# Patient Record
Sex: FEMALE | Race: WHITE | NOT HISPANIC OR LATINO | Employment: FULL TIME | URBAN - METROPOLITAN AREA
[De-identification: names, ages, dates, MRNs, and addresses within clinical notes are randomized per-mention and may not be internally consistent; named-entity substitution may affect disease eponyms.]

---

## 2017-01-13 ENCOUNTER — OFFICE VISIT (OUTPATIENT)
Dept: FAMILY MEDICINE | Facility: CLINIC | Age: 22
End: 2017-01-13
Payer: COMMERCIAL

## 2017-01-13 VITALS
OXYGEN SATURATION: 99 % | DIASTOLIC BLOOD PRESSURE: 84 MMHG | HEART RATE: 83 BPM | WEIGHT: 201.94 LBS | TEMPERATURE: 99 F | HEIGHT: 60 IN | BODY MASS INDEX: 39.65 KG/M2 | RESPIRATION RATE: 16 BRPM | SYSTOLIC BLOOD PRESSURE: 118 MMHG

## 2017-01-13 DIAGNOSIS — Z11.3 SCREEN FOR STD (SEXUALLY TRANSMITTED DISEASE): ICD-10-CM

## 2017-01-13 DIAGNOSIS — Z01.419 ROUTINE GYNECOLOGICAL EXAMINATION: Primary | ICD-10-CM

## 2017-01-13 DIAGNOSIS — L98.9 SKIN LESION: ICD-10-CM

## 2017-01-13 DIAGNOSIS — Z30.9 ENCOUNTER FOR CONTRACEPTIVE MANAGEMENT, UNSPECIFIED CONTRACEPTIVE ENCOUNTER TYPE: ICD-10-CM

## 2017-01-13 DIAGNOSIS — I10 ESSENTIAL HYPERTENSION: ICD-10-CM

## 2017-01-13 PROCEDURE — 90471 IMMUNIZATION ADMIN: CPT | Mod: S$GLB,,, | Performed by: NURSE PRACTITIONER

## 2017-01-13 PROCEDURE — 87591 N.GONORRHOEAE DNA AMP PROB: CPT

## 2017-01-13 PROCEDURE — 99395 PREV VISIT EST AGE 18-39: CPT | Mod: S$GLB,,, | Performed by: NURSE PRACTITIONER

## 2017-01-13 PROCEDURE — 90686 IIV4 VACC NO PRSV 0.5 ML IM: CPT | Mod: S$GLB,,, | Performed by: NURSE PRACTITIONER

## 2017-01-13 RX ORDER — METOPROLOL SUCCINATE 50 MG/1
50 TABLET, EXTENDED RELEASE ORAL DAILY
Qty: 30 TABLET | Refills: 11 | Status: SHIPPED | OUTPATIENT
Start: 2017-01-13 | End: 2020-08-14

## 2017-01-13 RX ORDER — NORGESTIMATE AND ETHINYL ESTRADIOL 0.25-0.035
1 KIT ORAL DAILY
Qty: 28 TABLET | Refills: 11 | Status: SHIPPED | OUTPATIENT
Start: 2017-01-13 | End: 2018-02-20 | Stop reason: SDUPTHER

## 2017-01-14 NOTE — PROGRESS NOTES
Subjective:       Patient ID: Gisell Villafuerte is a 21 y.o. female.    Chief Complaint: Annual Exam    HPI Comments:   Last pap 2015-normal    The patient is here for routine well woman exam.  She does have hypertension and has been stable for years on Toprol.  She does need a refill.  She is on oral birth control pills.  She does not smoke.  She does need a refill.  No side effects either of these medications.  She is generally feeling well without any new complaints today.  She does have a mole to the left side of her head that she would like to evaluate.            Review of Systems   Constitutional: Negative for activity change and unexpected weight change.   HENT: Negative for hearing loss, rhinorrhea and trouble swallowing.    Eyes: Negative for discharge and visual disturbance.   Respiratory: Negative for chest tightness and wheezing.    Cardiovascular: Negative for chest pain and palpitations.   Gastrointestinal: Negative for blood in stool, constipation, diarrhea and vomiting.   Endocrine: Negative for polydipsia and polyuria.   Genitourinary: Negative for difficulty urinating, dysuria, hematuria and menstrual problem.   Musculoskeletal: Negative for arthralgias and joint swelling.   Neurological: Negative for weakness and headaches.   Psychiatric/Behavioral: Negative for confusion and dysphoric mood.       Objective:       Vitals:    17 1336   BP: 118/84   Pulse: 83   Resp: 16   Temp: 98.5 °F (36.9 °C)   TempSrc: Oral   SpO2: 99%   Weight: 91.6 kg (201 lb 15.1 oz)   Height: 5' (1.524 m)   PainSc: 0-No pain       Physical Exam   Constitutional: She is oriented to person, place, and time. She appears well-developed. No distress.   Obese female    HENT:   Head: Normocephalic and atraumatic.   Right Ear: Hearing, tympanic membrane, external ear and ear canal normal.   Left Ear: Hearing, tympanic membrane, external ear and ear canal normal.   Nose: Nose normal.   Mouth/Throat: Uvula is midline,  oropharynx is clear and moist and mucous membranes are normal.   Eyes: Conjunctivae and EOM are normal. Pupils are equal, round, and reactive to light. Right eye exhibits no discharge. Left eye exhibits no discharge.   Neck: Trachea normal and normal range of motion. Neck supple. No JVD present. Carotid bruit is not present. No thyromegaly present.   Cardiovascular: Normal rate and regular rhythm.  Exam reveals no gallop and no friction rub.    No murmur heard.  Pulmonary/Chest: Effort normal and breath sounds normal. No respiratory distress. She has no wheezes. She has no rales. She exhibits no tenderness.   Abdominal: Soft. Bowel sounds are normal. She exhibits no distension and no mass. There is no tenderness. There is no rebound and no guarding.   Musculoskeletal: Normal range of motion.   Neurological: She is alert and oriented to person, place, and time. Coordination normal.   Skin: Skin is warm and dry. She is not diaphoretic.   Large, raised, pink skin lesion to the left scalp   Psychiatric: She has a normal mood and affect. Her behavior is normal. Judgment and thought content normal.       Assessment:       1. Routine gynecological examination    2. Screen for STD (sexually transmitted disease)    3. Skin lesion    4. Essential hypertension    5. Obesity, Class II, BMI 35-39.9, with comorbidity    6. Encounter for contraceptive management, unspecified contraceptive encounter type        Plan:       Gisell was seen today for annual exam.    Diagnoses and all orders for this visit:    Routine gynecological examination  Patient does not require pap today. We discussed recent ACS/ACOG/USPSTF guidelines.       Screen for STD (sexually transmitted disease)  -     C. trachomatis/N. gonorrhoeae by AMP DNA Urine    Skin lesion  -     Ambulatory consult to Dermatology    Essential hypertension    Obesity, Class II, BMI 35-39.9, with comorbidity   Weight loss discussed      Encounter for contraceptive management,  unspecified contraceptive encounter type    Other orders  -     metoprolol succinate (TOPROL-XL) 50 MG 24 hr tablet; Take 1 tablet (50 mg total) by mouth once daily.  -     Influenza - Quadrivalent (3 years & older) (PF)  -     norgestimate-ethinyl estradiol (SPRINTEC, 28,) 0.25-35 mg-mcg per tablet; Take 1 tablet by mouth once daily.

## 2017-01-14 NOTE — PROGRESS NOTES
Answers for HPI/ROS submitted by the patient on 1/10/2017   neck pain: No, No  unexpected weight change: No  hearing loss: No  rhinorrhea: No  trouble swallowing: No  eye discharge: No  visual disturbance: No  chest tightness: No  wheezing: No  chest pain: No  palpatations: No  blood in stool: No  constipation: No  vomiting: No  diarrhea: No  polydipsia: No  polyuria: No  difficulty urinating: No  hematuria: No  menstrual problem: No  dysuria: No  joint swelling: No  arthralgias: No  headaches: No  weakness: No  confusion: No  dysphoric mood: No

## 2017-01-17 LAB
C TRACH DNA SPEC QL NAA+PROBE: NEGATIVE
N GONORRHOEA DNA SPEC QL NAA+PROBE: NEGATIVE

## 2017-01-31 ENCOUNTER — PATIENT MESSAGE (OUTPATIENT)
Dept: FAMILY MEDICINE | Facility: CLINIC | Age: 22
End: 2017-01-31

## 2017-02-12 ENCOUNTER — PATIENT MESSAGE (OUTPATIENT)
Dept: FAMILY MEDICINE | Facility: CLINIC | Age: 22
End: 2017-02-12

## 2017-02-14 ENCOUNTER — TELEPHONE (OUTPATIENT)
Dept: FAMILY MEDICINE | Facility: CLINIC | Age: 22
End: 2017-02-14

## 2017-02-14 NOTE — TELEPHONE ENCOUNTER
contrave printed, tell her she can go online at [employer] and print coupon. F/u with me in 2 months for weight check

## 2017-02-16 ENCOUNTER — OFFICE VISIT (OUTPATIENT)
Dept: DERMATOLOGY | Facility: CLINIC | Age: 22
End: 2017-02-16
Payer: COMMERCIAL

## 2017-02-16 VITALS — BODY MASS INDEX: 39.46 KG/M2 | HEIGHT: 60 IN | WEIGHT: 201 LBS

## 2017-02-16 DIAGNOSIS — D48.9 NEOPLASM OF UNCERTAIN BEHAVIOR: Primary | ICD-10-CM

## 2017-02-16 PROCEDURE — 99499 UNLISTED E&M SERVICE: CPT | Mod: S$GLB,,, | Performed by: DERMATOLOGY

## 2017-02-16 PROCEDURE — 99999 PR PBB SHADOW E&M-EST. PATIENT-LVL III: CPT | Mod: PBBFAC,,, | Performed by: DERMATOLOGY

## 2017-02-16 PROCEDURE — 11100 PR BIOPSY OF SKIN LESION: CPT | Mod: S$GLB,,, | Performed by: DERMATOLOGY

## 2017-02-16 PROCEDURE — 88305 TISSUE EXAM BY PATHOLOGIST: CPT | Performed by: PATHOLOGY

## 2017-02-16 NOTE — PROGRESS NOTES
Subjective:       Patient ID:  Gisell Villafuerte is a 21 y.o. female who presents for   Chief Complaint   Patient presents with    Skin Tags     Scalp, long time, no tx     HPI Comments: Initial visit  No h/o skin cancer or disease  Lesion on left ant scalp present since childhood, bothersome, catches in comb, tender when traumatized      Skin Tags         Review of Systems   Constitutional: Negative for fever and chills.        Objective:    Physical Exam   Constitutional: She appears well-developed and well-nourished. No distress.   Neurological: She is alert and oriented to person, place, and time. She is not disoriented.   Psychiatric: She has a normal mood and affect.   Skin:   Areas Examined (abnormalities noted in diagram):   Scalp / Hair Palpated and Inspected              Diagram Legend     Erythematous scaling macule/papule c/w actinic keratosis       Vascular papule c/w angioma      Pigmented verrucoid papule/plaque c/w seborrheic keratosis      Yellow umbilicated papule c/w sebaceous hyperplasia      Irregularly shaped tan macule c/w lentigo     1-2 mm smooth white papules consistent with Milia      Movable subcutaneous cyst with punctum c/w epidermal inclusion cyst      Subcutaneous movable cyst c/w pilar cyst      Firm pink to brown papule c/w dermatofibroma      Pedunculated fleshy papule(s) c/w skin tag(s)      Evenly pigmented macule c/w junctional nevus     Mildly variegated pigmented, slightly irregular-bordered macule c/w mildly atypical nevus      Flesh colored to evenly pigmented papule c/w intradermal nevus       Pink pearly papule/plaque c/w basal cell carcinoma      Erythematous hyperkeratotic cursted plaque c/w SCC      Surgical scar with no sign of skin cancer recurrence      Open and closed comedones      Inflammatory papules and pustules      Verrucoid papule consistent consistent with wart     Erythematous eczematous patches and plaques     Dystrophic onycholytic nail with subungual  debris c/w onychomycosis     Umbilicated papule    Erythematous-base heme-crusted tan verrucoid plaque consistent with inflamed seborrheic keratosis     Erythematous Silvery Scaling Plaque c/w Psoriasis     See annotation          Assessment / Plan:      Pathology Orders:      Normal Orders This Visit    Tissue Specimen To Pathology, Dermatology     Questions:    Directional Terms:  Other(comment)    Clinical information:  Fleshy papule, IDN vs nevus sebaceus vs other    Specific Site:  left frontal scalp        Neoplasm of uncertain behavior  -     Tissue Specimen To Pathology, Dermatology    Shave biopsy procedure note:    Shave biopsy performed after verbal consent including risk of infection, scar, recurrence, need for additional treatment of site. Area prepped with alcohol, anesthetized with approximately 1.0cc of 1% lidocaine with epinephrine. Lesional tissue shaved with razor blade. Hemostasis achieved with application of aluminum chloride followed by hyfrecation. No complications. Dressing applied. Wound care explained.           Return if symptoms worsen or fail to improve.

## 2017-02-16 NOTE — LETTER
February 16, 2017      [place]  [address]  [facility] [address]           [place] [facility]  [address]  [facility] [address]  Phone: [phone]          Patient: Gisell Villafuerte   MR Number: [id number]   YOB: 1995   Date of Visit: 2/16/2017       Dear Kajal Dawn:    Thank you for referring Gisell Villafuerte to me for evaluation. Attached you will find relevant portions of my assessment and plan of care.    If you have questions, please do not hesitate to call me. I look forward to following Gisell Villafuerte along with you.    Sincerely,    Denise Weiss MD    Enclosure  CC:  No Recipients    If you would like to receive this communication electronically, please contact [email or web address] or [phone] to request more information on [facility] access.    For providers and/or their staff who would like to refer a patient to [facility], please contact us through our one-stop-shop provider referral line, [facility], at [phone].    If you feel you have received this communication in error or would no longer like to receive these types of communications, please e-mail [email or web address]

## 2017-04-27 ENCOUNTER — OFFICE VISIT (OUTPATIENT)
Dept: FAMILY MEDICINE | Facility: CLINIC | Age: 22
End: 2017-04-27

## 2017-04-27 VITALS
RESPIRATION RATE: 16 BRPM | WEIGHT: 192.88 LBS | HEIGHT: 60 IN | DIASTOLIC BLOOD PRESSURE: 86 MMHG | BODY MASS INDEX: 37.87 KG/M2 | HEART RATE: 91 BPM | SYSTOLIC BLOOD PRESSURE: 128 MMHG

## 2017-04-27 DIAGNOSIS — F41.9 ANXIETY: Primary | ICD-10-CM

## 2017-04-27 PROCEDURE — 99214 OFFICE O/P EST MOD 30 MIN: CPT | Mod: S$GLB,,, | Performed by: NURSE PRACTITIONER

## 2017-04-27 RX ORDER — ESCITALOPRAM OXALATE 10 MG/1
10 TABLET ORAL DAILY
Qty: 30 TABLET | Refills: 11 | Status: SHIPPED | OUTPATIENT
Start: 2017-04-27 | End: 2020-08-14

## 2017-04-27 NOTE — MR AVS SNAPSHOT
[facility]  [address] [address] [address]  [facility] [address]  Phone: [phone]  Fax: [phone]                  Gisell Villafuerte   2017 3:00 PM   Office Visit    Description:  Female : 1995   Provider:  Kajal Dawn NP   Department:  [facility]           Reason for Visit     Anxiety           Diagnoses this Visit        Comments    Anxiety    -  Primary            To Do List           Future Appointments        Provider Department Dept Phone    2017 10:00 AM Kajal Dawn NP [facility] [phone]      Goals (5 Years of Data)     None      Follow-Up and Disposition     Return in about 8 weeks (around 2017).       These Medications        Disp Refills Start End    escitalopram oxalate (LEXAPRO) 10 MG tablet 30 tablet 11 2017    Take 1 tablet (10 mg total) by mouth once daily. - Oral    Pharmacy: [facility] [address] [place] [address].  #: [phone]         [facility]     [facility] On Call Nurse Care Line -  Assistance  Unless otherwise directed by your provider, please contact [facility], our nurse care line that is available for  assistance.     Registered nurses in the [facility] provide: appointment scheduling, clinical advisement, health education, and other advisory services.  Call: [phone] (toll free)               Medications           Message regarding Medications     Verify the changes and/or additions to your medication regime listed below are the same as discussed with your clinician today.  If any of these changes or additions are incorrect, please notify your healthcare provider.        START taking these NEW medications        Refills    escitalopram oxalate (LEXAPRO) 10 MG tablet 11    Sig: Take 1 tablet (10 mg total) by mouth once daily.    Class: Normal    Route: Oral           Verify that the below list of  medications is an accurate representation of the medications you are currently taking.  If none reported, the list may be blank. If incorrect, please contact your healthcare provider. Carry this list with you in case of emergency.           Current Medications     metoprolol succinate (TOPROL-XL) 50 MG 24 hr tablet Take 1 tablet (50 mg total) by mouth once daily.    naltrexone-bupropion 8-90 mg TbSR 1 tab once daily in the morning for 1 week; at week 2, increase to 1 tab twice daily morning and evening and continue for 1 week; at week 3, increase to 2 tab in the morning and 1 tablet in the evening and continue for 1 week; at week 4, increase to 2 tablets twice daily continue for the remainder of the treatment course.    norgestimate-ethinyl estradiol (SPRINTEC, 28,) 0.25-35 mg-mcg per tablet Take 1 tablet by mouth once daily.    escitalopram oxalate (LEXAPRO) 10 MG tablet Take 1 tablet (10 mg total) by mouth once daily.           Clinical Reference Information           Your Vitals Were     BP Pulse Resp Height Weight Last Period    128/86 91 16 5' (1.524 m) 87.5 kg (192 lb 14.4 oz) 04/19/2017    BMI                37.67 kg/m2          Blood Pressure          Most Recent Value    BP  128/86      Allergies as of 4/27/2017     No Known Drug Allergies      Immunizations Administered on Date of Encounter - 4/27/2017     None      Language Assistance Services     ATTENTION: Language assistance services are available, free of charge. Please call [phone].      ATENCIÓN: Si habla español, tiene a starks disposición servicios gratuitos de asistencia lingüística. Llame al [phone].     CHÚ Ý: N?u b?n nói Ti?ng Vi?t, có các d?ch v? h? tr? ngôn ng? mi?n phí dành cho b?n. G?i s? [phone].         [facility] complies with applicable Federal civil rights laws and does not discriminate on the basis of race, color, national origin, age, disability, or sex.

## 2017-04-28 ENCOUNTER — PATIENT OUTREACH (OUTPATIENT)
Dept: ADMINISTRATIVE | Facility: HOSPITAL | Age: 22
End: 2017-04-28

## 2017-04-30 NOTE — PROGRESS NOTES
Subjective:       Patient ID: Gisell Villafuerte is a 21 y.o. female.    Chief Complaint: Anxiety    Anxiety   Presents for initial visit. Onset was 1 to 5 years ago. The problem has been gradually worsening. Symptoms include compulsions, decreased concentration, excessive worry, hyperventilation, insomnia, irritability, muscle tension, nervous/anxious behavior, obsessions and panic. Patient reports no chest pain, confusion, depressed mood, dizziness, feeling of choking, impotence, malaise, nausea, shortness of breath or suicidal ideas. Symptoms occur most days. The severity of symptoms is interfering with daily activities. The quality of sleep is non-restorative. Nighttime awakenings: one to two.     There is no history of anemia, bipolar disorder, depression or fibromyalgia. Past treatments include nothing.     Review of Systems   Constitutional: Positive for irritability. Negative for activity change and appetite change.   HENT: Negative for congestion, postnasal drip, rhinorrhea and sinus pressure.    Eyes: Negative for pain and redness.   Respiratory: Negative for choking, chest tightness and shortness of breath.    Cardiovascular: Negative for chest pain.   Gastrointestinal: Negative for abdominal distention, abdominal pain, blood in stool, constipation, diarrhea, nausea and vomiting.   Endocrine: Negative for polydipsia and polyphagia.   Genitourinary: Negative for dysuria, hematuria and impotence.   Musculoskeletal: Negative for arthralgias and myalgias.   Skin: Negative for color change and rash.   Neurological: Negative for dizziness and headaches.   Psychiatric/Behavioral: Positive for decreased concentration. Negative for agitation, behavioral problems, confusion and suicidal ideas. The patient is nervous/anxious and has insomnia.        Objective:       Vitals:    04/27/17 1537   BP: 128/86   Pulse: 91   Resp: 16   Weight: 87.5 kg (192 lb 14.4 oz)   Height: 5' (1.524 m)   PainSc: 0-No pain        Physical Exam   Constitutional: She is oriented to person, place, and time. She appears well-developed and well-nourished. No distress.   HENT:   Head: Normocephalic and atraumatic.   Right Ear: Hearing, tympanic membrane, external ear and ear canal normal.   Left Ear: Hearing, tympanic membrane, external ear and ear canal normal.   Nose: Nose normal.   Mouth/Throat: Uvula is midline, oropharynx is clear and moist and mucous membranes are normal.   Eyes: Conjunctivae and EOM are normal. Pupils are equal, round, and reactive to light. Right eye exhibits no discharge. Left eye exhibits no discharge.   Neck: Trachea normal and normal range of motion. Neck supple. No JVD present. Carotid bruit is not present. No thyromegaly present.   Cardiovascular: Normal rate and regular rhythm.  Exam reveals no gallop and no friction rub.    No murmur heard.  Pulmonary/Chest: Effort normal and breath sounds normal. No respiratory distress. She has no wheezes. She has no rales. She exhibits no tenderness.   Abdominal: Soft. Bowel sounds are normal. She exhibits no distension and no mass. There is no tenderness. There is no rebound and no guarding.   Musculoskeletal: Normal range of motion.   Neurological: She is alert and oriented to person, place, and time. Coordination normal.   Skin: Skin is warm and dry. She is not diaphoretic.   Psychiatric: She has a normal mood and affect. Her behavior is normal. Judgment and thought content normal.       Assessment:       1. Anxiety        Plan:       Gisell was seen today for anxiety.    Diagnoses and all orders for this visit:    Anxiety  -     escitalopram oxalate (LEXAPRO) 10 MG tablet; Take 1 tablet (10 mg total) by mouth once daily.    I did discuss the benefits of behavioral therapy with the patient as well

## 2017-07-10 ENCOUNTER — LAB VISIT (OUTPATIENT)
Dept: LAB | Facility: HOSPITAL | Age: 22
End: 2017-07-10
Attending: FAMILY MEDICINE
Payer: COMMERCIAL

## 2017-07-10 ENCOUNTER — OFFICE VISIT (OUTPATIENT)
Dept: FAMILY MEDICINE | Facility: CLINIC | Age: 22
End: 2017-07-10
Payer: COMMERCIAL

## 2017-07-10 VITALS
BODY MASS INDEX: 37.57 KG/M2 | DIASTOLIC BLOOD PRESSURE: 82 MMHG | HEART RATE: 98 BPM | RESPIRATION RATE: 18 BRPM | OXYGEN SATURATION: 98 % | WEIGHT: 191.38 LBS | SYSTOLIC BLOOD PRESSURE: 120 MMHG | HEIGHT: 60 IN | TEMPERATURE: 98 F

## 2017-07-10 DIAGNOSIS — M25.50 ARTHRALGIA, UNSPECIFIED JOINT: ICD-10-CM

## 2017-07-10 DIAGNOSIS — Z00.00 LABORATORY EXAM ORDERED AS PART OF ROUTINE GENERAL MEDICAL EXAMINATION: ICD-10-CM

## 2017-07-10 DIAGNOSIS — M25.50 ARTHRALGIA, UNSPECIFIED JOINT: Primary | ICD-10-CM

## 2017-07-10 DIAGNOSIS — M25.562 ACUTE PAIN OF LEFT KNEE: ICD-10-CM

## 2017-07-10 LAB
ALBUMIN SERPL BCP-MCNC: 4.2 G/DL
ALP SERPL-CCNC: 72 U/L
ALT SERPL W/O P-5'-P-CCNC: 20 U/L
ANION GAP SERPL CALC-SCNC: 8 MMOL/L
AST SERPL-CCNC: 16 U/L
BASOPHILS # BLD AUTO: 0.01 K/UL
BASOPHILS NFR BLD: 0.1 %
BILIRUB SERPL-MCNC: 0.6 MG/DL
BUN SERPL-MCNC: 8 MG/DL
CALCIUM SERPL-MCNC: 9.4 MG/DL
CHLORIDE SERPL-SCNC: 106 MMOL/L
CHOLEST/HDLC SERPL: 3.3 {RATIO}
CO2 SERPL-SCNC: 25 MMOL/L
CREAT SERPL-MCNC: 0.7 MG/DL
CRP SERPL-MCNC: 5.3 MG/L
DIFFERENTIAL METHOD: NORMAL
EOSINOPHIL # BLD AUTO: 0.1 K/UL
EOSINOPHIL NFR BLD: 0.7 %
ERYTHROCYTE [DISTWIDTH] IN BLOOD BY AUTOMATED COUNT: 12.4 %
ERYTHROCYTE [SEDIMENTATION RATE] IN BLOOD BY WESTERGREN METHOD: 1 MM/HR
EST. GFR  (AFRICAN AMERICAN): >60 ML/MIN/1.73 M^2
EST. GFR  (NON AFRICAN AMERICAN): >60 ML/MIN/1.73 M^2
GLUCOSE SERPL-MCNC: 85 MG/DL
HCT VFR BLD AUTO: 42.5 %
HDL/CHOLESTEROL RATIO: 30.2 %
HDLC SERPL-MCNC: 139 MG/DL
HDLC SERPL-MCNC: 42 MG/DL
HGB BLD-MCNC: 14.4 G/DL
LDLC SERPL CALC-MCNC: 79.2 MG/DL
LYMPHOCYTES # BLD AUTO: 2.3 K/UL
LYMPHOCYTES NFR BLD: 25.5 %
MCH RBC QN AUTO: 29 PG
MCHC RBC AUTO-ENTMCNC: 33.9 %
MCV RBC AUTO: 86 FL
MONOCYTES # BLD AUTO: 0.5 K/UL
MONOCYTES NFR BLD: 5.5 %
NEUTROPHILS # BLD AUTO: 6 K/UL
NEUTROPHILS NFR BLD: 67.6 %
NONHDLC SERPL-MCNC: 97 MG/DL
PLATELET # BLD AUTO: 308 K/UL
PMV BLD AUTO: 11.3 FL
POTASSIUM SERPL-SCNC: 4.2 MMOL/L
PROT SERPL-MCNC: 7.7 G/DL
RBC # BLD AUTO: 4.97 M/UL
SODIUM SERPL-SCNC: 139 MMOL/L
TRIGL SERPL-MCNC: 89 MG/DL
TSH SERPL DL<=0.005 MIU/L-ACNC: 1.93 UIU/ML
WBC # BLD AUTO: 8.89 K/UL

## 2017-07-10 PROCEDURE — 99213 OFFICE O/P EST LOW 20 MIN: CPT | Mod: S$GLB,,, | Performed by: NURSE PRACTITIONER

## 2017-07-10 PROCEDURE — 80053 COMPREHEN METABOLIC PANEL: CPT

## 2017-07-10 PROCEDURE — 80061 LIPID PANEL: CPT

## 2017-07-10 PROCEDURE — 36415 COLL VENOUS BLD VENIPUNCTURE: CPT | Mod: PO

## 2017-07-10 PROCEDURE — 85651 RBC SED RATE NONAUTOMATED: CPT | Mod: PO

## 2017-07-10 PROCEDURE — 86140 C-REACTIVE PROTEIN: CPT

## 2017-07-10 PROCEDURE — 84443 ASSAY THYROID STIM HORMONE: CPT

## 2017-07-10 PROCEDURE — 85025 COMPLETE CBC W/AUTO DIFF WBC: CPT

## 2017-07-10 PROCEDURE — 86431 RHEUMATOID FACTOR QUANT: CPT

## 2017-07-10 PROCEDURE — 83036 HEMOGLOBIN GLYCOSYLATED A1C: CPT

## 2017-07-10 NOTE — PROGRESS NOTES
"Subjective:       Patient ID: Gisell Villafuerte is a 21 y.o. female.    Chief Complaint: Knee Pain (C/o shooting pain in L knee X1 year. Wakes up in the middle of night. Also states L arm is starting to have the pain)    She also tells me she is having pain today in her left elbow that has just started over the past couple days.  No numbness or tingling to the arm or hand.  No trauma or injury to the left arm.      Knee Pain    There was no injury mechanism. The pain is present in the left knee. The pain has been constant since onset. Associated symptoms include a loss of motion. Pertinent negatives include no inability to bear weight, loss of sensation, muscle weakness, numbness or tingling. Associated symptoms comments: Knee "gives" out, pain mostly left lateral knee. The symptoms are aggravated by movement, palpation and weight bearing.     Review of Systems   Constitutional: Negative for appetite change, chills and fever.   HENT: Negative for ear pain and postnasal drip.    Eyes: Negative for pain and itching.   Respiratory: Negative for chest tightness and shortness of breath.    Gastrointestinal: Negative for abdominal distention and abdominal pain.   Endocrine: Negative for polydipsia and polyuria.   Genitourinary: Negative for difficulty urinating and flank pain.   Musculoskeletal: Positive for arthralgias.   Skin: Negative for color change and pallor.   Neurological: Negative for tingling, light-headedness, numbness and headaches.   Hematological: Negative for adenopathy. Does not bruise/bleed easily.   Psychiatric/Behavioral: Negative for agitation.       Objective:       Vitals:    07/10/17 1324   BP: 120/82   Pulse: 98   Resp: 18   Temp: 98.2 °F (36.8 °C)   TempSrc: Oral   SpO2: 98%   Weight: 86.8 kg (191 lb 5.8 oz)   Height: 5' (1.524 m)   PainSc:   8   PainLoc: Knee       Physical Exam   Constitutional: She is oriented to person, place, and time. She appears well-developed. No distress.   Obese female "   HENT:   Head: Normocephalic and atraumatic.   Right Ear: Hearing, tympanic membrane, external ear and ear canal normal.   Left Ear: Hearing, tympanic membrane, external ear and ear canal normal.   Nose: Nose normal.   Mouth/Throat: Uvula is midline, oropharynx is clear and moist and mucous membranes are normal.   Eyes: Conjunctivae and EOM are normal. Pupils are equal, round, and reactive to light. Right eye exhibits no discharge. Left eye exhibits no discharge.   Neck: Trachea normal and normal range of motion. Neck supple. No JVD present. Carotid bruit is not present. No thyromegaly present.   Cardiovascular: Normal rate and regular rhythm.  Exam reveals no gallop and no friction rub.    No murmur heard.  Pulmonary/Chest: Effort normal and breath sounds normal. No respiratory distress. She has no wheezes. She has no rales. She exhibits no tenderness.   Abdominal: Soft. Bowel sounds are normal. She exhibits no distension and no mass. There is no tenderness. There is no rebound and no guarding.   Musculoskeletal: Normal range of motion.   Unable to elicit pain to the left arm or to the left knee here in the clinic.   Neurological: She is alert and oriented to person, place, and time. Coordination normal.   Skin: Skin is warm and dry. She is not diaphoretic.   Psychiatric: She has a normal mood and affect. Her behavior is normal. Judgment and thought content normal.       Assessment:       1. Arthralgia, unspecified joint    2. Laboratory exam ordered as part of routine general medical examination    3. Acute pain of left knee        Plan:       Gisell was seen today for knee pain.    Diagnoses and all orders for this visit:    Arthralgia, unspecified joint  -     Rheumatoid factor; Future  -     Sedimentation rate, manual; Future  -     C-reactive protein; Future    Laboratory exam ordered as part of routine general medical examination  -     Comprehensive metabolic panel; Future  -     CBC auto differential;  Future  -     Hemoglobin A1c; Future  -     Lipid panel; Future  -     TSH; Future    Acute pain of left knee  -     Ambulatory consult to Orthopedics

## 2017-07-11 DIAGNOSIS — M25.562 LEFT KNEE PAIN, UNSPECIFIED CHRONICITY: Primary | ICD-10-CM

## 2017-07-11 LAB
ESTIMATED AVG GLUCOSE: 85 MG/DL
HBA1C MFR BLD HPLC: 4.6 %
RHEUMATOID FACT SERPL-ACNC: <10 IU/ML

## 2017-07-13 ENCOUNTER — HOSPITAL ENCOUNTER (OUTPATIENT)
Dept: RADIOLOGY | Facility: HOSPITAL | Age: 22
Discharge: HOME | End: 2017-07-13
Attending: ORTHOPAEDIC SURGERY
Payer: COMMERCIAL

## 2017-07-13 ENCOUNTER — OFFICE VISIT (OUTPATIENT)
Dept: ORTHOPEDICS | Facility: CLINIC | Age: 22
End: 2017-07-13
Payer: COMMERCIAL

## 2017-07-13 VITALS — WEIGHT: 191.38 LBS | BODY MASS INDEX: 37.57 KG/M2 | HEIGHT: 60 IN

## 2017-07-13 DIAGNOSIS — M25.562 LEFT KNEE PAIN, UNSPECIFIED CHRONICITY: Primary | ICD-10-CM

## 2017-07-13 DIAGNOSIS — M25.562 LEFT KNEE PAIN, UNSPECIFIED CHRONICITY: ICD-10-CM

## 2017-07-13 PROCEDURE — 73560 X-RAY EXAM OF KNEE 1 OR 2: CPT | Mod: TC,PO,RT

## 2017-07-13 PROCEDURE — 99999 PR PBB SHADOW E&M-EST. PATIENT-LVL II: CPT | Mod: PBBFAC,,, | Performed by: ORTHOPAEDIC SURGERY

## 2017-07-13 PROCEDURE — 73562 X-RAY EXAM OF KNEE 3: CPT | Mod: 26,LT,, | Performed by: RADIOLOGY

## 2017-07-13 PROCEDURE — 99203 OFFICE O/P NEW LOW 30 MIN: CPT | Mod: S$GLB,,, | Performed by: ORTHOPAEDIC SURGERY

## 2017-07-13 PROCEDURE — 73560 X-RAY EXAM OF KNEE 1 OR 2: CPT | Mod: 26,RT,, | Performed by: RADIOLOGY

## 2017-07-13 NOTE — PROGRESS NOTES
"21-year-old female with complaints of pain in her left knee for about 4 months time.  No trauma.  Points to the lateral side of the knee as the location of her pain.  Pain occurs randomly.  She does indeed have night pain.  She works as an EMT    Examination of the knee reveals good motion good strength no instability minimal lateral joint tenderness.  Hip range of motion is painless.  Straight leg recessing is negative.  She's nontender in the lumbar spine is full and painless motion of the lumbar spine    X-rays are negative    Assessment left knee pain of uncertain etiology in a healthy 21-year-old without trauma.    Plan MRI, follow-up after the MRI    Further History  Aching pain  Worse with activity  Relieved with rest  No other associated symptoms  No other radiation    Further Exam  Alert and oriented  Pleasant  Contralateral limb has appropriate range of motion for age and condition  Contralateral limb has appropriate strength for age and condition  Contralateral limb has appropriate stability  for age and condition  No adenopathy  Pulses are appropriate for current condition  Skin is intact        Chief Complaint    Chief Complaint   Patient presents with    Left Knee - Pain, Swelling       HPI  Gisell Villafuerte is a 21 y.o.  female who presents with       Past Medical History  Past Medical History:   Diagnosis Date    Anemia     s/p transfusion from menstral bleeding    Anxiety     Asthma     "athletic" asthma    Blood transfusion     Depression     Hashimoto's disease     HTN (hypertension)        Past Surgical History  History reviewed. No pertinent surgical history.    Medications  Current Outpatient Prescriptions   Medication Sig    escitalopram oxalate (LEXAPRO) 10 MG tablet Take 1 tablet (10 mg total) by mouth once daily.    metoprolol succinate (TOPROL-XL) 50 MG 24 hr tablet Take 1 tablet (50 mg total) by mouth once daily.    norgestimate-ethinyl estradiol (SPRINTEC, 28,) 0.25-35 " mg-mcg per tablet Take 1 tablet by mouth once daily.     No current facility-administered medications for this visit.        Allergies  Review of patient's allergies indicates:   Allergen Reactions    No known drug allergies        Family History  Family History   Problem Relation Age of Onset    Thyroid disease Mother     Hashimoto's thyroiditis Mother     Diabetes Father     Breast cancer Neg Hx     Ovarian cancer Neg Hx     Melanoma Neg Hx     Psoriasis Neg Hx     Lupus Neg Hx     Eczema Neg Hx        Social History  Social History     Social History    Marital status: Single     Spouse name: N/A    Number of children: N/A    Years of education: N/A     Occupational History    Not on file.     Social History Main Topics    Smoking status: Never Smoker    Smokeless tobacco: Never Used    Alcohol use No    Drug use: No    Sexual activity: Yes     Partners: Male     Birth control/ protection: Condom, Inserts     Other Topics Concern    Not on file     Social History Narrative    No narrative on file               Review of Systems     Constitutional: Negative    HENT: Negative  Eyes: Negative  Respiratory: Negative  Cardiovascular: Negative  Musculoskeletal: HPI  Skin: Negative  Neurological: Negative  Hematological: Negative  Endocrine: Negative                 Physical Exam    There were no vitals filed for this visit.  Body mass index is 37.37 kg/m².  Physical Examination:     General appearance -  well appearing, and in no distress  Mental status - awake  Neck - supple  Chest -  symmetric air entry  Heart - normal rate   Abdomen - soft      Assessment     1. Left knee pain, unspecified chronicity    2. Obesity, Class II, BMI 35-39.9, with comorbidity          Plan

## 2017-07-20 ENCOUNTER — HOSPITAL ENCOUNTER (OUTPATIENT)
Dept: RADIOLOGY | Facility: HOSPITAL | Age: 22
Discharge: HOME | End: 2017-07-20
Attending: ORTHOPAEDIC SURGERY
Payer: COMMERCIAL

## 2017-07-20 DIAGNOSIS — M25.562 LEFT KNEE PAIN, UNSPECIFIED CHRONICITY: ICD-10-CM

## 2017-07-20 PROCEDURE — 73721 MRI JNT OF LWR EXTRE W/O DYE: CPT | Mod: TC,PO,LT

## 2017-07-20 PROCEDURE — 73721 MRI JNT OF LWR EXTRE W/O DYE: CPT | Mod: 26,LT,, | Performed by: RADIOLOGY

## 2017-07-21 ENCOUNTER — OFFICE VISIT (OUTPATIENT)
Dept: ORTHOPEDICS | Facility: CLINIC | Age: 22
End: 2017-07-21
Payer: COMMERCIAL

## 2017-07-21 VITALS — HEIGHT: 60 IN | WEIGHT: 191 LBS | BODY MASS INDEX: 37.5 KG/M2

## 2017-07-21 DIAGNOSIS — M25.562 LEFT KNEE PAIN, UNSPECIFIED CHRONICITY: Primary | ICD-10-CM

## 2017-07-21 PROCEDURE — 99999 PR PBB SHADOW E&M-EST. PATIENT-LVL II: CPT | Mod: PBBFAC,,, | Performed by: ORTHOPAEDIC SURGERY

## 2017-07-21 PROCEDURE — 99213 OFFICE O/P EST LOW 20 MIN: CPT | Mod: S$GLB,,, | Performed by: ORTHOPAEDIC SURGERY

## 2017-07-21 NOTE — PROGRESS NOTES
22 years old follow-up of the left knee MRI.  Currently pain is 0 out of 10 on the pain scale    MRI was negative    Plan symptomatic care strengthening exercises follow-up as needed

## 2017-10-11 ENCOUNTER — PATIENT MESSAGE (OUTPATIENT)
Dept: FAMILY MEDICINE | Facility: CLINIC | Age: 22
End: 2017-10-11

## 2018-02-20 RX ORDER — NORGESTIMATE AND ETHINYL ESTRADIOL 0.25-0.035
KIT ORAL
Qty: 28 TABLET | Refills: 4 | Status: SHIPPED | OUTPATIENT
Start: 2018-02-20 | End: 2018-06-28 | Stop reason: SDUPTHER

## 2018-02-20 NOTE — TELEPHONE ENCOUNTER
----- Message from Alea Montenegro sent at 2/20/2018  9:10 AM CST -----  Contact: [phone]  Patient needs a refill on birth control pills with [facility] on [facility]. . Thanks!

## 2018-03-21 ENCOUNTER — TELEPHONE (OUTPATIENT)
Dept: FAMILY MEDICINE | Facility: CLINIC | Age: 23
End: 2018-03-21

## 2018-03-21 ENCOUNTER — PATIENT MESSAGE (OUTPATIENT)
Dept: FAMILY MEDICINE | Facility: CLINIC | Age: 23
End: 2018-03-21

## 2018-03-21 RX ORDER — OSELTAMIVIR PHOSPHATE 75 MG/1
75 CAPSULE ORAL 2 TIMES DAILY
Qty: 10 CAPSULE | Refills: 0 | Status: SHIPPED | OUTPATIENT
Start: 2018-03-21 | End: 2018-03-26

## 2018-03-21 NOTE — TELEPHONE ENCOUNTER
----- Message from Deloris Astorga sent at 3/21/2018 12:07 PM CDT -----  Contact: patient   Patient calling to request a prescription for Tamiflu. Please advise. She works around people with the Flu.   Call back    Thanks!    [id number] - [address] [place] [address]  [place] [address]  Phone: [phone] Fax: [phone]

## 2018-06-28 RX ORDER — NORGESTIMATE AND ETHINYL ESTRADIOL 0.25-0.035
KIT ORAL
Qty: 28 TABLET | Refills: 0 | Status: SHIPPED | OUTPATIENT
Start: 2018-06-28 | End: 2018-08-22 | Stop reason: SDUPTHER

## 2018-08-08 ENCOUNTER — HOSPITAL ENCOUNTER (EMERGENCY)
Facility: HOSPITAL | Age: 23
Discharge: HOME | End: 2018-08-08
Attending: EMERGENCY MEDICINE
Payer: COMMERCIAL

## 2018-08-08 VITALS
WEIGHT: 207 LBS | HEART RATE: 102 BPM | HEIGHT: 57 IN | SYSTOLIC BLOOD PRESSURE: 129 MMHG | BODY MASS INDEX: 44.66 KG/M2 | DIASTOLIC BLOOD PRESSURE: 75 MMHG | TEMPERATURE: 99 F | OXYGEN SATURATION: 97 % | RESPIRATION RATE: 18 BRPM

## 2018-08-08 DIAGNOSIS — R19.7 VOMITING AND DIARRHEA: Primary | ICD-10-CM

## 2018-08-08 DIAGNOSIS — R11.10 VOMITING AND DIARRHEA: Primary | ICD-10-CM

## 2018-08-08 LAB
ANION GAP SERPL CALC-SCNC: 15 MMOL/L
B-HCG UR QL: NEGATIVE
BASOPHILS # BLD AUTO: ABNORMAL K/UL
BASOPHILS NFR BLD: 0 %
BUN SERPL-MCNC: 14 MG/DL
CALCIUM SERPL-MCNC: 9.6 MG/DL
CHLORIDE SERPL-SCNC: 106 MMOL/L
CO2 SERPL-SCNC: 19 MMOL/L
CREAT SERPL-MCNC: 0.7 MG/DL
CTP QC/QA: YES
DIFFERENTIAL METHOD: ABNORMAL
EOSINOPHIL # BLD AUTO: ABNORMAL K/UL
EOSINOPHIL NFR BLD: 0 %
ERYTHROCYTE [DISTWIDTH] IN BLOOD BY AUTOMATED COUNT: 13.1 %
EST. GFR  (AFRICAN AMERICAN): >60 ML/MIN/1.73 M^2
EST. GFR  (NON AFRICAN AMERICAN): >60 ML/MIN/1.73 M^2
GLUCOSE SERPL-MCNC: 137 MG/DL
HCT VFR BLD AUTO: 45.1 %
HGB BLD-MCNC: 14.9 G/DL
LYMPHOCYTES # BLD AUTO: ABNORMAL K/UL
LYMPHOCYTES NFR BLD: 0 %
MCH RBC QN AUTO: 28 PG
MCHC RBC AUTO-ENTMCNC: 33.2 G/DL
MCV RBC AUTO: 85 FL
MONOCYTES # BLD AUTO: ABNORMAL K/UL
MONOCYTES NFR BLD: 3 %
NEUTROPHILS NFR BLD: 91 %
NEUTS BAND NFR BLD MANUAL: 6 %
PLATELET # BLD AUTO: 293 K/UL
PLATELET BLD QL SMEAR: ABNORMAL
PMV BLD AUTO: 8.9 FL
POTASSIUM SERPL-SCNC: 3.8 MMOL/L
RBC # BLD AUTO: 5.34 M/UL
SODIUM SERPL-SCNC: 140 MMOL/L
WBC # BLD AUTO: 21.1 K/UL

## 2018-08-08 PROCEDURE — 81025 URINE PREGNANCY TEST: CPT | Performed by: EMERGENCY MEDICINE

## 2018-08-08 PROCEDURE — 96361 HYDRATE IV INFUSION ADD-ON: CPT

## 2018-08-08 PROCEDURE — 25000003 PHARM REV CODE 250: Performed by: EMERGENCY MEDICINE

## 2018-08-08 PROCEDURE — 96375 TX/PRO/DX INJ NEW DRUG ADDON: CPT

## 2018-08-08 PROCEDURE — 85027 COMPLETE CBC AUTOMATED: CPT

## 2018-08-08 PROCEDURE — 80048 BASIC METABOLIC PNL TOTAL CA: CPT

## 2018-08-08 PROCEDURE — 85007 BL SMEAR W/DIFF WBC COUNT: CPT

## 2018-08-08 PROCEDURE — 36415 COLL VENOUS BLD VENIPUNCTURE: CPT

## 2018-08-08 PROCEDURE — 96374 THER/PROPH/DIAG INJ IV PUSH: CPT

## 2018-08-08 PROCEDURE — 99284 EMERGENCY DEPT VISIT MOD MDM: CPT | Mod: 25

## 2018-08-08 PROCEDURE — 63600175 PHARM REV CODE 636 W HCPCS: Performed by: EMERGENCY MEDICINE

## 2018-08-08 RX ORDER — ONDANSETRON 2 MG/ML
4 INJECTION INTRAMUSCULAR; INTRAVENOUS
Status: COMPLETED | OUTPATIENT
Start: 2018-08-08 | End: 2018-08-08

## 2018-08-08 RX ORDER — KETOROLAC TROMETHAMINE 30 MG/ML
10 INJECTION, SOLUTION INTRAMUSCULAR; INTRAVENOUS
Status: COMPLETED | OUTPATIENT
Start: 2018-08-08 | End: 2018-08-08

## 2018-08-08 RX ORDER — ONDANSETRON 4 MG/1
4 TABLET, ORALLY DISINTEGRATING ORAL EVERY 8 HOURS PRN
Qty: 12 TABLET | Refills: 0 | Status: SHIPPED | OUTPATIENT
Start: 2018-08-08 | End: 2018-10-17 | Stop reason: ALTCHOICE

## 2018-08-08 RX ORDER — LOPERAMIDE HYDROCHLORIDE 2 MG/1
4 CAPSULE ORAL
Status: COMPLETED | OUTPATIENT
Start: 2018-08-08 | End: 2018-08-08

## 2018-08-08 RX ORDER — LOPERAMIDE HYDROCHLORIDE 2 MG/1
2 CAPSULE ORAL 4 TIMES DAILY PRN
Qty: 12 CAPSULE | Refills: 0 | Status: SHIPPED | OUTPATIENT
Start: 2018-08-08 | End: 2019-11-19

## 2018-08-08 RX ADMIN — ONDANSETRON 4 MG: 2 INJECTION INTRAMUSCULAR; INTRAVENOUS at 03:08

## 2018-08-08 RX ADMIN — LOPERAMIDE HYDROCHLORIDE 4 MG: 2 CAPSULE ORAL at 03:08

## 2018-08-08 RX ADMIN — KETOROLAC TROMETHAMINE 10 MG: 30 INJECTION, SOLUTION INTRAMUSCULAR at 03:08

## 2018-08-08 RX ADMIN — SODIUM CHLORIDE, SODIUM LACTATE, POTASSIUM CHLORIDE, AND CALCIUM CHLORIDE 1000 ML: .6; .31; .03; .02 INJECTION, SOLUTION INTRAVENOUS at 03:08

## 2018-08-08 RX ADMIN — SODIUM CHLORIDE 1000 ML: 0.9 INJECTION, SOLUTION INTRAVENOUS at 03:08

## 2018-08-08 NOTE — ED PROVIDER NOTES
"Encounter Date: 8/8/2018    SCRIBE #1 NOTE: I, Seema Lyon, am scribing for, and in the presence of, Dr Kramer.       History     Chief Complaint   Patient presents with    Vomiting    Diarrhea     08/08/2018  2:39 AM        Gisell Eva Villafuerte is a 23 y.o. female with a pmhx of HTN presenting to the E.D. with complaints of acute non bloody vomiting with non bloody diarrhea which has been ongoing x 5 hours. She reports 6 episodes of vomiting since onset and constant "water" diarrhea with diffuse abdominal pain. No exacerbating or alleviating factors. Denies fever, rash Pt has no past surgical history on file.        The history is provided by the patient.     Review of patient's allergies indicates:   Allergen Reactions    No known drug allergies      Past Medical History:   Diagnosis Date    Anemia     s/p transfusion from menstral bleeding    Anxiety     Asthma     "athletic" asthma    Blood transfusion     Depression     Hashimoto's disease     HTN (hypertension)      History reviewed. No pertinent surgical history.  Family History   Problem Relation Age of Onset    Thyroid disease Mother     Hashimoto's thyroiditis Mother     Diabetes Father     Breast cancer Neg Hx     Ovarian cancer Neg Hx     Melanoma Neg Hx     Psoriasis Neg Hx     Lupus Neg Hx     Eczema Neg Hx      Social History   Substance Use Topics    Smoking status: Never Smoker    Smokeless tobacco: Never Used    Alcohol use No     Review of Systems   Constitutional: Negative for fever.   HENT: Negative for sore throat.    Respiratory: Negative for shortness of breath.    Cardiovascular: Negative for chest pain.   Gastrointestinal: Positive for abdominal pain, diarrhea and vomiting.   Genitourinary: Negative for dysuria.   Musculoskeletal: Negative for back pain.   Skin: Negative for rash.   Neurological: Negative for weakness.   Hematological: Does not bruise/bleed easily.       Physical Exam     Initial Vitals " [08/08/18 0234]   BP Pulse Resp Temp SpO2   (!) 139/114 (!) 128 18 98.7 °F (37.1 °C) 96 %      MAP       --         Physical Exam    Nursing note and vitals reviewed.  Constitutional: She appears well-developed and well-nourished. She is not diaphoretic. No distress.   HENT:   Head: Normocephalic and atraumatic.   Mouth/Throat: Oropharynx is clear and moist.   Eyes: Conjunctivae are normal.   Neck: Neck supple.   Cardiovascular: Regular rhythm, normal heart sounds and intact distal pulses. Tachycardia present.  Exam reveals no gallop and no friction rub.    No murmur heard.  Pulmonary/Chest: Breath sounds normal. She has no wheezes. She has no rhonchi. She has no rales.   Abdominal: Soft. She exhibits no distension and no mass. There is no hepatosplenomegaly. There is tenderness. There is no guarding.   Mild diffuse abdominal pain.    Musculoskeletal: Normal range of motion.   Neurological: She is alert and oriented to person, place, and time.   Skin: No rash noted. No erythema.   Psychiatric: Her speech is normal.         ED Course   Procedures  Labs Reviewed   CBC W/ AUTO DIFFERENTIAL - Abnormal; Notable for the following:        Result Value    WBC 21.10 (*)     MPV 8.9 (*)     Gran% 91.0 (*)     Lymph% 0.0 (*)     Mono% 3.0 (*)     All other components within normal limits   BASIC METABOLIC PANEL - Abnormal; Notable for the following:     CO2 19 (*)     Glucose 137 (*)     All other components within normal limits   POCT URINE PREGNANCY          Imaging Results    None                     Scribe Attestation:   Scribe #1: I performed the above scribed service and the documentation accurately describes the services I performed. I attest to the accuracy of the note.    I, Dr. Emmanuel Kramer, personally performed the services described in this documentation. All medical record entries made by the scribe were at my direction and in my presence.  I have reviewed the chart and agree that the record reflects my  personal performance and is accurate and complete. Emmanuel Kramer MD.  3:38 AM 08/08/2018    Gisell Villafuerte is a 23 y.o. female presenting with acute onset of vomiting and diarrhea both nonbloody most consistent with viral infectious etiology.  No high risk factors for bacterial infectious etiology and I do not think antibiotics are indicated.  Patient has mild abdominal cramping with minimal abdominal tenderness. Low suspicion for emergent intra-abdominal process such as abscess, cholecystitis, appendicitis.  I do not think further abdominal imaging or surgical consultation is indicated.  Tachycardia initially noted with IV fluid resuscitation initiated with normal saline followed by LR along with ondansetron for nausea.  Loperamide given for antidiarrheal effect as well. BMP consistent with mild-to-moderate dehydration without significant electrolyte derangement or renal insult.  Leukocytosis that is nonspecific discussed in detail with the patient. I have discussed the risks, benefits, and alternative of CT scan with the patient.  Risks include increased of a future malignancy that could be fatal with ionizing radiation exposure as well as IV dye injury to the kidneys possibly leading to renal failure if IV dye is required.  There is also the opposing risk of missed or delayed diagnosis if a CT is not performed today.  The patient understands these issues and was able to repeat them back to me in an intelligible manner.  Patient declines CT at this point, which I feel is reasonable.  Patient has had significant reduction in tachycardia.  Mild persistent tachycardia discussed with patient with offer for further fluids in the emergency department or admission for IV fluids.  Patient states she feels markedly better and wishes to go home.  On re-examination of the abdomen, patient has only minimal left lower quadrant tenderness with no other abdominal tenderness and no guarding, distension.  Detailed  return precautions reviewed with recommendation for close outpatient PCP follow-up with ondansetron and loperamide as needed in addition to probiotic.           Clinical Impression:   The encounter diagnosis was Vomiting and diarrhea.                             Emmanuel Kramer MD  08/08/18 0415

## 2018-08-22 RX ORDER — NORGESTIMATE AND ETHINYL ESTRADIOL 0.25-0.035
KIT ORAL
Qty: 28 TABLET | Refills: 0 | Status: SHIPPED | OUTPATIENT
Start: 2018-08-22 | End: 2018-10-17 | Stop reason: SDUPTHER

## 2018-10-17 ENCOUNTER — OFFICE VISIT (OUTPATIENT)
Dept: FAMILY MEDICINE | Facility: CLINIC | Age: 23
End: 2018-10-17
Payer: COMMERCIAL

## 2018-10-17 VITALS
HEART RATE: 80 BPM | RESPIRATION RATE: 18 BRPM | WEIGHT: 212 LBS | BODY MASS INDEX: 45.74 KG/M2 | DIASTOLIC BLOOD PRESSURE: 70 MMHG | HEIGHT: 57 IN | SYSTOLIC BLOOD PRESSURE: 128 MMHG | TEMPERATURE: 99 F

## 2018-10-17 DIAGNOSIS — Z00.00 ROUTINE PHYSICAL EXAMINATION: Primary | ICD-10-CM

## 2018-10-17 DIAGNOSIS — E66.01 MORBID OBESITY: ICD-10-CM

## 2018-10-17 PROCEDURE — 3078F DIAST BP <80 MM HG: CPT | Mod: CPTII,S$GLB,, | Performed by: NURSE PRACTITIONER

## 2018-10-17 PROCEDURE — 3074F SYST BP LT 130 MM HG: CPT | Mod: CPTII,S$GLB,, | Performed by: NURSE PRACTITIONER

## 2018-10-17 PROCEDURE — 99395 PREV VISIT EST AGE 18-39: CPT | Mod: S$GLB,,, | Performed by: NURSE PRACTITIONER

## 2018-10-17 RX ORDER — NORGESTIMATE AND ETHINYL ESTRADIOL 0.25-0.035
1 KIT ORAL DAILY
Qty: 90 TABLET | Refills: 0 | Status: SHIPPED | OUTPATIENT
Start: 2018-10-17 | End: 2019-04-21 | Stop reason: SDUPTHER

## 2018-10-17 NOTE — PROGRESS NOTES
"Subjective:       Patient ID: Gisell Villafuerte is a 23 y.o. female.    Chief Complaint: Preventative Health Care (Physical)    The patient is here for routine physical.  She is generally feeling well today without any new complaints.  She does need a refill on her birth control because she is about to run out. Last pap 12/2/2015-patient has upcoming appointment with OBGYN.  She is feeling well today without any new complaints.  She Has not been able to lose weight.        Review of Systems   Constitutional: Negative for activity change and appetite change.   HENT: Negative for congestion, postnasal drip, rhinorrhea and sinus pressure.    Eyes: Negative for pain and redness.   Respiratory: Negative for choking and chest tightness.    Gastrointestinal: Negative for abdominal distention, abdominal pain, blood in stool, constipation, diarrhea, nausea and vomiting.   Endocrine: Negative for polydipsia and polyphagia.   Genitourinary: Negative for dysuria and hematuria.   Musculoskeletal: Negative for arthralgias and myalgias.   Skin: Negative for color change and rash.   Neurological: Negative for dizziness and headaches.   Psychiatric/Behavioral: Negative for agitation and behavioral problems.       Past medical, surgical, family and social history reviewed.  Objective:     Vitals:    10/17/18 1413   BP: 128/70   Pulse: 80   Resp: 18   Temp: 99.4 °F (37.4 °C)   TempSrc: Oral   Weight: 96.2 kg (211 lb 15.6 oz)   Height: 4' 9" (1.448 m)   PainSc: 0-No pain     Body mass index is 45.87 kg/m².     Physical Exam   Constitutional: She is oriented to person, place, and time. She appears well-developed. No distress.   Morbidly obese female   HENT:   Head: Normocephalic and atraumatic.   Right Ear: Hearing, tympanic membrane, external ear and ear canal normal.   Left Ear: Hearing, tympanic membrane, external ear and ear canal normal.   Nose: Nose normal.   Mouth/Throat: Uvula is midline, oropharynx is clear and moist and " mucous membranes are normal.   Eyes: Conjunctivae and EOM are normal. Pupils are equal, round, and reactive to light. Right eye exhibits no discharge. Left eye exhibits no discharge.   Neck: Trachea normal and normal range of motion. Neck supple. No JVD present. Carotid bruit is not present. No thyromegaly present.   Cardiovascular: Normal rate and regular rhythm. Exam reveals no gallop and no friction rub.   No murmur heard.  Pulmonary/Chest: Effort normal and breath sounds normal. No respiratory distress. She has no wheezes. She has no rales. She exhibits no tenderness.   Abdominal: Soft. Bowel sounds are normal. She exhibits no distension and no mass. There is no tenderness. There is no rebound and no guarding.   Musculoskeletal: Normal range of motion.   Neurological: She is alert and oriented to person, place, and time. Coordination normal.   Skin: Skin is warm and dry. She is not diaphoretic.   Psychiatric: She has a normal mood and affect. Her behavior is normal. Judgment and thought content normal.       Assessment:       1. Routine physical examination    2. Morbid obesity        Plan:       Gisell was seen today for preventative health care.    Diagnoses and all orders for this visit:    Routine physical examination      Patient does not require pap today. We discussed recent ACS/ACOG/USPSTF guidelines.     -     norgestimate-ethinyl estradiol (SPRINTEC, 28,) 0.25-35 mg-mcg per tablet; Take 1 tablet by mouth once daily.    Morbid obesity  Body mass index is 45.87 kg/m².  The patient's BMI has been recorded in the chart. The patient has been provided educational materials regarding the benefits of attaining and maintaining a normal weight. We will continue to address and follow this issue during follow up visits.

## 2018-12-04 ENCOUNTER — PATIENT OUTREACH (OUTPATIENT)
Dept: ADMINISTRATIVE | Facility: HOSPITAL | Age: 23
End: 2018-12-04

## 2018-12-04 NOTE — PROGRESS NOTES
Health Maintenance Due   Topic Date Due    Lipid Panel  07/10/2018    Pap Smear  11/12/2018

## 2018-12-04 NOTE — LETTER
December 12, 2018    Gisell Eva Noy  [address]  [place] [address]             [facility]  [address]  [facility] [address]  Phone: [phone] Dear MsAndrey Bearsadie:    We have tried to reach you by [facility] email unsuccessfully.  [facility] is committed to your overall health.  To help you get the most out of each of your visits, we will review your information to make sure you are up to date on all of your recommended tests and/or procedures.       Kajal Dawn NP   has found that your chart shows you may be due for the following:     Fasting cholesterol labs (Lipid Panel)   Pap smear     If you have had any of the above done at another facility, please bring the records or information with you so that your record at [facility] will be complete.  If you would like to schedule any of these, please contact me.      If you are currently taking medication , please bring it with you to your appointment for review.      Sincerely,      Dipika Chester  Clinical Care Coordinator  [facility]  [address] [facility] [address]  [place] [address]  Phone: [phone]   Fax: [phone]

## 2019-01-09 ENCOUNTER — OFFICE VISIT (OUTPATIENT)
Dept: FAMILY MEDICINE | Facility: CLINIC | Age: 24
End: 2019-01-09
Payer: COMMERCIAL

## 2019-01-09 VITALS
TEMPERATURE: 98 F | BODY MASS INDEX: 44.43 KG/M2 | HEART RATE: 108 BPM | HEIGHT: 57 IN | SYSTOLIC BLOOD PRESSURE: 122 MMHG | WEIGHT: 205.94 LBS | DIASTOLIC BLOOD PRESSURE: 70 MMHG

## 2019-01-09 DIAGNOSIS — J06.9 URI WITH COUGH AND CONGESTION: Primary | ICD-10-CM

## 2019-01-09 DIAGNOSIS — R09.82 POST-NASAL DRIP: ICD-10-CM

## 2019-01-09 PROCEDURE — 3078F DIAST BP <80 MM HG: CPT | Mod: CPTII,S$GLB,, | Performed by: NURSE PRACTITIONER

## 2019-01-09 PROCEDURE — 99999 PR PBB SHADOW E&M-EST. PATIENT-LVL IV: CPT | Mod: PBBFAC,,, | Performed by: NURSE PRACTITIONER

## 2019-01-09 PROCEDURE — 3008F PR BODY MASS INDEX (BMI) DOCUMENTED: ICD-10-PCS | Mod: CPTII,S$GLB,, | Performed by: NURSE PRACTITIONER

## 2019-01-09 PROCEDURE — 3078F PR MOST RECENT DIASTOLIC BLOOD PRESSURE < 80 MM HG: ICD-10-PCS | Mod: CPTII,S$GLB,, | Performed by: NURSE PRACTITIONER

## 2019-01-09 PROCEDURE — 3008F BODY MASS INDEX DOCD: CPT | Mod: CPTII,S$GLB,, | Performed by: NURSE PRACTITIONER

## 2019-01-09 PROCEDURE — 3074F SYST BP LT 130 MM HG: CPT | Mod: CPTII,S$GLB,, | Performed by: NURSE PRACTITIONER

## 2019-01-09 PROCEDURE — 99213 OFFICE O/P EST LOW 20 MIN: CPT | Mod: S$GLB,,, | Performed by: NURSE PRACTITIONER

## 2019-01-09 PROCEDURE — 99999 PR PBB SHADOW E&M-EST. PATIENT-LVL IV: ICD-10-PCS | Mod: PBBFAC,,, | Performed by: NURSE PRACTITIONER

## 2019-01-09 PROCEDURE — 3074F PR MOST RECENT SYSTOLIC BLOOD PRESSURE < 130 MM HG: ICD-10-PCS | Mod: CPTII,S$GLB,, | Performed by: NURSE PRACTITIONER

## 2019-01-09 PROCEDURE — 99213 PR OFFICE/OUTPT VISIT, EST, LEVL III, 20-29 MIN: ICD-10-PCS | Mod: S$GLB,,, | Performed by: NURSE PRACTITIONER

## 2019-01-09 NOTE — PROGRESS NOTES
"This dictation has been generated using Modal Fluency Dictation some phonetic errors may occur. Please contact author for clarification if needed.     Problem List Items Addressed This Visit     None      Visit Diagnoses     URI with cough and congestion    -  Primary    Post-nasal drip            Patient Instructions   Claritin(loratadine), Allegra(fexofenadine), or zyrtec(cetirizine) for runny nose, POST NASAL DRIP, and congestion.   Delsym (Dextromethorphan) for cough.   Flonase daily.     Mucinex DM daily in the morning.  ONLY if mucous thickens.      Viral with drip. meds as above.     Follow-up if symptoms worsen or fail to improve.    ________________________________________________________________  ________________________________________________________________      Chief Complaint   Patient presents with    Cough     for last few weeks    Sore Throat     has improved    Nasal Congestion     History of present illness  This 23 y.o. presents today for complaint of cough and cold symptoms.  Started to 3 weeks ago with sore throat which has since resolved.  She notes stuffy nose and cough at that time.  The nasal congestion is controlled.  The cough has improved.  It is nonproductive.  She tried Mucinex.  Patient notes Solu-Medrol injection brought briefly for.  She was given Solu-Medrol shot December 29th.  She does get the flu shot.  She works on an ambulance.  Review of systems  No fever chills or body aches  No chest pain or shortness of breath  No nausea vomiting or diarrhea  No rash  No gland swelling    New to me reviewed histories.   Past Medical History:   Diagnosis Date    Anemia     s/p transfusion from menstral bleeding    Anxiety     Asthma     "athletic" asthma    Blood transfusion     Depression     Hashimoto's disease     HTN (hypertension)        History reviewed. No pertinent surgical history.    Family History   Problem Relation Age of Onset    Thyroid disease Mother     " Hashimoto's thyroiditis Mother     Diabetes Father     Breast cancer Neg Hx     Ovarian cancer Neg Hx     Melanoma Neg Hx     Psoriasis Neg Hx     Lupus Neg Hx     Eczema Neg Hx        Social History     Socioeconomic History    Marital status: Single     Spouse name: None    Number of children: None    Years of education: None    Highest education level: None   Social Needs    Financial resource strain: None    Food insecurity - worry: None    Food insecurity - inability: None    Transportation needs - medical: None    Transportation needs - non-medical: None   Occupational History    None   Tobacco Use    Smoking status: Never Smoker    Smokeless tobacco: Never Used   Substance and Sexual Activity    Alcohol use: No    Drug use: No    Sexual activity: Yes     Partners: Male     Birth control/protection: Condom, Inserts   Other Topics Concern    Are you pregnant or think you may be? Not Asked    Breast-feeding Not Asked   Social History Narrative    None       Current Outpatient Medications   Medication Sig Dispense Refill    escitalopram oxalate (LEXAPRO) 10 MG tablet Take 1 tablet (10 mg total) by mouth once daily. 30 tablet 11    loperamide (IMODIUM) 2 mg capsule Take 1 capsule (2 mg total) by mouth 4 (four) times daily as needed for Diarrhea. 12 capsule 0    metoprolol succinate (TOPROL-XL) 50 MG 24 hr tablet Take 1 tablet (50 mg total) by mouth once daily. 30 tablet 11    norgestimate-ethinyl estradiol (SPRINTEC, 28,) 0.25-35 mg-mcg per tablet Take 1 tablet by mouth once daily. 90 tablet 0     No current facility-administered medications for this visit.        Review of patient's allergies indicates:   Allergen Reactions    No known drug allergies        Physical examination  Vitals Reviewed  Gen. Well-dressed well-nourished   Skin warm dry and intact.  No rashes noted.  HEENT.  TM intact bilateral with normal light reflex.  No mastoid tenderness during percussion.  Nares patent  bilateral.  Pharynx is unremarkable except postnasal drip.  No maxillary or frontal sinus tenderness when percussed.    Neck is supple without adenopathy  Chest.  Respirations are even unlabored.  Lungs are clear to auscultation.  Cardiac regular rate and rhythm.  No chest wall adenopathy noted.  Neuro. Awake alert oriented x4.  Normal judgment and cognition noted.  Extremities no clubbing cyanosis or edema noted.     Call or return to clinic prn if these symptoms worsen or fail to improve as anticipated.

## 2019-01-09 NOTE — PATIENT INSTRUCTIONS
Claritin(loratadine), Allegra(fexofenadine), or zyrtec(cetirizine) for runny nose, POST NASAL DRIP, and congestion.   Delsym (Dextromethorphan) for cough.   Flonase daily.     Mucinex DM daily in the morning.  ONLY if mucous thickens.

## 2019-04-21 RX ORDER — NORGESTIMATE AND ETHINYL ESTRADIOL 0.25-0.035
KIT ORAL
Qty: 84 TABLET | Refills: 3 | Status: SHIPPED | OUTPATIENT
Start: 2019-04-21 | End: 2020-08-14

## 2019-09-18 ENCOUNTER — OFFICE VISIT (OUTPATIENT)
Dept: FAMILY MEDICINE | Facility: CLINIC | Age: 24
End: 2019-09-18
Payer: COMMERCIAL

## 2019-09-18 ENCOUNTER — HOSPITAL ENCOUNTER (OUTPATIENT)
Dept: RADIOLOGY | Facility: HOSPITAL | Age: 24
Discharge: HOME | End: 2019-09-18
Attending: NURSE PRACTITIONER
Payer: COMMERCIAL

## 2019-09-18 VITALS
HEIGHT: 57 IN | WEIGHT: 198.19 LBS | DIASTOLIC BLOOD PRESSURE: 82 MMHG | SYSTOLIC BLOOD PRESSURE: 120 MMHG | HEART RATE: 68 BPM | TEMPERATURE: 99 F | BODY MASS INDEX: 42.76 KG/M2

## 2019-09-18 DIAGNOSIS — T14.90XA TRAUMA: ICD-10-CM

## 2019-09-18 DIAGNOSIS — M79.676 PAIN OF GREAT TOE, UNSPECIFIED LATERALITY: ICD-10-CM

## 2019-09-18 DIAGNOSIS — L60.0 INGROWN NAIL: ICD-10-CM

## 2019-09-18 DIAGNOSIS — Z01.419 WELL WOMAN EXAM WITH ROUTINE GYNECOLOGICAL EXAM: ICD-10-CM

## 2019-09-18 DIAGNOSIS — T14.90XA TRAUMA: Primary | ICD-10-CM

## 2019-09-18 PROCEDURE — 3008F PR BODY MASS INDEX (BMI) DOCUMENTED: ICD-10-PCS | Mod: CPTII,S$GLB,, | Performed by: NURSE PRACTITIONER

## 2019-09-18 PROCEDURE — 3074F SYST BP LT 130 MM HG: CPT | Mod: CPTII,S$GLB,, | Performed by: NURSE PRACTITIONER

## 2019-09-18 PROCEDURE — 73660 X-RAY EXAM OF TOE(S): CPT | Mod: 26,LT,, | Performed by: RADIOLOGY

## 2019-09-18 PROCEDURE — 99999 PR PBB SHADOW E&M-EST. PATIENT-LVL V: CPT | Mod: PBBFAC,,, | Performed by: NURSE PRACTITIONER

## 2019-09-18 PROCEDURE — 87186 SC STD MICRODIL/AGAR DIL: CPT

## 2019-09-18 PROCEDURE — 73660 X-RAY EXAM OF TOE(S): CPT | Mod: TC,PN,LT

## 2019-09-18 PROCEDURE — 3074F PR MOST RECENT SYSTOLIC BLOOD PRESSURE < 130 MM HG: ICD-10-PCS | Mod: CPTII,S$GLB,, | Performed by: NURSE PRACTITIONER

## 2019-09-18 PROCEDURE — 99214 OFFICE O/P EST MOD 30 MIN: CPT | Mod: S$GLB,,, | Performed by: NURSE PRACTITIONER

## 2019-09-18 PROCEDURE — 99214 PR OFFICE/OUTPT VISIT, EST, LEVL IV, 30-39 MIN: ICD-10-PCS | Mod: S$GLB,,, | Performed by: NURSE PRACTITIONER

## 2019-09-18 PROCEDURE — 3008F BODY MASS INDEX DOCD: CPT | Mod: CPTII,S$GLB,, | Performed by: NURSE PRACTITIONER

## 2019-09-18 PROCEDURE — 87070 CULTURE OTHR SPECIMN AEROBIC: CPT

## 2019-09-18 PROCEDURE — 3079F DIAST BP 80-89 MM HG: CPT | Mod: CPTII,S$GLB,, | Performed by: NURSE PRACTITIONER

## 2019-09-18 PROCEDURE — 99999 PR PBB SHADOW E&M-EST. PATIENT-LVL V: ICD-10-PCS | Mod: PBBFAC,,, | Performed by: NURSE PRACTITIONER

## 2019-09-18 PROCEDURE — 3079F PR MOST RECENT DIASTOLIC BLOOD PRESSURE 80-89 MM HG: ICD-10-PCS | Mod: CPTII,S$GLB,, | Performed by: NURSE PRACTITIONER

## 2019-09-18 PROCEDURE — 73660 XR TOE 2 OR MORE VIEWS LEFT: ICD-10-PCS | Mod: 26,LT,, | Performed by: RADIOLOGY

## 2019-09-18 PROCEDURE — 87077 CULTURE AEROBIC IDENTIFY: CPT

## 2019-09-18 RX ORDER — SULFAMETHOXAZOLE AND TRIMETHOPRIM 800; 160 MG/1; MG/1
1 TABLET ORAL 2 TIMES DAILY
Qty: 14 TABLET | Refills: 0 | Status: SHIPPED | OUTPATIENT
Start: 2019-09-18 | End: 2019-09-25

## 2019-09-18 RX ORDER — TRAMADOL HYDROCHLORIDE 50 MG/1
50 TABLET ORAL EVERY 6 HOURS PRN
Qty: 10 TABLET | Refills: 0 | Status: SHIPPED | OUTPATIENT
Start: 2019-09-18 | End: 2019-11-19

## 2019-09-18 NOTE — PROGRESS NOTES
This dictation has been generated using Modal Fluency Dictation some phonetic errors may occur. Please contact author for clarification if needed.     Problem List Items Addressed This Visit     None      Visit Diagnoses     Trauma    -  Primary    Relevant Orders    X-Ray Toe 2 or More Views Left    Ambulatory referral to Podiatry    Well woman exam with routine gynecological exam        Relevant Orders    Ambulatory referral to Obstetrics / Gynecology    Pain of great toe, unspecified laterality        Relevant Orders    X-Ray Toe 2 or More Views Left    Ambulatory referral to Podiatry    Ingrown nail        Relevant Orders    Ambulatory referral to Podiatry    Aerobic culture        Orders Placed This Encounter    Aerobic culture    X-Ray Toe 2 or More Views Left    Ambulatory referral to Obstetrics / Gynecology    Ambulatory referral to Podiatry    sulfamethoxazole-trimethoprim 800-160mg (BACTRIM DS) 800-160 mg Tab    traMADol (ULTRAM) 50 mg tablet     Ingrown nail generalized toe swelling and some abnormal alignment.  I reviewed the x-ray image in do not note any fracture or malalignment.  Cannot rule out the possibility of a hairline fracture.  No further recommendations.  She does have the ingrown nail.  She does have some granulation tissue present.  She has some mild drainage so we will cover with an antibiotic as above.  Remove the outer portion of the nail limited.  Patient tolerated well.  Tramadol antibiotic as above and follow up with Podiatry for further removal of nail.  Patient Instructions   Episome salt soaks left toes 15 minutes twice a day.      Follow up if symptoms worsen or fail to improve.    ________________________________________________________________  ________________________________________________________________      Chief Complaint   Patient presents with    Toe Injury     left foot large toe     History of present illness  This 24 y.o. presents today for complaint of pain  "in left great toe.  She notes that she dropped a shelf on her toe about a month and a half ago.  She did experience pain at that time.  She is concerned about a fracture.  She also has an ingrown nail.  Patient notes that the toe looks Crow kid.  Does not have any pain at time of assessment but noted pain 6 on a scale of 1-10 yesterday.  She notes that tight shoes exacerbates her pain.  She denies any numbness distal to injury.  Patient had gone to urgent care at time of injury.  She works as an EMT.    Review of systems  No fever or chills  No numbness distal to injury  Past medical and social history reviewed.    Past Medical History:   Diagnosis Date    Anemia     s/p transfusion from menstral bleeding    Anxiety     Asthma     "athletic" asthma    Blood transfusion     Depression     Hashimoto's disease     HTN (hypertension)        History reviewed. No pertinent surgical history.    Family History   Problem Relation Age of Onset    Thyroid disease Mother     Hashimoto's thyroiditis Mother     Diabetes Father     Breast cancer Neg Hx     Ovarian cancer Neg Hx     Melanoma Neg Hx     Psoriasis Neg Hx     Lupus Neg Hx     Eczema Neg Hx        Social History     Socioeconomic History    Marital status: Single     Spouse name: Not on file    Number of children: Not on file    Years of education: Not on file    Highest education level: Not on file   Occupational History    Not on file   Social Needs    Financial resource strain: Not on file    Food insecurity:     Worry: Not on file     Inability: Not on file    Transportation needs:     Medical: Not on file     Non-medical: Not on file   Tobacco Use    Smoking status: Never Smoker    Smokeless tobacco: Never Used   Substance and Sexual Activity    Alcohol use: No    Drug use: No    Sexual activity: Yes     Partners: Male     Birth control/protection: Condom, Inserts   Lifestyle    Physical activity:     Days per week: Not on file     " Minutes per session: Not on file    Stress: Not on file   Relationships    Social connections:     Talks on phone: Not on file     Gets together: Not on file     Attends Congregation service: Not on file     Active member of club or organization: Not on file     Attends meetings of clubs or organizations: Not on file     Relationship status: Not on file   Other Topics Concern    Are you pregnant or think you may be? Not Asked    Breast-feeding Not Asked   Social History Narrative    Not on file       Current Outpatient Medications   Medication Sig Dispense Refill    escitalopram oxalate (LEXAPRO) 10 MG tablet Take 1 tablet (10 mg total) by mouth once daily. 30 tablet 11    metoprolol succinate (TOPROL-XL) 50 MG 24 hr tablet Take 1 tablet (50 mg total) by mouth once daily. 30 tablet 11    SPRINTEC, 28, 0.25-35 mg-mcg per tablet TAKE 1 TABLET BY MOUTH ONCE DAILY 84 tablet 3    loperamide (IMODIUM) 2 mg capsule Take 1 capsule (2 mg total) by mouth 4 (four) times daily as needed for Diarrhea. 12 capsule 0    sulfamethoxazole-trimethoprim 800-160mg (BACTRIM DS) 800-160 mg Tab Take 1 tablet by mouth 2 (two) times daily. for 7 days 14 tablet 0    traMADol (ULTRAM) 50 mg tablet Take 1 tablet (50 mg total) by mouth every 6 (six) hours as needed for Pain. 10 tablet 0     No current facility-administered medications for this visit.        Review of patient's allergies indicates:   Allergen Reactions    No known drug allergies        Physical examination  Vitals Reviewed  Gen. Well-dressed well-nourished   Skin warm dry and intact.  No rashes noted.  Chest.  Respirations are even unlabored.    Neuro. Awake alert oriented x4.  Normal judgment and cognition noted.  Extremities no clubbing cyanosis or edema noted. Left great toe fairly normal alignment.  Ingrown toenail noted on the lateral aspect.  Minimal granulation tissue noted. Minimal drainage noted. Outer portion of nail removed patient tolerated well.  Offered  patient use of anesthesia for digital block but she deferred.    Call or return to clinic prn if these symptoms worsen or fail to improve as anticipated.

## 2019-09-21 LAB — BACTERIA SPEC AEROBE CULT: ABNORMAL

## 2019-09-27 ENCOUNTER — OFFICE VISIT (OUTPATIENT)
Dept: PODIATRY | Facility: CLINIC | Age: 24
End: 2019-09-27
Payer: COMMERCIAL

## 2019-09-27 VITALS — WEIGHT: 198.19 LBS | HEIGHT: 57 IN | BODY MASS INDEX: 42.76 KG/M2

## 2019-09-27 DIAGNOSIS — L60.0 INGROWN NAIL: Primary | ICD-10-CM

## 2019-09-27 DIAGNOSIS — L03.032 PARONYCHIA, TOE, LEFT: ICD-10-CM

## 2019-09-27 DIAGNOSIS — M79.675 TOE PAIN, LEFT: ICD-10-CM

## 2019-09-27 PROCEDURE — 3008F BODY MASS INDEX DOCD: CPT | Mod: CPTII,S$GLB,, | Performed by: PODIATRIST

## 2019-09-27 PROCEDURE — 99203 OFFICE O/P NEW LOW 30 MIN: CPT | Mod: S$GLB,,, | Performed by: PODIATRIST

## 2019-09-27 PROCEDURE — 99999 PR PBB SHADOW E&M-EST. PATIENT-LVL III: CPT | Mod: PBBFAC,,, | Performed by: PODIATRIST

## 2019-09-27 PROCEDURE — 99203 PR OFFICE/OUTPT VISIT, NEW, LEVL III, 30-44 MIN: ICD-10-PCS | Mod: S$GLB,,, | Performed by: PODIATRIST

## 2019-09-27 PROCEDURE — 3008F PR BODY MASS INDEX (BMI) DOCUMENTED: ICD-10-PCS | Mod: CPTII,S$GLB,, | Performed by: PODIATRIST

## 2019-09-27 PROCEDURE — 99999 PR PBB SHADOW E&M-EST. PATIENT-LVL III: ICD-10-PCS | Mod: PBBFAC,,, | Performed by: PODIATRIST

## 2019-09-27 RX ORDER — TOBRAMYCIN 3 MG/ML
SOLUTION/ DROPS OPHTHALMIC
Qty: 5 ML | Refills: 3 | Status: SHIPPED | OUTPATIENT
Start: 2019-09-27 | End: 2019-11-19

## 2019-09-27 NOTE — PROGRESS NOTES
Subjective:      Patient ID: Gisell Villafuerte is a 24 y.o. female.    Chief Complaint: Other Misc (left great toe wound)    Sharp throbbing pain left big toe.  Gradual onset, worsening over past several weeks following injury with dropping a shelf on toe, aggravated by increased weight bearing, shoe gear, pressure.  No previous medical treatment.  OTC pain med not helping. xrays after contusion negative fracture.    Review of Systems   Constitution: Negative for chills, diaphoresis, fever, malaise/fatigue and night sweats.   Cardiovascular: Negative for claudication, cyanosis, leg swelling and syncope.   Skin: Positive for nail changes. Negative for color change, dry skin, rash, suspicious lesions and unusual hair distribution.   Musculoskeletal: Negative for falls, joint pain, joint swelling, muscle cramps, muscle weakness and stiffness.   Gastrointestinal: Negative for constipation, diarrhea, nausea and vomiting.   Neurological: Negative for brief paralysis, disturbances in coordination, focal weakness, numbness, paresthesias, sensory change and tremors.           Objective:      Physical Exam   Constitutional: She is oriented to person, place, and time. She appears well-developed and well-nourished. She is cooperative. No distress.   Cardiovascular:   Pulses:       Popliteal pulses are 2+ on the right side, and 2+ on the left side.        Dorsalis pedis pulses are 2+ on the right side, and 2+ on the left side.        Posterior tibial pulses are 2+ on the right side, and 2+ on the left side.   Capillary refill 3 seconds all toes/distal feet, all toes/both feet warm to touch.      Negative lymphadenopathy bilateral popliteal fossa and tarsal tunnel.      Negavie lower extremity edema bilateral.     Musculoskeletal:        Right ankle: She exhibits normal range of motion, no swelling, no ecchymosis, no deformity, no laceration and normal pulse. Achilles tendon normal. Achilles tendon exhibits no pain, no  defect and normal Anderson's test results.   Normal angle, base, station of gait. All ten toes without clubbing, cyanosis, or signs of ischemia.  No pain to palpation bilateral lower extremities.  Range of motion, stability, muscle strength, and muscle tone normal bilateral feet and legs.    Lymphadenopathy: No inguinal adenopathy noted on the right or left side.   Negative lymphadenopathy bilateral popliteal fossa and tarsal tunnel.    Negative lymphangitic streaking bilateral feet/ankles/legs.   Neurological: She is alert and oriented to person, place, and time. She has normal strength. She displays no atrophy and no tremor. No sensory deficit. She exhibits normal muscle tone. Gait normal.   Reflex Scores:       Patellar reflexes are 2+ on the right side and 2+ on the left side.       Achilles reflexes are 2+ on the right side and 2+ on the left side.  Negative tinel sign to percussion sural, superficial peroneal, deep peroneal, saphenous, and posterior tibial nerves right and left ankles and feet.    Negative allodynia.   Skin: Skin is warm, dry and intact. Capillary refill takes 2 to 3 seconds. No abrasion, no bruising, no burn, no ecchymosis, no laceration, no lesion and no rash noted. She is not diaphoretic. No cyanosis or erythema. No pallor. Nails show no clubbing.     Skin is normal age and health appropriate color, turgor, texture, and temperature bilateral lower extremities without ulceration, hyperpigmentation, discoloration, masses nodules or cords palpated.  No ecchymosis, erythema, edema, or cardinal signs of infection bilateral lower extremities.    Visible and palpable ingrowth of toenail medial border left hallux with pain to palpation, and focal localized erythema and edema,  without ulceration, drainage, pus, tracking, fluctuance, malodor, or cardinal signs infection.     Psychiatric: She has a normal mood and affect.             Assessment:       Encounter Diagnoses   Name Primary?    Ingrown  nail Yes    Paronychia, toe, left     Toe pain, left          Plan:       Gisell was seen today for other misc.    Diagnoses and all orders for this visit:    Ingrown nail    Paronychia, toe, left    Toe pain, left    Other orders  -     tobramycin sulfate 0.3% (TOBREX) 0.3 % ophthalmic solution; 1-2 drops topically twice daily to affected toe(s).      I counseled the patient on her conditions, their implications and medical management.    Rx tobramycin drops bid.  Cover left hallux all times with band aid dressings changing daily.    Discussed conservative treatment with shoes of adequate dimensions, material, and style to alleviate symptoms and delay or prevent surgical intervention.     Utilizing sterile toenail clippers I aggressively debrided the offending nail border approximately 3 mm from its edge and carried the nail plate incision down at an angle in order to wedge out the offending cryptotic portion of the nail plate. The offending border was then removed in toto. The area was cleansed with alcohol. Patient tolerated the procedure well and related significant relief.          Follow up in about 1 week (around 10/4/2019).

## 2019-09-27 NOTE — LETTER
September 27, 2019      Vance Alegria, NP  [address]  [place] [address]           [facility]  [address]  [place] [address]  Phone: [phone]          Patient: Gisell Villafuerte   MR Number: [id number]   YOB: 1995   Date of Visit: 9/27/2019       Dear Vance Alegria:    Thank you for referring Gisell Villafuerte to me for evaluation. Attached you will find relevant portions of my assessment and plan of care.    If you have questions, please do not hesitate to call me. I look forward to following Gisell Villafuerte along with you.    Sincerely,    Augie Childs, CLARK    Enclosure  CC:  No Recipients    If you would like to receive this communication electronically, please contact [email or web address] or [phone] to request more information on [employer] Link access.    For providers and/or their staff who would like to refer a patient to [facility], please contact us through our one-stop-shop provider referral line, [facility], at [phone].    If you feel you have received this communication in error or would no longer like to receive these types of communications, please e-mail [email or web address]

## 2019-10-08 ENCOUNTER — OFFICE VISIT (OUTPATIENT)
Dept: PODIATRY | Facility: CLINIC | Age: 24
End: 2019-10-08
Payer: COMMERCIAL

## 2019-10-08 VITALS — BODY MASS INDEX: 42.72 KG/M2 | WEIGHT: 198 LBS | HEIGHT: 57 IN

## 2019-10-08 DIAGNOSIS — M79.675 TOE PAIN, LEFT: ICD-10-CM

## 2019-10-08 DIAGNOSIS — L60.0 INGROWN NAIL: Primary | ICD-10-CM

## 2019-10-08 DIAGNOSIS — L03.032 PARONYCHIA, TOE, LEFT: ICD-10-CM

## 2019-10-08 PROCEDURE — 99999 PR PBB SHADOW E&M-EST. PATIENT-LVL III: ICD-10-PCS | Mod: PBBFAC,,, | Performed by: PODIATRIST

## 2019-10-08 PROCEDURE — 99212 OFFICE O/P EST SF 10 MIN: CPT | Mod: S$GLB,,, | Performed by: PODIATRIST

## 2019-10-08 PROCEDURE — 3008F PR BODY MASS INDEX (BMI) DOCUMENTED: ICD-10-PCS | Mod: CPTII,S$GLB,, | Performed by: PODIATRIST

## 2019-10-08 PROCEDURE — 99212 PR OFFICE/OUTPT VISIT, EST, LEVL II, 10-19 MIN: ICD-10-PCS | Mod: S$GLB,,, | Performed by: PODIATRIST

## 2019-10-08 PROCEDURE — 99999 PR PBB SHADOW E&M-EST. PATIENT-LVL III: CPT | Mod: PBBFAC,,, | Performed by: PODIATRIST

## 2019-10-08 PROCEDURE — 3008F BODY MASS INDEX DOCD: CPT | Mod: CPTII,S$GLB,, | Performed by: PODIATRIST

## 2019-10-08 NOTE — PROGRESS NOTES
Subjective:      Patient ID: Gisell Villafuerte is a 24 y.o. female.    Chief Complaint: Ingrown Toenail (Left great toe)    Sharp throbbing pain left big toe.  Gradual onset, improved over past several days-week following injury with dropping a shelf on toe, aggravated by increased weight bearing, shoe gear, pressure.  Dressings and topical antibiotic helping well. xrays after contusion negative fracture.  xrays negative osseous injury.    Review of Systems   Constitution: Negative for chills, diaphoresis, fever, malaise/fatigue and night sweats.   Cardiovascular: Negative for claudication, cyanosis, leg swelling and syncope.   Skin: Positive for nail changes. Negative for color change, dry skin, rash, suspicious lesions and unusual hair distribution.   Musculoskeletal: Negative for falls, joint pain, joint swelling, muscle cramps, muscle weakness and stiffness.   Gastrointestinal: Negative for constipation, diarrhea, nausea and vomiting.   Neurological: Negative for brief paralysis, disturbances in coordination, focal weakness, numbness, paresthesias, sensory change and tremors.           Objective:      Physical Exam   Constitutional: She is oriented to person, place, and time. She appears well-developed and well-nourished. She is cooperative. No distress.   Cardiovascular:   Pulses:       Popliteal pulses are 2+ on the right side, and 2+ on the left side.        Dorsalis pedis pulses are 2+ on the right side, and 2+ on the left side.        Posterior tibial pulses are 2+ on the right side, and 2+ on the left side.   Capillary refill 3 seconds all toes/distal feet, all toes/both feet warm to touch.      Negative lymphadenopathy bilateral popliteal fossa and tarsal tunnel.      Negavie lower extremity edema bilateral.     Musculoskeletal:        Right ankle: She exhibits normal range of motion, no swelling, no ecchymosis, no deformity, no laceration and normal pulse. Achilles tendon normal. Achilles tendon  exhibits no pain, no defect and normal Anderson's test results.   Normal angle, base, station of gait. All ten toes without clubbing, cyanosis, or signs of ischemia.  No pain to palpation bilateral lower extremities.  Range of motion, stability, muscle strength, and muscle tone normal bilateral feet and legs.    Lymphadenopathy: No inguinal adenopathy noted on the right or left side.   Negative lymphadenopathy bilateral popliteal fossa and tarsal tunnel.    Negative lymphangitic streaking bilateral feet/ankles/legs.   Neurological: She is alert and oriented to person, place, and time. She has normal strength. She displays no atrophy and no tremor. No sensory deficit. She exhibits normal muscle tone. Gait normal.   Reflex Scores:       Patellar reflexes are 2+ on the right side and 2+ on the left side.       Achilles reflexes are 2+ on the right side and 2+ on the left side.  Negative tinel sign to percussion sural, superficial peroneal, deep peroneal, saphenous, and posterior tibial nerves right and left ankles and feet.    Negative allodynia.   Skin: Skin is warm, dry and intact. Capillary refill takes 2 to 3 seconds. No abrasion, no bruising, no burn, no ecchymosis, no laceration, no lesion and no rash noted. She is not diaphoretic. No cyanosis or erythema. No pallor. Nails show no clubbing.     Skin is normal age and health appropriate color, turgor, texture, and temperature bilateral lower extremities without ulceration, hyperpigmentation, discoloration, masses nodules or cords palpated.  No ecchymosis, erythema, edema, or cardinal signs of infection bilateral lower extremities.    Visible and palpable incurvation of toenail medial border left hallux with pain to palpation, resolved erythema,edema  without ulceration, drainage, pus, tracking, fluctuance, malodor, or cardinal signs infection.     Psychiatric: She has a normal mood and affect.             Assessment:       Encounter Diagnoses   Name Primary?     Ingrown nail Yes    Paronychia, toe, left     Toe pain, left          Plan:       Gisell was seen today for ingrown toenail.    Diagnoses and all orders for this visit:    Ingrown nail    Paronychia, toe, left    Toe pain, left      I counseled the patient on her conditions, their implications and medical management.    Rx tobramycin drops bid.  Cover left hallux all times with band aid dressings changing daily until completely healed    Discussed conservative treatment with shoes of adequate dimensions, material, and style to alleviate symptoms and delay or prevent surgical intervention.               Follow up if symptoms worsen or fail to improve.

## 2019-11-19 ENCOUNTER — OFFICE VISIT (OUTPATIENT)
Dept: FAMILY MEDICINE | Facility: CLINIC | Age: 24
End: 2019-11-19
Payer: COMMERCIAL

## 2019-11-19 VITALS
WEIGHT: 194.69 LBS | TEMPERATURE: 98 F | SYSTOLIC BLOOD PRESSURE: 112 MMHG | OXYGEN SATURATION: 99 % | DIASTOLIC BLOOD PRESSURE: 78 MMHG | BODY MASS INDEX: 42 KG/M2 | HEART RATE: 98 BPM | HEIGHT: 57 IN

## 2019-11-19 DIAGNOSIS — H69.91 ETD (EUSTACHIAN TUBE DYSFUNCTION), RIGHT: ICD-10-CM

## 2019-11-19 DIAGNOSIS — H92.01 DISCOMFORT OF RIGHT EAR: Primary | ICD-10-CM

## 2019-11-19 PROCEDURE — 3078F PR MOST RECENT DIASTOLIC BLOOD PRESSURE < 80 MM HG: ICD-10-PCS | Mod: CPTII,S$GLB,, | Performed by: NURSE PRACTITIONER

## 2019-11-19 PROCEDURE — 99999 PR PBB SHADOW E&M-EST. PATIENT-LVL IV: CPT | Mod: PBBFAC,,, | Performed by: NURSE PRACTITIONER

## 2019-11-19 PROCEDURE — 3074F PR MOST RECENT SYSTOLIC BLOOD PRESSURE < 130 MM HG: ICD-10-PCS | Mod: CPTII,S$GLB,, | Performed by: NURSE PRACTITIONER

## 2019-11-19 PROCEDURE — 99999 PR PBB SHADOW E&M-EST. PATIENT-LVL IV: ICD-10-PCS | Mod: PBBFAC,,, | Performed by: NURSE PRACTITIONER

## 2019-11-19 PROCEDURE — 3008F PR BODY MASS INDEX (BMI) DOCUMENTED: ICD-10-PCS | Mod: CPTII,S$GLB,, | Performed by: NURSE PRACTITIONER

## 2019-11-19 PROCEDURE — 3008F BODY MASS INDEX DOCD: CPT | Mod: CPTII,S$GLB,, | Performed by: NURSE PRACTITIONER

## 2019-11-19 PROCEDURE — 3074F SYST BP LT 130 MM HG: CPT | Mod: CPTII,S$GLB,, | Performed by: NURSE PRACTITIONER

## 2019-11-19 PROCEDURE — 99213 PR OFFICE/OUTPT VISIT, EST, LEVL III, 20-29 MIN: ICD-10-PCS | Mod: S$GLB,,, | Performed by: NURSE PRACTITIONER

## 2019-11-19 PROCEDURE — 99213 OFFICE O/P EST LOW 20 MIN: CPT | Mod: S$GLB,,, | Performed by: NURSE PRACTITIONER

## 2019-11-19 PROCEDURE — 3078F DIAST BP <80 MM HG: CPT | Mod: CPTII,S$GLB,, | Performed by: NURSE PRACTITIONER

## 2019-11-19 NOTE — PATIENT INSTRUCTIONS
Claritin(loratadine), Allegra(fexofenadine), or zyrtec(cetirizine) for rhe left ear. Take for a month.  Sudafed 30 mg up to twice a day for left ear discomfort. Monitor blood pressure and hold for bp 140/90 or greater. Repeat next dose when improved. If bp remains elevated for more than 2 consecutive days come in for eval.    Flonase daily

## 2019-11-19 NOTE — PROGRESS NOTES
"This dictation has been generated using Modal Fluency Dictation some phonetic errors may occur. Please contact author for clarification if needed.     Problem List Items Addressed This Visit     None      Visit Diagnoses     Discomfort of right ear    -  Primary    ETD (Eustachian tube dysfunction), right            ETD   Patient Instructions   Claritin(loratadine), Allegra(fexofenadine), or zyrtec(cetirizine) for rhe left ear. Take for a month.  Sudafed 30 mg up to twice a day for left ear discomfort. Monitor blood pressure and hold for bp 140/90 or greater. Repeat next dose when improved. If bp remains elevated for more than 2 consecutive days come in for eval.    Flonase daily        Follow up if symptoms worsen or fail to improve.    ________________________________________________________________  ________________________________________________________________      Chief Complaint   Patient presents with    fluid in ear     rt ear feeling that the ear is popping     History of present illness  This 24 y.o. presents today for complaint of ear discomfort.  Symptoms have been present for months.  Denies overt pain.  Symptoms are intermittent.  Denies any drainage from the ear.  Does have occasional sinus symptoms.  Review of systems  No fever or chills  No nausea or vomiting    Past medical and social history reviewed    Past Medical History:   Diagnosis Date    Anemia     s/p transfusion from menstral bleeding    Anxiety     Asthma     "athletic" asthma    Blood transfusion     Depression     Hashimoto's disease     HTN (hypertension)        History reviewed. No pertinent surgical history.    Family History   Problem Relation Age of Onset    Thyroid disease Mother     Hashimoto's thyroiditis Mother     Diabetes Father     Breast cancer Neg Hx     Ovarian cancer Neg Hx     Melanoma Neg Hx     Psoriasis Neg Hx     Lupus Neg Hx     Eczema Neg Hx        Social History     Socioeconomic History    " Marital status: Single     Spouse name: Not on file    Number of children: Not on file    Years of education: Not on file    Highest education level: Not on file   Occupational History    Not on file   Social Needs    Financial resource strain: Not on file    Food insecurity:     Worry: Not on file     Inability: Not on file    Transportation needs:     Medical: Not on file     Non-medical: Not on file   Tobacco Use    Smoking status: Never Smoker    Smokeless tobacco: Never Used   Substance and Sexual Activity    Alcohol use: No    Drug use: No    Sexual activity: Yes     Partners: Male     Birth control/protection: Condom, Inserts   Lifestyle    Physical activity:     Days per week: Not on file     Minutes per session: Not on file    Stress: Not on file   Relationships    Social connections:     Talks on phone: Not on file     Gets together: Not on file     Attends [employer] service: Not on file     Active member of club or organization: Not on file     Attends meetings of clubs or organizations: Not on file     Relationship status: Not on file   Other Topics Concern    Are you pregnant or think you may be? Not Asked    Breast-feeding Not Asked   Social History Narrative    Not on file       Current Outpatient Medications   Medication Sig Dispense Refill    escitalopram oxalate (LEXAPRO) 10 MG tablet Take 1 tablet (10 mg total) by mouth once daily. 30 tablet 11    metoprolol succinate (TOPROL-XL) 50 MG 24 hr tablet Take 1 tablet (50 mg total) by mouth once daily. 30 tablet 11    SPRINTEC, 28, 0.25-35 mg-mcg per tablet TAKE 1 TABLET BY MOUTH ONCE DAILY 84 tablet 3     No current facility-administered medications for this visit.        Review of patient's allergies indicates:   Allergen Reactions    No known drug allergies        Physical examination  Vitals Reviewed  Gen. Well-dressed well-nourished   Skin warm dry and intact.  No rashes noted.  HEENT.  TM intact bilateral with abnormal light  reflex on the right.  No mastoid tenderness during percussion.  Nares patent bilateral.  Pharynx is unremarkable.  No maxillary or frontal sinus tenderness when percussed.    Neck is supple without adenopathy  Chest.  Respirations are even unlabored.  Lungs are clear to auscultation.  Cardiac regular rate and rhythm.  No chest wall adenopathy noted.  Neuro. Awake alert oriented x4.  Normal judgment and cognition noted.  Extremities no clubbing cyanosis or edema noted.     Call or return to clinic prn if these symptoms worsen or fail to improve as anticipated.

## 2020-01-17 ENCOUNTER — OFFICE VISIT (OUTPATIENT)
Dept: FAMILY MEDICINE | Facility: CLINIC | Age: 25
End: 2020-01-17
Payer: COMMERCIAL

## 2020-01-17 VITALS
OXYGEN SATURATION: 98 % | DIASTOLIC BLOOD PRESSURE: 88 MMHG | SYSTOLIC BLOOD PRESSURE: 132 MMHG | HEIGHT: 57 IN | WEIGHT: 196.88 LBS | BODY MASS INDEX: 42.47 KG/M2 | TEMPERATURE: 98 F | HEART RATE: 85 BPM

## 2020-01-17 DIAGNOSIS — Z20.9 EXPOSURE TO COMMUNICABLE DISEASE: ICD-10-CM

## 2020-01-17 DIAGNOSIS — Z02.0 SCHOOL PHYSICAL EXAM: Primary | ICD-10-CM

## 2020-01-17 PROCEDURE — 86580 POCT TB SKIN TEST: ICD-10-PCS | Mod: S$GLB,,, | Performed by: NURSE PRACTITIONER

## 2020-01-17 PROCEDURE — 3079F DIAST BP 80-89 MM HG: CPT | Mod: CPTII,S$GLB,, | Performed by: NURSE PRACTITIONER

## 2020-01-17 PROCEDURE — 99999 PR PBB SHADOW E&M-EST. PATIENT-LVL III: ICD-10-PCS | Mod: PBBFAC,,, | Performed by: NURSE PRACTITIONER

## 2020-01-17 PROCEDURE — 99999 PR PBB SHADOW E&M-EST. PATIENT-LVL III: CPT | Mod: PBBFAC,,, | Performed by: NURSE PRACTITIONER

## 2020-01-17 PROCEDURE — 99213 PR OFFICE/OUTPT VISIT, EST, LEVL III, 20-29 MIN: ICD-10-PCS | Mod: 25,S$GLB,, | Performed by: NURSE PRACTITIONER

## 2020-01-17 PROCEDURE — 86580 TB INTRADERMAL TEST: CPT | Mod: S$GLB,,, | Performed by: NURSE PRACTITIONER

## 2020-01-17 PROCEDURE — 90471 FLU VACCINE (QUAD) GREATER THAN OR EQUAL TO 3YO PRESERVATIVE FREE IM: ICD-10-PCS | Mod: S$GLB,,, | Performed by: NURSE PRACTITIONER

## 2020-01-17 PROCEDURE — 3075F SYST BP GE 130 - 139MM HG: CPT | Mod: CPTII,S$GLB,, | Performed by: NURSE PRACTITIONER

## 2020-01-17 PROCEDURE — 3079F PR MOST RECENT DIASTOLIC BLOOD PRESSURE 80-89 MM HG: ICD-10-PCS | Mod: CPTII,S$GLB,, | Performed by: NURSE PRACTITIONER

## 2020-01-17 PROCEDURE — 90686 IIV4 VACC NO PRSV 0.5 ML IM: CPT | Mod: S$GLB,,, | Performed by: NURSE PRACTITIONER

## 2020-01-17 PROCEDURE — 90686 FLU VACCINE (QUAD) GREATER THAN OR EQUAL TO 3YO PRESERVATIVE FREE IM: ICD-10-PCS | Mod: S$GLB,,, | Performed by: NURSE PRACTITIONER

## 2020-01-17 PROCEDURE — 90471 IMMUNIZATION ADMIN: CPT | Mod: S$GLB,,, | Performed by: NURSE PRACTITIONER

## 2020-01-17 PROCEDURE — 99213 OFFICE O/P EST LOW 20 MIN: CPT | Mod: 25,S$GLB,, | Performed by: NURSE PRACTITIONER

## 2020-01-17 PROCEDURE — 3075F PR MOST RECENT SYSTOLIC BLOOD PRESS GE 130-139MM HG: ICD-10-PCS | Mod: CPTII,S$GLB,, | Performed by: NURSE PRACTITIONER

## 2020-01-17 NOTE — PROGRESS NOTES
"This dictation has been generated using Modal Fluency Dictation some phonetic errors may occur. Please contact author for clarification if needed.     Problem List Items Addressed This Visit     None      Visit Diagnoses     School physical exam    -  Primary    Relevant Orders    POCT TB Skin Test Read (Completed)    Exposure to communicable disease        Relevant Orders    POCT TB Skin Test Read (Completed)        Orders Placed This Encounter    Influenza - Quadrivalent (PF)    POCT TB Skin Test Read     School physical unclear if she needs titers.  Immunizations are up-to-date except flu.  Also needs PPD skin test.  She is going to go to see if work and read her PPD.    No follow-ups on file.    ________________________________________________________________  ________________________________________________________________      Chief Complaint   Patient presents with    Annual Exam     History of present illness  This 24 y.o. 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  Arriving 30 minutes late to appointment.  Immunizations appear to be up-to-date except for influenza.  Unclear if they need titers.  She does need PPD.  Denies hemoptysis.  No chest pain or shortness of breath.  Denies chronic medical disease.  Complete review of systems otherwise negative    Past Medical History:   Diagnosis Date    Anemia     s/p transfusion from menstral bleeding    Anxiety     Asthma     "athletic" asthma    Blood transfusion     Depression     Hashimoto's disease     HTN (hypertension)        History reviewed. No pertinent surgical history.    Family History   Problem Relation Age of Onset    Thyroid disease Mother     Hashimoto's thyroiditis Mother     Diabetes Father     Breast cancer Neg Hx     Ovarian cancer Neg Hx     Melanoma Neg Hx     Psoriasis Neg Hx     Lupus Neg Hx     Eczema Neg Hx        Social History     Socioeconomic History    Marital status: Single     Spouse name: " Not on file    Number of children: Not on file    Years of education: Not on file    Highest education level: Not on file   Occupational History    Not on file   Social Needs    Financial resource strain: Not on file    Food insecurity:     Worry: Not on file     Inability: Not on file    Transportation needs:     Medical: Not on file     Non-medical: Not on file   Tobacco Use    Smoking status: Never Smoker    Smokeless tobacco: Never Used   Substance and Sexual Activity    Alcohol use: No    Drug use: No    Sexual activity: Yes     Partners: Male     Birth control/protection: Condom, Inserts   Lifestyle    Physical activity:     Days per week: Not on file     Minutes per session: Not on file    Stress: Not on file   Relationships    Social connections:     Talks on phone: Not on file     Gets together: Not on file     Attends Judaism service: Not on file     Active member of club or organization: Not on file     Attends meetings of clubs or organizations: Not on file     Relationship status: Not on file   Other Topics Concern    Are you pregnant or think you may be? Not Asked    Breast-feeding Not Asked   Social History Narrative    Not on file       Current Outpatient Medications   Medication Sig Dispense Refill    escitalopram oxalate (LEXAPRO) 10 MG tablet Take 1 tablet (10 mg total) by mouth once daily. 30 tablet 11    metoprolol succinate (TOPROL-XL) 50 MG 24 hr tablet Take 1 tablet (50 mg total) by mouth once daily. 30 tablet 11    SPRINTEC, 28, 0.25-35 mg-mcg per tablet TAKE 1 TABLET BY MOUTH ONCE DAILY 84 tablet 3     No current facility-administered medications for this visit.        Review of patient's allergies indicates:   Allergen Reactions    No known drug allergies        Physical examination  Vitals Reviewed  Gen. Well-dressed well-nourished   Skin warm dry and intact.  No rashes noted.  HEENT.  TM intact bilateral with normal light reflex.  No mastoid tenderness during  percussion.  Nares patent bilateral.  Pharynx is unremarkable.  No maxillary or frontal sinus tenderness when percussed.    Neck is supple without adenopathy  Chest.  Respirations are even unlabored.  Lungs are clear to auscultation.  Cardiac regular rate and rhythm.  No chest wall adenopathy noted.  Neuro. Awake alert oriented x4.  Normal judgment and cognition noted.  Extremities no clubbing cyanosis or edema noted.     Call or return to clinic prn if these symptoms worsen or fail to improve as anticipated.

## 2020-05-05 ENCOUNTER — PATIENT MESSAGE (OUTPATIENT)
Dept: ADMINISTRATIVE | Facility: HOSPITAL | Age: 25
End: 2020-05-05

## 2020-08-14 ENCOUNTER — OFFICE VISIT (OUTPATIENT)
Dept: OBSTETRICS AND GYNECOLOGY | Facility: CLINIC | Age: 25
End: 2020-08-14
Payer: COMMERCIAL

## 2020-08-14 VITALS
HEIGHT: 57 IN | WEIGHT: 201.06 LBS | SYSTOLIC BLOOD PRESSURE: 124 MMHG | DIASTOLIC BLOOD PRESSURE: 84 MMHG | BODY MASS INDEX: 43.38 KG/M2

## 2020-08-14 DIAGNOSIS — Z12.4 ENCOUNTER FOR PAP SMEAR OF CERVIX WITH HPV DNA COTESTING: Primary | ICD-10-CM

## 2020-08-14 PROCEDURE — 3074F SYST BP LT 130 MM HG: CPT | Mod: CPTII,S$GLB,, | Performed by: SPECIALIST

## 2020-08-14 PROCEDURE — 88175 CYTOPATH C/V AUTO FLUID REDO: CPT

## 2020-08-14 PROCEDURE — 3008F BODY MASS INDEX DOCD: CPT | Mod: CPTII,S$GLB,, | Performed by: SPECIALIST

## 2020-08-14 PROCEDURE — 3074F PR MOST RECENT SYSTOLIC BLOOD PRESSURE < 130 MM HG: ICD-10-PCS | Mod: CPTII,S$GLB,, | Performed by: SPECIALIST

## 2020-08-14 PROCEDURE — 99999 PR PBB SHADOW E&M-EST. PATIENT-LVL III: ICD-10-PCS | Mod: PBBFAC,,, | Performed by: SPECIALIST

## 2020-08-14 PROCEDURE — 99203 PR OFFICE/OUTPT VISIT, NEW, LEVL III, 30-44 MIN: ICD-10-PCS | Mod: S$GLB,,, | Performed by: SPECIALIST

## 2020-08-14 PROCEDURE — 3008F PR BODY MASS INDEX (BMI) DOCUMENTED: ICD-10-PCS | Mod: CPTII,S$GLB,, | Performed by: SPECIALIST

## 2020-08-14 PROCEDURE — 99203 OFFICE O/P NEW LOW 30 MIN: CPT | Mod: S$GLB,,, | Performed by: SPECIALIST

## 2020-08-14 PROCEDURE — 3079F DIAST BP 80-89 MM HG: CPT | Mod: CPTII,S$GLB,, | Performed by: SPECIALIST

## 2020-08-14 PROCEDURE — 99999 PR PBB SHADOW E&M-EST. PATIENT-LVL III: CPT | Mod: PBBFAC,,, | Performed by: SPECIALIST

## 2020-08-14 PROCEDURE — 3079F PR MOST RECENT DIASTOLIC BLOOD PRESSURE 80-89 MM HG: ICD-10-PCS | Mod: CPTII,S$GLB,, | Performed by: SPECIALIST

## 2020-08-14 PROCEDURE — 87624 HPV HI-RISK TYP POOLED RSLT: CPT

## 2020-08-14 NOTE — PROGRESS NOTES
"24 yo WF G0 presents for annual gyn eval. No overt complaints. Menses q month with occasional irregularity.  Past Medical History:   Diagnosis Date    Anemia     s/p transfusion from menstral bleeding    Anxiety     Asthma     "athletic" asthma    Blood transfusion     Depression     Hashimoto's disease     HTN (hypertension)        History reviewed. No pertinent surgical history.    Family History   Problem Relation Age of Onset    Thyroid disease Mother     Hashimoto's thyroiditis Mother     Diabetes Father     Breast cancer Neg Hx     Ovarian cancer Neg Hx     Melanoma Neg Hx     Psoriasis Neg Hx     Lupus Neg Hx     Eczema Neg Hx        Social History     Socioeconomic History    Marital status: Single     Spouse name: Not on file    Number of children: Not on file    Years of education: Not on file    Highest education level: Not on file   Occupational History    Not on file   Social Needs    Financial resource strain: Not on file    Food insecurity     Worry: Not on file     Inability: Not on file    Transportation needs     Medical: Not on file     Non-medical: Not on file   Tobacco Use    Smoking status: Never Smoker    Smokeless tobacco: Never Used   Substance and Sexual Activity    Alcohol use: Yes    Drug use: No    Sexual activity: Yes     Partners: Male     Birth control/protection: None   Lifestyle    Physical activity     Days per week: Not on file     Minutes per session: Not on file    Stress: Not on file   Relationships    Social connections     Talks on phone: Not on file     Gets together: Not on file     Attends Mosque service: Not on file     Active member of club or organization: Not on file     Attends meetings of clubs or organizations: Not on file     Relationship status: Not on file   Other Topics Concern    Are you pregnant or think you may be? Not Asked    Breast-feeding Not Asked   Social History Narrative    Not on file       No current outpatient " medications on file.     No current facility-administered medications for this visit.        Review of patient's allergies indicates:   Allergen Reactions    No known drug allergies        Review of System:   General: no chills, fever, night sweats, weight gain or weight loss  Psychological: no depression or suicidal ideation  Breasts: no new or changing breast lumps, nipple discharge or masses.  Respiratory: no cough, shortness of breath, or wheezing  Cardiovascular: no chest pain or dyspnea on exertion  Gastrointestinal: no abdominal pain, change in bowel habits, or black or bloody stools  Genito-Urinary: no incontinence, urinary frequency/urgency or vulvar/vaginal symptoms, pelvic pain or abnormal vaginal bleeding.  Musculoskeletal: no gait disturbance or muscular weakness                                              General Appearance    A and O x 4, Cooperative, no distress   Breasts    Abdomen   Deferred  Soft, non-tender, bowel sounds active all four quadrants,  no masses, no organomegaly    Genitourinary:   External rectal exam shows no thrombosed external hemorrhoids.   Pelvic exam was performed with patient supine.  No labial fusion.  There is no rash, lesion or injury on the right labia.  There is no rash, lesion or injury on the left labia.  No bleeding and no signs of injury around the vaginal introitus, urethra is without lesions and well supported. The cervix is visualized with no discharge, lesions or friability.  No vaginal discharge found.  No significant Cystocele, Enterocele or rectocele, and uterus well supported.  Bimanual exam:  The urethra is normal to palpation and there are no palpable vaginal wall masses.  Uterus is not deviated, not enlarged, not fixed, normal shape and not tender.  Cervix exhibits no motion tenderness.   Right adnexum displays no mass and no tenderness.  Left adnexum displays no mass and no tenderness.   Extremities: Extremities normal, atraumatic, no cyanosis or edema                      PAP submitted    Plan BSE monthly          COUNSELING:  The patient was counseled today on osteoporosis prevention, calcium supplementation, and regular weight bearing exercise. The patient was also counseled today on ACS PAP guidelines, with recommendations for screening PAP smear age 21-65 every 3-5 yearsunless their uterus, cervix, and ovaries were removed for benign reasons. Screening with HPV testing ages 30-65 every 5 years as an alternative. The patient was also counseled regarding monthly breast self-examination, routine STD screening for at-risk populations, prophylactic immunizations for transmitted infections such as HPV, Pertussis, or Influenza as appropriate, and yearly mammograms when indicated by ACS guidelines. She was advised to see her primary care physician for all other health maintenance.   FOLLOW-UP with me for next routine visit.

## 2020-08-25 LAB
HPV HR 12 DNA SPEC QL NAA+PROBE: NEGATIVE
HPV16 AG SPEC QL: NEGATIVE
HPV18 DNA SPEC QL NAA+PROBE: NEGATIVE

## 2020-08-30 LAB
FINAL PATHOLOGIC DIAGNOSIS: NORMAL
Lab: NORMAL

## 2021-03-18 ENCOUNTER — CLINICAL SUPPORT (OUTPATIENT)
Dept: FAMILY MEDICINE | Facility: CLINIC | Age: 26
End: 2021-03-18
Payer: COMMERCIAL

## 2021-03-18 ENCOUNTER — TELEPHONE (OUTPATIENT)
Dept: FAMILY MEDICINE | Facility: CLINIC | Age: 26
End: 2021-03-18

## 2021-03-18 DIAGNOSIS — Z11.1 TUBERCULOSIS SCREENING: Primary | ICD-10-CM

## 2021-03-18 DIAGNOSIS — Z11.1 ENCOUNTER FOR PPD TEST: Primary | ICD-10-CM

## 2021-03-18 PROCEDURE — 99499 NO LOS: ICD-10-PCS | Mod: S$GLB,,, | Performed by: FAMILY MEDICINE

## 2021-03-18 PROCEDURE — 86580 TB INTRADERMAL TEST: CPT | Mod: S$GLB,,, | Performed by: FAMILY MEDICINE

## 2021-03-18 PROCEDURE — 86580 POCT TB SKIN TEST: ICD-10-PCS | Mod: S$GLB,,, | Performed by: FAMILY MEDICINE

## 2021-03-18 PROCEDURE — 99499 UNLISTED E&M SERVICE: CPT | Mod: S$GLB,,, | Performed by: FAMILY MEDICINE

## 2021-04-06 ENCOUNTER — PATIENT MESSAGE (OUTPATIENT)
Dept: ADMINISTRATIVE | Facility: HOSPITAL | Age: 26
End: 2021-04-06

## 2021-05-10 ENCOUNTER — OCCUPATIONAL HEALTH (OUTPATIENT)
Dept: URGENT CARE | Facility: CLINIC | Age: 26
End: 2021-05-10

## 2021-05-10 PROCEDURE — 80305 DRUG TEST PRSMV DIR OPT OBS: CPT | Mod: S$GLB,,, | Performed by: EMERGENCY MEDICINE

## 2021-05-10 PROCEDURE — 80305 PR COLLECTION ONLY DRUG SCREEN: ICD-10-PCS | Mod: S$GLB,,, | Performed by: EMERGENCY MEDICINE

## 2021-06-01 ENCOUNTER — OFFICE VISIT (OUTPATIENT)
Dept: FAMILY MEDICINE | Facility: CLINIC | Age: 26
End: 2021-06-01
Payer: COMMERCIAL

## 2021-06-01 ENCOUNTER — TELEPHONE (OUTPATIENT)
Dept: FAMILY MEDICINE | Facility: CLINIC | Age: 26
End: 2021-06-01

## 2021-06-01 VITALS
HEART RATE: 83 BPM | BODY MASS INDEX: 43.9 KG/M2 | DIASTOLIC BLOOD PRESSURE: 78 MMHG | WEIGHT: 203.5 LBS | TEMPERATURE: 98 F | HEIGHT: 57 IN | OXYGEN SATURATION: 97 % | SYSTOLIC BLOOD PRESSURE: 126 MMHG

## 2021-06-01 DIAGNOSIS — J45.901 EXACERBATION OF ASTHMA, UNSPECIFIED ASTHMA SEVERITY, UNSPECIFIED WHETHER PERSISTENT: ICD-10-CM

## 2021-06-01 DIAGNOSIS — Z23 IMMUNIZATION DUE: ICD-10-CM

## 2021-06-01 DIAGNOSIS — Z00.00 WELLNESS EXAMINATION: Primary | ICD-10-CM

## 2021-06-01 PROCEDURE — 99395 PREV VISIT EST AGE 18-39: CPT | Mod: S$GLB,,, | Performed by: FAMILY MEDICINE

## 2021-06-01 PROCEDURE — 1126F AMNT PAIN NOTED NONE PRSNT: CPT | Mod: S$GLB,,, | Performed by: FAMILY MEDICINE

## 2021-06-01 PROCEDURE — 3008F BODY MASS INDEX DOCD: CPT | Mod: CPTII,S$GLB,, | Performed by: FAMILY MEDICINE

## 2021-06-01 PROCEDURE — 3008F PR BODY MASS INDEX (BMI) DOCUMENTED: ICD-10-PCS | Mod: CPTII,S$GLB,, | Performed by: FAMILY MEDICINE

## 2021-06-01 PROCEDURE — 99395 PR PREVENTIVE VISIT,EST,18-39: ICD-10-PCS | Mod: S$GLB,,, | Performed by: FAMILY MEDICINE

## 2021-06-01 PROCEDURE — 1126F PR PAIN SEVERITY QUANTIFIED, NO PAIN PRESENT: ICD-10-PCS | Mod: S$GLB,,, | Performed by: FAMILY MEDICINE

## 2021-06-01 PROCEDURE — 99999 PR PBB SHADOW E&M-EST. PATIENT-LVL III: ICD-10-PCS | Mod: PBBFAC,,, | Performed by: FAMILY MEDICINE

## 2021-06-01 PROCEDURE — 99999 PR PBB SHADOW E&M-EST. PATIENT-LVL III: CPT | Mod: PBBFAC,,, | Performed by: FAMILY MEDICINE

## 2021-06-01 RX ORDER — EPINEPHRINE 0.3 MG/.3ML
2 INJECTION SUBCUTANEOUS ONCE
Qty: 0.6 ML | Refills: 3 | Status: SHIPPED | OUTPATIENT
Start: 2021-06-01 | End: 2021-07-07

## 2021-06-01 RX ORDER — EPINEPHRINE 0.3 MG/.3ML
2 INJECTION SUBCUTANEOUS ONCE
Qty: 0.6 ML | Refills: 3 | Status: SHIPPED | OUTPATIENT
Start: 2021-06-01 | End: 2021-06-01 | Stop reason: SDUPTHER

## 2021-06-01 RX ORDER — ALBUTEROL SULFATE 90 UG/1
AEROSOL, METERED RESPIRATORY (INHALATION)
COMMUNITY
Start: 2021-05-18 | End: 2022-09-16 | Stop reason: SDUPTHER

## 2021-06-23 ENCOUNTER — PATIENT MESSAGE (OUTPATIENT)
Dept: SURGERY | Facility: HOSPITAL | Age: 26
End: 2021-06-23

## 2021-06-23 ENCOUNTER — ANESTHESIA EVENT (OUTPATIENT)
Dept: SURGERY | Facility: HOSPITAL | Age: 26
End: 2021-06-23
Payer: COMMERCIAL

## 2021-06-23 ENCOUNTER — PATIENT MESSAGE (OUTPATIENT)
Dept: FAMILY MEDICINE | Facility: CLINIC | Age: 26
End: 2021-06-23

## 2021-06-23 ENCOUNTER — LAB VISIT (OUTPATIENT)
Dept: LAB | Facility: HOSPITAL | Age: 26
End: 2021-06-23
Attending: OBSTETRICS & GYNECOLOGY
Payer: COMMERCIAL

## 2021-06-23 ENCOUNTER — OFFICE VISIT (OUTPATIENT)
Dept: OBSTETRICS AND GYNECOLOGY | Facility: CLINIC | Age: 26
End: 2021-06-23
Payer: COMMERCIAL

## 2021-06-23 VITALS
DIASTOLIC BLOOD PRESSURE: 84 MMHG | WEIGHT: 202.81 LBS | BODY MASS INDEX: 43.75 KG/M2 | SYSTOLIC BLOOD PRESSURE: 132 MMHG | HEIGHT: 57 IN

## 2021-06-23 DIAGNOSIS — O36.8390 UNABLE TO HEAR FETAL HEART TONES AS REASON FOR ULTRASOUND SCAN: ICD-10-CM

## 2021-06-23 DIAGNOSIS — O02.1 MISSED ABORTION: ICD-10-CM

## 2021-06-23 DIAGNOSIS — N92.6 MISSED MENSES: Primary | ICD-10-CM

## 2021-06-23 LAB
ABO + RH BLD: NORMAL
B-HCG UR QL: POSITIVE
BLD GP AB SCN CELLS X3 SERPL QL: NORMAL
CTP QC/QA: YES
ERYTHROCYTE [DISTWIDTH] IN BLOOD BY AUTOMATED COUNT: 12.7 % (ref 11.5–14.5)
HCT VFR BLD AUTO: 39.8 % (ref 37–48.5)
HGB BLD-MCNC: 13.3 G/DL (ref 12–16)
MCH RBC QN AUTO: 28.6 PG (ref 27–31)
MCHC RBC AUTO-ENTMCNC: 33.4 G/DL (ref 32–36)
MCV RBC AUTO: 86 FL (ref 82–98)
PLATELET # BLD AUTO: 293 K/UL (ref 150–450)
PMV BLD AUTO: 11.1 FL (ref 9.2–12.9)
RBC # BLD AUTO: 4.65 M/UL (ref 4–5.4)
WBC # BLD AUTO: 10.84 K/UL (ref 3.9–12.7)

## 2021-06-23 PROCEDURE — 81025 URINE PREGNANCY TEST: CPT | Mod: S$GLB,,, | Performed by: OBSTETRICS & GYNECOLOGY

## 2021-06-23 PROCEDURE — 81025 POCT URINE PREGNANCY: ICD-10-PCS | Mod: S$GLB,,, | Performed by: OBSTETRICS & GYNECOLOGY

## 2021-06-23 PROCEDURE — 85027 COMPLETE CBC AUTOMATED: CPT | Performed by: OBSTETRICS & GYNECOLOGY

## 2021-06-23 PROCEDURE — 99213 OFFICE O/P EST LOW 20 MIN: CPT | Mod: 25,S$GLB,, | Performed by: OBSTETRICS & GYNECOLOGY

## 2021-06-23 PROCEDURE — 99213 PR OFFICE/OUTPT VISIT, EST, LEVL III, 20-29 MIN: ICD-10-PCS | Mod: 25,S$GLB,, | Performed by: OBSTETRICS & GYNECOLOGY

## 2021-06-23 PROCEDURE — 1126F AMNT PAIN NOTED NONE PRSNT: CPT | Mod: S$GLB,,, | Performed by: OBSTETRICS & GYNECOLOGY

## 2021-06-23 PROCEDURE — 76817 PR US, OB, TRANSVAG APPROACH: ICD-10-PCS | Mod: S$GLB,,, | Performed by: OBSTETRICS & GYNECOLOGY

## 2021-06-23 PROCEDURE — 99999 PR PBB SHADOW E&M-EST. PATIENT-LVL IV: CPT | Mod: PBBFAC,,, | Performed by: OBSTETRICS & GYNECOLOGY

## 2021-06-23 PROCEDURE — 3008F PR BODY MASS INDEX (BMI) DOCUMENTED: ICD-10-PCS | Mod: CPTII,S$GLB,, | Performed by: OBSTETRICS & GYNECOLOGY

## 2021-06-23 PROCEDURE — 99999 PR PBB SHADOW E&M-EST. PATIENT-LVL IV: ICD-10-PCS | Mod: PBBFAC,,, | Performed by: OBSTETRICS & GYNECOLOGY

## 2021-06-23 PROCEDURE — 76817 TRANSVAGINAL US OBSTETRIC: CPT | Mod: S$GLB,,, | Performed by: OBSTETRICS & GYNECOLOGY

## 2021-06-23 PROCEDURE — 1126F PR PAIN SEVERITY QUANTIFIED, NO PAIN PRESENT: ICD-10-PCS | Mod: S$GLB,,, | Performed by: OBSTETRICS & GYNECOLOGY

## 2021-06-23 PROCEDURE — 3008F BODY MASS INDEX DOCD: CPT | Mod: CPTII,S$GLB,, | Performed by: OBSTETRICS & GYNECOLOGY

## 2021-06-23 PROCEDURE — 86900 BLOOD TYPING SEROLOGIC ABO: CPT | Performed by: OBSTETRICS & GYNECOLOGY

## 2021-06-23 PROCEDURE — 84702 CHORIONIC GONADOTROPIN TEST: CPT | Performed by: OBSTETRICS & GYNECOLOGY

## 2021-06-23 RX ORDER — DEXTROSE, SODIUM CHLORIDE, SODIUM LACTATE, POTASSIUM CHLORIDE, AND CALCIUM CHLORIDE 5; .6; .31; .03; .02 G/100ML; G/100ML; G/100ML; G/100ML; G/100ML
INJECTION, SOLUTION INTRAVENOUS CONTINUOUS
Status: CANCELLED | OUTPATIENT
Start: 2021-06-23

## 2021-06-23 RX ORDER — MUPIROCIN 20 MG/G
OINTMENT TOPICAL
Status: CANCELLED | OUTPATIENT
Start: 2021-06-23

## 2021-06-23 RX ORDER — CHLORHEXIDINE GLUCONATE ORAL RINSE 1.2 MG/ML
10 SOLUTION DENTAL
Status: CANCELLED | OUTPATIENT
Start: 2021-06-23

## 2021-06-24 ENCOUNTER — HOSPITAL ENCOUNTER (OUTPATIENT)
Facility: HOSPITAL | Age: 26
Discharge: HOME | End: 2021-06-24
Attending: OBSTETRICS & GYNECOLOGY | Admitting: OBSTETRICS & GYNECOLOGY
Payer: COMMERCIAL

## 2021-06-24 ENCOUNTER — ANESTHESIA (OUTPATIENT)
Dept: SURGERY | Facility: HOSPITAL | Age: 26
End: 2021-06-24
Payer: COMMERCIAL

## 2021-06-24 DIAGNOSIS — R79.89 ABNORMAL THYROID BLOOD TEST: Primary | ICD-10-CM

## 2021-06-24 DIAGNOSIS — N92.6 MISSED MENSES: ICD-10-CM

## 2021-06-24 DIAGNOSIS — O02.1 MISSED ABORTION: ICD-10-CM

## 2021-06-24 DIAGNOSIS — Z71.89 ADVICE GIVEN ABOUT 2019 NOVEL CORONAVIRUS BY TELEPHONE: Primary | ICD-10-CM

## 2021-06-24 LAB
HCG INTACT+B SERPL-ACNC: NORMAL MIU/ML
SARS-COV-2 RDRP RESP QL NAA+PROBE: NEGATIVE

## 2021-06-24 PROCEDURE — U0002 COVID-19 LAB TEST NON-CDC: HCPCS | Mod: PO | Performed by: OBSTETRICS & GYNECOLOGY

## 2021-06-24 PROCEDURE — 63600175 PHARM REV CODE 636 W HCPCS: Mod: PO | Performed by: OBSTETRICS & GYNECOLOGY

## 2021-06-24 PROCEDURE — 63600175 PHARM REV CODE 636 W HCPCS: Mod: PO | Performed by: ANESTHESIOLOGY

## 2021-06-24 PROCEDURE — 37000008 HC ANESTHESIA 1ST 15 MINUTES: Mod: PO | Performed by: OBSTETRICS & GYNECOLOGY

## 2021-06-24 PROCEDURE — D9220A PRA ANESTHESIA: Mod: ,,, | Performed by: ANESTHESIOLOGY

## 2021-06-24 PROCEDURE — 59820 CARE OF MISCARRIAGE: CPT | Mod: ,,, | Performed by: OBSTETRICS & GYNECOLOGY

## 2021-06-24 PROCEDURE — 88305 TISSUE EXAM BY PATHOLOGIST: CPT | Performed by: PATHOLOGY

## 2021-06-24 PROCEDURE — 88305 TISSUE EXAM BY PATHOLOGIST: ICD-10-PCS | Mod: 26,,, | Performed by: PATHOLOGY

## 2021-06-24 PROCEDURE — D9220A PRA ANESTHESIA: ICD-10-PCS | Mod: ,,, | Performed by: NURSE ANESTHETIST, CERTIFIED REGISTERED

## 2021-06-24 PROCEDURE — 36000704 HC OR TIME LEV I 1ST 15 MIN: Mod: PO | Performed by: OBSTETRICS & GYNECOLOGY

## 2021-06-24 PROCEDURE — 71000033 HC RECOVERY, INTIAL HOUR: Mod: PO | Performed by: OBSTETRICS & GYNECOLOGY

## 2021-06-24 PROCEDURE — 25000003 PHARM REV CODE 250: Mod: PO | Performed by: NURSE ANESTHETIST, CERTIFIED REGISTERED

## 2021-06-24 PROCEDURE — 25000003 PHARM REV CODE 250: Mod: PO | Performed by: ANESTHESIOLOGY

## 2021-06-24 PROCEDURE — 25000003 PHARM REV CODE 250: Mod: PO | Performed by: OBSTETRICS & GYNECOLOGY

## 2021-06-24 PROCEDURE — 71000015 HC POSTOP RECOV 1ST HR: Mod: PO | Performed by: OBSTETRICS & GYNECOLOGY

## 2021-06-24 PROCEDURE — D9220A PRA ANESTHESIA: ICD-10-PCS | Mod: ,,, | Performed by: ANESTHESIOLOGY

## 2021-06-24 PROCEDURE — 59820 PR SURG RX MISSED ABORTN,1ST TRI: ICD-10-PCS | Mod: ,,, | Performed by: OBSTETRICS & GYNECOLOGY

## 2021-06-24 PROCEDURE — 36000705 HC OR TIME LEV I EA ADD 15 MIN: Mod: PO | Performed by: OBSTETRICS & GYNECOLOGY

## 2021-06-24 PROCEDURE — D9220A PRA ANESTHESIA: Mod: ,,, | Performed by: NURSE ANESTHETIST, CERTIFIED REGISTERED

## 2021-06-24 PROCEDURE — 88305 TISSUE EXAM BY PATHOLOGIST: CPT | Mod: 26,,, | Performed by: PATHOLOGY

## 2021-06-24 PROCEDURE — 63600175 PHARM REV CODE 636 W HCPCS: Mod: PO | Performed by: NURSE ANESTHETIST, CERTIFIED REGISTERED

## 2021-06-24 PROCEDURE — 37000009 HC ANESTHESIA EA ADD 15 MINS: Mod: PO | Performed by: OBSTETRICS & GYNECOLOGY

## 2021-06-24 RX ORDER — KETOROLAC TROMETHAMINE 30 MG/ML
INJECTION, SOLUTION INTRAMUSCULAR; INTRAVENOUS
Status: DISCONTINUED | OUTPATIENT
Start: 2021-06-24 | End: 2021-06-24

## 2021-06-24 RX ORDER — DEXTROSE, SODIUM CHLORIDE, SODIUM LACTATE, POTASSIUM CHLORIDE, AND CALCIUM CHLORIDE 5; .6; .31; .03; .02 G/100ML; G/100ML; G/100ML; G/100ML; G/100ML
INJECTION, SOLUTION INTRAVENOUS CONTINUOUS
Status: CANCELLED | OUTPATIENT
Start: 2021-06-24

## 2021-06-24 RX ORDER — CEFAZOLIN SODIUM 2 G/50ML
2 SOLUTION INTRAVENOUS
Status: COMPLETED | OUTPATIENT
Start: 2021-06-24 | End: 2021-06-24

## 2021-06-24 RX ORDER — PROPOFOL 10 MG/ML
VIAL (ML) INTRAVENOUS
Status: DISCONTINUED | OUTPATIENT
Start: 2021-06-24 | End: 2021-06-24

## 2021-06-24 RX ORDER — IBUPROFEN 800 MG/1
800 TABLET ORAL EVERY 8 HOURS PRN
Qty: 30 TABLET | Refills: 1 | Status: SHIPPED | OUTPATIENT
Start: 2021-06-24 | End: 2021-07-07

## 2021-06-24 RX ORDER — KETAMINE HCL IN 0.9 % NACL 50 MG/5 ML
SYRINGE (ML) INTRAVENOUS
Status: DISCONTINUED | OUTPATIENT
Start: 2021-06-24 | End: 2021-06-24

## 2021-06-24 RX ORDER — LIDOCAINE HYDROCHLORIDE 10 MG/ML
1 INJECTION, SOLUTION EPIDURAL; INFILTRATION; INTRACAUDAL; PERINEURAL ONCE
Status: COMPLETED | OUTPATIENT
Start: 2021-06-24 | End: 2021-06-24

## 2021-06-24 RX ORDER — MIDAZOLAM HYDROCHLORIDE 1 MG/ML
INJECTION, SOLUTION INTRAMUSCULAR; INTRAVENOUS
Status: DISCONTINUED | OUTPATIENT
Start: 2021-06-24 | End: 2021-06-24

## 2021-06-24 RX ORDER — ONDANSETRON 2 MG/ML
4 INJECTION INTRAMUSCULAR; INTRAVENOUS ONCE AS NEEDED
Status: DISCONTINUED | OUTPATIENT
Start: 2021-06-24 | End: 2021-06-24 | Stop reason: HOSPADM

## 2021-06-24 RX ORDER — DEXAMETHASONE SODIUM PHOSPHATE 4 MG/ML
INJECTION, SOLUTION INTRA-ARTICULAR; INTRALESIONAL; INTRAMUSCULAR; INTRAVENOUS; SOFT TISSUE
Status: DISCONTINUED | OUTPATIENT
Start: 2021-06-24 | End: 2021-06-24

## 2021-06-24 RX ORDER — FENTANYL CITRATE 50 UG/ML
INJECTION, SOLUTION INTRAMUSCULAR; INTRAVENOUS
Status: DISCONTINUED | OUTPATIENT
Start: 2021-06-24 | End: 2021-06-24

## 2021-06-24 RX ORDER — OXYCODONE HYDROCHLORIDE 5 MG/1
5 TABLET ORAL ONCE AS NEEDED
Status: DISCONTINUED | OUTPATIENT
Start: 2021-06-24 | End: 2021-06-24 | Stop reason: HOSPADM

## 2021-06-24 RX ORDER — DIPHENHYDRAMINE HCL 50 MG
50 CAPSULE ORAL EVERY 4 HOURS PRN
Status: DISCONTINUED | OUTPATIENT
Start: 2021-06-24 | End: 2021-06-24 | Stop reason: HOSPADM

## 2021-06-24 RX ORDER — PROCHLORPERAZINE EDISYLATE 5 MG/ML
5 INJECTION INTRAMUSCULAR; INTRAVENOUS EVERY 6 HOURS PRN
Status: DISCONTINUED | OUTPATIENT
Start: 2021-06-24 | End: 2021-06-24 | Stop reason: HOSPADM

## 2021-06-24 RX ORDER — ONDANSETRON 8 MG/1
8 TABLET, ORALLY DISINTEGRATING ORAL EVERY 8 HOURS PRN
Status: DISCONTINUED | OUTPATIENT
Start: 2021-06-24 | End: 2021-06-24 | Stop reason: HOSPADM

## 2021-06-24 RX ORDER — ACETAMINOPHEN 10 MG/ML
INJECTION, SOLUTION INTRAVENOUS
Status: DISCONTINUED | OUTPATIENT
Start: 2021-06-24 | End: 2021-06-24

## 2021-06-24 RX ORDER — ONDANSETRON 4 MG/1
8 TABLET, ORALLY DISINTEGRATING ORAL EVERY 8 HOURS PRN
Qty: 12 TABLET | Refills: 0 | Status: SHIPPED | OUTPATIENT
Start: 2021-06-24 | End: 2021-07-07

## 2021-06-24 RX ORDER — IBUPROFEN 400 MG/1
800 TABLET ORAL EVERY 8 HOURS PRN
Status: DISCONTINUED | OUTPATIENT
Start: 2021-06-24 | End: 2021-06-24 | Stop reason: HOSPADM

## 2021-06-24 RX ORDER — FENTANYL CITRATE 50 UG/ML
25 INJECTION, SOLUTION INTRAMUSCULAR; INTRAVENOUS EVERY 5 MIN PRN
Status: DISCONTINUED | OUTPATIENT
Start: 2021-06-24 | End: 2021-06-24 | Stop reason: HOSPADM

## 2021-06-24 RX ORDER — SODIUM CHLORIDE, SODIUM LACTATE, POTASSIUM CHLORIDE, CALCIUM CHLORIDE 600; 310; 30; 20 MG/100ML; MG/100ML; MG/100ML; MG/100ML
INJECTION, SOLUTION INTRAVENOUS CONTINUOUS
Status: DISCONTINUED | OUTPATIENT
Start: 2021-06-24 | End: 2021-06-24 | Stop reason: HOSPADM

## 2021-06-24 RX ORDER — ONDANSETRON 2 MG/ML
INJECTION INTRAMUSCULAR; INTRAVENOUS
Status: DISCONTINUED | OUTPATIENT
Start: 2021-06-24 | End: 2021-06-24

## 2021-06-24 RX ORDER — MUPIROCIN 20 MG/G
OINTMENT TOPICAL
Status: DISCONTINUED | OUTPATIENT
Start: 2021-06-24 | End: 2021-06-24 | Stop reason: HOSPADM

## 2021-06-24 RX ORDER — LIDOCAINE HYDROCHLORIDE 20 MG/ML
INJECTION INTRAVENOUS
Status: DISCONTINUED | OUTPATIENT
Start: 2021-06-24 | End: 2021-06-24

## 2021-06-24 RX ORDER — HYDROCODONE BITARTRATE AND ACETAMINOPHEN 5; 325 MG/1; MG/1
1 TABLET ORAL EVERY 4 HOURS PRN
Status: DISCONTINUED | OUTPATIENT
Start: 2021-06-24 | End: 2021-06-24 | Stop reason: HOSPADM

## 2021-06-24 RX ORDER — OXYCODONE AND ACETAMINOPHEN 5; 325 MG/1; MG/1
1 TABLET ORAL EVERY 4 HOURS PRN
Qty: 14 TABLET | Refills: 0 | Status: SHIPPED | OUTPATIENT
Start: 2021-06-24 | End: 2021-07-07

## 2021-06-24 RX ORDER — AMOXICILLIN 250 MG
1 CAPSULE ORAL 2 TIMES DAILY
Status: DISCONTINUED | OUTPATIENT
Start: 2021-06-24 | End: 2021-06-24 | Stop reason: HOSPADM

## 2021-06-24 RX ORDER — SODIUM CHLORIDE, SODIUM LACTATE, POTASSIUM CHLORIDE, CALCIUM CHLORIDE 600; 310; 30; 20 MG/100ML; MG/100ML; MG/100ML; MG/100ML
125 INJECTION, SOLUTION INTRAVENOUS CONTINUOUS
Status: DISCONTINUED | OUTPATIENT
Start: 2021-06-24 | End: 2021-06-24 | Stop reason: HOSPADM

## 2021-06-24 RX ORDER — DIPHENHYDRAMINE HYDROCHLORIDE 50 MG/ML
INJECTION INTRAMUSCULAR; INTRAVENOUS
Status: DISCONTINUED | OUTPATIENT
Start: 2021-06-24 | End: 2021-06-24

## 2021-06-24 RX ORDER — DEXTROSE, SODIUM CHLORIDE, SODIUM LACTATE, POTASSIUM CHLORIDE, AND CALCIUM CHLORIDE 5; .6; .31; .03; .02 G/100ML; G/100ML; G/100ML; G/100ML; G/100ML
INJECTION, SOLUTION INTRAVENOUS CONTINUOUS
Status: DISCONTINUED | OUTPATIENT
Start: 2021-06-24 | End: 2021-06-24 | Stop reason: HOSPADM

## 2021-06-24 RX ORDER — FAMOTIDINE 10 MG/ML
INJECTION INTRAVENOUS
Status: DISCONTINUED | OUTPATIENT
Start: 2021-06-24 | End: 2021-06-24

## 2021-06-24 RX ORDER — CHLORHEXIDINE GLUCONATE ORAL RINSE 1.2 MG/ML
10 SOLUTION DENTAL
Status: DISCONTINUED | OUTPATIENT
Start: 2021-06-24 | End: 2021-06-24 | Stop reason: HOSPADM

## 2021-06-24 RX ORDER — MISOPROSTOL 200 UG/1
TABLET ORAL
Status: DISCONTINUED | OUTPATIENT
Start: 2021-06-24 | End: 2021-06-24 | Stop reason: HOSPADM

## 2021-06-24 RX ADMIN — FAMOTIDINE 20 MG: 10 INJECTION, SOLUTION INTRAVENOUS at 12:06

## 2021-06-24 RX ADMIN — MUPIROCIN: 20 OINTMENT TOPICAL at 12:06

## 2021-06-24 RX ADMIN — LIDOCAINE HYDROCHLORIDE 10 MG: 10 INJECTION, SOLUTION EPIDURAL; INFILTRATION; INTRACAUDAL; PERINEURAL at 12:06

## 2021-06-24 RX ADMIN — PROPOFOL 150 MG: 10 INJECTION, EMULSION INTRAVENOUS at 12:06

## 2021-06-24 RX ADMIN — DIPHENHYDRAMINE HYDROCHLORIDE 6.25 MG: 50 INJECTION INTRAMUSCULAR; INTRAVENOUS at 12:06

## 2021-06-24 RX ADMIN — KETOROLAC TROMETHAMINE 30 MG: 30 INJECTION, SOLUTION INTRAMUSCULAR at 12:06

## 2021-06-24 RX ADMIN — MIDAZOLAM HYDROCHLORIDE 2 MG: 1 INJECTION, SOLUTION INTRAMUSCULAR; INTRAVENOUS at 12:06

## 2021-06-24 RX ADMIN — LIDOCAINE HYDROCHLORIDE 75 MG: 20 INJECTION, SOLUTION INTRAVENOUS at 12:06

## 2021-06-24 RX ADMIN — FENTANYL CITRATE 50 MCG: 50 INJECTION, SOLUTION INTRAMUSCULAR; INTRAVENOUS at 12:06

## 2021-06-24 RX ADMIN — ONDANSETRON 4 MG: 2 INJECTION INTRAMUSCULAR; INTRAVENOUS at 12:06

## 2021-06-24 RX ADMIN — Medication 20 MG: at 12:06

## 2021-06-24 RX ADMIN — SODIUM CHLORIDE, SODIUM LACTATE, POTASSIUM CHLORIDE, AND CALCIUM CHLORIDE: .6; .31; .03; .02 INJECTION, SOLUTION INTRAVENOUS at 12:06

## 2021-06-24 RX ADMIN — DEXAMETHASONE SODIUM PHOSPHATE 4 MG: 4 INJECTION, SOLUTION INTRAMUSCULAR; INTRAVENOUS at 12:06

## 2021-06-24 RX ADMIN — CEFAZOLIN SODIUM 2 G: 1 INJECTION, POWDER, FOR SOLUTION INTRAMUSCULAR; INTRAVENOUS at 12:06

## 2021-06-24 RX ADMIN — PROPOFOL 100 MG: 10 INJECTION, EMULSION INTRAVENOUS at 12:06

## 2021-06-24 RX ADMIN — Medication 10 MG: at 12:06

## 2021-06-24 RX ADMIN — ACETAMINOPHEN 1000 MG: 10 INJECTION, SOLUTION INTRAVENOUS at 12:06

## 2021-06-24 RX ADMIN — PROPOFOL 50 MG: 10 INJECTION, EMULSION INTRAVENOUS at 12:06

## 2021-06-25 ENCOUNTER — PATIENT MESSAGE (OUTPATIENT)
Dept: OBSTETRICS AND GYNECOLOGY | Facility: CLINIC | Age: 26
End: 2021-06-25

## 2021-06-25 ENCOUNTER — PATIENT MESSAGE (OUTPATIENT)
Dept: FAMILY MEDICINE | Facility: CLINIC | Age: 26
End: 2021-06-25

## 2021-06-25 VITALS
SYSTOLIC BLOOD PRESSURE: 129 MMHG | DIASTOLIC BLOOD PRESSURE: 79 MMHG | TEMPERATURE: 97 F | RESPIRATION RATE: 13 BRPM | HEART RATE: 80 BPM | HEIGHT: 57 IN | WEIGHT: 202 LBS | OXYGEN SATURATION: 98 % | BODY MASS INDEX: 43.58 KG/M2

## 2021-06-28 ENCOUNTER — OFFICE VISIT (OUTPATIENT)
Dept: FAMILY MEDICINE | Facility: CLINIC | Age: 26
End: 2021-06-28
Payer: COMMERCIAL

## 2021-06-28 ENCOUNTER — PATIENT MESSAGE (OUTPATIENT)
Dept: OBSTETRICS AND GYNECOLOGY | Facility: CLINIC | Age: 26
End: 2021-06-28

## 2021-06-28 VITALS
HEART RATE: 87 BPM | BODY MASS INDEX: 43.32 KG/M2 | DIASTOLIC BLOOD PRESSURE: 78 MMHG | OXYGEN SATURATION: 97 % | WEIGHT: 200.81 LBS | SYSTOLIC BLOOD PRESSURE: 136 MMHG | TEMPERATURE: 99 F | HEIGHT: 57 IN

## 2021-06-28 DIAGNOSIS — J45.20 MILD INTERMITTENT ASTHMA WITHOUT COMPLICATION: ICD-10-CM

## 2021-06-28 DIAGNOSIS — O02.1 MISSED ABORTION: ICD-10-CM

## 2021-06-28 DIAGNOSIS — E06.3 HASHIMOTO'S THYROIDITIS: Primary | ICD-10-CM

## 2021-06-28 PROCEDURE — 99999 PR PBB SHADOW E&M-EST. PATIENT-LVL IV: ICD-10-PCS | Mod: PBBFAC,,, | Performed by: FAMILY MEDICINE

## 2021-06-28 PROCEDURE — 1126F AMNT PAIN NOTED NONE PRSNT: CPT | Mod: S$GLB,,, | Performed by: FAMILY MEDICINE

## 2021-06-28 PROCEDURE — 3008F PR BODY MASS INDEX (BMI) DOCUMENTED: ICD-10-PCS | Mod: CPTII,S$GLB,, | Performed by: FAMILY MEDICINE

## 2021-06-28 PROCEDURE — 99214 OFFICE O/P EST MOD 30 MIN: CPT | Mod: S$GLB,,, | Performed by: FAMILY MEDICINE

## 2021-06-28 PROCEDURE — 99999 PR PBB SHADOW E&M-EST. PATIENT-LVL IV: CPT | Mod: PBBFAC,,, | Performed by: FAMILY MEDICINE

## 2021-06-28 PROCEDURE — 99214 PR OFFICE/OUTPT VISIT, EST, LEVL IV, 30-39 MIN: ICD-10-PCS | Mod: S$GLB,,, | Performed by: FAMILY MEDICINE

## 2021-06-28 PROCEDURE — 3008F BODY MASS INDEX DOCD: CPT | Mod: CPTII,S$GLB,, | Performed by: FAMILY MEDICINE

## 2021-06-28 PROCEDURE — 1126F PR PAIN SEVERITY QUANTIFIED, NO PAIN PRESENT: ICD-10-PCS | Mod: S$GLB,,, | Performed by: FAMILY MEDICINE

## 2021-06-30 LAB
FINAL PATHOLOGIC DIAGNOSIS: NORMAL
Lab: NORMAL

## 2021-07-02 ENCOUNTER — OFFICE VISIT (OUTPATIENT)
Dept: OBSTETRICS AND GYNECOLOGY | Facility: CLINIC | Age: 26
End: 2021-07-02
Payer: COMMERCIAL

## 2021-07-02 ENCOUNTER — LAB VISIT (OUTPATIENT)
Dept: LAB | Facility: HOSPITAL | Age: 26
End: 2021-07-02
Attending: OBSTETRICS & GYNECOLOGY
Payer: COMMERCIAL

## 2021-07-02 VITALS
BODY MASS INDEX: 42.91 KG/M2 | DIASTOLIC BLOOD PRESSURE: 70 MMHG | HEIGHT: 57 IN | SYSTOLIC BLOOD PRESSURE: 120 MMHG | WEIGHT: 198.88 LBS

## 2021-07-02 DIAGNOSIS — N92.1 MENOMETRORRHAGIA: ICD-10-CM

## 2021-07-02 DIAGNOSIS — N92.1 MENOMETRORRHAGIA: Primary | ICD-10-CM

## 2021-07-02 LAB
ERYTHROCYTE [DISTWIDTH] IN BLOOD BY AUTOMATED COUNT: 12.6 % (ref 11.5–14.5)
HCT VFR BLD AUTO: 34.8 % (ref 37–48.5)
HGB BLD-MCNC: 11.9 G/DL (ref 12–16)
MCH RBC QN AUTO: 29.5 PG (ref 27–31)
MCHC RBC AUTO-ENTMCNC: 34.2 G/DL (ref 32–36)
MCV RBC AUTO: 86 FL (ref 82–98)
PLATELET # BLD AUTO: 326 K/UL (ref 150–450)
PMV BLD AUTO: 11.1 FL (ref 9.2–12.9)
RBC # BLD AUTO: 4.04 M/UL (ref 4–5.4)
WBC # BLD AUTO: 8.98 K/UL (ref 3.9–12.7)

## 2021-07-02 PROCEDURE — 1126F PR PAIN SEVERITY QUANTIFIED, NO PAIN PRESENT: ICD-10-PCS | Mod: S$GLB,,, | Performed by: OBSTETRICS & GYNECOLOGY

## 2021-07-02 PROCEDURE — 99024 PR POST-OP FOLLOW-UP VISIT: ICD-10-PCS | Mod: S$GLB,,, | Performed by: OBSTETRICS & GYNECOLOGY

## 2021-07-02 PROCEDURE — 3008F PR BODY MASS INDEX (BMI) DOCUMENTED: ICD-10-PCS | Mod: CPTII,S$GLB,, | Performed by: OBSTETRICS & GYNECOLOGY

## 2021-07-02 PROCEDURE — 36415 COLL VENOUS BLD VENIPUNCTURE: CPT | Mod: PN | Performed by: OBSTETRICS & GYNECOLOGY

## 2021-07-02 PROCEDURE — 1126F AMNT PAIN NOTED NONE PRSNT: CPT | Mod: S$GLB,,, | Performed by: OBSTETRICS & GYNECOLOGY

## 2021-07-02 PROCEDURE — 85027 COMPLETE CBC AUTOMATED: CPT | Performed by: OBSTETRICS & GYNECOLOGY

## 2021-07-02 PROCEDURE — 99999 PR PBB SHADOW E&M-EST. PATIENT-LVL III: ICD-10-PCS | Mod: PBBFAC,,, | Performed by: OBSTETRICS & GYNECOLOGY

## 2021-07-02 PROCEDURE — 99999 PR PBB SHADOW E&M-EST. PATIENT-LVL III: CPT | Mod: PBBFAC,,, | Performed by: OBSTETRICS & GYNECOLOGY

## 2021-07-02 PROCEDURE — 3008F BODY MASS INDEX DOCD: CPT | Mod: CPTII,S$GLB,, | Performed by: OBSTETRICS & GYNECOLOGY

## 2021-07-02 PROCEDURE — 99024 POSTOP FOLLOW-UP VISIT: CPT | Mod: S$GLB,,, | Performed by: OBSTETRICS & GYNECOLOGY

## 2021-07-05 ENCOUNTER — PATIENT MESSAGE (OUTPATIENT)
Dept: OBSTETRICS AND GYNECOLOGY | Facility: CLINIC | Age: 26
End: 2021-07-05

## 2021-07-06 ENCOUNTER — PATIENT OUTREACH (OUTPATIENT)
Dept: ADMINISTRATIVE | Facility: OTHER | Age: 26
End: 2021-07-06

## 2021-07-07 ENCOUNTER — OFFICE VISIT (OUTPATIENT)
Dept: ENDOCRINOLOGY | Facility: CLINIC | Age: 26
End: 2021-07-07
Payer: COMMERCIAL

## 2021-07-07 VITALS
HEART RATE: 88 BPM | WEIGHT: 202.19 LBS | BODY MASS INDEX: 39.69 KG/M2 | RESPIRATION RATE: 18 BRPM | DIASTOLIC BLOOD PRESSURE: 82 MMHG | HEIGHT: 60 IN | SYSTOLIC BLOOD PRESSURE: 126 MMHG

## 2021-07-07 DIAGNOSIS — E06.3 HASHIMOTO'S THYROIDITIS: ICD-10-CM

## 2021-07-07 PROCEDURE — 99999 PR PBB SHADOW E&M-EST. PATIENT-LVL IV: CPT | Mod: PBBFAC,,, | Performed by: INTERNAL MEDICINE

## 2021-07-07 PROCEDURE — 3008F BODY MASS INDEX DOCD: CPT | Mod: CPTII,S$GLB,, | Performed by: INTERNAL MEDICINE

## 2021-07-07 PROCEDURE — 3008F PR BODY MASS INDEX (BMI) DOCUMENTED: ICD-10-PCS | Mod: CPTII,S$GLB,, | Performed by: INTERNAL MEDICINE

## 2021-07-07 PROCEDURE — 1126F AMNT PAIN NOTED NONE PRSNT: CPT | Mod: S$GLB,,, | Performed by: INTERNAL MEDICINE

## 2021-07-07 PROCEDURE — 99203 PR OFFICE/OUTPT VISIT, NEW, LEVL III, 30-44 MIN: ICD-10-PCS | Mod: S$GLB,,, | Performed by: INTERNAL MEDICINE

## 2021-07-07 PROCEDURE — 99203 OFFICE O/P NEW LOW 30 MIN: CPT | Mod: S$GLB,,, | Performed by: INTERNAL MEDICINE

## 2021-07-07 PROCEDURE — 99999 PR PBB SHADOW E&M-EST. PATIENT-LVL IV: ICD-10-PCS | Mod: PBBFAC,,, | Performed by: INTERNAL MEDICINE

## 2021-07-07 PROCEDURE — 1126F PR PAIN SEVERITY QUANTIFIED, NO PAIN PRESENT: ICD-10-PCS | Mod: S$GLB,,, | Performed by: INTERNAL MEDICINE

## 2021-07-12 ENCOUNTER — PATIENT MESSAGE (OUTPATIENT)
Dept: OBSTETRICS AND GYNECOLOGY | Facility: CLINIC | Age: 26
End: 2021-07-12

## 2021-07-12 RX ORDER — NORETHINDRONE ACETATE AND ETHINYL ESTRADIOL .02; 1 MG/1; MG/1
1 TABLET ORAL DAILY
Qty: 30 TABLET | Refills: 3 | Status: SHIPPED | OUTPATIENT
Start: 2021-07-12 | End: 2022-01-03

## 2021-08-08 ENCOUNTER — PATIENT MESSAGE (OUTPATIENT)
Dept: FAMILY MEDICINE | Facility: CLINIC | Age: 26
End: 2021-08-08

## 2021-08-08 DIAGNOSIS — Z01.84 ENCOUNTER FOR ANTIBODY RESPONSE EXAMINATION: ICD-10-CM

## 2021-08-09 ENCOUNTER — PATIENT MESSAGE (OUTPATIENT)
Dept: FAMILY MEDICINE | Facility: CLINIC | Age: 26
End: 2021-08-09

## 2021-08-10 ENCOUNTER — LAB VISIT (OUTPATIENT)
Dept: LAB | Facility: HOSPITAL | Age: 26
End: 2021-08-10
Attending: FAMILY MEDICINE
Payer: COMMERCIAL

## 2021-08-10 DIAGNOSIS — Z01.84 ENCOUNTER FOR ANTIBODY RESPONSE EXAMINATION: ICD-10-CM

## 2021-08-10 PROCEDURE — 36415 COLL VENOUS BLD VENIPUNCTURE: CPT | Mod: PO | Performed by: FAMILY MEDICINE

## 2021-08-10 PROCEDURE — 86769 SARS-COV-2 COVID-19 ANTIBODY: CPT | Performed by: FAMILY MEDICINE

## 2021-08-11 LAB
SARS-COV-2 IGG SERPL IA-ACNC: <50 AU/ML
SARS-COV-2 IGG SERPL QL IA: NEGATIVE

## 2021-09-20 RX ORDER — EPINEPHRINE 0.3 MG/.3ML
1 INJECTION SUBCUTANEOUS ONCE
Qty: 0.3 ML | Refills: 3 | Status: SHIPPED | OUTPATIENT
Start: 2021-09-20 | End: 2021-09-20

## 2021-10-14 ENCOUNTER — IMMUNIZATION (OUTPATIENT)
Dept: FAMILY MEDICINE | Facility: CLINIC | Age: 26
End: 2021-10-14
Payer: COMMERCIAL

## 2021-10-14 DIAGNOSIS — Z23 NEED FOR VACCINATION: Primary | ICD-10-CM

## 2021-10-14 PROCEDURE — 0001A COVID-19, MRNA, LNP-S, PF, 30 MCG/0.3 ML DOSE VACCINE: CPT | Mod: PBBFAC | Performed by: FAMILY MEDICINE

## 2021-12-30 ENCOUNTER — PATIENT OUTREACH (OUTPATIENT)
Dept: ADMINISTRATIVE | Facility: OTHER | Age: 26
End: 2021-12-30
Payer: COMMERCIAL

## 2022-01-03 ENCOUNTER — OFFICE VISIT (OUTPATIENT)
Dept: ALLERGY | Facility: CLINIC | Age: 27
End: 2022-01-03
Payer: COMMERCIAL

## 2022-01-03 ENCOUNTER — LAB VISIT (OUTPATIENT)
Dept: LAB | Facility: HOSPITAL | Age: 27
End: 2022-01-03
Attending: ALLERGY & IMMUNOLOGY
Payer: COMMERCIAL

## 2022-01-03 VITALS — OXYGEN SATURATION: 97 % | BODY MASS INDEX: 43.1 KG/M2 | HEART RATE: 80 BPM | WEIGHT: 199.75 LBS | HEIGHT: 57 IN

## 2022-01-03 DIAGNOSIS — J45.30 MILD PERSISTENT ASTHMA WITHOUT COMPLICATION: Primary | ICD-10-CM

## 2022-01-03 DIAGNOSIS — J45.30 MILD PERSISTENT ASTHMA WITHOUT COMPLICATION: ICD-10-CM

## 2022-01-03 DIAGNOSIS — R06.02 SHORTNESS OF BREATH: ICD-10-CM

## 2022-01-03 PROCEDURE — 99204 PR OFFICE/OUTPT VISIT, NEW, LEVL IV, 45-59 MIN: ICD-10-PCS | Mod: S$GLB,,, | Performed by: ALLERGY & IMMUNOLOGY

## 2022-01-03 PROCEDURE — 99204 OFFICE O/P NEW MOD 45 MIN: CPT | Mod: S$GLB,,, | Performed by: ALLERGY & IMMUNOLOGY

## 2022-01-03 PROCEDURE — 3008F BODY MASS INDEX DOCD: CPT | Mod: CPTII,S$GLB,, | Performed by: ALLERGY & IMMUNOLOGY

## 2022-01-03 PROCEDURE — 3008F PR BODY MASS INDEX (BMI) DOCUMENTED: ICD-10-PCS | Mod: CPTII,S$GLB,, | Performed by: ALLERGY & IMMUNOLOGY

## 2022-01-03 PROCEDURE — 1159F PR MEDICATION LIST DOCUMENTED IN MEDICAL RECORD: ICD-10-PCS | Mod: CPTII,S$GLB,, | Performed by: ALLERGY & IMMUNOLOGY

## 2022-01-03 PROCEDURE — 99999 PR PBB SHADOW E&M-EST. PATIENT-LVL III: ICD-10-PCS | Mod: PBBFAC,,, | Performed by: ALLERGY & IMMUNOLOGY

## 2022-01-03 PROCEDURE — 86003 ALLG SPEC IGE CRUDE XTRC EA: CPT | Mod: 59 | Performed by: ALLERGY & IMMUNOLOGY

## 2022-01-03 PROCEDURE — 99999 PR PBB SHADOW E&M-EST. PATIENT-LVL III: CPT | Mod: PBBFAC,,, | Performed by: ALLERGY & IMMUNOLOGY

## 2022-01-03 PROCEDURE — 1159F MED LIST DOCD IN RCRD: CPT | Mod: CPTII,S$GLB,, | Performed by: ALLERGY & IMMUNOLOGY

## 2022-01-03 PROCEDURE — 1160F PR REVIEW ALL MEDS BY PRESCRIBER/CLIN PHARMACIST DOCUMENTED: ICD-10-PCS | Mod: CPTII,S$GLB,, | Performed by: ALLERGY & IMMUNOLOGY

## 2022-01-03 PROCEDURE — 36415 COLL VENOUS BLD VENIPUNCTURE: CPT | Mod: PO | Performed by: ALLERGY & IMMUNOLOGY

## 2022-01-03 PROCEDURE — 1160F RVW MEDS BY RX/DR IN RCRD: CPT | Mod: CPTII,S$GLB,, | Performed by: ALLERGY & IMMUNOLOGY

## 2022-01-03 PROCEDURE — 86003 ALLG SPEC IGE CRUDE XTRC EA: CPT | Performed by: ALLERGY & IMMUNOLOGY

## 2022-01-03 RX ORDER — FLUTICASONE PROPIONATE 110 UG/1
1 AEROSOL, METERED RESPIRATORY (INHALATION) 2 TIMES DAILY
Qty: 12 G | Refills: 6 | Status: SHIPPED | OUTPATIENT
Start: 2022-01-03 | End: 2022-09-08 | Stop reason: CLARIF

## 2022-01-03 NOTE — PROGRESS NOTES
Subjective:       Patient ID: Gisell Villafuerte is a 26 y.o. female.    Chief Complaint:  Allergic Reaction (Trouble breathing 2x when she went away. Suspected allergic reaction to something in the new environment.)      27 yo woman presents for new patient evaluation of SOB. She states twice when traveling to [place] she had episodes of SOB. First was in [place], the first morning there she awoke feeling throat very tight, SOB, wheezing. No hives. No rhinitis. No fever. EMS came and gave her neb and resolved. Also gave solumedrol and albuterol inhaler. Via tele visit. She got better. Then during [place] where there again and had similar. she has H/o exercise induced asthma but lately has some daily wheeze and tightness, not like when was in [place]. She has albuterol and using 1-2 times per day right now. No night awakening. No rhinitis. No known allergies. No food, insect or latex allergy. no there medical issue.s no medications.       Environmental History: see history section for home environment  Review of Systems   Constitutional: Negative for appetite change, chills, fatigue and fever.   HENT: Negative for congestion, ear discharge, ear pain, facial swelling, nosebleeds, postnasal drip, rhinorrhea, sinus pressure, sneezing, sore throat, trouble swallowing and voice change.    Eyes: Negative for discharge, redness, itching and visual disturbance.   Respiratory: Positive for cough, chest tightness, shortness of breath and wheezing. Negative for choking.    Cardiovascular: Negative for chest pain, palpitations and leg swelling.   Gastrointestinal: Negative for abdominal distention, abdominal pain, constipation, diarrhea, nausea and vomiting.   Genitourinary: Negative for difficulty urinating.   Musculoskeletal: Negative for arthralgias, gait problem, joint swelling and myalgias.   Skin: Negative for color change and rash.   Neurological: Negative for dizziness, syncope, weakness, light-headedness and headaches.    Hematological: Negative for adenopathy. Does not bruise/bleed easily.   Psychiatric/Behavioral: Negative for agitation, behavioral problems, confusion and sleep disturbance. The patient is not nervous/anxious.         Objective:      Physical Exam  Vitals and nursing note reviewed.   Constitutional:       General: She is not in acute distress.     Appearance: She is well-developed and well-nourished.   HENT:      Head: Normocephalic and atraumatic.      Right Ear: Hearing and external ear normal.      Left Ear: Hearing and external ear normal.      Nose: No septal deviation, sinus tenderness, mucosal edema, rhinorrhea or epistaxis.      Right Sinus: No maxillary sinus tenderness or frontal sinus tenderness.      Left Sinus: No maxillary sinus tenderness or frontal sinus tenderness.      Mouth/Throat:      Mouth: Oropharynx is clear and moist and mucous membranes are normal.      Pharynx: Uvula midline. No uvula swelling.   Eyes:      General:         Right eye: No discharge.         Left eye: No discharge.      Conjunctiva/sclera: Conjunctivae normal.   Neck:      Thyroid: No thyromegaly.   Cardiovascular:      Rate and Rhythm: Normal rate and regular rhythm.   Pulmonary:      Effort: Pulmonary effort is normal. No respiratory distress.   Abdominal:      General: There is no distension.   Musculoskeletal:         General: No tenderness or edema. Normal range of motion.      Cervical back: Normal range of motion.   Skin:     General: Skin is warm and dry.      Findings: No erythema or rash.   Neurological:      Mental Status: She is alert and oriented to person, place, and time.   Psychiatric:         Mood and Affect: Mood and affect normal.         Behavior: Behavior normal.         Thought Content: Thought content normal.         Judgment: Judgment normal.         Laboratory:   none performed   Assessment:       1. Mild persistent asthma without complication    2. Shortness of breath         Plan:       1.  immunocaps today to see if any allergic triggers  2. Start Flovent 110 1 puff BID and continue albuterol 2 puff every 4 hours as needed  3. Will need PFT in near future  4. Phone review

## 2022-01-07 ENCOUNTER — PATIENT MESSAGE (OUTPATIENT)
Dept: ALLERGY | Facility: CLINIC | Age: 27
End: 2022-01-07
Payer: COMMERCIAL

## 2022-01-07 LAB
A ALTERNATA IGE QN: <0.1 KU/L
A FUMIGATUS IGE QN: <0.1 KU/L
ALLERGEN CHAETOMIUM GLOBOSUM IGE: <0.1 KU/L
ALLERGEN WALNUT TREE IGE: 0.13 KU/L
ALLERGEN WHITE PINE TREE IGE: <0.1 KU/L
BAHIA GRASS IGE QN: 3.32 KU/L
BALD CYPRESS IGE QN: <0.1 KU/L
BERMUDA GRASS IGE QN: 0.98 KU/L
C HERBARUM IGE QN: <0.1 KU/L
C LUNATA IGE QN: <0.1 KU/L
CAT DANDER IGE QN: 0.24 KU/L
CHAETOMIUM GLOB. CLASS: NORMAL
COMMON RAGWEED IGE QN: <0.1 KU/L
COTTONWOOD IGE QN: <0.1 KU/L
D FARINAE IGE QN: 3.43 KU/L
D PTERONYSS IGE QN: 4.88 KU/L
DEPRECATED A ALTERNATA IGE RAST QL: NORMAL
DEPRECATED A FUMIGATUS IGE RAST QL: NORMAL
DEPRECATED BAHIA GRASS IGE RAST QL: ABNORMAL
DEPRECATED BALD CYPRESS IGE RAST QL: NORMAL
DEPRECATED BERMUDA GRASS IGE RAST QL: ABNORMAL
DEPRECATED C HERBARUM IGE RAST QL: NORMAL
DEPRECATED C LUNATA IGE RAST QL: NORMAL
DEPRECATED CAT DANDER IGE RAST QL: ABNORMAL
DEPRECATED COMMON RAGWEED IGE RAST QL: NORMAL
DEPRECATED COTTONWOOD IGE RAST QL: NORMAL
DEPRECATED D FARINAE IGE RAST QL: ABNORMAL
DEPRECATED D PTERONYSS IGE RAST QL: ABNORMAL
DEPRECATED DOG DANDER IGE RAST QL: NORMAL
DEPRECATED ELDER IGE RAST QL: NORMAL
DEPRECATED ENGL PLANTAIN IGE RAST QL: NORMAL
DEPRECATED JOHNSON GRASS IGE RAST QL: ABNORMAL
DEPRECATED LONDON PLANE IGE RAST QL: NORMAL
DEPRECATED MUGWORT IGE RAST QL: NORMAL
DEPRECATED PECAN/HICK TREE IGE RAST QL: NORMAL
DEPRECATED ROACH IGE RAST QL: NORMAL
DEPRECATED S ROSTRATA IGE RAST QL: NORMAL
DEPRECATED SALTWORT IGE RAST QL: NORMAL
DEPRECATED SILVER BIRCH IGE RAST QL: ABNORMAL
DEPRECATED TIMOTHY IGE RAST QL: ABNORMAL
DEPRECATED WHITE OAK IGE RAST QL: ABNORMAL
DEPRECATED WILLOW IGE RAST QL: NORMAL
DOG DANDER IGE QN: <0.1 KU/L
ELDER IGE QN: <0.1 KU/L
ENGL PLANTAIN IGE QN: <0.1 KU/L
JOHNSON GRASS IGE QN: 1.27 KU/L
LONDON PLANE IGE QN: <0.1 KU/L
MUGWORT IGE QN: <0.1 KU/L
PECAN/HICK TREE IGE QN: <0.1 KU/L
ROACH IGE QN: <0.1 KU/L
S ROSTRATA IGE QN: <0.1 KU/L
SALTWORT IGE QN: <0.1 KU/L
SILVER BIRCH IGE QN: 0.11 KU/L
TIMOTHY IGE QN: 1.51 KU/L
WALNUT TREE CLASS: ABNORMAL
WHITE OAK IGE QN: 0.31 KU/L
WHITE PINE CLASS: NORMAL
WILLOW IGE QN: <0.1 KU/L

## 2022-01-07 RX ORDER — BUDESONIDE 180 UG/1
1 AEROSOL, POWDER RESPIRATORY (INHALATION) 2 TIMES DAILY
Qty: 1 EACH | Refills: 11 | Status: SHIPPED | OUTPATIENT
Start: 2022-01-07 | End: 2023-01-22 | Stop reason: SDUPTHER

## 2022-01-11 ENCOUNTER — PATIENT MESSAGE (OUTPATIENT)
Dept: FAMILY MEDICINE | Facility: CLINIC | Age: 27
End: 2022-01-11
Payer: COMMERCIAL

## 2022-01-11 ENCOUNTER — PATIENT MESSAGE (OUTPATIENT)
Dept: ALLERGY | Facility: CLINIC | Age: 27
End: 2022-01-11
Payer: COMMERCIAL

## 2022-01-11 DIAGNOSIS — J45.20 MILD INTERMITTENT ASTHMA WITHOUT COMPLICATION: Primary | ICD-10-CM

## 2022-01-11 RX ORDER — ALBUTEROL SULFATE 0.83 MG/ML
2.5 SOLUTION RESPIRATORY (INHALATION) EVERY 6 HOURS PRN
Qty: 1 EACH | Refills: 1 | Status: SHIPPED | OUTPATIENT
Start: 2022-01-11 | End: 2023-02-03 | Stop reason: SDUPTHER

## 2022-01-11 RX ORDER — MONTELUKAST SODIUM 10 MG/1
10 TABLET ORAL NIGHTLY
Qty: 90 TABLET | Refills: 3 | Status: SHIPPED | OUTPATIENT
Start: 2022-01-11 | End: 2022-02-10

## 2022-01-11 NOTE — TELEPHONE ENCOUNTER
Currently has albuterol rescue inhaler for PRN use and pulmicort.   Pt mother wants to know if nebulizer should be ordered for instances like this. Please advise.

## 2022-01-11 NOTE — TELEPHONE ENCOUNTER
No new care gaps identified.  Powered by [employer]. Reference number: [id number].   1/11/2022 3:38:09 PM CST

## 2022-01-12 ENCOUNTER — PATIENT MESSAGE (OUTPATIENT)
Dept: FAMILY MEDICINE | Facility: CLINIC | Age: 27
End: 2022-01-12
Payer: COMMERCIAL

## 2022-01-18 ENCOUNTER — HOSPITAL ENCOUNTER (OUTPATIENT)
Dept: PULMONOLOGY | Facility: HOSPITAL | Age: 27
Discharge: HOME | End: 2022-01-18
Attending: ALLERGY & IMMUNOLOGY
Payer: COMMERCIAL

## 2022-01-18 DIAGNOSIS — J45.20 MILD INTERMITTENT ASTHMA WITHOUT COMPLICATION: ICD-10-CM

## 2022-01-18 PROCEDURE — 94060 EVALUATION OF WHEEZING: CPT

## 2022-01-18 PROCEDURE — 94727 GAS DIL/WSHOT DETER LNG VOL: CPT | Mod: 26,,, | Performed by: INTERNAL MEDICINE

## 2022-01-18 PROCEDURE — 94729 PR C02/MEMBANE DIFFUSE CAPACITY: ICD-10-PCS | Mod: 26,,, | Performed by: INTERNAL MEDICINE

## 2022-01-18 PROCEDURE — 94727 GAS DIL/WSHOT DETER LNG VOL: CPT

## 2022-01-18 PROCEDURE — 94729 DIFFUSING CAPACITY: CPT | Mod: 26,,, | Performed by: INTERNAL MEDICINE

## 2022-01-18 PROCEDURE — 94727 PR PULM FUNCTION TEST BY GAS: ICD-10-PCS | Mod: 26,,, | Performed by: INTERNAL MEDICINE

## 2022-01-18 PROCEDURE — 94060 PR EVAL OF BRONCHOSPASM: ICD-10-PCS | Mod: 26,,, | Performed by: INTERNAL MEDICINE

## 2022-01-18 PROCEDURE — 94729 DIFFUSING CAPACITY: CPT

## 2022-01-18 PROCEDURE — 94060 EVALUATION OF WHEEZING: CPT | Mod: 26,,, | Performed by: INTERNAL MEDICINE

## 2022-01-19 ENCOUNTER — PATIENT MESSAGE (OUTPATIENT)
Dept: ALLERGY | Facility: CLINIC | Age: 27
End: 2022-01-19
Payer: COMMERCIAL

## 2022-01-19 LAB
BRPFT: NORMAL
DLCO ADJ PRE: 25.65 ML/(MIN*MMHG)
DLCO SINGLE BREATH LLN: 19.26
DLCO SINGLE BREATH PRE REF: 102.6 %
DLCO SINGLE BREATH REF: 24.99
DLCOC SBVA LLN: 3.96
DLCOC SBVA PRE REF: 116.4 %
DLCOC SBVA REF: 5.89
DLCOC SINGLE BREATH LLN: 19.26
DLCOC SINGLE BREATH PRE REF: 102.6 %
DLCOC SINGLE BREATH REF: 24.99
DLCOVA LLN: 3.96
DLCOVA PRE REF: 116.4 %
DLCOVA PRE: 6.86 ML/(MIN*MMHG*L)
DLCOVA REF: 5.89
DLVAADJ PRE: 6.86 ML/(MIN*MMHG*L)
ERVN2 LLN: -16448.75
ERVN2 PRE REF: 55.1 %
ERVN2 PRE: 0.69 L
ERVN2 REF: 1.25
FEF 25 75 CHG: 11.5 %
FEF 25 75 LLN: 2.19
FEF 25 75 POST REF: 33.1 %
FEF 25 75 PRE REF: 29.7 %
FEF 25 75 REF: 3.35
FET100 CHG: 1.5 %
FEV1 CHG: 5 %
FEV1 FVC CHG: 17.5 %
FEV1 FVC LLN: 75
FEV1 FVC POST REF: 57.8 %
FEV1 FVC PRE REF: 49.2 %
FEV1 FVC REF: 86
FEV1 LLN: 2.26
FEV1 POST REF: 53.2 %
FEV1 PRE REF: 50.7 %
FEV1 REF: 2.82
FRCN2 LLN: 1.61
FRCN2 PRE REF: 76.2 %
FRCN2 REF: 2.43
FVC CHG: -10.6 %
FVC LLN: 2.61
FVC POST REF: 91.8 %
FVC PRE REF: 102.8 %
FVC REF: 3.27
IVC PRE: 2.86 L
IVC SINGLE BREATH LLN: 2.61
IVC SINGLE BREATH PRE REF: 87.3 %
IVC SINGLE BREATH REF: 3.27
MVV LLN: 93
MVV PRE REF: 21.1 %
MVV REF: 109
PEF CHG: 5.6 %
PEF LLN: 4.85
PEF POST REF: 27.4 %
PEF PRE REF: 26 %
PEF REF: 6.35
POST FEF 25 75: 1.11 L/S
POST FET 100: 7.59 SEC
POST FEV1 FVC: 49.87 %
POST FEV1: 1.5 L
POST FVC: 3 L
POST PEF: 1.74 L/S
PRE DLCO: 25.65 ML/(MIN*MMHG)
PRE FEF 25 75: 0.99 L/S
PRE FET 100: 7.48 SEC
PRE FEV1 FVC: 42.46 %
PRE FEV1: 1.43 L
PRE FRC N2: 1.85 L
PRE FVC: 3.36 L
PRE MVV: 23 L/MIN
PRE PEF: 1.65 L/S
RVN2 LLN: 0.61
RVN2 PRE REF: 93.6 %
RVN2 PRE: 1.11 L
RVN2 REF: 1.18
RVN2TLCN2 LLN: 18.55
RVN2TLCN2 PRE REF: 88.1 %
RVN2TLCN2 PRE: 24.79 %
RVN2TLCN2 REF: 28.14
TLCN2 LLN: 3.25
TLCN2 PRE REF: 105.4 %
TLCN2 PRE: 4.47 L
TLCN2 REF: 4.24
VA PRE: 3.75 L
VA SINGLE BREATH LLN: 4.09
VA SINGLE BREATH PRE REF: 91.6 %
VA SINGLE BREATH REF: 4.09
VCMAXN2 LLN: 2.61
VCMAXN2 PRE REF: 102.8 %
VCMAXN2 PRE: 3.36 L
VCMAXN2 REF: 3.27

## 2022-01-27 RX ORDER — PREDNISONE 20 MG/1
40 TABLET ORAL DAILY
Qty: 10 TABLET | Refills: 0 | Status: SHIPPED | OUTPATIENT
Start: 2022-01-27 | End: 2022-02-01

## 2022-01-27 RX ORDER — ALBUTEROL SULFATE 90 UG/1
1-2 AEROSOL, METERED RESPIRATORY (INHALATION) EVERY 6 HOURS PRN
Qty: 18 G | Refills: 0 | Status: SHIPPED | OUTPATIENT
Start: 2022-01-27 | End: 2022-09-16 | Stop reason: SDUPTHER

## 2022-01-31 ENCOUNTER — OFFICE VISIT (OUTPATIENT)
Dept: OBSTETRICS AND GYNECOLOGY | Facility: CLINIC | Age: 27
End: 2022-01-31
Payer: COMMERCIAL

## 2022-01-31 VITALS
HEIGHT: 57 IN | WEIGHT: 198.19 LBS | BODY MASS INDEX: 42.76 KG/M2 | DIASTOLIC BLOOD PRESSURE: 80 MMHG | SYSTOLIC BLOOD PRESSURE: 122 MMHG

## 2022-01-31 DIAGNOSIS — Z01.419 WELL WOMAN EXAM: Primary | ICD-10-CM

## 2022-01-31 DIAGNOSIS — N94.6 MENSES PAINFUL: ICD-10-CM

## 2022-01-31 PROCEDURE — 3079F DIAST BP 80-89 MM HG: CPT | Mod: CPTII,S$GLB,, | Performed by: OBSTETRICS & GYNECOLOGY

## 2022-01-31 PROCEDURE — 87624 HPV HI-RISK TYP POOLED RSLT: CPT | Performed by: OBSTETRICS & GYNECOLOGY

## 2022-01-31 PROCEDURE — 3074F SYST BP LT 130 MM HG: CPT | Mod: CPTII,S$GLB,, | Performed by: OBSTETRICS & GYNECOLOGY

## 2022-01-31 PROCEDURE — 99999 PR PBB SHADOW E&M-EST. PATIENT-LVL III: CPT | Mod: PBBFAC,,, | Performed by: OBSTETRICS & GYNECOLOGY

## 2022-01-31 PROCEDURE — 3074F PR MOST RECENT SYSTOLIC BLOOD PRESSURE < 130 MM HG: ICD-10-PCS | Mod: CPTII,S$GLB,, | Performed by: OBSTETRICS & GYNECOLOGY

## 2022-01-31 PROCEDURE — 3079F PR MOST RECENT DIASTOLIC BLOOD PRESSURE 80-89 MM HG: ICD-10-PCS | Mod: CPTII,S$GLB,, | Performed by: OBSTETRICS & GYNECOLOGY

## 2022-01-31 PROCEDURE — 1159F PR MEDICATION LIST DOCUMENTED IN MEDICAL RECORD: ICD-10-PCS | Mod: CPTII,S$GLB,, | Performed by: OBSTETRICS & GYNECOLOGY

## 2022-01-31 PROCEDURE — 3008F PR BODY MASS INDEX (BMI) DOCUMENTED: ICD-10-PCS | Mod: CPTII,S$GLB,, | Performed by: OBSTETRICS & GYNECOLOGY

## 2022-01-31 PROCEDURE — 0502F PR SUBSEQUENT PRENATAL CARE: ICD-10-PCS | Mod: CPTII,S$GLB,, | Performed by: OBSTETRICS & GYNECOLOGY

## 2022-01-31 PROCEDURE — 88175 CYTOPATH C/V AUTO FLUID REDO: CPT | Performed by: OBSTETRICS & GYNECOLOGY

## 2022-01-31 PROCEDURE — 0502F SUBSEQUENT PRENATAL CARE: CPT | Mod: CPTII,S$GLB,, | Performed by: OBSTETRICS & GYNECOLOGY

## 2022-01-31 PROCEDURE — 3008F BODY MASS INDEX DOCD: CPT | Mod: CPTII,S$GLB,, | Performed by: OBSTETRICS & GYNECOLOGY

## 2022-01-31 PROCEDURE — 1159F MED LIST DOCD IN RCRD: CPT | Mod: CPTII,S$GLB,, | Performed by: OBSTETRICS & GYNECOLOGY

## 2022-01-31 PROCEDURE — 99999 PR PBB SHADOW E&M-EST. PATIENT-LVL III: ICD-10-PCS | Mod: PBBFAC,,, | Performed by: OBSTETRICS & GYNECOLOGY

## 2022-01-31 NOTE — PROGRESS NOTES
"Chief Complaint   Patient presents with    Questions    Well Woman       History and Physical:  Patient's last menstrual period was 01/10/2022.       Gisell Villafuerte is a 26 y.o.   female who presents today for her routine annual GYN exam. The patient has no Gynecology complaints today. mense momnthly - trying to coneive, counseled.       Allergies:   Review of patient's allergies indicates:   Allergen Reactions    No known drug allergies        Past Medical History:   Diagnosis Date    Anemia     s/p transfusion from menstral bleeding    Anxiety     Asthma     "athletic" asthma    Blood transfusion     Depression     Hashimoto's disease     HTN (hypertension)        Past Surgical History:   Procedure Laterality Date    DILATION AND CURETTAGE OF UTERUS N/A 2021    Procedure: DILATION AND CURETTAGE, UTERUS;  Surgeon: Víctor Arriaga MD;  Location: [facility];  Service: OB/GYN;  Laterality: N/A;       MEDS:   Current Outpatient Medications on File Prior to Visit   Medication Sig Dispense Refill    albuterol (PROVENTIL) 2.5 mg /3 mL (0.083 %) nebulizer solution Take 3 mLs (2.5 mg total) by nebulization every 6 (six) hours as needed for Wheezing. Rescue 1 each 1    albuterol (PROVENTIL/VENTOLIN HFA) 90 mcg/actuation inhaler INHALE 1 PUFF BY MOUTH 4 TIMES DAILY AS NEEDED FOR COUGH      albuterol (PROVENTIL/VENTOLIN HFA) 90 mcg/actuation inhaler Inhale 1-2 puffs into the lungs every 6 (six) hours as needed for Wheezing. Rescue 18 g 0    budesonide 180mcg (PULMICORT FLEXHALER) 180 mcg/actuation AePB Inhale 1 puff into the lungs 2 (two) times a day. Controller 1 each 11    EPINEPHrine (EPIPEN 2-BELEN) 0.3 mg/0.3 mL AtIn Inject 0.3 mLs (0.3 mg total) into the muscle once. for 1 dose 0.3 mL 3    fluticasone propionate (FLOVENT HFA) 110 mcg/actuation inhaler Inhale 1 puff into the lungs 2 (two) times daily. Controller 12 g 6    montelukast (SINGULAIR) 10 mg tablet Take 1 tablet (10 mg " "total) by mouth every evening. 90 tablet 3    predniSONE (DELTASONE) 20 MG tablet Take 2 tablets (40 mg total) by mouth once daily. for 5 days 10 tablet 0     No current facility-administered medications on file prior to visit.       OB History        1    Para        Term                AB   1    Living           SAB   1    IAB        Ectopic        Multiple        Live Births                     Social History     Socioeconomic History    Marital status: Single   Tobacco Use    Smoking status: Never Smoker    Smokeless tobacco: Never Used   Substance and Sexual Activity    Alcohol use: Not Currently    Drug use: No    Sexual activity: Yes     Partners: Male     Birth control/protection: None       Family History   Problem Relation Age of Onset    Thyroid disease Mother     Hashimoto's thyroiditis Mother     Diabetes Father     Breast cancer Neg Hx     Ovarian cancer Neg Hx     Melanoma Neg Hx     Psoriasis Neg Hx     Lupus Neg Hx     Eczema Neg Hx          Past medical and surgical history reviewed.   I have reviewed the patient's medical history in detail and updated the computerized patient record.        Review of System:   General: no chills, fever, night sweats, weight gain or weight loss  Psychological: no depression or suicidal ideation  Breasts: no new or changing breast lumps, nipple discharge or masses.  Respiratory: no cough, shortness of breath, or wheezing  Cardiovascular: no chest pain or dyspnea on exertion  Gastrointestinal: no abdominal pain, change in bowel habits, or black or bloody stools  Genito-Urinary: no incontinence, urinary frequency/urgency or vulvar/vaginal symptoms, pelvic pain or abnormal vaginal bleeding.  Musculoskeletal: no gait disturbance or muscular weakness      Physical Exam:   /80   Ht 4' 9" (1.448 m)   Wt 89.9 kg (198 lb 3.1 oz)   LMP 01/10/2022   BMI 42.89 kg/m²   Constitutional: She appears alert and responsive. She appears " well-developed, well-groomed, and well-nourished. No distress. OverWeight   HENT:   Head: Normocephalic and atraumatic.   Eyes: Conjunctivae and EOM are normal. No scleral icterus.   Neck: Symmetrical. Normal range of motion. Neck supple. No tracheal deviation present. THYROID: without masses or tenderness.  Cardiovascular: Normal rate, no rhythm abnormality noted. Extremities without swelling or edema, warm.    Pulmonary/Chest: Normal respiratory Effort. No distress or retractions. She exhibits no tenderness.  Breasts: are symmetrical.   Right breast exhibits no inverted nipple, no mass, no nipple discharge, no skin change and no tenderness.   Left breast exhibits no inverted nipple, no mass, no nipple discharge, no skin change and no tenderness.  Abdominal: Soft. She exhibits no distension, hernias or masses. There is no tenderness. No enlargement of liver edge or spleen.  There is no rebound and no guarding.   Genitourinary:    External rectal exam shows no thrombosed external hemorrhoids, no lesions.     Pelvic exam was performed with patient supine.   No labial fusion, and symmetrical.    There is no rash, lesion or injury on the right labia.   There is no rash, lesion or injury on the left labia.   No bleeding and no signs of injury around the vaginal introitus, urethral meatus is normal size and without prolapse or lesions, urethra well supported. The cervix is visualized with no discharge, lesions or friability.   No vaginal discharge found.   No significant Cystocele, Enterocele or rectocele, and cervix and uterus well supported.   Bimanual exam:   The urethra is normal to palpation and there are no palpable vaginal wall masses.   Uterus is not deviated, not enlarged, not fixed, normal shape and not tender.   Cervix exhibits no motion tenderness.    Right adnexum displays no mass or nodularity and no tenderness.   Left adnexum displays no mass or nodularity and no tenderness.  Musculoskeletal: Normal range  of motion.   Lymphadenopathy: No inguinal adenopathy present.   Neurological: She is alert and oriented to person, place, and time. Coordination normal.   Skin: Skin is warm and dry. She is not diaphoretic. No rashes, lesions or ulcers.   Psychiatric: She has a normal mood and affect, oriented to person, place, and time.      Assessment:   Normal annual GYN exam  1. Well woman exam  Liquid-Based Pap Smear, Screening    HPV High Risk Genotypes, PCR    Progesterone   2. Menses painful  Progesterone   monthly menses, trying to conceive, counseled     Plan:   PAP  PNV, day 21 progesterone  Follow up in 6 months.  Patient informed will be contacted with results within 2 weeks. Encouraged to please call back or email if she has not heard from us by then.

## 2022-02-05 LAB
FINAL PATHOLOGIC DIAGNOSIS: NORMAL
Lab: NORMAL

## 2022-04-18 ENCOUNTER — LAB VISIT (OUTPATIENT)
Dept: LAB | Facility: HOSPITAL | Age: 27
End: 2022-04-18
Attending: OBSTETRICS & GYNECOLOGY
Payer: COMMERCIAL

## 2022-04-18 ENCOUNTER — OFFICE VISIT (OUTPATIENT)
Dept: OBSTETRICS AND GYNECOLOGY | Facility: CLINIC | Age: 27
End: 2022-04-18
Payer: COMMERCIAL

## 2022-04-18 VITALS
WEIGHT: 199.94 LBS | DIASTOLIC BLOOD PRESSURE: 72 MMHG | HEIGHT: 59 IN | BODY MASS INDEX: 40.31 KG/M2 | SYSTOLIC BLOOD PRESSURE: 138 MMHG

## 2022-04-18 DIAGNOSIS — N92.6 MISSED MENSES: Primary | ICD-10-CM

## 2022-04-18 DIAGNOSIS — N92.6 MISSED MENSES: ICD-10-CM

## 2022-04-18 DIAGNOSIS — Z34.90 EARLY STAGE OF PREGNANCY: ICD-10-CM

## 2022-04-18 LAB
B-HCG UR QL: POSITIVE
CTP QC/QA: YES
HCG INTACT+B SERPL-ACNC: 2163 MIU/ML
PROGEST SERPL-MCNC: 12 NG/ML

## 2022-04-18 PROCEDURE — 3008F PR BODY MASS INDEX (BMI) DOCUMENTED: ICD-10-PCS | Mod: CPTII,S$GLB,, | Performed by: OBSTETRICS & GYNECOLOGY

## 2022-04-18 PROCEDURE — 3078F PR MOST RECENT DIASTOLIC BLOOD PRESSURE < 80 MM HG: ICD-10-PCS | Mod: CPTII,S$GLB,, | Performed by: OBSTETRICS & GYNECOLOGY

## 2022-04-18 PROCEDURE — 3075F SYST BP GE 130 - 139MM HG: CPT | Mod: CPTII,S$GLB,, | Performed by: OBSTETRICS & GYNECOLOGY

## 2022-04-18 PROCEDURE — 3075F PR MOST RECENT SYSTOLIC BLOOD PRESS GE 130-139MM HG: ICD-10-PCS | Mod: CPTII,S$GLB,, | Performed by: OBSTETRICS & GYNECOLOGY

## 2022-04-18 PROCEDURE — 84702 CHORIONIC GONADOTROPIN TEST: CPT | Performed by: OBSTETRICS & GYNECOLOGY

## 2022-04-18 PROCEDURE — 1159F PR MEDICATION LIST DOCUMENTED IN MEDICAL RECORD: ICD-10-PCS | Mod: CPTII,S$GLB,, | Performed by: OBSTETRICS & GYNECOLOGY

## 2022-04-18 PROCEDURE — 81025 POCT URINE PREGNANCY: ICD-10-PCS | Mod: S$GLB,,, | Performed by: OBSTETRICS & GYNECOLOGY

## 2022-04-18 PROCEDURE — 3078F DIAST BP <80 MM HG: CPT | Mod: CPTII,S$GLB,, | Performed by: OBSTETRICS & GYNECOLOGY

## 2022-04-18 PROCEDURE — 99213 OFFICE O/P EST LOW 20 MIN: CPT | Mod: S$GLB,,, | Performed by: OBSTETRICS & GYNECOLOGY

## 2022-04-18 PROCEDURE — 3008F BODY MASS INDEX DOCD: CPT | Mod: CPTII,S$GLB,, | Performed by: OBSTETRICS & GYNECOLOGY

## 2022-04-18 PROCEDURE — 1159F MED LIST DOCD IN RCRD: CPT | Mod: CPTII,S$GLB,, | Performed by: OBSTETRICS & GYNECOLOGY

## 2022-04-18 PROCEDURE — 36415 COLL VENOUS BLD VENIPUNCTURE: CPT | Mod: PN | Performed by: OBSTETRICS & GYNECOLOGY

## 2022-04-18 PROCEDURE — 84144 ASSAY OF PROGESTERONE: CPT | Performed by: OBSTETRICS & GYNECOLOGY

## 2022-04-18 PROCEDURE — 99213 PR OFFICE/OUTPT VISIT, EST, LEVL III, 20-29 MIN: ICD-10-PCS | Mod: S$GLB,,, | Performed by: OBSTETRICS & GYNECOLOGY

## 2022-04-18 PROCEDURE — 99999 PR PBB SHADOW E&M-EST. PATIENT-LVL III: CPT | Mod: PBBFAC,,, | Performed by: OBSTETRICS & GYNECOLOGY

## 2022-04-18 PROCEDURE — 99999 PR PBB SHADOW E&M-EST. PATIENT-LVL III: ICD-10-PCS | Mod: PBBFAC,,, | Performed by: OBSTETRICS & GYNECOLOGY

## 2022-04-18 PROCEDURE — 81025 URINE PREGNANCY TEST: CPT | Mod: S$GLB,,, | Performed by: OBSTETRICS & GYNECOLOGY

## 2022-04-18 RX ORDER — PROGESTERONE 200 MG/1
200 CAPSULE ORAL NIGHTLY
Qty: 30 CAPSULE | Refills: 1 | Status: SHIPPED | OUTPATIENT
Start: 2022-04-18 | End: 2022-09-08 | Stop reason: CLARIF

## 2022-04-18 NOTE — PROGRESS NOTES
"  History of Present Illness   26 y.o.  female patient presents today for missed menses, recent SAB- counseled. No cramps or bleeding  LMP: 03/08/2022  15 minutes spent with patient with > 1/2 time in counseling.  .    Past medical and surgical history reviewed.   I have reviewed the patient's medical history in detail and updated the computerized patient record.      Please let me know if you have any questions.    Review of patient's allergies indicates:   Allergen Reactions    No known drug allergies          Past Medical History:   Diagnosis Date    Anemia     s/p transfusion from menstral bleeding    Anxiety     Asthma     "athletic" asthma    Blood transfusion     Depression     Hashimoto's disease     HTN (hypertension)        Past Surgical History:   Procedure Laterality Date    DILATION AND CURETTAGE OF UTERUS N/A 6/24/2021    Procedure: DILATION AND CURETTAGE, UTERUS;  Surgeon: Víctor Arriaga MD;  Location: [facility];  Service: OB/GYN;  Laterality: N/A;       MEDS:   Current Outpatient Medications on File Prior to Visit   Medication Sig Dispense Refill    albuterol (PROVENTIL) 2.5 mg /3 mL (0.083 %) nebulizer solution Take 3 mLs (2.5 mg total) by nebulization every 6 (six) hours as needed for Wheezing. Rescue 1 each 1    albuterol (PROVENTIL/VENTOLIN HFA) 90 mcg/actuation inhaler INHALE 1 PUFF BY MOUTH 4 TIMES DAILY AS NEEDED FOR COUGH      albuterol (PROVENTIL/VENTOLIN HFA) 90 mcg/actuation inhaler Inhale 1-2 puffs into the lungs every 6 (six) hours as needed for Wheezing. Rescue 18 g 0    budesonide 180mcg (PULMICORT FLEXHALER) 180 mcg/actuation AePB Inhale 1 puff into the lungs 2 (two) times a day. Controller 1 each 11    fluticasone propionate (FLOVENT HFA) 110 mcg/actuation inhaler Inhale 1 puff into the lungs 2 (two) times daily. Controller 12 g 6    EPINEPHrine (EPIPEN 2-BELEN) 0.3 mg/0.3 mL AtIn Inject 0.3 mLs (0.3 mg total) into the muscle once. for 1 dose 0.3 mL 3     No " "current facility-administered medications on file prior to visit.       OB History        1    Para        Term                AB   1    Living           SAB   1    IAB        Ectopic        Multiple        Live Births                     Social History     Socioeconomic History    Marital status: Single   Tobacco Use    Smoking status: Never Smoker    Smokeless tobacco: Never Used   Substance and Sexual Activity    Alcohol use: Not Currently    Drug use: No    Sexual activity: Yes     Partners: Male     Birth control/protection: None       Family History   Problem Relation Age of Onset    Thyroid disease Mother     Hashimoto's thyroiditis Mother     Diabetes Father     Breast cancer Neg Hx     Ovarian cancer Neg Hx     Melanoma Neg Hx     Psoriasis Neg Hx     Lupus Neg Hx     Eczema Neg Hx          Review of Systems - Negative except HPI  GEN ROS: negative for - chills or fever  Breast ROS: negative for breast lumps  Genito-Urinary ROS: no dysuria, trouble voiding, or hematuria     Urine pregnancy test in office: POSITIVE    Physical Examination:  /72   Ht 4' 11" (1.499 m)   Wt 90.7 kg (199 lb 15.3 oz)   LMP 2022   BMI 40.39 kg/m²    deferred      Assessment:    1. Missed menses  POCT urine pregnancy    HCG, Quantitative    Progesterone    progesterone (PROMETRIUM) 200 MG capsule   2. Early stage of pregnancy  HCG, Quantitative    Progesterone    progesterone (PROMETRIUM) 200 MG capsule       Plan:  Bleeding/pain precautions   ultrasound with MD 2 weeks  Prenatal vitamins, early OB labs    Patient informed will be contacted with results within 2 weeks. Encouraged to please call back or email if she has not heard from us by then.        "

## 2022-04-19 ENCOUNTER — PATIENT MESSAGE (OUTPATIENT)
Dept: OBSTETRICS AND GYNECOLOGY | Facility: CLINIC | Age: 27
End: 2022-04-19
Payer: COMMERCIAL

## 2022-04-20 ENCOUNTER — PATIENT MESSAGE (OUTPATIENT)
Dept: OBSTETRICS AND GYNECOLOGY | Facility: CLINIC | Age: 27
End: 2022-04-20
Payer: COMMERCIAL

## 2022-05-02 ENCOUNTER — PATIENT MESSAGE (OUTPATIENT)
Dept: OBSTETRICS AND GYNECOLOGY | Facility: CLINIC | Age: 27
End: 2022-05-02
Payer: COMMERCIAL

## 2022-05-04 ENCOUNTER — PATIENT OUTREACH (OUTPATIENT)
Dept: ADMINISTRATIVE | Facility: OTHER | Age: 27
End: 2022-05-04
Payer: COMMERCIAL

## 2022-05-05 ENCOUNTER — LAB VISIT (OUTPATIENT)
Dept: LAB | Facility: HOSPITAL | Age: 27
End: 2022-05-05
Attending: OBSTETRICS & GYNECOLOGY
Payer: COMMERCIAL

## 2022-05-05 ENCOUNTER — ROUTINE PRENATAL (OUTPATIENT)
Dept: OBSTETRICS AND GYNECOLOGY | Facility: CLINIC | Age: 27
End: 2022-05-05
Payer: COMMERCIAL

## 2022-05-05 VITALS
DIASTOLIC BLOOD PRESSURE: 80 MMHG | BODY MASS INDEX: 40.43 KG/M2 | WEIGHT: 200.19 LBS | SYSTOLIC BLOOD PRESSURE: 124 MMHG

## 2022-05-05 DIAGNOSIS — Z34.90 EARLY STAGE OF PREGNANCY: Primary | ICD-10-CM

## 2022-05-05 DIAGNOSIS — Z34.90 EARLY STAGE OF PREGNANCY: ICD-10-CM

## 2022-05-05 LAB
ABO + RH BLD: NORMAL
ANION GAP SERPL CALC-SCNC: 12 MMOL/L (ref 8–16)
BASOPHILS # BLD AUTO: 0.04 K/UL (ref 0–0.2)
BASOPHILS NFR BLD: 0.4 % (ref 0–1.9)
BLD GP AB SCN CELLS X3 SERPL QL: NORMAL
BUN SERPL-MCNC: 8 MG/DL (ref 6–20)
CALCIUM SERPL-MCNC: 9.7 MG/DL (ref 8.7–10.5)
CHLORIDE SERPL-SCNC: 104 MMOL/L (ref 95–110)
CO2 SERPL-SCNC: 20 MMOL/L (ref 23–29)
CREAT SERPL-MCNC: 0.6 MG/DL (ref 0.5–1.4)
DIFFERENTIAL METHOD: ABNORMAL
EOSINOPHIL # BLD AUTO: 0.1 K/UL (ref 0–0.5)
EOSINOPHIL NFR BLD: 0.7 % (ref 0–8)
ERYTHROCYTE [DISTWIDTH] IN BLOOD BY AUTOMATED COUNT: 12.5 % (ref 11.5–14.5)
EST. GFR  (AFRICAN AMERICAN): >60 ML/MIN/1.73 M^2
EST. GFR  (NON AFRICAN AMERICAN): >60 ML/MIN/1.73 M^2
GLUCOSE SERPL-MCNC: 84 MG/DL (ref 70–110)
HCT VFR BLD AUTO: 40.5 % (ref 37–48.5)
HGB BLD-MCNC: 13.5 G/DL (ref 12–16)
IMM GRANULOCYTES # BLD AUTO: 0.07 K/UL (ref 0–0.04)
IMM GRANULOCYTES NFR BLD AUTO: 0.6 % (ref 0–0.5)
LYMPHOCYTES # BLD AUTO: 2.2 K/UL (ref 1–4.8)
LYMPHOCYTES NFR BLD: 20 % (ref 18–48)
MCH RBC QN AUTO: 28.5 PG (ref 27–31)
MCHC RBC AUTO-ENTMCNC: 33.3 G/DL (ref 32–36)
MCV RBC AUTO: 86 FL (ref 82–98)
MONOCYTES # BLD AUTO: 0.6 K/UL (ref 0.3–1)
MONOCYTES NFR BLD: 5.7 % (ref 4–15)
NEUTROPHILS # BLD AUTO: 8 K/UL (ref 1.8–7.7)
NEUTROPHILS NFR BLD: 72.6 % (ref 38–73)
NRBC BLD-RTO: 0 /100 WBC
PLATELET # BLD AUTO: 300 K/UL (ref 150–450)
PMV BLD AUTO: 10.9 FL (ref 9.2–12.9)
POTASSIUM SERPL-SCNC: 3.4 MMOL/L (ref 3.5–5.1)
RBC # BLD AUTO: 4.73 M/UL (ref 4–5.4)
SODIUM SERPL-SCNC: 136 MMOL/L (ref 136–145)
T4 FREE SERPL-MCNC: 0.96 NG/DL (ref 0.71–1.51)
WBC # BLD AUTO: 10.97 K/UL (ref 3.9–12.7)

## 2022-05-05 PROCEDURE — 86901 BLOOD TYPING SEROLOGIC RH(D): CPT | Performed by: OBSTETRICS & GYNECOLOGY

## 2022-05-05 PROCEDURE — 86592 SYPHILIS TEST NON-TREP QUAL: CPT | Performed by: OBSTETRICS & GYNECOLOGY

## 2022-05-05 PROCEDURE — 86762 RUBELLA ANTIBODY: CPT | Performed by: OBSTETRICS & GYNECOLOGY

## 2022-05-05 PROCEDURE — 36415 COLL VENOUS BLD VENIPUNCTURE: CPT | Mod: PN | Performed by: OBSTETRICS & GYNECOLOGY

## 2022-05-05 PROCEDURE — 99999 PR PBB SHADOW E&M-EST. PATIENT-LVL I: ICD-10-PCS | Mod: PBBFAC,,, | Performed by: OBSTETRICS & GYNECOLOGY

## 2022-05-05 PROCEDURE — 87086 URINE CULTURE/COLONY COUNT: CPT | Performed by: OBSTETRICS & GYNECOLOGY

## 2022-05-05 PROCEDURE — 76817 TRANSVAGINAL US OBSTETRIC: CPT | Mod: S$GLB,,, | Performed by: OBSTETRICS & GYNECOLOGY

## 2022-05-05 PROCEDURE — 87340 HEPATITIS B SURFACE AG IA: CPT | Performed by: OBSTETRICS & GYNECOLOGY

## 2022-05-05 PROCEDURE — 86803 HEPATITIS C AB TEST: CPT | Performed by: OBSTETRICS & GYNECOLOGY

## 2022-05-05 PROCEDURE — 84439 ASSAY OF FREE THYROXINE: CPT | Performed by: OBSTETRICS & GYNECOLOGY

## 2022-05-05 PROCEDURE — 87389 HIV-1 AG W/HIV-1&-2 AB AG IA: CPT | Performed by: OBSTETRICS & GYNECOLOGY

## 2022-05-05 PROCEDURE — 85025 COMPLETE CBC W/AUTO DIFF WBC: CPT | Performed by: OBSTETRICS & GYNECOLOGY

## 2022-05-05 PROCEDURE — 99999 PR PBB SHADOW E&M-EST. PATIENT-LVL I: CPT | Mod: PBBFAC,,, | Performed by: OBSTETRICS & GYNECOLOGY

## 2022-05-05 PROCEDURE — 80048 BASIC METABOLIC PNL TOTAL CA: CPT | Performed by: OBSTETRICS & GYNECOLOGY

## 2022-05-05 PROCEDURE — 0502F PR SUBSEQUENT PRENATAL CARE: ICD-10-PCS | Mod: CPTII,S$GLB,, | Performed by: OBSTETRICS & GYNECOLOGY

## 2022-05-05 PROCEDURE — 76817 PR US, OB, TRANSVAG APPROACH: ICD-10-PCS | Mod: S$GLB,,, | Performed by: OBSTETRICS & GYNECOLOGY

## 2022-05-05 PROCEDURE — 0502F SUBSEQUENT PRENATAL CARE: CPT | Mod: CPTII,S$GLB,, | Performed by: OBSTETRICS & GYNECOLOGY

## 2022-05-05 NOTE — PROCEDURES
Procedures     ULTRASOUND:   Bedside Ultrasound Findings    EXAMINATION:  US PELVIS COMP WITH TRANSVAG OB    CLINICAL HISTORY:      TECHNIQUE:  Transvaginal sonography was performed to better evaluate the uterus and ovaries.    COMPARISON:  None.    FINDINGS:  1. Uterus:    Appearance: Viable vazquez intrauterine pregnancy was seen, yolk sac was seen. Crown-rump length = 10 mm with flicker, consistent with 7w1d and EDC 12/21/2022.    Size: normal    Masses: none    Endometrium:  normal    2. Right ovary: Not seen  3. Left ovary: Not seen.    Free Fluid: none  Adnexal pathology: none seen        Impression      1. Single viable intrauterine pregnancy   2. Crown rump length consistent with 7w1d, and estimated due date 12/21/2022

## 2022-05-05 NOTE — PROGRESS NOTES
"  History of Present Illness   26 y.o.  female patient presents today for missed menses, progesterone doignwell, no Vaginal Bleeding   LMP: 3/8/2022  15 minutes spent with patient with > 1/2 time in counseling.      Past medical and surgical history reviewed.   I have reviewed the patient's medical history in detail and updated the computerized patient record.      Please let me know if you have any questions.    Review of patient's allergies indicates:   Allergen Reactions    No known drug allergies          Past Medical History:   Diagnosis Date    Anemia     s/p transfusion from menstral bleeding    Anxiety     Asthma     "athletic" asthma    Blood transfusion     Depression     Hashimoto's disease     HTN (hypertension)        Past Surgical History:   Procedure Laterality Date    DILATION AND CURETTAGE OF UTERUS N/A 6/24/2021    Procedure: DILATION AND CURETTAGE, UTERUS;  Surgeon: Víctor Arriaga MD;  Location: [facility];  Service: OB/GYN;  Laterality: N/A;       MEDS:   Current Outpatient Medications on File Prior to Visit   Medication Sig Dispense Refill    albuterol (PROVENTIL) 2.5 mg /3 mL (0.083 %) nebulizer solution Take 3 mLs (2.5 mg total) by nebulization every 6 (six) hours as needed for Wheezing. Rescue 1 each 1    albuterol (PROVENTIL/VENTOLIN HFA) 90 mcg/actuation inhaler INHALE 1 PUFF BY MOUTH 4 TIMES DAILY AS NEEDED FOR COUGH      albuterol (PROVENTIL/VENTOLIN HFA) 90 mcg/actuation inhaler Inhale 1-2 puffs into the lungs every 6 (six) hours as needed for Wheezing. Rescue 18 g 0    budesonide 180mcg (PULMICORT FLEXHALER) 180 mcg/actuation AePB Inhale 1 puff into the lungs 2 (two) times a day. Controller 1 each 11    EPINEPHrine (EPIPEN 2-BELEN) 0.3 mg/0.3 mL AtIn Inject 0.3 mLs (0.3 mg total) into the muscle once. for 1 dose 0.3 mL 3    fluticasone propionate (FLOVENT HFA) 110 mcg/actuation inhaler Inhale 1 puff into the lungs 2 (two) times daily. Controller 12 g 6    " progesterone (PROMETRIUM) 200 MG capsule Take 1 capsule (200 mg total) by mouth nightly. 30 capsule 1     No current facility-administered medications on file prior to visit.       OB History        2    Para        Term                AB   1    Living           SAB   1    IAB        Ectopic        Multiple        Live Births                     Social History     Socioeconomic History    Marital status: Single   Tobacco Use    Smoking status: Never Smoker    Smokeless tobacco: Never Used   Substance and Sexual Activity    Alcohol use: Not Currently    Drug use: No    Sexual activity: Yes     Partners: Male     Birth control/protection: None       Family History   Problem Relation Age of Onset    Thyroid disease Mother     Hashimoto's thyroiditis Mother     Diabetes Father     Breast cancer Neg Hx     Ovarian cancer Neg Hx     Melanoma Neg Hx     Psoriasis Neg Hx     Lupus Neg Hx     Eczema Neg Hx          Review of Systems - Negative except HPI  GEN ROS: negative for - chills or fever  Breast ROS: negative for breast lumps  Genito-Urinary ROS: no dysuria, trouble voiding, or hematuria     Urine pregnancy test in office: POSITIVE    Physical Examination:  /80   Wt 90.8 kg (200 lb 2.8 oz)   LMP 2022   BMI 40.43 kg/m²        Assessment:    1. Early stage of pregnancy  Hepatitis C Antibody    HIV 1/2 Ag/Ab (4th Gen)    RPR    Hepatitis B surface antigen    Type & Screen    Rubella antibody, IgG    Urine culture    CBC auto differential    Basic metabolic panel    T4, Free       Plan:  Bleeding/pain precautions   ultrasound todayPrenatal vitamins, progesterone  requesting or summarizing old records (information regarding previous ob history)  Patient informed will be contacted with results within 2 weeks. Encouraged to please call back or email if she has not heard from us by then.

## 2022-05-06 LAB
RPR SER QL: NORMAL
RUBV IGG SER-ACNC: 11.3 IU/ML
RUBV IGG SER-IMP: REACTIVE

## 2022-05-07 LAB — BACTERIA UR CULT: NORMAL

## 2022-05-09 ENCOUNTER — PATIENT MESSAGE (OUTPATIENT)
Dept: OBSTETRICS AND GYNECOLOGY | Facility: CLINIC | Age: 27
End: 2022-05-09
Payer: COMMERCIAL

## 2022-05-09 LAB — HIV 1+2 AB+HIV1 P24 AG SERPL QL IA: NEGATIVE

## 2022-05-12 LAB
HBV SURFACE AG SERPL QL IA: NEGATIVE
HCV AB SERPL QL IA: NEGATIVE

## 2022-05-27 ENCOUNTER — ROUTINE PRENATAL (OUTPATIENT)
Dept: OBSTETRICS AND GYNECOLOGY | Facility: CLINIC | Age: 27
End: 2022-05-27
Payer: COMMERCIAL

## 2022-05-27 VITALS — SYSTOLIC BLOOD PRESSURE: 146 MMHG | WEIGHT: 193.56 LBS | DIASTOLIC BLOOD PRESSURE: 82 MMHG | BODY MASS INDEX: 39.1 KG/M2

## 2022-05-27 DIAGNOSIS — Z3A.10 10 WEEKS GESTATION OF PREGNANCY: Primary | ICD-10-CM

## 2022-05-27 LAB
BILIRUB SERPL-MCNC: NORMAL MG/DL
BLOOD URINE, POC: NORMAL
CLARITY, POC UA: CLEAR
COLOR, POC UA: NORMAL
GLUCOSE UR QL STRIP: NORMAL
KETONES UR QL STRIP: NORMAL
LEUKOCYTE ESTERASE URINE, POC: NORMAL
NITRITE, POC UA: NORMAL
PH, POC UA: 5
PROTEIN, POC: NORMAL
SPECIFIC GRAVITY, POC UA: NORMAL
UROBILINOGEN, POC UA: NORMAL

## 2022-05-27 PROCEDURE — 0502F SUBSEQUENT PRENATAL CARE: CPT | Mod: CPTII,S$GLB,, | Performed by: SPECIALIST

## 2022-05-27 PROCEDURE — 99999 PR PBB SHADOW E&M-EST. PATIENT-LVL III: CPT | Mod: PBBFAC,,, | Performed by: SPECIALIST

## 2022-05-27 PROCEDURE — 99999 PR PBB SHADOW E&M-EST. PATIENT-LVL III: ICD-10-PCS | Mod: PBBFAC,,, | Performed by: SPECIALIST

## 2022-05-27 PROCEDURE — 0502F PR SUBSEQUENT PRENATAL CARE: ICD-10-PCS | Mod: CPTII,S$GLB,, | Performed by: SPECIALIST

## 2022-05-27 NOTE — PROGRESS NOTES
Complaints today: none, No Bleeding or pains    Abdominal RTS for FHR  3.5MHz head  Pos IUP single very active , viewed by pt and FOB    BP (!) 146/82   Wt 87.8 kg (193 lb 9 oz)   LMP 2022   BMI 39.10 kg/m²     26 y.o., at 10w2d by Estimated Date of Delivery: 22  Patient Active Problem List   Diagnosis    Contraception    DUB (dysfunctional uterine bleeding)    Obesity, Class II, BMI 35-39.9, with comorbidity    Missed menses    Missed      OB History    Para Term  AB Living   2       1     SAB IAB Ectopic Multiple Live Births   1              # Outcome Date GA Lbr Donn/2nd Weight Sex Delivery Anes PTL Lv   2 Current            1 SAB                Dating reviewed    Allergies and problem list reviewed and updated    Medical and surgical history reviewed    Prenatal labs reviewed and updated    Physical Exam:  ABD: soft, gravid, nontender,     Assessment:  IUP 10 w 2 d    Plan:   Dr Arriaga  Continue Progestin  follow up 4 Weeks, bleeding/pain precautions

## 2022-06-08 ENCOUNTER — PATIENT MESSAGE (OUTPATIENT)
Dept: ADMINISTRATIVE | Facility: OTHER | Age: 27
End: 2022-06-08
Payer: COMMERCIAL

## 2022-06-09 ENCOUNTER — PATIENT MESSAGE (OUTPATIENT)
Dept: OBSTETRICS AND GYNECOLOGY | Facility: CLINIC | Age: 27
End: 2022-06-09
Payer: COMMERCIAL

## 2022-06-15 ENCOUNTER — PATIENT MESSAGE (OUTPATIENT)
Dept: ADMINISTRATIVE | Facility: OTHER | Age: 27
End: 2022-06-15
Payer: COMMERCIAL

## 2022-06-16 ENCOUNTER — LAB VISIT (OUTPATIENT)
Dept: LAB | Facility: HOSPITAL | Age: 27
End: 2022-06-16
Attending: OBSTETRICS & GYNECOLOGY
Payer: COMMERCIAL

## 2022-06-16 ENCOUNTER — ROUTINE PRENATAL (OUTPATIENT)
Dept: OBSTETRICS AND GYNECOLOGY | Facility: CLINIC | Age: 27
End: 2022-06-16
Payer: COMMERCIAL

## 2022-06-16 VITALS
WEIGHT: 195.13 LBS | BODY MASS INDEX: 39.41 KG/M2 | SYSTOLIC BLOOD PRESSURE: 122 MMHG | DIASTOLIC BLOOD PRESSURE: 80 MMHG

## 2022-06-16 DIAGNOSIS — Z3A.13 13 WEEKS GESTATION OF PREGNANCY: ICD-10-CM

## 2022-06-16 DIAGNOSIS — Z3A.13 13 WEEKS GESTATION OF PREGNANCY: Primary | ICD-10-CM

## 2022-06-16 PROCEDURE — 0502F PR SUBSEQUENT PRENATAL CARE: ICD-10-PCS | Mod: CPTII,S$GLB,, | Performed by: OBSTETRICS & GYNECOLOGY

## 2022-06-16 PROCEDURE — 99999 PR PBB SHADOW E&M-EST. PATIENT-LVL III: ICD-10-PCS | Mod: PBBFAC,,, | Performed by: OBSTETRICS & GYNECOLOGY

## 2022-06-16 PROCEDURE — 99999 PR PBB SHADOW E&M-EST. PATIENT-LVL III: CPT | Mod: PBBFAC,,, | Performed by: OBSTETRICS & GYNECOLOGY

## 2022-06-16 PROCEDURE — 36415 COLL VENOUS BLD VENIPUNCTURE: CPT | Mod: PN | Performed by: OBSTETRICS & GYNECOLOGY

## 2022-06-16 PROCEDURE — 0502F SUBSEQUENT PRENATAL CARE: CPT | Mod: CPTII,S$GLB,, | Performed by: OBSTETRICS & GYNECOLOGY

## 2022-06-16 NOTE — PROGRESS NOTES
The patient presents with No complaints.   Reports: no bleeding or pains    2022  26 y.o. 13w1d Estimated Date of Delivery: 22, dating reviewed.   OB History    Para Term  AB Living   2       1     SAB IAB Ectopic Multiple Live Births   1              # Outcome Date GA Lbr Donn/2nd Weight Sex Delivery Anes PTL Lv   2 Current            1 SAB                Prenatal labs reviewed and updated today    Review of Systems:  General ROS: negative for headache or visual changes  Breast ROS: negative for breast lumps  Gastrointestinal ROS: negative for constipation, diarrhea or nausea/vomiting  Musculoskeletal ROS: negative for pain in joints or swelling in face or hands.   Neurological ROS: negative for - headaches, numbness/tingling or visual changes      Physical Exam:  /80   Wt 88.5 kg (195 lb 1.7 oz)   LMP 2022   BMI 39.41 kg/m²   Urine Dip: Pending  Fundal Height: 14cm  Fetal Heart Tones: 159bpm  Constitutional: She is oriented to person, place, and time. She appears well-developed and well-nourished. No distress.Overweight   Cardiovascular: Normal rate.    Pulmonary/Chest: Effort normal. No respiratory distress  Abdominal: Soft, gravid, nontender. No rebound and no guarding.     Genitourinary: Deferred    Musculoskeletal: Normal range of motion, Minimal peripheral edema.   Neurological: She is alert and oriented to person, place, and time. Coordination normal.   Skin: Skin is warm and dry. She is not diaphoretic.  Psychiatric: She has a normal mood and affect.        Assessment:  26 y.o., at 13w1d Gestation   Patient Active Problem List   Diagnosis    Contraception    DUB (dysfunctional uterine bleeding)    Obesity, Class II, BMI 35-39.9, with comorbidity    Missed menses    Missed      Current Outpatient Medications on File Prior to Visit   Medication Sig Dispense Refill    progesterone (PROMETRIUM) 200 MG capsule Take 1 capsule (200 mg total) by mouth nightly. 30  capsule 1    albuterol (PROVENTIL) 2.5 mg /3 mL (0.083 %) nebulizer solution Take 3 mLs (2.5 mg total) by nebulization every 6 (six) hours as needed for Wheezing. Rescue (Patient not taking: Reported on 6/16/2022) 1 each 1    albuterol (PROVENTIL/VENTOLIN HFA) 90 mcg/actuation inhaler INHALE 1 PUFF BY MOUTH 4 TIMES DAILY AS NEEDED FOR COUGH      albuterol (PROVENTIL/VENTOLIN HFA) 90 mcg/actuation inhaler Inhale 1-2 puffs into the lungs every 6 (six) hours as needed for Wheezing. Rescue (Patient not taking: Reported on 6/16/2022) 18 g 0    budesonide 180mcg (PULMICORT FLEXHALER) 180 mcg/actuation AePB Inhale 1 puff into the lungs 2 (two) times a day. Controller (Patient not taking: Reported on 6/16/2022) 1 each 11    EPINEPHrine (EPIPEN 2-BELEN) 0.3 mg/0.3 mL AtIn Inject 0.3 mLs (0.3 mg total) into the muscle once. for 1 dose 0.3 mL 3    fluticasone propionate (FLOVENT HFA) 110 mcg/actuation inhaler Inhale 1 puff into the lungs 2 (two) times daily. Controller (Patient not taking: Reported on 6/16/2022) 12 g 6     No current facility-administered medications on file prior to visit.         Plan:   Labs today: materna 21 today  Orders today: none  MEds today: none  Procedures Today: none  Follow up 3-4 Weeks, bleeding/pain precautions

## 2022-06-24 ENCOUNTER — PATIENT MESSAGE (OUTPATIENT)
Dept: OBSTETRICS AND GYNECOLOGY | Facility: CLINIC | Age: 27
End: 2022-06-24
Payer: COMMERCIAL

## 2022-07-07 ENCOUNTER — ROUTINE PRENATAL (OUTPATIENT)
Dept: OBSTETRICS AND GYNECOLOGY | Facility: CLINIC | Age: 27
End: 2022-07-07
Payer: COMMERCIAL

## 2022-07-07 VITALS
SYSTOLIC BLOOD PRESSURE: 122 MMHG | BODY MASS INDEX: 39.27 KG/M2 | WEIGHT: 194.44 LBS | DIASTOLIC BLOOD PRESSURE: 78 MMHG

## 2022-07-07 DIAGNOSIS — Z3A.16 16 WEEKS GESTATION OF PREGNANCY: Primary | ICD-10-CM

## 2022-07-07 LAB
BILIRUB SERPL-MCNC: NORMAL MG/DL
BLOOD URINE, POC: NORMAL
CLARITY, POC UA: NORMAL
COLOR, POC UA: YELLOW
GLUCOSE UR QL STRIP: NORMAL
KETONES UR QL STRIP: NORMAL
LEUKOCYTE ESTERASE URINE, POC: NORMAL
NITRITE, POC UA: NORMAL
PH, POC UA: 6
PROTEIN, POC: NORMAL
SPECIFIC GRAVITY, POC UA: 1.01
UROBILINOGEN, POC UA: NORMAL

## 2022-07-07 PROCEDURE — 0502F PR SUBSEQUENT PRENATAL CARE: ICD-10-PCS | Mod: CPTII,S$GLB,, | Performed by: OBSTETRICS & GYNECOLOGY

## 2022-07-07 PROCEDURE — 99999 PR PBB SHADOW E&M-EST. PATIENT-LVL III: ICD-10-PCS | Mod: PBBFAC,,, | Performed by: OBSTETRICS & GYNECOLOGY

## 2022-07-07 PROCEDURE — 99999 PR PBB SHADOW E&M-EST. PATIENT-LVL III: CPT | Mod: PBBFAC,,, | Performed by: OBSTETRICS & GYNECOLOGY

## 2022-07-07 PROCEDURE — 0502F SUBSEQUENT PRENATAL CARE: CPT | Mod: CPTII,S$GLB,, | Performed by: OBSTETRICS & GYNECOLOGY

## 2022-07-07 NOTE — PROGRESS NOTES
The patient presents with No complaints.   Reports: No Bleeding or pains    2022  26 y.o. 16w1d Estimated Date of Delivery: 22, dating reviewed.   OB History    Para Term  AB Living   2       1     SAB IAB Ectopic Multiple Live Births   1              # Outcome Date GA Lbr Donn/2nd Weight Sex Delivery Anes PTL Lv   2 Current            1 SAB                Prenatal labs reviewed and updated today    Review of Systems:  General ROS: negative for headache or visual changes  Breast ROS: negative for breast lumps  Gastrointestinal ROS: negative for constipation, diarrhea or nausea/vomiting  Musculoskeletal ROS: negative for pain in joints or swelling in face or hands.   Neurological ROS: negative for - headaches, numbness/tingling or visual changes      Physical Exam:  /78   Wt 88.2 kg (194 lb 7.1 oz)   LMP 2022   BMI 39.27 kg/m²   Urine Dip: Pending  Fundal Height: 16cm  Fetal Heart Tones: 162bpm  Constitutional: She is oriented to person, place, and time. She appears well-developed and well-nourished. No distress. Overweight   Cardiovascular: Normal rate.    Pulmonary/Chest: Effort normal. No respiratory distress  Abdominal: Soft, gravid, nontender. No rebound and no guarding.     Genitourinary: Deferred    Musculoskeletal: Normal range of motion, Minimal peripheral edema.   Neurological: She is alert and oriented to person, place, and time. Coordination normal.   Skin: Skin is warm and dry. She is not diaphoretic.  Psychiatric: She has a normal mood and affect.        Assessment:  26 y.o., at 16w1d Gestation   Patient Active Problem List   Diagnosis    Contraception    DUB (dysfunctional uterine bleeding)    Obesity, Class II, BMI 35-39.9, with comorbidity    Missed menses    Missed      Current Outpatient Medications on File Prior to Visit   Medication Sig Dispense Refill    prenatal vit/iron fum/folic ac (PRENATAL 1+1 ORAL) Take by mouth.      albuterol  (PROVENTIL) 2.5 mg /3 mL (0.083 %) nebulizer solution Take 3 mLs (2.5 mg total) by nebulization every 6 (six) hours as needed for Wheezing. Rescue (Patient not taking: No sig reported) 1 each 1    albuterol (PROVENTIL/VENTOLIN HFA) 90 mcg/actuation inhaler INHALE 1 PUFF BY MOUTH 4 TIMES DAILY AS NEEDED FOR COUGH      albuterol (PROVENTIL/VENTOLIN HFA) 90 mcg/actuation inhaler Inhale 1-2 puffs into the lungs every 6 (six) hours as needed for Wheezing. Rescue (Patient not taking: No sig reported) 18 g 0    budesonide 180mcg (PULMICORT FLEXHALER) 180 mcg/actuation AePB Inhale 1 puff into the lungs 2 (two) times a day. Controller (Patient not taking: No sig reported) 1 each 11    EPINEPHrine (EPIPEN 2-BELEN) 0.3 mg/0.3 mL AtIn Inject 0.3 mLs (0.3 mg total) into the muscle once. for 1 dose 0.3 mL 3    fluticasone propionate (FLOVENT HFA) 110 mcg/actuation inhaler Inhale 1 puff into the lungs 2 (two) times daily. Controller (Patient not taking: No sig reported) 12 g 6    progesterone (PROMETRIUM) 200 MG capsule Take 1 capsule (200 mg total) by mouth nightly. (Patient not taking: Reported on 7/7/2022) 30 capsule 1     No current facility-administered medications on file prior to visit.         Plan:   Labs today: none  Orders today: u/s 2 weeks  MEds today: none  Procedures Today: none  Follow up 2 Weeks, bleeding/pain precautions

## 2022-07-08 ENCOUNTER — PATIENT MESSAGE (OUTPATIENT)
Dept: OBSTETRICS AND GYNECOLOGY | Facility: CLINIC | Age: 27
End: 2022-07-08
Payer: COMMERCIAL

## 2022-07-20 ENCOUNTER — ROUTINE PRENATAL (OUTPATIENT)
Dept: OBSTETRICS AND GYNECOLOGY | Facility: CLINIC | Age: 27
End: 2022-07-20
Payer: COMMERCIAL

## 2022-07-20 ENCOUNTER — HOSPITAL ENCOUNTER (OUTPATIENT)
Dept: RADIOLOGY | Facility: HOSPITAL | Age: 27
Discharge: HOME | End: 2022-07-20
Attending: OBSTETRICS & GYNECOLOGY
Payer: COMMERCIAL

## 2022-07-20 VITALS — WEIGHT: 193.56 LBS | SYSTOLIC BLOOD PRESSURE: 120 MMHG | BODY MASS INDEX: 39.1 KG/M2 | DIASTOLIC BLOOD PRESSURE: 9 MMHG

## 2022-07-20 DIAGNOSIS — Z3A.16 16 WEEKS GESTATION OF PREGNANCY: ICD-10-CM

## 2022-07-20 DIAGNOSIS — Z3A.18 18 WEEKS GESTATION OF PREGNANCY: Primary | ICD-10-CM

## 2022-07-20 LAB
BILIRUB SERPL-MCNC: NORMAL MG/DL
BLOOD URINE, POC: NORMAL
CLARITY, POC UA: CLEAR
COLOR, POC UA: YELLOW
GLUCOSE UR QL STRIP: NORMAL
KETONES UR QL STRIP: 1
LEUKOCYTE ESTERASE URINE, POC: NORMAL
NITRITE, POC UA: NORMAL
PH, POC UA: 8
PROTEIN, POC: NORMAL
SPECIFIC GRAVITY, POC UA: 1
UROBILINOGEN, POC UA: NORMAL

## 2022-07-20 PROCEDURE — 76805 OB US >/= 14 WKS SNGL FETUS: CPT | Mod: TC,PN

## 2022-07-20 PROCEDURE — 76805 OB US >/= 14 WKS SNGL FETUS: CPT | Mod: 26,,, | Performed by: RADIOLOGY

## 2022-07-20 PROCEDURE — 0502F PR SUBSEQUENT PRENATAL CARE: ICD-10-PCS | Mod: CPTII,S$GLB,, | Performed by: OBSTETRICS & GYNECOLOGY

## 2022-07-20 PROCEDURE — 76805 US OB 14+ WEEKS, TRANSABDOM, SINGLE GESTATION: ICD-10-PCS | Mod: 26,,, | Performed by: RADIOLOGY

## 2022-07-20 PROCEDURE — 99999 PR PBB SHADOW E&M-EST. PATIENT-LVL III: ICD-10-PCS | Mod: PBBFAC,,, | Performed by: OBSTETRICS & GYNECOLOGY

## 2022-07-20 PROCEDURE — 0502F SUBSEQUENT PRENATAL CARE: CPT | Mod: CPTII,S$GLB,, | Performed by: OBSTETRICS & GYNECOLOGY

## 2022-07-20 PROCEDURE — 99999 PR PBB SHADOW E&M-EST. PATIENT-LVL III: CPT | Mod: PBBFAC,,, | Performed by: OBSTETRICS & GYNECOLOGY

## 2022-07-20 NOTE — PROGRESS NOTES
The patient presents with No complaints.   Reports: Good fetal movements reported, No Bleeding or pains    2022  27 y.o. 18w0d Estimated Date of Delivery: 22, dating reviewed.   OB History    Para Term  AB Living   2       1     SAB IAB Ectopic Multiple Live Births   1              # Outcome Date GA Lbr Donn/2nd Weight Sex Delivery Anes PTL Lv   2 Current            1 SAB                Prenatal labs reviewed and updated today    Review of Systems:  General ROS: negative for headache or visual changes  Breast ROS: negative for breast lumps  Gastrointestinal ROS: negative for  constipation, diarrhea or nausea/vomiting  Musculoskeletal ROS: negative for pain in joints or swelling in face or hands.   Neurological ROS: negative for - headaches, numbness/tingling or visual changes      Physical Exam:  BP (!) 120/9   Wt 87.8 kg (193 lb 9 oz)   LMP 2022   BMI 39.10 kg/m²   Urine Dip: Pending  Fundal Height: 19cm  Fetal Heart Tones: 156bpm  Constitutional: She is oriented to person, place, and time. She appears well-developed and well-nourished. No distress. Overweight   Cardiovascular: Normal rate.    Pulmonary/Chest: Effort normal. No respiratory distress  Abdominal: Soft, gravid, nontender. No rebound and no guarding.     Genitourinary: Deferred    Musculoskeletal: Normal range of motion, Minimal peripheral edema.   Neurological: She is alert and oriented to person, place, and time. Coordination normal.   Skin: Skin is warm and dry. She is not diaphoretic.  Psychiatric: She has a normal mood and affect.        Assessment:  27 y.o., at 18w0d Gestation   Patient Active Problem List   Diagnosis    Contraception    DUB (dysfunctional uterine bleeding)    Obesity, Class II, BMI 35-39.9, with comorbidity    Missed menses    Missed      Current Outpatient Medications on File Prior to Visit   Medication Sig Dispense Refill    prenatal vit/iron fum/folic ac (PRENATAL 1+1 ORAL) Take  by mouth.      albuterol (PROVENTIL) 2.5 mg /3 mL (0.083 %) nebulizer solution Take 3 mLs (2.5 mg total) by nebulization every 6 (six) hours as needed for Wheezing. Rescue (Patient not taking: No sig reported) 1 each 1    albuterol (PROVENTIL/VENTOLIN HFA) 90 mcg/actuation inhaler INHALE 1 PUFF BY MOUTH 4 TIMES DAILY AS NEEDED FOR COUGH      albuterol (PROVENTIL/VENTOLIN HFA) 90 mcg/actuation inhaler Inhale 1-2 puffs into the lungs every 6 (six) hours as needed for Wheezing. Rescue (Patient not taking: No sig reported) 18 g 0    budesonide 180mcg (PULMICORT FLEXHALER) 180 mcg/actuation AePB Inhale 1 puff into the lungs 2 (two) times a day. Controller (Patient not taking: No sig reported) 1 each 11    EPINEPHrine (EPIPEN 2-BELEN) 0.3 mg/0.3 mL AtIn Inject 0.3 mLs (0.3 mg total) into the muscle once. for 1 dose 0.3 mL 3    fluticasone propionate (FLOVENT HFA) 110 mcg/actuation inhaler Inhale 1 puff into the lungs 2 (two) times daily. Controller (Patient not taking: No sig reported) 12 g 6    progesterone (PROMETRIUM) 200 MG capsule Take 1 capsule (200 mg total) by mouth nightly. (Patient not taking: No sig reported) 30 capsule 1     No current facility-administered medications on file prior to visit.         Plan:   Labs today: none  Orders today: none  MEds today: none  Procedures Today: u/s for anatomy today  Follow up 4 Weeks, bleeding/pain precautions ,  kick counts, labor precautions

## 2022-07-25 ENCOUNTER — PATIENT MESSAGE (OUTPATIENT)
Dept: OBSTETRICS AND GYNECOLOGY | Facility: CLINIC | Age: 27
End: 2022-07-25
Payer: COMMERCIAL

## 2022-07-28 ENCOUNTER — PATIENT MESSAGE (OUTPATIENT)
Dept: OBSTETRICS AND GYNECOLOGY | Facility: CLINIC | Age: 27
End: 2022-07-28
Payer: COMMERCIAL

## 2022-08-18 ENCOUNTER — ROUTINE PRENATAL (OUTPATIENT)
Dept: OBSTETRICS AND GYNECOLOGY | Facility: CLINIC | Age: 27
End: 2022-08-18
Payer: COMMERCIAL

## 2022-08-18 VITALS — WEIGHT: 196 LBS | BODY MASS INDEX: 39.58 KG/M2 | SYSTOLIC BLOOD PRESSURE: 122 MMHG | DIASTOLIC BLOOD PRESSURE: 76 MMHG

## 2022-08-18 DIAGNOSIS — Z3A.22 22 WEEKS GESTATION OF PREGNANCY: Primary | ICD-10-CM

## 2022-08-18 LAB
BILIRUB SERPL-MCNC: NORMAL MG/DL
BLOOD URINE, POC: NORMAL
CLARITY, POC UA: NORMAL
COLOR, POC UA: YELLOW
GLUCOSE UR QL STRIP: NORMAL
KETONES UR QL STRIP: NORMAL
LEUKOCYTE ESTERASE URINE, POC: NORMAL
NITRITE, POC UA: NORMAL
PH, POC UA: 8
PROTEIN, POC: NORMAL
SPECIFIC GRAVITY, POC UA: 1.01
UROBILINOGEN, POC UA: NORMAL

## 2022-08-18 PROCEDURE — 99999 PR PBB SHADOW E&M-EST. PATIENT-LVL III: CPT | Mod: PBBFAC,,, | Performed by: OBSTETRICS & GYNECOLOGY

## 2022-08-18 PROCEDURE — 0502F SUBSEQUENT PRENATAL CARE: CPT | Mod: CPTII,S$GLB,, | Performed by: OBSTETRICS & GYNECOLOGY

## 2022-08-18 PROCEDURE — 99999 PR PBB SHADOW E&M-EST. PATIENT-LVL III: ICD-10-PCS | Mod: PBBFAC,,, | Performed by: OBSTETRICS & GYNECOLOGY

## 2022-08-18 PROCEDURE — 0502F PR SUBSEQUENT PRENATAL CARE: ICD-10-PCS | Mod: CPTII,S$GLB,, | Performed by: OBSTETRICS & GYNECOLOGY

## 2022-08-18 NOTE — PROGRESS NOTES
The patient presents with No complaints.   Reports: Good fetal movements reported, No Bleeding or pains    2022  27 y.o. 22w1d Estimated Date of Delivery: 22, dating reviewed.   OB History    Para Term  AB Living   2       1     SAB IAB Ectopic Multiple Live Births   1              # Outcome Date GA Lbr Donn/2nd Weight Sex Delivery Anes PTL Lv   2 Current            1 SAB                Prenatal labs reviewed and updated today    Review of Systems:  General ROS: negative for headache or visual changes  Breast ROS: negative for breast lumps  Gastrointestinal ROS: negative for constipation, diarrhea or nausea/vomiting  Musculoskeletal ROS: negative for pain in joints or swelling in face or hands.   Neurological ROS: negative for - headaches, numbness/tingling or visual changes      Physical Exam:  /76   Wt 88.9 kg (195 lb 15.8 oz)   LMP 2022   BMI 39.58 kg/m²   Urine Dip: Pending  Fundal Height:23cm  Fetal Heart Tones: 159bpm  Constitutional: She is oriented to person, place, and time. She appears well-developed and well-nourished. No distress. Overweight   Cardiovascular: Normal rate.    Pulmonary/Chest: Effort normal. No respiratory distress  Abdominal: Soft, gravid, nontender. No rebound and no guarding.     Genitourinary: Deferred    Musculoskeletal: Normal range of motion, Minimal peripheral edema.   Neurological: She is alert and oriented to person, place, and time. Coordination normal.   Skin: Skin is warm and dry. She is not diaphoretic.  Psychiatric: She has a normal mood and affect.        Assessment:  27 y.o., at 22w1d Gestation   Patient Active Problem List   Diagnosis    Contraception    DUB (dysfunctional uterine bleeding)    Obesity, Class II, BMI 35-39.9, with comorbidity    Missed menses    Missed      Current Outpatient Medications on File Prior to Visit   Medication Sig Dispense Refill    albuterol (PROVENTIL/VENTOLIN HFA) 90 mcg/actuation inhaler  INHALE 1 PUFF BY MOUTH 4 TIMES DAILY AS NEEDED FOR COUGH      prenatal vit/iron fum/folic ac (PRENATAL 1+1 ORAL) Take by mouth.      albuterol (PROVENTIL) 2.5 mg /3 mL (0.083 %) nebulizer solution Take 3 mLs (2.5 mg total) by nebulization every 6 (six) hours as needed for Wheezing. Rescue (Patient not taking: No sig reported) 1 each 1    albuterol (PROVENTIL/VENTOLIN HFA) 90 mcg/actuation inhaler Inhale 1-2 puffs into the lungs every 6 (six) hours as needed for Wheezing. Rescue (Patient not taking: No sig reported) 18 g 0    budesonide 180mcg (PULMICORT FLEXHALER) 180 mcg/actuation AePB Inhale 1 puff into the lungs 2 (two) times a day. Controller (Patient not taking: No sig reported) 1 each 11    EPINEPHrine (EPIPEN 2-BELEN) 0.3 mg/0.3 mL AtIn Inject 0.3 mLs (0.3 mg total) into the muscle once. for 1 dose 0.3 mL 3    fluticasone propionate (FLOVENT HFA) 110 mcg/actuation inhaler Inhale 1 puff into the lungs 2 (two) times daily. Controller (Patient not taking: No sig reported) 12 g 6    progesterone (PROMETRIUM) 200 MG capsule Take 1 capsule (200 mg total) by mouth nightly. (Patient not taking: No sig reported) 30 capsule 1     No current facility-administered medications on file prior to visit.         Plan:   Labs today: none  Orders today: none  MEds today: none  Procedures Today: none  Follow up 3 Weeks, bleeding/pain precautions , kick counts, labor precautions

## 2022-09-05 ENCOUNTER — PATIENT MESSAGE (OUTPATIENT)
Dept: OBSTETRICS AND GYNECOLOGY | Facility: CLINIC | Age: 27
End: 2022-09-05
Payer: COMMERCIAL

## 2022-09-07 ENCOUNTER — PATIENT MESSAGE (OUTPATIENT)
Dept: OBSTETRICS AND GYNECOLOGY | Facility: CLINIC | Age: 27
End: 2022-09-07

## 2022-09-07 ENCOUNTER — OFFICE VISIT (OUTPATIENT)
Dept: OTOLARYNGOLOGY | Facility: CLINIC | Age: 27
End: 2022-09-07
Payer: COMMERCIAL

## 2022-09-07 ENCOUNTER — PATIENT MESSAGE (OUTPATIENT)
Dept: ADMINISTRATIVE | Facility: OTHER | Age: 27
End: 2022-09-07
Payer: COMMERCIAL

## 2022-09-07 ENCOUNTER — ROUTINE PRENATAL (OUTPATIENT)
Dept: OBSTETRICS AND GYNECOLOGY | Facility: CLINIC | Age: 27
End: 2022-09-07
Payer: COMMERCIAL

## 2022-09-07 VITALS
DIASTOLIC BLOOD PRESSURE: 74 MMHG | SYSTOLIC BLOOD PRESSURE: 126 MMHG | BODY MASS INDEX: 39.81 KG/M2 | WEIGHT: 197.06 LBS

## 2022-09-07 VITALS — HEIGHT: 59 IN | BODY MASS INDEX: 40 KG/M2 | TEMPERATURE: 99 F | WEIGHT: 198.44 LBS

## 2022-09-07 DIAGNOSIS — R09.A2 GLOBUS SENSATION: ICD-10-CM

## 2022-09-07 DIAGNOSIS — Z3A.25 25 WEEKS GESTATION OF PREGNANCY: Primary | ICD-10-CM

## 2022-09-07 DIAGNOSIS — K21.9 LPRD (LARYNGOPHARYNGEAL REFLUX DISEASE): Primary | ICD-10-CM

## 2022-09-07 DIAGNOSIS — R09.82 PND (POST-NASAL DRIP): ICD-10-CM

## 2022-09-07 LAB
BILIRUB SERPL-MCNC: NEGATIVE MG/DL
BLOOD URINE, POC: NORMAL
CLARITY, POC UA: CLEAR
COLOR, POC UA: YELLOW
GLUCOSE UR QL STRIP: NORMAL
KETONES UR QL STRIP: NEGATIVE
LEUKOCYTE ESTERASE URINE, POC: NEGATIVE
NITRITE, POC UA: NEGATIVE
PH, POC UA: 6
PROTEIN, POC: NORMAL
SPECIFIC GRAVITY, POC UA: NORMAL
UROBILINOGEN, POC UA: NORMAL

## 2022-09-07 PROCEDURE — 99999 PR PBB SHADOW E&M-EST. PATIENT-LVL IV: ICD-10-PCS | Mod: PBBFAC,,, | Performed by: PHYSICIAN ASSISTANT

## 2022-09-07 PROCEDURE — 3008F BODY MASS INDEX DOCD: CPT | Mod: CPTII,S$GLB,, | Performed by: PHYSICIAN ASSISTANT

## 2022-09-07 PROCEDURE — 99203 PR OFFICE/OUTPT VISIT, NEW, LEVL III, 30-44 MIN: ICD-10-PCS | Mod: 25,S$GLB,, | Performed by: PHYSICIAN ASSISTANT

## 2022-09-07 PROCEDURE — 0502F PR SUBSEQUENT PRENATAL CARE: ICD-10-PCS | Mod: CPTII,S$GLB,, | Performed by: OBSTETRICS & GYNECOLOGY

## 2022-09-07 PROCEDURE — 99999 PR PBB SHADOW E&M-EST. PATIENT-LVL III: CPT | Mod: PBBFAC,,, | Performed by: OBSTETRICS & GYNECOLOGY

## 2022-09-07 PROCEDURE — 3008F PR BODY MASS INDEX (BMI) DOCUMENTED: ICD-10-PCS | Mod: CPTII,S$GLB,, | Performed by: PHYSICIAN ASSISTANT

## 2022-09-07 PROCEDURE — 99203 OFFICE O/P NEW LOW 30 MIN: CPT | Mod: 25,S$GLB,, | Performed by: PHYSICIAN ASSISTANT

## 2022-09-07 PROCEDURE — 99999 PR PBB SHADOW E&M-EST. PATIENT-LVL III: ICD-10-PCS | Mod: PBBFAC,,, | Performed by: OBSTETRICS & GYNECOLOGY

## 2022-09-07 PROCEDURE — 99999 PR PBB SHADOW E&M-EST. PATIENT-LVL IV: CPT | Mod: PBBFAC,,, | Performed by: PHYSICIAN ASSISTANT

## 2022-09-07 PROCEDURE — 0502F SUBSEQUENT PRENATAL CARE: CPT | Mod: CPTII,S$GLB,, | Performed by: OBSTETRICS & GYNECOLOGY

## 2022-09-07 PROCEDURE — 31575 LARYNGOSCOPY: ICD-10-PCS | Mod: S$GLB,,, | Performed by: PHYSICIAN ASSISTANT

## 2022-09-07 PROCEDURE — 31575 DIAGNOSTIC LARYNGOSCOPY: CPT | Mod: S$GLB,,, | Performed by: PHYSICIAN ASSISTANT

## 2022-09-07 RX ORDER — FLUTICASONE PROPIONATE 50 MCG
1 SPRAY, SUSPENSION (ML) NASAL 2 TIMES DAILY
Qty: 16 G | Refills: 3 | Status: SHIPPED | OUTPATIENT
Start: 2022-09-07

## 2022-09-07 RX ORDER — FAMOTIDINE 40 MG/1
40 TABLET, FILM COATED ORAL DAILY
Qty: 30 TABLET | Refills: 2 | Status: SHIPPED | OUTPATIENT
Start: 2022-09-07 | End: 2022-09-08 | Stop reason: CLARIF

## 2022-09-07 NOTE — PATIENT INSTRUCTIONS
Use shivani med sinus rinse twice daily. After using shivani med sinus rinse, use flonase 1 puff to each nostril twice daily. Follow up in 8 weeks to re-assess throat.     Take pepcid 40mg at night.   ACID REFLUX   What is acid reflux?    When we eat, food passes from the throat and into the stomach through a tube called the esophagus. At the bottom of the esophagus is a ring of muscles that acts as a valve between the esophagus and stomach, called the lower esophageal sphincter. Smoking, alcohol, and certain types of food may weaken the sphincter, so it may stop closing properly. The contents in the stomach then may leak back, or reflux, into the esophagus. This problem is called gastroesophageal reflux disease (GERD). Symptoms of GERD include heartburn, belching, regurgitation of stomach contents, and swallowing difficulties.    Sometimes, the stomach acid travels up through the esophagus and spills into the larynx or pharynx (voice box). This is called laryngopharyngeal reflux (LPR) and is irritating to the vocal folds and surrounding tissues. Often, patients with LPR do not experience heartburn as a symptom. More commonly, symptoms of LPR include hoarseness, excessive mucous resulting in frequent throat clearing, post-nasal drip, coughing, throat soreness or burning, choking episodes, difficulty swallowing, and sensation of a lump in the throat.     How is acid reflux treated?   Treatment for acid reflux can involve any combination of medication, lifestyle modifications, and surgery.   Medications. Your doctor may prescribe a proton pump inhibitor (PPI) or an H2 blocker. If you are prescribed a PPI, take in on an empty stomach in the morning 30 minutes prior to eating breakfast. Keep in mind that it may take 4-6 weeks before symptoms begin to resolve, so do not stop medications without consulting your doctor.   Lifestyle and dietary modifications. Eat smaller meals at a slower pace. Avoid over-eating. If you are  overweight, try to lose weight. Do not lie down or exercise directly after eating; eat your last meal of the day at least 2-3 hours prior to going to sleep. Avoid tight-fitting clothes. If you are a smoker, reduce or quit smoking. Elevate your head of bed 4-6 inches by putting phone books under the legs at the head of your bed or buy a wedge pillow, but do not use more than two regular pillows as this causes the body to curl and compresses your stomach.     Food group Foods to avoid to reduce reflux   Beverages  Whole milk, 2% milk, chocolate milk/hot chocolate, alcohol, coffee (regular and decaf), caffeinated tea, mint tea, carbonated beverages, citrus juice    Breads/grains Commercial sweet rolls, doughnuts, croissants, and other high-fat pastries    Fruits and vegetables Fried or cream-style vegetables, tomatoes, tomato-based products, citrus fruits, hot peppers    Soups and seasonings Cream, cheese, tomato-based soups, vinegar    Meats and proteins Fatty or fried meat/fish, lópez, sausage, pepperoni, lunch meat, fried eggs    Fats and oil Lard, lópez drippings, salt pork, meat drippings, gravies, highly seasoned salad dressings, nuts    Sweets/desserts Anything made with or from chocolate, peppermint, spearmint, whole milk, or cream; high-fat pastries, gum, hard candy       THROAT CLEARING     Why am I clearing my throat so often?   Irritation of the vocal folds and surrounding area can cause the urge to clear your throat. This irritation can be caused by acid reflux, allergies, or environmental factors, as well as from a cold or sore throat. Talk with your doctor about the treatment of possible underlying causes. However, throat clearing can turn into a cycle. You clear your throat after feeling an irritation, which causes more irritation, which causes you to continue clearing your throat, which causes more irritation.     What can I do about it?   Ask a friend or family member to help you keep track - you may  not even realize how often you do it.   Replace the throat clearing with a different behavior.   Take a sip of water and then swallow. Repeat until the sensation subsides.  Swallow hard (even without water)   Use the silent throat clear method by making the h sound when you exhale  Breathe in deeply through your nose and exhale on shhh   Hum quietly   Keep yourself hydrated.   Drink plenty of water   Eat wet snacks (grapes, apples, melon, cucumber)   Use a humidifier at home or at work   Suck on hard candies, but avoid too much sugar and avoid anything with mint, menthol, camphor, or eucaplyptus, as these will have a more irritating effect.

## 2022-09-07 NOTE — PROGRESS NOTES
[facility]    Subjective:      Patient: Gisell Villafuerte Patient PCP: Yazan Jj MD         :  1995     Sex:  female      MRN:  [id number]          Date of Visit: 2022      Chief Complaint: mucous in throat    Patient ID: Gisell Villafuerte is a 27 y.o. female who presents to clinic for evaluation of mucous in her throat. Pt is 25 weeks pregnant with expected due date 2022. Pt's most recent free T4 lab WNL. Pt has had issues with mucous being stuck in her throat for months. Pt states that she has been getting heartburn everytime she eats or drinks anything. Pt denies dysphagia or throat pain. No current shortness of breath or wheezing. Pt has had globus sensation. Pt has had chronic throat clearing with mucous in throat worse int he morning. Pt denies post-nasal drip.    Past Medical History  She has a past medical history of Anemia, Anxiety, Asthma, Blood transfusion, Depression, Hashimoto's disease, and HTN (hypertension).    Family History  Her family history includes Diabetes in her father; Hashimoto's thyroiditis in her mother; Thyroid disease in her mother.    Past Surgical History:   Procedure Laterality Date    DILATION AND CURETTAGE OF UTERUS N/A 2021    Procedure: DILATION AND CURETTAGE, UTERUS;  Surgeon: Víctor Arriaga MD;  Location: [facility];  Service: OB/GYN;  Laterality: N/A;     Social History     Tobacco Use    Smoking status: Never    Smokeless tobacco: Never   Substance and Sexual Activity    Alcohol use: Not Currently    Drug use: No    Sexual activity: Yes     Partners: Male     Birth control/protection: None     Medications  She has a current medication list which includes the following prescription(s): albuterol, albuterol, albuterol, pulmicort flexhaler, epinephrine, fluticasone propionate, prenatal vit/iron fum/folic ac, and progesterone.    Review of patient's allergies indicates:   Allergen Reactions    No known drug allergies      All medications,  allergies, and past history have been reviewed.    Objective:      Vitals:  Vitals - 1 value per visit 9/7/2022 9/7/2022 9/7/2022   SYSTOLIC - 126 -   DIASTOLIC - 74 -   Pulse - - -   Temp - - 98.7   Resp - - -   SPO2 - - -   Weight (lb) - 197.09 198.41   Weight (kg) - 89.4 90   Height - - 59   BMI (Calculated) - - 40.1   VISIT REPORT - - -   Pain Score  0 - -   Some recent data might be hidden       Body surface area is 1.94 meters squared.  Physical Exam  Constitutional:       General: She is not in acute distress.     Appearance: Normal appearance. She is not ill-appearing.   HENT:      Head: Normocephalic and atraumatic.      Right Ear: Tympanic membrane, ear canal and external ear normal.      Left Ear: Tympanic membrane, ear canal and external ear normal.      Nose: Septal deviation present.      Mouth/Throat:      Lips: Pink. No lesions.      Mouth: Mucous membranes are moist. No oral lesions.      Tongue: No lesions.      Palate: No lesions.      Pharynx: Oropharynx is clear. Uvula midline. No pharyngeal swelling, oropharyngeal exudate, posterior oropharyngeal erythema or uvula swelling.   Eyes:      General:         Right eye: No discharge.         Left eye: No discharge.      Extraocular Movements: Extraocular movements intact.      Conjunctiva/sclera: Conjunctivae normal.   Pulmonary:      Effort: Pulmonary effort is normal.   Neurological:      General: No focal deficit present.      Mental Status: She is alert and oriented to person, place, and time. Mental status is at baseline.   Psychiatric:         Mood and Affect: Mood normal.         Behavior: Behavior normal.         Thought Content: Thought content normal.         Judgment: Judgment normal.   Laryngoscopy     Date/Time: 9/7/2022 1:00 PM  Performed by: Yazan Vo PA-C  Authorized by: Yazan Vo PA-C      Consent Done?:  Yes (Verbal)  Anesthesia:     Local anesthetic:  Lidocaine 4% and Kelton-Synephrine 1/2%    Patient  tolerance:  Patient tolerated the procedure well with no immediate complications    Decongestion performed?: Yes    Laryngoscopy:     Areas examined:  Nasal cavities, nasopharynx, oropharynx, hypopharynx, larynx and vocal cords    Laryngoscope size: disposable ambu scope.  Nose External:      No external nasal deformity  Nose Intranasal:      Mucosa no polyps     Mucosa ulcers not present     No mucosa lesions present     Septum gross deformity (mild left deviation)     Turbinates not enlarged  Nasopharynx:      No mucosa lesions     Adenoids present     Posterior choanae patent     Eustachian tube patent  Larynx/hypopharynx:      No epiglottis lesions     No epiglottis edema     No AE folds lesions     No vocal cord polyps     Equal and normal bilateral     No hypopharynx lesions     No piriform sinus pooling     No piriform sinus lesions     Post cricoid edema     Post cricoid erythema       Clear mucous consistent with post-nasal drip    Labs:  WBC   Date Value Ref Range Status   05/05/2022 10.97 3.90 - 12.70 K/uL Final     Eosinophil %   Date Value Ref Range Status   05/05/2022 0.7 0.0 - 8.0 % Final     Eos #   Date Value Ref Range Status   05/05/2022 0.1 0.0 - 0.5 K/uL Final     Platelets   Date Value Ref Range Status   05/05/2022 300 150 - 450 K/uL Final     Glucose   Date Value Ref Range Status   05/05/2022 84 70 - 110 mg/dL Final     All lab results, imaging results, and data have been reviewed.    Assessment:        ICD-10-CM ICD-9-CM   1. Globus sensation  R19.8 306.4   2. LPRD (laryngopharyngeal reflux disease)  K21.9 478.79            Plan:      Globus sensation with LPRD  -     Laryngoscopy shows post-cricoid edema with erythema with whitish clear mucous consistent with non-purulent post-nasal drip. Take pepcid 40mg at night. Throat clearing and acid reflux handout provided to pt. Use shivani med sinus rinse twice daily. After using shivani med sinus rinse, use flonase 1 puff to each nostril twice daily.  Follow up in 8 weeks to re-assess throat.

## 2022-09-07 NOTE — PROGRESS NOTES
The patient presents with No complaints.   Reports: Good fetal movements reported,No Bleeding or pains    2022  27 y.o. 25w0d Estimated Date of Delivery: 22, dating reviewed.   OB History    Para Term  AB Living   2       1     SAB IAB Ectopic Multiple Live Births   1              # Outcome Date GA Lbr Donn/2nd Weight Sex Delivery Anes PTL Lv   2 Current            1 SAB                Prenatal labs reviewed and updated today    Review of Systems:  General ROS: negative for headache or visual changes  Breast ROS: negative for breast lumps  Gastrointestinal ROS: negative for constipation, diarrhea or nausea/vomiting  Musculoskeletal ROS: negative for pain in joints or swelling in face or hands.   Neurological ROS: negative for - headaches, numbness/tingling or visual changes      Physical Exam:  /74   Wt 89.4 kg (197 lb 1.5 oz)   LMP 2022   BMI 39.81 kg/m²   Urine Dip: Pending  Fundal Height:27cm  Fetal Heart Tones: 148bpm  Constitutional: She is oriented to person, place, and time. She appears well-developed and well-nourished. No distress. Overweight   Cardiovascular: Normal rate.    Pulmonary/Chest: Effort normal. No respiratory distress  Abdominal: Soft, gravid, nontender. No rebound and no guarding.     Genitourinary: Deferred    Musculoskeletal: Normal range of motion, Minimal peripheral edema.   Neurological: She is alert and oriented to person, place, and time. Coordination normal.   Skin: Skin is warm and dry. She is not diaphoretic.  Psychiatric: She has a normal mood and affect.        Assessment:  27 y.o., at 25w0d Gestation   Patient Active Problem List   Diagnosis    Contraception    DUB (dysfunctional uterine bleeding)    Obesity, Class II, BMI 35-39.9, with comorbidity    Missed menses    Missed      Current Outpatient Medications on File Prior to Visit   Medication Sig Dispense Refill    albuterol (PROVENTIL/VENTOLIN HFA) 90 mcg/actuation inhaler  INHALE 1 PUFF BY MOUTH 4 TIMES DAILY AS NEEDED FOR COUGH      prenatal vit/iron fum/folic ac (PRENATAL 1+1 ORAL) Take by mouth.      albuterol (PROVENTIL) 2.5 mg /3 mL (0.083 %) nebulizer solution Take 3 mLs (2.5 mg total) by nebulization every 6 (six) hours as needed for Wheezing. Rescue (Patient not taking: No sig reported) 1 each 1    albuterol (PROVENTIL/VENTOLIN HFA) 90 mcg/actuation inhaler Inhale 1-2 puffs into the lungs every 6 (six) hours as needed for Wheezing. Rescue (Patient not taking: No sig reported) 18 g 0    budesonide 180mcg (PULMICORT FLEXHALER) 180 mcg/actuation AePB Inhale 1 puff into the lungs 2 (two) times a day. Controller (Patient not taking: No sig reported) 1 each 11    EPINEPHrine (EPIPEN 2-BELEN) 0.3 mg/0.3 mL AtIn Inject 0.3 mLs (0.3 mg total) into the muscle once. for 1 dose 0.3 mL 3    fluticasone propionate (FLOVENT HFA) 110 mcg/actuation inhaler Inhale 1 puff into the lungs 2 (two) times daily. Controller (Patient not taking: No sig reported) 12 g 6    progesterone (PROMETRIUM) 200 MG capsule Take 1 capsule (200 mg total) by mouth nightly. (Patient not taking: No sig reported) 30 capsule 1     No current facility-administered medications on file prior to visit.         Plan:   Labs today: none  Orders today: glucola at follow up  MEds today: none  Procedures Today: none  Follow up 3 Weeks, bleeding/pain precautions , kick counts, labor precautions

## 2022-09-07 NOTE — PROCEDURES
Laryngoscopy    Date/Time: 9/7/2022 1:00 PM  Performed by: Yazan Vo PA-C  Authorized by: Yazan Vo PA-C     Consent Done?:  Yes (Verbal)  Anesthesia:     Local anesthetic:  Lidocaine 4% and Kelton-Synephrine 1/2%    Patient tolerance:  Patient tolerated the procedure well with no immediate complications    Decongestion performed?: Yes    Laryngoscopy:     Areas examined:  Nasal cavities, nasopharynx, oropharynx, hypopharynx, larynx and vocal cords    Laryngoscope size: disposable ambu scope.  Nose External:      No external nasal deformity  Nose Intranasal:      Mucosa no polyps     Mucosa ulcers not present     No mucosa lesions present     Septum gross deformity (mild left deviation)     Turbinates not enlarged  Nasopharynx:      No mucosa lesions     Adenoids present     Posterior choanae patent     Eustachian tube patent  Larynx/hypopharynx:      No epiglottis lesions     No epiglottis edema     No AE folds lesions     No vocal cord polyps     Equal and normal bilateral     No hypopharynx lesions     No piriform sinus pooling     No piriform sinus lesions     Post cricoid edema     Post cricoid erythema       Clear mucous consistent with post-nasal drip

## 2022-09-08 ENCOUNTER — HOSPITAL ENCOUNTER (EMERGENCY)
Facility: HOSPITAL | Age: 27
Discharge: HOME | End: 2022-09-08
Attending: EMERGENCY MEDICINE
Payer: COMMERCIAL

## 2022-09-08 ENCOUNTER — PATIENT MESSAGE (OUTPATIENT)
Dept: OBSTETRICS AND GYNECOLOGY | Facility: CLINIC | Age: 27
End: 2022-09-08
Payer: COMMERCIAL

## 2022-09-08 VITALS
RESPIRATION RATE: 18 BRPM | BODY MASS INDEX: 40.43 KG/M2 | DIASTOLIC BLOOD PRESSURE: 65 MMHG | SYSTOLIC BLOOD PRESSURE: 110 MMHG | OXYGEN SATURATION: 99 % | TEMPERATURE: 98 F | HEART RATE: 86 BPM | WEIGHT: 200.19 LBS

## 2022-09-08 DIAGNOSIS — R31.9 HEMATURIA, UNSPECIFIED TYPE: Primary | ICD-10-CM

## 2022-09-08 LAB
AMORPH CRY URNS QL MICRO: ABNORMAL
ANION GAP SERPL CALC-SCNC: 10 MMOL/L (ref 8–16)
BACTERIA #/AREA URNS HPF: ABNORMAL /HPF
BASOPHILS # BLD AUTO: 0.02 K/UL (ref 0–0.2)
BASOPHILS NFR BLD: 0.1 % (ref 0–1.9)
BILIRUB UR QL STRIP: NEGATIVE
BUN SERPL-MCNC: 7 MG/DL (ref 6–20)
CALCIUM SERPL-MCNC: 10.1 MG/DL (ref 8.7–10.5)
CHLORIDE SERPL-SCNC: 107 MMOL/L (ref 95–110)
CLARITY UR: ABNORMAL
CO2 SERPL-SCNC: 21 MMOL/L (ref 23–29)
COLOR UR: YELLOW
CREAT SERPL-MCNC: 0.6 MG/DL (ref 0.5–1.4)
DIFFERENTIAL METHOD: ABNORMAL
EOSINOPHIL # BLD AUTO: 0.1 K/UL (ref 0–0.5)
EOSINOPHIL NFR BLD: 0.7 % (ref 0–8)
ERYTHROCYTE [DISTWIDTH] IN BLOOD BY AUTOMATED COUNT: 12.8 % (ref 11.5–14.5)
EST. GFR  (NO RACE VARIABLE): >60 ML/MIN/1.73 M^2
GLUCOSE SERPL-MCNC: 96 MG/DL (ref 70–110)
GLUCOSE UR QL STRIP: NEGATIVE
HCT VFR BLD AUTO: 33.3 % (ref 37–48.5)
HGB BLD-MCNC: 11.8 G/DL (ref 12–16)
HGB UR QL STRIP: ABNORMAL
IMM GRANULOCYTES # BLD AUTO: 0.13 K/UL (ref 0–0.04)
IMM GRANULOCYTES NFR BLD AUTO: 1 % (ref 0–0.5)
KETONES UR QL STRIP: NEGATIVE
LEUKOCYTE ESTERASE UR QL STRIP: NEGATIVE
LYMPHOCYTES # BLD AUTO: 2.4 K/UL (ref 1–4.8)
LYMPHOCYTES NFR BLD: 17.5 % (ref 18–48)
MCH RBC QN AUTO: 30.2 PG (ref 27–31)
MCHC RBC AUTO-ENTMCNC: 35.4 G/DL (ref 32–36)
MCV RBC AUTO: 85 FL (ref 82–98)
MICROSCOPIC COMMENT: ABNORMAL
MONOCYTES # BLD AUTO: 0.9 K/UL (ref 0.3–1)
MONOCYTES NFR BLD: 6.8 % (ref 4–15)
NEUTROPHILS # BLD AUTO: 9.9 K/UL (ref 1.8–7.7)
NEUTROPHILS NFR BLD: 73.9 % (ref 38–73)
NITRITE UR QL STRIP: NEGATIVE
NRBC BLD-RTO: 0 /100 WBC
PH UR STRIP: 7 [PH] (ref 5–8)
PLATELET # BLD AUTO: 197 K/UL (ref 150–450)
PMV BLD AUTO: 11.1 FL (ref 9.2–12.9)
POTASSIUM SERPL-SCNC: 3.7 MMOL/L (ref 3.5–5.1)
PROT UR QL STRIP: NEGATIVE
RBC # BLD AUTO: 3.91 M/UL (ref 4–5.4)
RBC #/AREA URNS HPF: >100 /HPF (ref 0–4)
SODIUM SERPL-SCNC: 138 MMOL/L (ref 136–145)
SP GR UR STRIP: 1.01 (ref 1–1.03)
SQUAMOUS #/AREA URNS HPF: 3 /HPF
URATE CRY URNS QL MICRO: ABNORMAL
URN SPEC COLLECT METH UR: ABNORMAL
UROBILINOGEN UR STRIP-ACNC: NEGATIVE EU/DL
WBC # BLD AUTO: 13.44 K/UL (ref 3.9–12.7)
WBC #/AREA URNS HPF: 2 /HPF (ref 0–5)

## 2022-09-08 PROCEDURE — 81000 URINALYSIS NONAUTO W/SCOPE: CPT | Performed by: EMERGENCY MEDICINE

## 2022-09-08 PROCEDURE — 80048 BASIC METABOLIC PNL TOTAL CA: CPT | Performed by: EMERGENCY MEDICINE

## 2022-09-08 PROCEDURE — 36415 COLL VENOUS BLD VENIPUNCTURE: CPT | Performed by: EMERGENCY MEDICINE

## 2022-09-08 PROCEDURE — 85025 COMPLETE CBC W/AUTO DIFF WBC: CPT | Performed by: EMERGENCY MEDICINE

## 2022-09-08 PROCEDURE — 99284 EMERGENCY DEPT VISIT MOD MDM: CPT | Mod: 25

## 2022-09-08 NOTE — ED PROVIDER NOTES
"Encounter Date: 9/8/2022       History     Chief Complaint   Patient presents with    Urinary Urgency     All day yesterday since leaving Gyn office     Patient is a 27-year-old female who is 25 weeks pregnant and has a past medical history of hypertension Hashimoto's disease depression asthma anxiety anemia who presents the emergency room for evaluation urinary frequency, urinary urgency and mild right flank discomfort.  She was seen at OBGYN yesterday who ordered a urinalysis that appeared to be normal.  She denies any dysuria, lower abdominal pain cramping vaginal bleeding vaginal discharge or labor pains.    Review of patient's allergies indicates:   Allergen Reactions    No known drug allergies      Past Medical History:   Diagnosis Date    Anemia     s/p transfusion from menstral bleeding    Anxiety     Asthma     "athletic" asthma    Blood transfusion     Depression     Hashimoto's disease     HTN (hypertension)      Past Surgical History:   Procedure Laterality Date    DILATION AND CURETTAGE OF UTERUS N/A 6/24/2021    Procedure: DILATION AND CURETTAGE, UTERUS;  Surgeon: Víctor Arriaga MD;  Location: [facility] OR;  Service: OB/GYN;  Laterality: N/A;     Family History   Problem Relation Age of Onset    Thyroid disease Mother     Hashimoto's thyroiditis Mother     Diabetes Father     Breast cancer Neg Hx     Ovarian cancer Neg Hx     Melanoma Neg Hx     Psoriasis Neg Hx     Lupus Neg Hx     Eczema Neg Hx      Social History     Tobacco Use    Smoking status: Never    Smokeless tobacco: Never   Substance Use Topics    Alcohol use: Not Currently    Drug use: No     Review of Systems   Constitutional:  Negative for diaphoresis and fever.   HENT:  Negative for ear pain, rhinorrhea and sore throat.    Eyes:  Negative for pain and visual disturbance.   Respiratory:  Negative for cough and shortness of breath.    Cardiovascular:  Negative for chest pain.   Gastrointestinal:  Negative for abdominal " pain, diarrhea, nausea and vomiting.   Genitourinary:  Positive for flank pain (mild right lower), frequency and urgency. Negative for difficulty urinating, dysuria, pelvic pain, vaginal bleeding and vaginal pain.   Musculoskeletal:  Positive for back pain. Negative for arthralgias.   Skin:  Negative for rash.   Neurological:  Negative for weakness, numbness and headaches.   All other systems reviewed and are negative.    Physical Exam     Initial Vitals [09/08/22 0501]   BP Pulse Resp Temp SpO2   (!) 151/97 101 16 98.5 °F (36.9 °C) 99 %      MAP       --         Physical Exam    Nursing note and vitals reviewed.  Constitutional: She appears well-developed and well-nourished.  Non-toxic appearance. No distress.   HENT:   Head: Normocephalic and atraumatic.   Mouth/Throat: Oropharynx is clear and moist.   Eyes: EOM are normal. Pupils are equal, round, and reactive to light.   Neck: Neck supple.   Cardiovascular:  Normal rate, regular rhythm, normal heart sounds and intact distal pulses.     Exam reveals no gallop and no friction rub.       No murmur heard.  Pulmonary/Chest: Breath sounds normal. No respiratory distress. She has no decreased breath sounds. She has no wheezes. She has no rhonchi. She has no rales.   Abdominal: Abdomen is soft. Bowel sounds are normal. She exhibits no distension. There is no abdominal tenderness. There is no rebound.   Musculoskeletal:         General: Tenderness (mild in the right lower lumbar region) present. No edema. Normal range of motion.      Cervical back: Neck supple.     Neurological: She is alert and oriented to person, place, and time. She has normal strength. No sensory deficit.   Skin: Skin is warm and dry.   Psychiatric: She has a normal mood and affect.       ED Course   Procedures  Labs Reviewed   URINALYSIS, REFLEX TO URINE CULTURE - Abnormal; Notable for the following components:       Result Value    Appearance, UA Hazy (*)     Occult Blood UA 2+ (*)     All other  components within normal limits    Narrative:     Specimen Source->Urine   URINALYSIS MICROSCOPIC - Abnormal; Notable for the following components:    RBC, UA >100 (*)     Amorphous, UA Many (*)     All other components within normal limits    Narrative:     Specimen Source->Urine   CBC W/ AUTO DIFFERENTIAL - Abnormal; Notable for the following components:    WBC 13.44 (*)     RBC 3.91 (*)     Hemoglobin 11.8 (*)     Hematocrit 33.3 (*)     Immature Granulocytes 1.0 (*)     Gran # (ANC) 9.9 (*)     Immature Grans (Abs) 0.13 (*)     Gran % 73.9 (*)     Lymph % 17.5 (*)     All other components within normal limits   BASIC METABOLIC PANEL - Abnormal; Notable for the following components:    CO2 21 (*)     All other components within normal limits          Imaging Results              US Retroperitoneal Complete (Final result)  Result time 09/08/22 08:12:09   Procedure changed from US Kidney     Final result by Alba Su MD (09/08/22 08:12:09)                   Impression:      There is no hydronephrosis.      Electronically signed by: Alba Su MD  Date:    09/08/2022  Time:    08:12               Narrative:    EXAMINATION:  US RETROPERITONEAL COMPLETE    CLINICAL HISTORY:  evaluate for hydro, hematuria and 25 weeks pregnant, R flank pain, concerned for renal stones.;    TECHNIQUE:  Ultrasound of the kidneys and urinary bladder was performed including color flow and Doppler evaluation of the kidneys.    COMPARISON:  None.    FINDINGS:  Right kidney: The right kidney measures 15 cm. No cortical thinning. No loss of corticomedullary distinction. Resistive index measures 0.6.  No mass. No renal stone. No hydronephrosis.    Left kidney: The left kidney measures 14.4 cm. No cortical thinning. No loss of corticomedullary distinction. Resistive index measures 0.7.  No mass. No renal stone. No hydronephrosis.    The bladder is partially distended at the time of scanning and has an unremarkable appearance.                                        Medications - No data to display              ED Course as of 09/09/22 0202   u Sep 08, 2022   0734 Patient has hematuria with crystals in her urine.  She likely has ureterolithiasis causing her symptoms.  CBC shows leukocytosis which could be consistent with pregnancy.  I doubt she is septic or toxic.  She has no fevers.  Renal function is stable.  Renal ultrasound is pending at this time and care has been transferred to Dr. Love.  If there is no evidence of hydronephrosis patient can be discharged with presumed ureterolithiasis and referral to urology and back to her OBGYN.  If she has hydronephrosis potential consult to Urology for further evaluation potential workup.  No evidence of urinary tract infection.  No vaginal bleeding. [JS]   0818 The patient's ultrasound shows no evidence of hydronephrosis.  The patient likely has a small kidney stone.  She is stable for discharge to home.  She will be referred to Urology as an outpatient for further care. [PE]      ED Course User Index  [JS] Jesus Ruiz MD  [PE] Sreekanth Love MD             Clinical Impression:   Final diagnoses:  [R31.9] Hematuria, unspecified type (Primary)      ED Disposition Condition    Discharge Stable          ED Prescriptions    None       Follow-up Information       Follow up With Specialties Details Why Contact Info    Efraín Hernandez MD Urology Schedule an appointment as soon as possible for a visit   [address] [place] [address] [address]ENITA ARNOLD [id number]  [phone]               Jesus Ruiz MD  09/09/22 0159       Jesus Ruiz MD  09/09/22 0202

## 2022-09-08 NOTE — ED NOTES
Assumed care    Ultrasound tech in room  Patient complains of back pain that increased more on the right side. Urinary frequency and urgency without bleeding or discharge. 25 week pregnant.

## 2022-09-08 NOTE — ED NOTES
Pt c/o UTI w/ bilateral flank pain, urinary urgency, frequency, hesitancy, discomfort and pressure since earlier yesterday AM. Pt is 25 weeks gestation. Pt denies vaginal bleeding, abd cramping and hematuria. Pt is A & O x 3, denies SOB, fever, chills and N/V/D. No obvious respiratory distress noted. Respirations are even and unlabored. Skin is warm and dry w/ pink mucosa. VS. NATASHA x 3mm. BBS- CTA . Abd- SNT. PSM x 4 exts. Bed is locked, in the low position and locked for safety. Call bell and spouse @ BS. Will continue to monitor closely.

## 2022-09-09 ENCOUNTER — PATIENT MESSAGE (OUTPATIENT)
Dept: OBSTETRICS AND GYNECOLOGY | Facility: CLINIC | Age: 27
End: 2022-09-09
Payer: COMMERCIAL

## 2022-09-10 ENCOUNTER — NURSE TRIAGE (OUTPATIENT)
Dept: ADMINISTRATIVE | Facility: CLINIC | Age: 27
End: 2022-09-10
Payer: COMMERCIAL

## 2022-09-10 NOTE — TELEPHONE ENCOUNTER
Unable to complete call due to connection issues  Reason for Disposition   Unable to complete triage due to phone connection issues    Protocols used: No Contact or Duplicate Contact Call-A-AH

## 2022-09-10 NOTE — TELEPHONE ENCOUNTER
Reason for Disposition   Nursing judgment    Protocols used: No Guideline or Reference [id number]    Gisell's mom Fartun called for Gisell.  She is not with patient, who is 27 weeks pregnant. She said that the last two nights Gisell went to ED for extreme pain. Negative for kidney stone, per Fartun, by MRI.  She was given a small dose of morphine for pain and Zofran for nausea. Fartun said that they have been called by MD at [facility] ED today to let them know there was some fluid noted around her appendix and she has elevated WBC. 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 Mom said Gisell told her she is not having any pain, which is why she went to work today. She is concerned for her daughter, and wants to speak with on call OB for advice to determine what they should do. Fartun said they were told that if she is not in pain, that there is nothing that will be done. Elevated concern because Gisell has miscarried in the past and family worried.  Of note she has appt with urology tomorrow. Very anxious and insisting on speaking with on call OB, Dr Bekah Moreau.  Call went to MD voice mail, left message with request to call family back at [phone].  Also message being sent to Dr Moreau and Dr Arriaga via epic in-box and Akin Moreau in secure chat as well.  Encouraged Fartun and Gisell to go to ED for any pain, fever, worsening symptoms, as was advised to do in ED.  Please contact caller directly with any additional care advice.

## 2022-09-12 ENCOUNTER — TELEPHONE (OUTPATIENT)
Dept: FAMILY MEDICINE | Facility: CLINIC | Age: 27
End: 2022-09-12
Payer: COMMERCIAL

## 2022-09-12 ENCOUNTER — PATIENT MESSAGE (OUTPATIENT)
Dept: FAMILY MEDICINE | Facility: CLINIC | Age: 27
End: 2022-09-12
Payer: COMMERCIAL

## 2022-09-12 ENCOUNTER — PATIENT MESSAGE (OUTPATIENT)
Dept: OBSTETRICS AND GYNECOLOGY | Facility: CLINIC | Age: 27
End: 2022-09-12
Payer: COMMERCIAL

## 2022-09-12 NOTE — TELEPHONE ENCOUNTER
----- Message from Purnima Bruno, Patient Care Assistant sent at 9/10/2022 11:40 AM CDT -----  Regarding: advice  Contact: pt  Type: Needs Medical Advice  Who Called:  pt   Symptoms (please be specific):  27 wks prego - back pain - fluid   How long has patient had these symptoms:  2 days     Best Call Back Number:     Additional Information: please call pt to advise. Thanks!

## 2022-09-13 ENCOUNTER — TELEPHONE (OUTPATIENT)
Dept: OBSTETRICS AND GYNECOLOGY | Facility: CLINIC | Age: 27
End: 2022-09-13
Payer: COMMERCIAL

## 2022-09-13 ENCOUNTER — PATIENT MESSAGE (OUTPATIENT)
Dept: FAMILY MEDICINE | Facility: CLINIC | Age: 27
End: 2022-09-13
Payer: COMMERCIAL

## 2022-09-13 DIAGNOSIS — D72.829 LEUKOCYTOSIS, UNSPECIFIED TYPE: Primary | ICD-10-CM

## 2022-09-13 NOTE — TELEPHONE ENCOUNTER
----- Message from Víctor Arriaga MD sent at 9/13/2022  9:34 AM CDT -----  Regarding: pain  So looking over messages, no problems with baby and no infections seen. Not sure where the pain is coming from, but may just be muscle strain / pains from pregnancy getting bigger.    Baby if not effected, but can get into clinic sooner if needed for a check up.

## 2022-09-16 ENCOUNTER — OFFICE VISIT (OUTPATIENT)
Dept: FAMILY MEDICINE | Facility: CLINIC | Age: 27
End: 2022-09-16
Payer: COMMERCIAL

## 2022-09-16 ENCOUNTER — LAB VISIT (OUTPATIENT)
Dept: LAB | Facility: HOSPITAL | Age: 27
End: 2022-09-16
Attending: FAMILY MEDICINE
Payer: COMMERCIAL

## 2022-09-16 VITALS
BODY MASS INDEX: 40.59 KG/M2 | OXYGEN SATURATION: 98 % | WEIGHT: 200.94 LBS | DIASTOLIC BLOOD PRESSURE: 78 MMHG | HEART RATE: 96 BPM | RESPIRATION RATE: 12 BRPM | SYSTOLIC BLOOD PRESSURE: 132 MMHG

## 2022-09-16 DIAGNOSIS — D72.829 LEUKOCYTOSIS, UNSPECIFIED TYPE: ICD-10-CM

## 2022-09-16 DIAGNOSIS — R10.31 RIGHT LOWER QUADRANT PAIN: Primary | ICD-10-CM

## 2022-09-16 LAB
BASOPHILS # BLD AUTO: 0.02 K/UL (ref 0–0.2)
BASOPHILS NFR BLD: 0.2 % (ref 0–1.9)
BILIRUB SERPL-MCNC: ABNORMAL MG/DL
BLOOD URINE, POC: ABNORMAL
CLARITY, POC UA: CLEAR
COLOR, POC UA: YELLOW
DIFFERENTIAL METHOD: ABNORMAL
EOSINOPHIL # BLD AUTO: 0.1 K/UL (ref 0–0.5)
EOSINOPHIL NFR BLD: 1 % (ref 0–8)
ERYTHROCYTE [DISTWIDTH] IN BLOOD BY AUTOMATED COUNT: 13.1 % (ref 11.5–14.5)
GLUCOSE UR QL STRIP: NORMAL
HCT VFR BLD AUTO: 34.5 % (ref 37–48.5)
HGB BLD-MCNC: 11.7 G/DL (ref 12–16)
IMM GRANULOCYTES # BLD AUTO: 0.1 K/UL (ref 0–0.04)
IMM GRANULOCYTES NFR BLD AUTO: 0.9 % (ref 0–0.5)
KETONES UR QL STRIP: ABNORMAL
LEUKOCYTE ESTERASE URINE, POC: ABNORMAL
LYMPHOCYTES # BLD AUTO: 1.8 K/UL (ref 1–4.8)
LYMPHOCYTES NFR BLD: 16.4 % (ref 18–48)
MCH RBC QN AUTO: 30.5 PG (ref 27–31)
MCHC RBC AUTO-ENTMCNC: 33.9 G/DL (ref 32–36)
MCV RBC AUTO: 90 FL (ref 82–98)
MONOCYTES # BLD AUTO: 0.7 K/UL (ref 0.3–1)
MONOCYTES NFR BLD: 6.1 % (ref 4–15)
NEUTROPHILS # BLD AUTO: 8.1 K/UL (ref 1.8–7.7)
NEUTROPHILS NFR BLD: 75.4 % (ref 38–73)
NITRITE, POC UA: ABNORMAL
NRBC BLD-RTO: 0 /100 WBC
PH, POC UA: 7
PLATELET # BLD AUTO: 217 K/UL (ref 150–450)
PMV BLD AUTO: 11.6 FL (ref 9.2–12.9)
PROTEIN, POC: ABNORMAL
RBC # BLD AUTO: 3.84 M/UL (ref 4–5.4)
SPECIFIC GRAVITY, POC UA: 1
UROBILINOGEN, POC UA: NORMAL
WBC # BLD AUTO: 10.76 K/UL (ref 3.9–12.7)

## 2022-09-16 PROCEDURE — 99999 PR PBB SHADOW E&M-EST. PATIENT-LVL III: ICD-10-PCS | Mod: PBBFAC,,, | Performed by: FAMILY MEDICINE

## 2022-09-16 PROCEDURE — 85025 COMPLETE CBC W/AUTO DIFF WBC: CPT | Performed by: FAMILY MEDICINE

## 2022-09-16 PROCEDURE — 3075F PR MOST RECENT SYSTOLIC BLOOD PRESS GE 130-139MM HG: ICD-10-PCS | Mod: CPTII,S$GLB,, | Performed by: FAMILY MEDICINE

## 2022-09-16 PROCEDURE — 81002 URINALYSIS NONAUTO W/O SCOPE: CPT | Mod: S$GLB,,, | Performed by: FAMILY MEDICINE

## 2022-09-16 PROCEDURE — 81002 POCT URINE DIPSTICK WITHOUT MICROSCOPE: ICD-10-PCS | Mod: S$GLB,,, | Performed by: FAMILY MEDICINE

## 2022-09-16 PROCEDURE — 3078F DIAST BP <80 MM HG: CPT | Mod: CPTII,S$GLB,, | Performed by: FAMILY MEDICINE

## 2022-09-16 PROCEDURE — 3008F PR BODY MASS INDEX (BMI) DOCUMENTED: ICD-10-PCS | Mod: CPTII,S$GLB,, | Performed by: FAMILY MEDICINE

## 2022-09-16 PROCEDURE — 3078F PR MOST RECENT DIASTOLIC BLOOD PRESSURE < 80 MM HG: ICD-10-PCS | Mod: CPTII,S$GLB,, | Performed by: FAMILY MEDICINE

## 2022-09-16 PROCEDURE — 99214 OFFICE O/P EST MOD 30 MIN: CPT | Mod: S$GLB,,, | Performed by: FAMILY MEDICINE

## 2022-09-16 PROCEDURE — 99999 PR PBB SHADOW E&M-EST. PATIENT-LVL III: CPT | Mod: PBBFAC,,, | Performed by: FAMILY MEDICINE

## 2022-09-16 PROCEDURE — 3075F SYST BP GE 130 - 139MM HG: CPT | Mod: CPTII,S$GLB,, | Performed by: FAMILY MEDICINE

## 2022-09-16 PROCEDURE — 1159F MED LIST DOCD IN RCRD: CPT | Mod: CPTII,S$GLB,, | Performed by: FAMILY MEDICINE

## 2022-09-16 PROCEDURE — 3008F BODY MASS INDEX DOCD: CPT | Mod: CPTII,S$GLB,, | Performed by: FAMILY MEDICINE

## 2022-09-16 PROCEDURE — 1159F PR MEDICATION LIST DOCUMENTED IN MEDICAL RECORD: ICD-10-PCS | Mod: CPTII,S$GLB,, | Performed by: FAMILY MEDICINE

## 2022-09-16 PROCEDURE — 99214 PR OFFICE/OUTPT VISIT, EST, LEVL IV, 30-39 MIN: ICD-10-PCS | Mod: S$GLB,,, | Performed by: FAMILY MEDICINE

## 2022-09-16 PROCEDURE — 36415 COLL VENOUS BLD VENIPUNCTURE: CPT | Mod: PN | Performed by: FAMILY MEDICINE

## 2022-09-16 NOTE — PROGRESS NOTES
" THIS DOCUMENT WAS MADE IN PART WITH VOICE RECOGNITION SOFTWARE.  OCCASIONALLY THIS SOFTWARE WILL MISINTERPRET WORDS OR PHRASES.    Assessment and Plan:    1. Right lower quadrant pain  POCT urine dipstick without microscope      2. Leukocytosis, unspecified type  CBC Auto Differential    Urine culture          PLAN    Sent urine for culture     Recheck CBC to assess for resolve of leukocytosis         ______________________________________________________________________  Subjective:    Chief Complaint:  Chief Complaint   Patient presents with    Follow-up        HPI:  Gisell is a 27 y.o. year old     ER follow-up    19-year-old  at 26 weeks gestational age presents for ER follow-up   Presented to emergency room twice about 1 week ago complaining of flank pain, lower abdominal pain on the right side     1st visit : suspected right side nephrolithiasis with LUTS and hematuria. D/c'ed home with urology referral     2nd visit 1 day was for severe rt flank pain     Workup fairly unremarkable but did have an MRI showing some residual fluid around the appendix suggestive of possible appendicitis    Denies LOF, VB, CTX     OB : Corina     Today: reports symptoms resolved         Past Medical History:  Past Medical History:   Diagnosis Date    Anemia     s/p transfusion from menstral bleeding    Anxiety     Asthma     "athletic" asthma    Blood transfusion     Depression     Hashimoto's disease     HTN (hypertension)        Past Surgical History:  Past Surgical History:   Procedure Laterality Date    DILATION AND CURETTAGE OF UTERUS N/A 2021    Procedure: DILATION AND CURETTAGE, UTERUS;  Surgeon: Víctor Arriaga MD;  Location: [facility];  Service: OB/GYN;  Laterality: N/A;       Family History:  Family History   Problem Relation Age of Onset    Thyroid disease Mother     Hashimoto's thyroiditis Mother     Diabetes Father     Breast cancer Neg Hx     Ovarian cancer Neg Hx     Melanoma Neg Hx     Psoriasis Neg Hx     " Lupus Neg Hx     Eczema Neg Hx        Social History:  Social History     Socioeconomic History    Marital status: Single   Tobacco Use    Smoking status: Never    Smokeless tobacco: Never   Substance and Sexual Activity    Alcohol use: Not Currently    Drug use: No    Sexual activity: Yes     Partners: Male     Birth control/protection: None       Medications:  Current Outpatient Medications on File Prior to Visit   Medication Sig Dispense Refill    albuterol (PROVENTIL) 2.5 mg /3 mL (0.083 %) nebulizer solution Take 3 mLs (2.5 mg total) by nebulization every 6 (six) hours as needed for Wheezing. Rescue 1 each 1    budesonide 180mcg (PULMICORT FLEXHALER) 180 mcg/actuation AePB Inhale 1 puff into the lungs 2 (two) times a day. Controller 1 each 11    fluticasone propionate (FLONASE) 50 mcg/actuation nasal spray 1 spray (50 mcg total) by Each Nostril route 2 (two) times daily. 16 g 3    prenatal vit/iron fum/folic ac (PRENATAL 1+1 ORAL) Take by mouth.      EPINEPHrine (EPIPEN 2-BELEN) 0.3 mg/0.3 mL AtIn Inject 0.3 mLs (0.3 mg total) into the muscle once. for 1 dose 0.3 mL 3    [DISCONTINUED] albuterol (PROVENTIL/VENTOLIN HFA) 90 mcg/actuation inhaler INHALE 1 PUFF BY MOUTH 4 TIMES DAILY AS NEEDED FOR COUGH      [DISCONTINUED] albuterol (PROVENTIL/VENTOLIN HFA) 90 mcg/actuation inhaler Inhale 1-2 puffs into the lungs every 6 (six) hours as needed for Wheezing. Rescue (Patient not taking: Reported on 9/16/2022) 18 g 0     No current facility-administered medications on file prior to visit.       Allergies:  No known drug allergies    Immunizations:  Immunization History   Administered Date(s) Administered    COVID-19, MRNA, LN-S, PF (Pfizer) (Purple Cap) 10/14/2021, 11/23/2021    DTaP 01/30/1996, 09/01/1998    DTaP / HiB 1994, 1994, 01/17/1995    HIB 11/09/1995    HPV Quadrivalent 06/01/2007, 08/22/2007, 02/15/2008    Hepatitis A, Pediatric/Adolescent, 2 Dose 10/02/2008, 07/20/2011    Hepatitis B,  Pediatric/Adolescent 1994, 1994, 07/24/1995    IPV 1994, 1994, 01/17/1995, 09/01/1998    Influenza 11/18/2005, 01/13/2017    Influenza - Intradermal - Quadrivalent - PF 08/16/2018    Influenza - Intranasal - Trivalent 11/18/2005    Influenza - Quadrivalent - PF *Preferred* (6 months and older) 11/12/2015, 01/13/2017, 01/17/2020    Influenza - Trivalent - PF (ADULT) 01/13/2017    MMR 11/09/1995, 09/01/1998    Meningococcal Conjugate 06/10/2013    Meningococcal Conjugate (MCV4P) 03/29/2006, 06/10/2013    PPD Test 07/24/1995, 01/17/2020, 03/18/2021    Tdap 07/24/2007    Varicella 09/01/1998, 10/02/2008       Review of Systems:  Review of Systems   All other systems reviewed and are negative.    Objective:    Vitals:  Vitals:    09/16/22 1048   BP: 132/78   Pulse: 96   Resp: 12   SpO2: 98%   Weight: 91.2 kg (200 lb 15.2 oz)   PainSc: 0-No pain       Physical Exam  Vitals reviewed.   Constitutional:       General: She is not in acute distress.  HENT:      Head: Normocephalic and atraumatic.   Eyes:      Pupils: Pupils are equal, round, and reactive to light.   Cardiovascular:      Rate and Rhythm: Normal rate and regular rhythm.      Heart sounds: No murmur heard.    No friction rub.   Pulmonary:      Effort: Pulmonary effort is normal.      Breath sounds: Normal breath sounds.   Abdominal:      General: Bowel sounds are normal. There is no distension.      Palpations: Abdomen is soft.      Tenderness: There is no abdominal tenderness.   Musculoskeletal:      Cervical back: Neck supple.   Skin:     General: Skin is warm and dry.      Findings: No rash.   Psychiatric:         Behavior: Behavior normal.           Yazan Jj MD  Family Medicine

## 2022-09-18 ENCOUNTER — PATIENT MESSAGE (OUTPATIENT)
Dept: OBSTETRICS AND GYNECOLOGY | Facility: CLINIC | Age: 27
End: 2022-09-18
Payer: COMMERCIAL

## 2022-09-19 DIAGNOSIS — F41.9 ANXIETY: ICD-10-CM

## 2022-09-19 DIAGNOSIS — Z3A.26 26 WEEKS GESTATION OF PREGNANCY: Primary | ICD-10-CM

## 2022-09-19 NOTE — TELEPHONE ENCOUNTER
Please review/advise per mother pt saw US tech over the weekend, concerned that pt has low fluid as it was said to them during their US visit. No record of event. Requesting US. Please advise.

## 2022-09-20 ENCOUNTER — PATIENT MESSAGE (OUTPATIENT)
Dept: OBSTETRICS AND GYNECOLOGY | Facility: CLINIC | Age: 27
End: 2022-09-20
Payer: COMMERCIAL

## 2022-09-29 ENCOUNTER — HOSPITAL ENCOUNTER (OUTPATIENT)
Dept: RADIOLOGY | Facility: HOSPITAL | Age: 27
Discharge: HOME | End: 2022-09-29
Attending: OBSTETRICS & GYNECOLOGY
Payer: COMMERCIAL

## 2022-09-29 ENCOUNTER — PATIENT MESSAGE (OUTPATIENT)
Dept: OBSTETRICS AND GYNECOLOGY | Facility: CLINIC | Age: 27
End: 2022-09-29

## 2022-09-29 ENCOUNTER — ROUTINE PRENATAL (OUTPATIENT)
Dept: OBSTETRICS AND GYNECOLOGY | Facility: CLINIC | Age: 27
End: 2022-09-29
Payer: COMMERCIAL

## 2022-09-29 VITALS — WEIGHT: 195.56 LBS | SYSTOLIC BLOOD PRESSURE: 118 MMHG | DIASTOLIC BLOOD PRESSURE: 70 MMHG | BODY MASS INDEX: 39.5 KG/M2

## 2022-09-29 DIAGNOSIS — F41.9 ANXIETY: ICD-10-CM

## 2022-09-29 DIAGNOSIS — Z3A.28 28 WEEKS GESTATION OF PREGNANCY: Primary | ICD-10-CM

## 2022-09-29 DIAGNOSIS — Z3A.26 26 WEEKS GESTATION OF PREGNANCY: ICD-10-CM

## 2022-09-29 PROCEDURE — 76815 OB US LIMITED FETUS(S): CPT | Mod: TC,PN

## 2022-09-29 PROCEDURE — 0502F SUBSEQUENT PRENATAL CARE: CPT | Mod: CPTII,S$GLB,, | Performed by: OBSTETRICS & GYNECOLOGY

## 2022-09-29 PROCEDURE — 76815 US OB LIMITED 1 OR MORE GESTATIONS: ICD-10-PCS | Mod: 26,,, | Performed by: RADIOLOGY

## 2022-09-29 PROCEDURE — 0502F PR SUBSEQUENT PRENATAL CARE: ICD-10-PCS | Mod: CPTII,S$GLB,, | Performed by: OBSTETRICS & GYNECOLOGY

## 2022-09-29 PROCEDURE — 99999 PR PBB SHADOW E&M-EST. PATIENT-LVL II: CPT | Mod: PBBFAC,,, | Performed by: OBSTETRICS & GYNECOLOGY

## 2022-09-29 PROCEDURE — 76815 OB US LIMITED FETUS(S): CPT | Mod: 26,,, | Performed by: RADIOLOGY

## 2022-09-29 PROCEDURE — 99999 PR PBB SHADOW E&M-EST. PATIENT-LVL II: ICD-10-PCS | Mod: PBBFAC,,, | Performed by: OBSTETRICS & GYNECOLOGY

## 2022-09-29 NOTE — PROGRESS NOTES
The patient presents with No complaints.   Reports: Good fetal movements reported, No Bleeding or pains    2022  27 y.o. 28w1d Estimated Date of Delivery: 22, dating reviewed.   OB History    Para Term  AB Living   2       1     SAB IAB Ectopic Multiple Live Births   1              # Outcome Date GA Lbr Donn/2nd Weight Sex Delivery Anes PTL Lv   2 Current            1 SAB                Prenatal labs reviewed and updated today    Review of Systems:  General ROS: negative for headache or visual changes  Breast ROS: negative for breast lumps  Gastrointestinal ROS: negative for  constipation, diarrhea or nausea/vomiting  Musculoskeletal ROS: negative for pain in joints or swelling in face or hands.   Neurological ROS: negative for - headaches, numbness/tingling or visual changes      Physical Exam:  /70   Wt 88.7 kg (195 lb 8.8 oz)   LMP 2022   BMI 39.50 kg/m²   Urine Dip: Pending  Fundal Height:28cm  Fetal Heart Tones: 160bpm  Constitutional: She is oriented to person, place, and time. She appears well-developed and well-nourished. No distress. Overweight   Cardiovascular: Normal rate.    Pulmonary/Chest: Effort normal. No respiratory distress  Abdominal: Soft, gravid, nontender. No rebound and no guarding.     Genitourinary: Deferred    Musculoskeletal: Normal range of motion, Minimal peripheral edema.   Neurological: She is alert and oriented to person, place, and time. Coordination normal.   Skin: Skin is warm and dry. She is not diaphoretic.  Psychiatric: She has a normal mood and affect.        Assessment:  27 y.o., at 28w1d Gestation   Patient Active Problem List   Diagnosis    Contraception    DUB (dysfunctional uterine bleeding)    Obesity, Class II, BMI 35-39.9, with comorbidity    Missed menses    Missed      Current Outpatient Medications on File Prior to Visit   Medication Sig Dispense Refill    prenatal vit/iron fum/folic ac (PRENATAL 1+1 ORAL) Take by  mouth.      albuterol (PROVENTIL) 2.5 mg /3 mL (0.083 %) nebulizer solution Take 3 mLs (2.5 mg total) by nebulization every 6 (six) hours as needed for Wheezing. Rescue (Patient not taking: Reported on 9/29/2022) 1 each 1    budesonide 180mcg (PULMICORT FLEXHALER) 180 mcg/actuation AePB Inhale 1 puff into the lungs 2 (two) times a day. Controller (Patient not taking: Reported on 9/29/2022) 1 each 11    EPINEPHrine (EPIPEN 2-BELEN) 0.3 mg/0.3 mL AtIn Inject 0.3 mLs (0.3 mg total) into the muscle once. for 1 dose (Patient not taking: Reported on 9/29/2022) 0.3 mL 3    fluticasone propionate (FLONASE) 50 mcg/actuation nasal spray 1 spray (50 mcg total) by Each Nostril route 2 (two) times daily. (Patient not taking: Reported on 9/29/2022) 16 g 3     No current facility-administered medications on file prior to visit.         Plan:   Labs today: glucola today  Orders today: none  MEds today: none  Procedures Today: none  Follow up 2 Weeks, bleeding/pain precautions ,  kick counts, labor precautions

## 2022-09-30 ENCOUNTER — PATIENT MESSAGE (OUTPATIENT)
Dept: OBSTETRICS AND GYNECOLOGY | Facility: CLINIC | Age: 27
End: 2022-09-30
Payer: COMMERCIAL

## 2022-10-02 ENCOUNTER — HOSPITAL ENCOUNTER (EMERGENCY)
Facility: HOSPITAL | Age: 27
Discharge: HOME | End: 2022-10-03
Attending: EMERGENCY MEDICINE
Payer: COMMERCIAL
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
## 2022-10-02 PROCEDURE — 99284 EMERGENCY DEPT VISIT MOD MDM: CPT

## 2022-10-02 NOTE — Clinical Note
"Gisell "Israel Villafuerte was seen and treated in our emergency department on 10/2/2022.  She may return to work on 10/06/2022.       If you have any questions or concerns, please don't hesitate to call.      Eddie Goode MD"

## 2022-10-03 VITALS
DIASTOLIC BLOOD PRESSURE: 71 MMHG | TEMPERATURE: 98 F | BODY MASS INDEX: 37.04 KG/M2 | RESPIRATION RATE: 20 BRPM | WEIGHT: 196.19 LBS | OXYGEN SATURATION: 100 % | HEIGHT: 61 IN | SYSTOLIC BLOOD PRESSURE: 115 MMHG | HEART RATE: 102 BPM

## 2022-10-03 LAB
B-HCG UR QL: POSITIVE
CTP QC/QA: YES

## 2022-10-03 PROCEDURE — 81025 URINE PREGNANCY TEST: CPT | Performed by: EMERGENCY MEDICINE

## 2022-10-03 RX ORDER — AMOXICILLIN AND CLAVULANATE POTASSIUM 875; 125 MG/1; MG/1
1 TABLET, FILM COATED ORAL 2 TIMES DAILY
Qty: 14 TABLET | Refills: 0 | Status: SHIPPED | OUTPATIENT
Start: 2022-10-03 | End: 2022-10-24 | Stop reason: ALTCHOICE

## 2022-10-03 NOTE — ED PROVIDER NOTES
"Encounter Date: 10/2/2022       History     Chief Complaint   Patient presents with    Animal Bite     Pt presents to ER with c/o cat scratch or bite after breaking up altercation at home between cat and dog. Pt reports redness and inflammation to left hand and right wrist. Pt reports shortness of breath during incident. Pt 28 weeks pregnant with JOVANY of 12/20/2022. Pt denies any OB complaints.      27-year-old female presents complaining of cat scratches patient reports that she broke up a fight between the CT and the dog at home patient reports that she has asthma and experienced some brief shortness of breath during the incident patient complains of scratches and bites to her left hand and right wrist.  Patient has no other acute complaints at this time patient denies abdominal pain patient denies vaginal discharge patient denies vaginal bleeding    Review of patient's allergies indicates:   Allergen Reactions    No known drug allergies      Past Medical History:   Diagnosis Date    Anemia     s/p transfusion from menstral bleeding    Anxiety     Asthma     "athletic" asthma    Blood transfusion     Depression     Hashimoto's disease     HTN (hypertension)      Past Surgical History:   Procedure Laterality Date    DILATION AND CURETTAGE OF UTERUS N/A 6/24/2021    Procedure: DILATION AND CURETTAGE, UTERUS;  Surgeon: Víctor Arriaga MD;  Location: [facility] OR;  Service: OB/GYN;  Laterality: N/A;     Family History   Problem Relation Age of Onset    Thyroid disease Mother     Hashimoto's thyroiditis Mother     Diabetes Father     Breast cancer Neg Hx     Ovarian cancer Neg Hx     Melanoma Neg Hx     Psoriasis Neg Hx     Lupus Neg Hx     Eczema Neg Hx      Social History     Tobacco Use    Smoking status: Never    Smokeless tobacco: Never   Substance Use Topics    Alcohol use: Not Currently    Drug use: No     Review of Systems   Constitutional:  Negative for fever.   HENT:  Negative for congestion, rhinorrhea, sore " throat and trouble swallowing.    Eyes:  Negative for visual disturbance.   Respiratory:  Negative for cough, chest tightness, shortness of breath and wheezing.    Cardiovascular:  Negative for chest pain, palpitations and leg swelling.   Gastrointestinal:  Negative for abdominal distention, abdominal pain, constipation, diarrhea, nausea and vomiting.   Genitourinary:  Negative for difficulty urinating, dysuria, flank pain and frequency.   Musculoskeletal:  Positive for arthralgias. Negative for back pain, joint swelling and neck pain.   Skin:  Negative for color change and rash.   Neurological:  Negative for dizziness, syncope, speech difficulty, weakness, numbness and headaches.   All other systems reviewed and are negative.    Physical Exam     Initial Vitals [10/02/22 2246]   BP Pulse Resp Temp SpO2   (!) 139/96 (!) 113 20 98 °F (36.7 °C) 99 %      MAP       --         Physical Exam    Nursing note and vitals reviewed.  Constitutional: She appears well-developed and well-nourished. She is not diaphoretic. No distress.   HENT:   Head: Normocephalic and atraumatic.   Right Ear: External ear normal.   Left Ear: External ear normal.   Nose: Nose normal.   Mouth/Throat: Oropharynx is clear and moist. No oropharyngeal exudate.   Eyes: Conjunctivae and EOM are normal. Pupils are equal, round, and reactive to light. Right eye exhibits no discharge. Left eye exhibits no discharge. No scleral icterus.   Neck: Neck supple. No thyromegaly present. No tracheal deviation present. No JVD present.   Normal range of motion.  Cardiovascular:  Normal rate, regular rhythm, normal heart sounds and intact distal pulses.     Exam reveals no gallop and no friction rub.       No murmur heard.  Pulmonary/Chest: Breath sounds normal. No stridor. No respiratory distress. She has no wheezes. She has no rhonchi. She has no rales. She exhibits no tenderness.   Abdominal: Abdomen is soft. Bowel sounds are normal. She exhibits no distension and  no mass. There is no abdominal tenderness. There is no rebound and no guarding.   Musculoskeletal:         General: Tenderness present. No edema. Normal range of motion.      Cervical back: Normal range of motion and neck supple.      Comments: Patient has cat scratches and bites noted to the left hand especially on the 3rd and 4th digits patient retains full range of motion patient has 2+ radial pulses capillary refill less than 2 seconds patient has no wounds which appear to require sutures at this time patient additionally has some scratches and bites to the right wrist patient has full range of motion of this wrist 2+ pulses capillary refill less than 2 seconds and full range of motion of the hand no other injuries or complaints noted patient has otherwise normal physical exam     Lymphadenopathy:     She has no cervical adenopathy.   Neurological: She is alert and oriented to person, place, and time. She has normal strength. No cranial nerve deficit or sensory deficit.   Skin: Skin is warm and dry. No rash and no abscess noted. No erythema. No pallor.       ED Course   Procedures  Labs Reviewed   POCT URINE PREGNANCY - Abnormal; Notable for the following components:       Result Value    POC Preg Test, Ur Positive (*)     All other components within normal limits          Imaging Results              US OB Limited 1 Or More Gestations (Final result)  Result time 10/03/22 01:53:11   Procedure changed from US OB 14+ Wks, TransAbd, Single Gestation     Final result by Crista Patino MD (10/03/22 01:53:11)                   Narrative:    PROCEDURE:  US PREGNANCY LIMITED  dated  10/3/2022 12:29 AM CDT    CLINICAL HISTORY:   Female 27 years of age.   sob,animal bite    COMPARISON: No prior similar    TECHNIQUE: Transabdominal ultrasound was performed of the gravid uterus    FINDINGS:  There is a single intrauterine gestation with fetus in the breech position.  Fetal heart rate measures approximately 141 bpm.  There  is a posterior placenta with no visible abnormality.  Amniotic fluid index is 9.1 cm, which is within normal limits.    IMPRESSION:  Normal.    Electronically signed by:  Crista Hanson MD  10/3/2022 1:53 AM CDT Workstation: [id number]                                     X-Ray Hand 3 View Left (In process)  Result time 10/03/22 00:17:40   Procedure changed from X-Ray Hand 3 View Bilateral                    Medications - No data to display  Medical Decision Making:   History:   Old Medical Records: I decided to obtain old medical records.  Initial Assessment:   Urgent evaluation of a 27-year-old female presenting after being scratched and bitten by her cat, the cat is known to her and up-to-date on all immunizations, patient will be started on a course of antibiotics and is to follow-up with her PCP in the next 2-3 days for further evaluation and management patient is cautioned to return immediately to the ER for any worsening or for any further concerns.          Attending Attestation:             Attending ED Notes:   Patient's imaging shows no significant acute abnormalities patient is to follow up with PCP in the next 2-3 days for further evaluation and management and is cautioned to return immediately to the ER for any worsening or any further concerns patient's wounds have been cleaned and dressed.    I had a detailed discussion with the patient and/or guardian regarding: The historical points, exam findings, and diagnostic results supporting the discharge diagnosis, lab results, pertinent radiology results, and the need for outpatient follow-up, for definitive care with a family practitioner and to return to the emergency department if symptoms worsen or persist or if there are any questions or concerns that arise at home. All questions have been answered in detail. Strict return to Emergency Department precautions have been provided.     A dictation software program was used for this note.  Please expect  some simple typographical  errors in this note.     This patient was seen during the context of the Covid 19 global pandemic where local, state, hospital guidelines, were followed to the best of ability given the circumstances of the pandemic.                       Clinical Impression:   Final diagnoses:  [W55.01XA] Cat bite  [W55.01XA] Cat bite, initial encounter (Primary)        ED Disposition Condition    Discharge Stable          ED Prescriptions       Medication Sig Dispense Start Date End Date Auth. Provider    amoxicillin-clavulanate 875-125mg (AUGMENTIN) 875-125 mg per tablet Take 1 tablet by mouth 2 (two) times daily. 14 tablet 10/3/2022 -- Eddie Goode MD          Follow-up Information       Follow up With Specialties Details Why Contact Info Additional Information    [facility] - Emergency Dept Emergency Medicine  If symptoms worsen [address] [facility] [id number] [phone] 1st floor    Yazan Jj MD Family Medicine   [address]  [facility] [address]  [phone]                Eddie Goode MD  10/03/22 0242

## 2022-10-06 ENCOUNTER — PATIENT MESSAGE (OUTPATIENT)
Dept: OBSTETRICS AND GYNECOLOGY | Facility: CLINIC | Age: 27
End: 2022-10-06
Payer: COMMERCIAL

## 2022-10-10 ENCOUNTER — PATIENT MESSAGE (OUTPATIENT)
Dept: OBSTETRICS AND GYNECOLOGY | Facility: CLINIC | Age: 27
End: 2022-10-10
Payer: COMMERCIAL

## 2022-10-16 ENCOUNTER — PATIENT MESSAGE (OUTPATIENT)
Dept: OBSTETRICS AND GYNECOLOGY | Facility: CLINIC | Age: 27
End: 2022-10-16
Payer: COMMERCIAL

## 2022-10-24 ENCOUNTER — ROUTINE PRENATAL (OUTPATIENT)
Dept: OBSTETRICS AND GYNECOLOGY | Facility: CLINIC | Age: 27
End: 2022-10-24
Payer: COMMERCIAL

## 2022-10-24 VITALS
WEIGHT: 197.06 LBS | SYSTOLIC BLOOD PRESSURE: 130 MMHG | BODY MASS INDEX: 37.24 KG/M2 | DIASTOLIC BLOOD PRESSURE: 80 MMHG

## 2022-10-24 DIAGNOSIS — Z3A.31 31 WEEKS GESTATION OF PREGNANCY: Primary | ICD-10-CM

## 2022-10-24 LAB
BILIRUBIN, UA POC OHS: NEGATIVE
BLOOD, UA POC OHS: NEGATIVE
CLARITY, UA POC OHS: ABNORMAL
COLOR, UA POC OHS: YELLOW
GLUCOSE, UA POC OHS: NEGATIVE
KETONES, UA POC OHS: NEGATIVE
LEUKOCYTES, UA POC OHS: ABNORMAL
NITRITE, UA POC OHS: NEGATIVE
PH, UA POC OHS: 7
PROTEIN, UA POC OHS: NEGATIVE
SPECIFIC GRAVITY, UA POC OHS: 1.01
UROBILINOGEN, UA POC OHS: 0.2

## 2022-10-24 PROCEDURE — 0502F SUBSEQUENT PRENATAL CARE: CPT | Mod: CPTII,S$GLB,, | Performed by: OBSTETRICS & GYNECOLOGY

## 2022-10-24 PROCEDURE — 87086 URINE CULTURE/COLONY COUNT: CPT | Performed by: OBSTETRICS & GYNECOLOGY

## 2022-10-24 PROCEDURE — 99999 PR PBB SHADOW E&M-EST. PATIENT-LVL II: CPT | Mod: PBBFAC,,, | Performed by: OBSTETRICS & GYNECOLOGY

## 2022-10-24 PROCEDURE — 99999 PR PBB SHADOW E&M-EST. PATIENT-LVL II: ICD-10-PCS | Mod: PBBFAC,,, | Performed by: OBSTETRICS & GYNECOLOGY

## 2022-10-24 PROCEDURE — 0502F PR SUBSEQUENT PRENATAL CARE: ICD-10-PCS | Mod: CPTII,S$GLB,, | Performed by: OBSTETRICS & GYNECOLOGY

## 2022-10-24 NOTE — PROGRESS NOTES
The patient presents with No complaints.   Reports: Good fetal movements reported, No Bleeding or pains    10/24/2022  27 y.o. 31w5d Estimated Date of Delivery: 22, dating reviewed.   OB History    Para Term  AB Living   2       1     SAB IAB Ectopic Multiple Live Births   1              # Outcome Date GA Lbr Donn/2nd Weight Sex Delivery Anes PTL Lv   2 Current            1 SAB                Prenatal labs reviewed and updated today    Review of Systems:  General ROS: negative for headache or visual changes  Breast ROS: negative for breast lumps  Gastrointestinal ROS: negative for  constipation, diarrhea or nausea/vomiting  Musculoskeletal ROS: negative for pain in joints or swelling in face or hands.   Neurological ROS: negative for - headaches, numbness/tingling or visual changes      Physical Exam:  /80   Wt 89.4 kg (197 lb 1.5 oz)   LMP 2022   BMI 37.24 kg/m²   Urine Dip: Pending  Fundal Height: 32cm  Fetal Heart Tones: 150bpm  Constitutional: She is oriented to person, place, and time. She appears well-developed and well-nourished. No distress. Overweight   Cardiovascular: Normal rate.    Pulmonary/Chest: Effort normal. No respiratory distress  Abdominal: Soft, gravid, nontender. No rebound and no guarding.     Genitourinary: Deferred    Musculoskeletal: Normal range of motion, Minimal peripheral edema.   Neurological: She is alert and oriented to person, place, and time. Coordination normal.   Skin: Skin is warm and dry. She is not diaphoretic.  Psychiatric: She has a normal mood and affect.        Assessment:  27 y.o., at 31w5d Gestation   Patient Active Problem List   Diagnosis    Contraception    DUB (dysfunctional uterine bleeding)    Obesity, Class II, BMI 35-39.9, with comorbidity    Missed menses    Missed      Current Outpatient Medications on File Prior to Visit   Medication Sig Dispense Refill    prenatal vit/iron fum/folic ac (PRENATAL 1+1 ORAL) Take  by mouth.      albuterol (PROVENTIL) 2.5 mg /3 mL (0.083 %) nebulizer solution Take 3 mLs (2.5 mg total) by nebulization every 6 (six) hours as needed for Wheezing. Rescue (Patient not taking: No sig reported) 1 each 1    budesonide 180mcg (PULMICORT FLEXHALER) 180 mcg/actuation AePB Inhale 1 puff into the lungs 2 (two) times a day. Controller (Patient not taking: No sig reported) 1 each 11    EPINEPHrine (EPIPEN 2-BELEN) 0.3 mg/0.3 mL AtIn Inject 0.3 mLs (0.3 mg total) into the muscle once. for 1 dose (Patient not taking: Reported on 9/29/2022) 0.3 mL 3    fluticasone propionate (FLONASE) 50 mcg/actuation nasal spray 1 spray (50 mcg total) by Each Nostril route 2 (two) times daily. (Patient not taking: No sig reported) 16 g 3    [DISCONTINUED] amoxicillin-clavulanate 875-125mg (AUGMENTIN) 875-125 mg per tablet Take 1 tablet by mouth 2 (two) times daily. (Patient not taking: Reported on 10/24/2022) 14 tablet 0     No current facility-administered medications on file prior to visit.         Plan:   Labs today: none  Orders today: none  MEds today: none  Procedures Today: none  Follow up 2 Weeks, bleeding/pain precautions  kick counts, labor precautions

## 2022-10-26 ENCOUNTER — PATIENT MESSAGE (OUTPATIENT)
Dept: ADMINISTRATIVE | Facility: OTHER | Age: 27
End: 2022-10-26
Payer: COMMERCIAL

## 2022-10-26 LAB — BACTERIA UR CULT: NO GROWTH

## 2022-11-01 ENCOUNTER — TELEPHONE (OUTPATIENT)
Dept: OBSTETRICS AND GYNECOLOGY | Facility: CLINIC | Age: 27
End: 2022-11-01
Payer: COMMERCIAL

## 2022-11-01 NOTE — TELEPHONE ENCOUNTER
----- Message from Iliana Lockhart sent at 11/1/2022 10:43 AM CDT -----  Contact: Tyler at [phone]  Type: Needs Medical Advice  Who Called:  Tyler at [facility] Call Back Number: [phone]  Additional Information: Tyler is calling the office to check the status of order for breast pump that was sent on 10/31/22. Please call back and advise.

## 2022-11-07 ENCOUNTER — ROUTINE PRENATAL (OUTPATIENT)
Dept: OBSTETRICS AND GYNECOLOGY | Facility: CLINIC | Age: 27
End: 2022-11-07
Payer: COMMERCIAL

## 2022-11-07 VITALS
SYSTOLIC BLOOD PRESSURE: 120 MMHG | BODY MASS INDEX: 37.16 KG/M2 | DIASTOLIC BLOOD PRESSURE: 76 MMHG | WEIGHT: 196.63 LBS

## 2022-11-07 DIAGNOSIS — Z3A.33 33 WEEKS GESTATION OF PREGNANCY: Primary | ICD-10-CM

## 2022-11-07 LAB
BILIRUBIN, UA POC OHS: NEGATIVE
BLOOD, UA POC OHS: NEGATIVE
CLARITY, UA POC OHS: CLEAR
COLOR, UA POC OHS: YELLOW
GLUCOSE, UA POC OHS: NEGATIVE
KETONES, UA POC OHS: 15
LEUKOCYTES, UA POC OHS: ABNORMAL
NITRITE, UA POC OHS: NEGATIVE
PH, UA POC OHS: 7
PROTEIN, UA POC OHS: ABNORMAL
SPECIFIC GRAVITY, UA POC OHS: 1.02
UROBILINOGEN, UA POC OHS: 0.2

## 2022-11-07 PROCEDURE — 87086 URINE CULTURE/COLONY COUNT: CPT | Performed by: OBSTETRICS & GYNECOLOGY

## 2022-11-07 PROCEDURE — 0502F SUBSEQUENT PRENATAL CARE: CPT | Mod: CPTII,S$GLB,, | Performed by: OBSTETRICS & GYNECOLOGY

## 2022-11-07 PROCEDURE — 99999 PR PBB SHADOW E&M-EST. PATIENT-LVL II: CPT | Mod: PBBFAC,,, | Performed by: OBSTETRICS & GYNECOLOGY

## 2022-11-07 PROCEDURE — 99999 PR PBB SHADOW E&M-EST. PATIENT-LVL II: ICD-10-PCS | Mod: PBBFAC,,, | Performed by: OBSTETRICS & GYNECOLOGY

## 2022-11-07 PROCEDURE — 0502F PR SUBSEQUENT PRENATAL CARE: ICD-10-PCS | Mod: CPTII,S$GLB,, | Performed by: OBSTETRICS & GYNECOLOGY

## 2022-11-07 NOTE — PROGRESS NOTES
The patient presents with No complaints .   Reports: Good fetal movements reported, No Bleeding or pains    2022  27 y.o. 33w5d Estimated Date of Delivery: 22, dating reviewed.   OB History    Para Term  AB Living   2       1     SAB IAB Ectopic Multiple Live Births   1              # Outcome Date GA Lbr Donn/2nd Weight Sex Delivery Anes PTL Lv   2 Current            1 SAB                Prenatal labs reviewed and updated today    Review of Systems:  General ROS: negative for headache or visual changes  Breast ROS: negative for breast lumps  Gastrointestinal ROS: negative for  constipation, diarrhea or nausea/vomiting  Musculoskeletal ROS: negative for pain in joints or swelling in face or hands.   Neurological ROS: negative for - headaches, numbness/tingling or visual changes      Physical Exam:  /76   Wt 89.2 kg (196 lb 10.4 oz)   LMP 2022   BMI 37.16 kg/m²   Urine Dip: Pending  Fundal Height:34cm  Fetal Heart Tones: 165bpm  Constitutional: She is oriented to person, place, and time. She appears well-developed and well-nourished. No distress. Overweight   Cardiovascular: Normal rate.    Pulmonary/Chest: Effort normal. No respiratory distress  Abdominal: Soft, gravid, nontender. No rebound and no guarding.     Genitourinary: Deferred     Musculoskeletal: Normal range of motion, Minimal peripheral edema.   Neurological: She is alert and oriented to person, place, and time. Coordination normal.   Skin: Skin is warm and dry. She is not diaphoretic.  Psychiatric: She has a normal mood and affect.        Assessment:  27 y.o., at 33w5d Gestation   Patient Active Problem List   Diagnosis    Contraception    DUB (dysfunctional uterine bleeding)    Obesity, Class II, BMI 35-39.9, with comorbidity    Missed menses    Missed      Current Outpatient Medications on File Prior to Visit   Medication Sig Dispense Refill    prenatal vit/iron fum/folic ac (PRENATAL 1+1 ORAL) Take  by mouth.      albuterol (PROVENTIL) 2.5 mg /3 mL (0.083 %) nebulizer solution Take 3 mLs (2.5 mg total) by nebulization every 6 (six) hours as needed for Wheezing. Rescue (Patient not taking: Reported on 9/29/2022) 1 each 1    budesonide 180mcg (PULMICORT FLEXHALER) 180 mcg/actuation AePB Inhale 1 puff into the lungs 2 (two) times a day. Controller (Patient not taking: Reported on 9/29/2022) 1 each 11    EPINEPHrine (EPIPEN 2-BELEN) 0.3 mg/0.3 mL AtIn Inject 0.3 mLs (0.3 mg total) into the muscle once. for 1 dose (Patient not taking: Reported on 9/29/2022) 0.3 mL 3    fluticasone propionate (FLONASE) 50 mcg/actuation nasal spray 1 spray (50 mcg total) by Each Nostril route 2 (two) times daily. (Patient not taking: Reported on 9/29/2022) 16 g 3     No current facility-administered medications on file prior to visit.       Plan:   Labs today: none  Orders today: u/s 1 week  MEds today: none  Procedures Today: none  Follow up 1 Weeks, bleeding/pain precautions , kick counts, labor precautions

## 2022-11-09 LAB — BACTERIA UR CULT: NO GROWTH

## 2022-11-17 ENCOUNTER — ROUTINE PRENATAL (OUTPATIENT)
Dept: OBSTETRICS AND GYNECOLOGY | Facility: CLINIC | Age: 27
End: 2022-11-17
Payer: COMMERCIAL

## 2022-11-17 ENCOUNTER — PATIENT MESSAGE (OUTPATIENT)
Dept: OBSTETRICS AND GYNECOLOGY | Facility: CLINIC | Age: 27
End: 2022-11-17

## 2022-11-17 ENCOUNTER — HOSPITAL ENCOUNTER (OUTPATIENT)
Dept: RADIOLOGY | Facility: HOSPITAL | Age: 27
Discharge: HOME | End: 2022-11-17
Attending: OBSTETRICS & GYNECOLOGY
Payer: COMMERCIAL

## 2022-11-17 VITALS
WEIGHT: 202.38 LBS | SYSTOLIC BLOOD PRESSURE: 140 MMHG | BODY MASS INDEX: 38.24 KG/M2 | DIASTOLIC BLOOD PRESSURE: 82 MMHG

## 2022-11-17 DIAGNOSIS — Z3A.33 33 WEEKS GESTATION OF PREGNANCY: ICD-10-CM

## 2022-11-17 DIAGNOSIS — Z3A.35 35 WEEKS GESTATION OF PREGNANCY: Primary | ICD-10-CM

## 2022-11-17 LAB
BILIRUBIN, UA POC OHS: NEGATIVE
BLOOD, UA POC OHS: NEGATIVE
CLARITY, UA POC OHS: CLEAR
COLOR, UA POC OHS: YELLOW
GLUCOSE, UA POC OHS: NEGATIVE
KETONES, UA POC OHS: NEGATIVE
LEUKOCYTES, UA POC OHS: ABNORMAL
NITRITE, UA POC OHS: NEGATIVE
PH, UA POC OHS: 7.5
PROTEIN, UA POC OHS: NEGATIVE
SPECIFIC GRAVITY, UA POC OHS: 1.02
UROBILINOGEN, UA POC OHS: 0.2

## 2022-11-17 PROCEDURE — 76816 OB US FOLLOW-UP PER FETUS: CPT | Mod: 26,,, | Performed by: RADIOLOGY

## 2022-11-17 PROCEDURE — 99999 PR PBB SHADOW E&M-EST. PATIENT-LVL II: CPT | Mod: PBBFAC,,, | Performed by: SPECIALIST

## 2022-11-17 PROCEDURE — 0502F PR SUBSEQUENT PRENATAL CARE: ICD-10-PCS | Mod: CPTII,S$GLB,, | Performed by: SPECIALIST

## 2022-11-17 PROCEDURE — 99999 PR PBB SHADOW E&M-EST. PATIENT-LVL II: ICD-10-PCS | Mod: PBBFAC,,, | Performed by: SPECIALIST

## 2022-11-17 PROCEDURE — 76816 US OB FOLLOW UP EA GESTATION TRANSABDOMINAL: ICD-10-PCS | Mod: 26,,, | Performed by: RADIOLOGY

## 2022-11-17 PROCEDURE — 76816 OB US FOLLOW-UP PER FETUS: CPT | Mod: TC,PN

## 2022-11-17 PROCEDURE — 0502F SUBSEQUENT PRENATAL CARE: CPT | Mod: CPTII,S$GLB,, | Performed by: SPECIALIST

## 2022-11-17 NOTE — PROGRESS NOTES
Complaints today: none  Completing fetal growth u/s today  Verbal report, still breech presenting  Discussed persistent breech and possible ECV pending review of u/s  , Good fetal movements reported No Bleeding or pains    BP (!) 140/82   Wt 91.8 kg (202 lb 6.1 oz)   LMP 2022   BMI 38.24 kg/m²     27 y.o., at 35w1d by Estimated Date of Delivery: 22  Patient Active Problem List   Diagnosis    Contraception    DUB (dysfunctional uterine bleeding)    Obesity, Class II, BMI 35-39.9, with comorbidity    Missed menses    Missed      OB History    Para Term  AB Living   2       1     SAB IAB Ectopic Multiple Live Births   1              # Outcome Date GA Lbr Donn/2nd Weight Sex Delivery Anes PTL Lv   2 Current            1 SAB                Dating reviewed    Allergies and problem list reviewed and updated    Medical and surgical history reviewed    Prenatal labs reviewed and updated    Physical Exam:  ABD: soft, gravid, nontender,     Assessment:  IUP 35 weeks  Breech    Plan:   Review u/s  GBS and Tdap on RTO  follow up 1 Weeks, bleeding/pain precautions   kick counts, labor precautions

## 2022-11-18 ENCOUNTER — PATIENT MESSAGE (OUTPATIENT)
Dept: OBSTETRICS AND GYNECOLOGY | Facility: CLINIC | Age: 27
End: 2022-11-18
Payer: COMMERCIAL

## 2022-11-22 ENCOUNTER — LAB VISIT (OUTPATIENT)
Dept: LAB | Facility: HOSPITAL | Age: 27
End: 2022-11-22
Attending: FAMILY MEDICINE
Payer: COMMERCIAL

## 2022-11-22 ENCOUNTER — ROUTINE PRENATAL (OUTPATIENT)
Dept: OBSTETRICS AND GYNECOLOGY | Facility: CLINIC | Age: 27
End: 2022-11-22
Payer: COMMERCIAL

## 2022-11-22 VITALS
WEIGHT: 200.19 LBS | SYSTOLIC BLOOD PRESSURE: 138 MMHG | BODY MASS INDEX: 37.82 KG/M2 | DIASTOLIC BLOOD PRESSURE: 80 MMHG

## 2022-11-22 DIAGNOSIS — Z3A.35 35 WEEKS GESTATION OF PREGNANCY: ICD-10-CM

## 2022-11-22 DIAGNOSIS — Z3A.35 35 WEEKS GESTATION OF PREGNANCY: Primary | ICD-10-CM

## 2022-11-22 DIAGNOSIS — Z23 ENCOUNTER FOR ADMINISTRATION OF VACCINE: ICD-10-CM

## 2022-11-22 DIAGNOSIS — D72.829 LEUKOCYTOSIS, UNSPECIFIED TYPE: ICD-10-CM

## 2022-11-22 LAB
BILIRUBIN, UA POC OHS: NEGATIVE
BLOOD, UA POC OHS: NEGATIVE
CLARITY, UA POC OHS: ABNORMAL
COLOR, UA POC OHS: YELLOW
GLUCOSE, UA POC OHS: NEGATIVE
KETONES, UA POC OHS: NEGATIVE
LEUKOCYTES, UA POC OHS: NEGATIVE
NITRITE, UA POC OHS: NEGATIVE
PH, UA POC OHS: 7.5
PROTEIN, UA POC OHS: NEGATIVE
SPECIFIC GRAVITY, UA POC OHS: 1.02
UROBILINOGEN, UA POC OHS: 0.2

## 2022-11-22 PROCEDURE — 90471 IMMUNIZATION ADMIN: CPT | Mod: S$GLB,,, | Performed by: STUDENT IN AN ORGANIZED HEALTH CARE EDUCATION/TRAINING PROGRAM

## 2022-11-22 PROCEDURE — 99999 PR PBB SHADOW E&M-EST. PATIENT-LVL III: CPT | Mod: PBBFAC,,, | Performed by: STUDENT IN AN ORGANIZED HEALTH CARE EDUCATION/TRAINING PROGRAM

## 2022-11-22 PROCEDURE — 87081 CULTURE SCREEN ONLY: CPT | Performed by: STUDENT IN AN ORGANIZED HEALTH CARE EDUCATION/TRAINING PROGRAM

## 2022-11-22 PROCEDURE — 36415 COLL VENOUS BLD VENIPUNCTURE: CPT | Mod: PN | Performed by: FAMILY MEDICINE

## 2022-11-22 PROCEDURE — 86592 SYPHILIS TEST NON-TREP QUAL: CPT | Performed by: STUDENT IN AN ORGANIZED HEALTH CARE EDUCATION/TRAINING PROGRAM

## 2022-11-22 PROCEDURE — 90715 TDAP VACCINE 7 YRS/> IM: CPT | Mod: S$GLB,,, | Performed by: STUDENT IN AN ORGANIZED HEALTH CARE EDUCATION/TRAINING PROGRAM

## 2022-11-22 PROCEDURE — 0502F PR SUBSEQUENT PRENATAL CARE: ICD-10-PCS | Mod: CPTII,S$GLB,, | Performed by: STUDENT IN AN ORGANIZED HEALTH CARE EDUCATION/TRAINING PROGRAM

## 2022-11-22 PROCEDURE — 99999 PR PBB SHADOW E&M-EST. PATIENT-LVL III: ICD-10-PCS | Mod: PBBFAC,,, | Performed by: STUDENT IN AN ORGANIZED HEALTH CARE EDUCATION/TRAINING PROGRAM

## 2022-11-22 PROCEDURE — 90715 TDAP VACCINE GREATER THAN OR EQUAL TO 7YO IM: ICD-10-PCS | Mod: S$GLB,,, | Performed by: STUDENT IN AN ORGANIZED HEALTH CARE EDUCATION/TRAINING PROGRAM

## 2022-11-22 PROCEDURE — 85025 COMPLETE CBC W/AUTO DIFF WBC: CPT | Performed by: FAMILY MEDICINE

## 2022-11-22 PROCEDURE — 90471 TDAP VACCINE GREATER THAN OR EQUAL TO 7YO IM: ICD-10-PCS | Mod: S$GLB,,, | Performed by: STUDENT IN AN ORGANIZED HEALTH CARE EDUCATION/TRAINING PROGRAM

## 2022-11-22 PROCEDURE — 0502F SUBSEQUENT PRENATAL CARE: CPT | Mod: CPTII,S$GLB,, | Performed by: STUDENT IN AN ORGANIZED HEALTH CARE EDUCATION/TRAINING PROGRAM

## 2022-11-23 ENCOUNTER — PATIENT MESSAGE (OUTPATIENT)
Dept: OBSTETRICS AND GYNECOLOGY | Facility: CLINIC | Age: 27
End: 2022-11-23
Payer: COMMERCIAL

## 2022-11-23 LAB
BASOPHILS # BLD AUTO: 0.03 K/UL (ref 0–0.2)
BASOPHILS # BLD AUTO: 0.03 K/UL (ref 0–0.2)
BASOPHILS NFR BLD: 0.2 % (ref 0–1.9)
BASOPHILS NFR BLD: 0.2 % (ref 0–1.9)
DIFFERENTIAL METHOD: ABNORMAL
DIFFERENTIAL METHOD: ABNORMAL
EOSINOPHIL # BLD AUTO: 0.1 K/UL (ref 0–0.5)
EOSINOPHIL # BLD AUTO: 0.1 K/UL (ref 0–0.5)
EOSINOPHIL NFR BLD: 0.4 % (ref 0–8)
EOSINOPHIL NFR BLD: 0.4 % (ref 0–8)
ERYTHROCYTE [DISTWIDTH] IN BLOOD BY AUTOMATED COUNT: 13.1 % (ref 11.5–14.5)
ERYTHROCYTE [DISTWIDTH] IN BLOOD BY AUTOMATED COUNT: 13.1 % (ref 11.5–14.5)
HCT VFR BLD AUTO: 36.8 % (ref 37–48.5)
HCT VFR BLD AUTO: 36.8 % (ref 37–48.5)
HGB BLD-MCNC: 12.1 G/DL (ref 12–16)
HGB BLD-MCNC: 12.1 G/DL (ref 12–16)
IMM GRANULOCYTES # BLD AUTO: 0.1 K/UL (ref 0–0.04)
IMM GRANULOCYTES # BLD AUTO: 0.1 K/UL (ref 0–0.04)
IMM GRANULOCYTES NFR BLD AUTO: 0.8 % (ref 0–0.5)
IMM GRANULOCYTES NFR BLD AUTO: 0.8 % (ref 0–0.5)
LYMPHOCYTES # BLD AUTO: 2 K/UL (ref 1–4.8)
LYMPHOCYTES # BLD AUTO: 2 K/UL (ref 1–4.8)
LYMPHOCYTES NFR BLD: 16.4 % (ref 18–48)
LYMPHOCYTES NFR BLD: 16.4 % (ref 18–48)
MCH RBC QN AUTO: 28.3 PG (ref 27–31)
MCH RBC QN AUTO: 28.3 PG (ref 27–31)
MCHC RBC AUTO-ENTMCNC: 32.9 G/DL (ref 32–36)
MCHC RBC AUTO-ENTMCNC: 32.9 G/DL (ref 32–36)
MCV RBC AUTO: 86 FL (ref 82–98)
MCV RBC AUTO: 86 FL (ref 82–98)
MONOCYTES # BLD AUTO: 0.8 K/UL (ref 0.3–1)
MONOCYTES # BLD AUTO: 0.8 K/UL (ref 0.3–1)
MONOCYTES NFR BLD: 6.7 % (ref 4–15)
MONOCYTES NFR BLD: 6.7 % (ref 4–15)
NEUTROPHILS # BLD AUTO: 9.2 K/UL (ref 1.8–7.7)
NEUTROPHILS # BLD AUTO: 9.2 K/UL (ref 1.8–7.7)
NEUTROPHILS NFR BLD: 75.5 % (ref 38–73)
NEUTROPHILS NFR BLD: 75.5 % (ref 38–73)
NRBC BLD-RTO: 0 /100 WBC
NRBC BLD-RTO: 0 /100 WBC
PLATELET # BLD AUTO: 240 K/UL (ref 150–450)
PLATELET # BLD AUTO: 240 K/UL (ref 150–450)
PMV BLD AUTO: 11.8 FL (ref 9.2–12.9)
PMV BLD AUTO: 11.8 FL (ref 9.2–12.9)
RBC # BLD AUTO: 4.28 M/UL (ref 4–5.4)
RBC # BLD AUTO: 4.28 M/UL (ref 4–5.4)
RPR SER QL: NORMAL
WBC # BLD AUTO: 12.17 K/UL (ref 3.9–12.7)
WBC # BLD AUTO: 12.17 K/UL (ref 3.9–12.7)

## 2022-11-25 LAB — BACTERIA SPEC AEROBE CULT: NORMAL

## 2022-11-30 ENCOUNTER — ROUTINE PRENATAL (OUTPATIENT)
Dept: OBSTETRICS AND GYNECOLOGY | Facility: CLINIC | Age: 27
End: 2022-11-30
Payer: COMMERCIAL

## 2022-11-30 VITALS
BODY MASS INDEX: 38.03 KG/M2 | WEIGHT: 201.25 LBS | DIASTOLIC BLOOD PRESSURE: 80 MMHG | SYSTOLIC BLOOD PRESSURE: 130 MMHG

## 2022-11-30 DIAGNOSIS — Z3A.35 35 WEEKS GESTATION OF PREGNANCY: Primary | ICD-10-CM

## 2022-11-30 DIAGNOSIS — Z3A.37 37 WEEKS GESTATION OF PREGNANCY: ICD-10-CM

## 2022-11-30 LAB
BILIRUBIN, UA POC OHS: NEGATIVE
BLOOD, UA POC OHS: NEGATIVE
CLARITY, UA POC OHS: ABNORMAL
COLOR, UA POC OHS: YELLOW
GLUCOSE, UA POC OHS: NEGATIVE
KETONES, UA POC OHS: NEGATIVE
LEUKOCYTES, UA POC OHS: ABNORMAL
NITRITE, UA POC OHS: NEGATIVE
PH, UA POC OHS: 7
PROTEIN, UA POC OHS: NEGATIVE
SPECIFIC GRAVITY, UA POC OHS: 1.02
UROBILINOGEN, UA POC OHS: 0.2

## 2022-11-30 PROCEDURE — 99999 PR PBB SHADOW E&M-EST. PATIENT-LVL III: CPT | Mod: PBBFAC,,, | Performed by: OBSTETRICS & GYNECOLOGY

## 2022-11-30 PROCEDURE — 0502F PR SUBSEQUENT PRENATAL CARE: ICD-10-PCS | Mod: CPTII,S$GLB,, | Performed by: OBSTETRICS & GYNECOLOGY

## 2022-11-30 PROCEDURE — 0502F SUBSEQUENT PRENATAL CARE: CPT | Mod: CPTII,S$GLB,, | Performed by: OBSTETRICS & GYNECOLOGY

## 2022-11-30 PROCEDURE — 99999 PR PBB SHADOW E&M-EST. PATIENT-LVL III: ICD-10-PCS | Mod: PBBFAC,,, | Performed by: OBSTETRICS & GYNECOLOGY

## 2022-11-30 NOTE — PROGRESS NOTES
The patient presents with No complaints.   Reports: Good fetal movements reported, No Bleeding or pains    2022  27 y.o. 37w0d Estimated Date of Delivery: 22, dating reviewed.   OB History    Para Term  AB Living   2       1     SAB IAB Ectopic Multiple Live Births   1              # Outcome Date GA Lbr Donn/2nd Weight Sex Delivery Anes PTL Lv   2 Current            1 SAB                Prenatal labs reviewed and updated today    Review of Systems:  General ROS: negative for headache or visual changes  Breast ROS: negative for breast lumps  Gastrointestinal ROS: negative for  constipation, diarrhea or nausea/vomiting  Musculoskeletal ROS: negative for pain in joints or swelling in face or hands.   Neurological ROS: negative for - headaches, numbness/tingling or visual changes      Physical Exam:  /80   Wt 91.3 kg (201 lb 4.5 oz)   LMP 2022   BMI 38.03 kg/m²   Urine Dip: Pending  Fundal Height: 37cm  Fetal Heart Tones: 139bpm  Constitutional: She is oriented to person, place, and time. She appears well-developed and well-nourished. No distress. Overweight   Cardiovascular: Normal rate.    Pulmonary/Chest: Effort normal. No respiratory distress  Abdominal: Soft, gravid, nontender. No rebound and no guarding.     Genitourinary: Deferred    Musculoskeletal: Normal range of motion, Minimal peripheral edema.   Neurological: She is alert and oriented to person, place, and time. Coordination normal.   Skin: Skin is warm and dry. She is not diaphoretic.  Psychiatric: She has a normal mood and affect.        Assessment:  27 y.o., at 37w0d Gestation   Patient Active Problem List   Diagnosis    Contraception    DUB (dysfunctional uterine bleeding)    Obesity, Class II, BMI 35-39.9, with comorbidity    Missed menses    Missed      Current Outpatient Medications on File Prior to Visit   Medication Sig Dispense Refill    albuterol (PROVENTIL) 2.5 mg /3 mL (0.083 %) nebulizer  solution Take 3 mLs (2.5 mg total) by nebulization every 6 (six) hours as needed for Wheezing. Rescue 1 each 1    prenatal vit/iron fum/folic ac (PRENATAL 1+1 ORAL) Take by mouth.      budesonide 180mcg (PULMICORT FLEXHALER) 180 mcg/actuation AePB Inhale 1 puff into the lungs 2 (two) times a day. Controller (Patient not taking: Reported on 9/29/2022) 1 each 11    EPINEPHrine (EPIPEN 2-BELEN) 0.3 mg/0.3 mL AtIn Inject 0.3 mLs (0.3 mg total) into the muscle once. for 1 dose (Patient not taking: Reported on 9/29/2022) 0.3 mL 3    fluticasone propionate (FLONASE) 50 mcg/actuation nasal spray 1 spray (50 mcg total) by Each Nostril route 2 (two) times daily. (Patient not taking: Reported on 9/29/2022) 16 g 3     No current facility-administered medications on file prior to visit.         Plan:   Labs today: none  Orders today: none  MEds today: none  Procedures Today: transabdominal u/s today - vertex  Follow up 1 Weeks, bleeding/pain precautions ,  kick counts, labor precautions

## 2022-12-06 ENCOUNTER — ROUTINE PRENATAL (OUTPATIENT)
Dept: OBSTETRICS AND GYNECOLOGY | Facility: CLINIC | Age: 27
End: 2022-12-06
Payer: COMMERCIAL

## 2022-12-06 VITALS
WEIGHT: 203.25 LBS | SYSTOLIC BLOOD PRESSURE: 128 MMHG | BODY MASS INDEX: 38.41 KG/M2 | DIASTOLIC BLOOD PRESSURE: 76 MMHG

## 2022-12-06 DIAGNOSIS — Z3A.37 37 WEEKS GESTATION OF PREGNANCY: Primary | ICD-10-CM

## 2022-12-06 LAB
BILIRUBIN, UA POC OHS: NEGATIVE
BLOOD, UA POC OHS: NEGATIVE
CLARITY, UA POC OHS: CLEAR
COLOR, UA POC OHS: YELLOW
GLUCOSE, UA POC OHS: NEGATIVE
KETONES, UA POC OHS: NEGATIVE
LEUKOCYTES, UA POC OHS: ABNORMAL
NITRITE, UA POC OHS: NEGATIVE
PH, UA POC OHS: 7
PROTEIN, UA POC OHS: NEGATIVE
SPECIFIC GRAVITY, UA POC OHS: 1.02
UROBILINOGEN, UA POC OHS: 0.2

## 2022-12-06 PROCEDURE — 99999 PR PBB SHADOW E&M-EST. PATIENT-LVL II: ICD-10-PCS | Mod: PBBFAC,,, | Performed by: OBSTETRICS & GYNECOLOGY

## 2022-12-06 PROCEDURE — 99999 PR PBB SHADOW E&M-EST. PATIENT-LVL II: CPT | Mod: PBBFAC,,, | Performed by: OBSTETRICS & GYNECOLOGY

## 2022-12-06 PROCEDURE — 0502F PR SUBSEQUENT PRENATAL CARE: ICD-10-PCS | Mod: CPTII,S$GLB,, | Performed by: OBSTETRICS & GYNECOLOGY

## 2022-12-06 PROCEDURE — 0502F SUBSEQUENT PRENATAL CARE: CPT | Mod: CPTII,S$GLB,, | Performed by: OBSTETRICS & GYNECOLOGY

## 2022-12-06 NOTE — PROGRESS NOTES
The patient presents with No complaints.   Reports: Good fetal movements reported, No Bleeding or pains    2022  27 y.o. 37w6d Estimated Date of Delivery: 22, dating reviewed.   OB History    Para Term  AB Living   2       1     SAB IAB Ectopic Multiple Live Births   1              # Outcome Date GA Lbr Donn/2nd Weight Sex Delivery Anes PTL Lv   2 Current            1 SAB                Prenatal labs reviewed and updated today    Review of Systems:  General ROS: negative for headache or visual changes  Breast ROS: negative for breast lumps  Gastrointestinal ROS: negative for  constipation, diarrhea or nausea/vomiting  Musculoskeletal ROS: negative for pain in joints or swelling in face or hands.   Neurological ROS: negative for - headaches, numbness/tingling or visual changes      Physical Exam:  /76   Wt 92.2 kg (203 lb 4.2 oz)   LMP 2022   BMI 38.41 kg/m²   Urine Dip: Pending  Fundal Height:37cm  Fetal Heart Tones: 136bpm  Constitutional: She is oriented to person, place, and time. She appears well-developed and well-nourished. No distress. Overweight   Cardiovascular: Normal rate.    Pulmonary/Chest: Effort normal. No respiratory distress  Abdominal: Soft, gravid, nontender. No rebound and no guarding.     Genitourinary: Deferred    Musculoskeletal: Normal range of motion, Minimal peripheral edema.   Neurological: She is alert and oriented to person, place, and time. Coordination normal.   Skin: Skin is warm and dry. She is not diaphoretic.  Psychiatric: She has a normal mood and affect.        Assessment:  27 y.o., at 37w6d Gestation   Patient Active Problem List   Diagnosis    Contraception    DUB (dysfunctional uterine bleeding)    Obesity, Class II, BMI 35-39.9, with comorbidity    Missed menses    Missed      Current Outpatient Medications on File Prior to Visit   Medication Sig Dispense Refill    albuterol (PROVENTIL) 2.5 mg /3 mL (0.083 %) nebulizer  solution Take 3 mLs (2.5 mg total) by nebulization every 6 (six) hours as needed for Wheezing. Rescue 1 each 1    budesonide 180mcg (PULMICORT FLEXHALER) 180 mcg/actuation AePB Inhale 1 puff into the lungs 2 (two) times a day. Controller (Patient not taking: Reported on 9/29/2022) 1 each 11    EPINEPHrine (EPIPEN 2-BELEN) 0.3 mg/0.3 mL AtIn Inject 0.3 mLs (0.3 mg total) into the muscle once. for 1 dose (Patient not taking: Reported on 9/29/2022) 0.3 mL 3    fluticasone propionate (FLONASE) 50 mcg/actuation nasal spray 1 spray (50 mcg total) by Each Nostril route 2 (two) times daily. (Patient not taking: Reported on 9/29/2022) 16 g 3    prenatal vit/iron fum/folic ac (PRENATAL 1+1 ORAL) Take by mouth.       No current facility-administered medications on file prior to visit.         Plan:   Labs today: none  Orders today: none  MEds today: none  Procedures Today: none  Follow up 1 Weeks, bleeding/pain precautions , kick counts, labor precautions

## 2022-12-08 ENCOUNTER — PATIENT MESSAGE (OUTPATIENT)
Dept: OBSTETRICS AND GYNECOLOGY | Facility: CLINIC | Age: 27
End: 2022-12-08
Payer: COMMERCIAL

## 2022-12-15 ENCOUNTER — ROUTINE PRENATAL (OUTPATIENT)
Dept: OBSTETRICS AND GYNECOLOGY | Facility: CLINIC | Age: 27
End: 2022-12-15
Payer: COMMERCIAL

## 2022-12-15 VITALS
BODY MASS INDEX: 38.41 KG/M2 | DIASTOLIC BLOOD PRESSURE: 70 MMHG | SYSTOLIC BLOOD PRESSURE: 132 MMHG | WEIGHT: 203.25 LBS

## 2022-12-15 DIAGNOSIS — Z3A.39 39 WEEKS GESTATION OF PREGNANCY: Primary | ICD-10-CM

## 2022-12-15 PROCEDURE — 0502F SUBSEQUENT PRENATAL CARE: CPT | Mod: CPTII,S$GLB,, | Performed by: OBSTETRICS & GYNECOLOGY

## 2022-12-15 PROCEDURE — 99999 PR PBB SHADOW E&M-EST. PATIENT-LVL II: CPT | Mod: PBBFAC,,, | Performed by: OBSTETRICS & GYNECOLOGY

## 2022-12-15 PROCEDURE — 99999 PR PBB SHADOW E&M-EST. PATIENT-LVL II: ICD-10-PCS | Mod: PBBFAC,,, | Performed by: OBSTETRICS & GYNECOLOGY

## 2022-12-15 PROCEDURE — 0502F PR SUBSEQUENT PRENATAL CARE: ICD-10-PCS | Mod: CPTII,S$GLB,, | Performed by: OBSTETRICS & GYNECOLOGY

## 2022-12-15 NOTE — PROGRESS NOTES
"    Admission History and Physical:     12/15/2022      History of present Illness:     Gisell Villafuerte is a 27 y.o.  female   who presents 12/15/2022 at 39w1d. Estimated Date of Delivery: 22. The patient presents with term pregnancy for induction, counseled on Risk, benefits and alternatives to elective induction of labor, including a 1.5 fold increase in the rate nonreassuring fetal heart rate pattern, a 2 fold increase in epidural anesthesia, and a 1.5 fold increase in the  section rate. She agreed to proceed.   .    Review of patient's allergies indicates:   Allergen Reactions    No known drug allergies        Patient Active Problem List   Diagnosis    Contraception    DUB (dysfunctional uterine bleeding)    Obesity, Class II, BMI 35-39.9, with comorbidity    Missed menses    Missed        OB History    Para Term  AB Living   2       1     SAB IAB Ectopic Multiple Live Births   1              # Outcome Date GA Lbr Donn/2nd Weight Sex Delivery Anes PTL Lv   2 Current            1 SAB                Past Medical History:   Diagnosis Date    Anemia     s/p transfusion from menstral bleeding    Anxiety     Asthma     "athletic" asthma    Blood transfusion     Depression     Hashimoto's disease     HTN (hypertension)        Past Surgical History:   Procedure Laterality Date    DILATION AND CURETTAGE OF UTERUS N/A 2021    Procedure: DILATION AND CURETTAGE, UTERUS;  Surgeon: Víctor Arriaga MD;  Location[facility];  Service: OB/GYN;  Laterality: N/A;       Current Outpatient Medications on File Prior to Visit   Medication Sig Dispense Refill    albuterol (PROVENTIL) 2.5 mg /3 mL (0.083 %) nebulizer solution Take 3 mLs (2.5 mg total) by nebulization every 6 (six) hours as needed for Wheezing. Rescue 1 each 1    prenatal vit/iron fum/folic ac (PRENATAL 1+1 ORAL) Take by mouth.      budesonide 180mcg (PULMICORT FLEXHALER) 180 mcg/actuation " AePB Inhale 1 puff into the lungs 2 (two) times a day. Controller (Patient not taking: Reported on 9/29/2022) 1 each 11    EPINEPHrine (EPIPEN 2-BELEN) 0.3 mg/0.3 mL AtIn Inject 0.3 mLs (0.3 mg total) into the muscle once. for 1 dose (Patient not taking: Reported on 9/29/2022) 0.3 mL 3    fluticasone propionate (FLONASE) 50 mcg/actuation nasal spray 1 spray (50 mcg total) by Each Nostril route 2 (two) times daily. (Patient not taking: Reported on 9/29/2022) 16 g 3     No current facility-administered medications on file prior to visit.       Social History     Socioeconomic History    Marital status: Single   Tobacco Use    Smoking status: Never    Smokeless tobacco: Never   Substance and Sexual Activity    Alcohol use: Not Currently    Drug use: No    Sexual activity: Yes     Partners: Male     Birth control/protection: None       Family History   Problem Relation Age of Onset    Thyroid disease Mother     Hashimoto's thyroiditis Mother     Diabetes Father     Breast cancer Neg Hx     Ovarian cancer Neg Hx     Melanoma Neg Hx     Psoriasis Neg Hx     Lupus Neg Hx     Eczema Neg Hx            Review of Systems:  General ROS: negative for - chills, fever, headache or visual changes  Breast ROS: negative for breast lumps  Respiratory ROS: no cough, shortness of breath, or wheezing  Cardiovascular ROS: no chest pain or dyspnea on exertion  Gastrointestinal ROS: negative for - change in bowel habits, constipation, diarrhea or nausea/vomiting  Musculoskeletal ROS: negative for - muscular weakness, pain in joints or swelling in face or hands.   Neurological ROS: negative for - headaches, numbness/tingling or visual changes    Physical Exam:  Vital Signs: stable, /70   Wt 92.2 kg (203 lb 4.2 oz)   LMP 03/08/2022   BMI 38.41 kg/m²    NST: pendingg on admission  Constitutional: She is oriented to person, place, and time. She appears well-developed and well-nourished. No distress.   HENT:   Head:  Normocephalic and atraumatic.   Eyes: Conjunctivae and EOM are normal. No scleral icterus.   Neck: Normal range of motion. Neck supple. No tracheal deviation present.   Cardiovascular: Normal rate.    Pulmonary/Chest: Effort normal. No respiratory distress. She exhibits no tenderness.  Breasts: deferred  Abdominal: Soft, gravid, nontender. There is no rebound and no guarding. EFW: 8 #  Genitourinary:    External rectal exam shows no thrombosed external hemorrhoids.    Pelvic exam was performed with patient supine.   There is no rash, lesion or injury on the right labia.   There is no rash, lesion or injury on the left labia.   No bleeding and no signs of injury around the vaginal introitus, urethra is without lesions.  Bimanual exam:   Clinical pelvimetry is adequate, average intraspinous diameter, gynecoid.   Cervix is : 1/30/-3, Adequate pelvimetry, vertex presentation.   Musculoskeletal: Normal range of motion. Minimal peripheral edema.   Lymphadenopathy: No inguinal adenopathy present.   Neurological: She is alert and oriented to person, place, and time. Coordination normal.   Skin: Skin is warm and dry. She is not diaphoretic.   Psychiatric: She has a normal mood and affect.      Assessment:  27 y.o.,  female  at 39w1d Gestation   Reassuring fetal testing  Last Estimated fetal [id number] u/s= 2988 g, 85%  GBS= neg      Counseled today on Risks, benefits and alternatives to vaginal delivery and c/section, including bleeding, infection, transfusion, and damage to pelvic organs and she agreed to proceed .      Plan:  Proceed with pitocin 6am after 50mcg cyctec vaginally 10pm prior.  Anticipate Spontaneous vaginal Delivery            Víctor Arriaga M.D., FACOG

## 2022-12-18 ENCOUNTER — PATIENT MESSAGE (OUTPATIENT)
Dept: OBSTETRICS AND GYNECOLOGY | Facility: CLINIC | Age: 27
End: 2022-12-18
Payer: COMMERCIAL

## 2023-01-04 ENCOUNTER — POSTPARTUM VISIT (OUTPATIENT)
Dept: OBSTETRICS AND GYNECOLOGY | Facility: CLINIC | Age: 28
End: 2023-01-04
Payer: COMMERCIAL

## 2023-01-04 VITALS
DIASTOLIC BLOOD PRESSURE: 80 MMHG | BODY MASS INDEX: 33.93 KG/M2 | SYSTOLIC BLOOD PRESSURE: 118 MMHG | HEIGHT: 61 IN | WEIGHT: 179.69 LBS

## 2023-01-04 PROCEDURE — 99999 PR PBB SHADOW E&M-EST. PATIENT-LVL III: CPT | Mod: PBBFAC,,, | Performed by: OBSTETRICS & GYNECOLOGY

## 2023-01-04 PROCEDURE — 0503F PR POSTPARTUM CARE VISIT: ICD-10-PCS | Mod: CPTII,S$GLB,, | Performed by: OBSTETRICS & GYNECOLOGY

## 2023-01-04 PROCEDURE — 99999 PR PBB SHADOW E&M-EST. PATIENT-LVL III: ICD-10-PCS | Mod: PBBFAC,,, | Performed by: OBSTETRICS & GYNECOLOGY

## 2023-01-04 PROCEDURE — 0503F POSTPARTUM CARE VISIT: CPT | Mod: CPTII,S$GLB,, | Performed by: OBSTETRICS & GYNECOLOGY

## 2023-01-04 NOTE — PROGRESS NOTES
"  Patient here for 2 week postpartum exam without complaint. Breast feeding.   She is s/p c/section  She has no mood complaints.   Minimal lochia.   No pain.    PE:  /80   Ht 5' 1" (1.549 m)   Wt 81.5 kg (179 lb 10.8 oz)   LMP 03/08/2022   Breastfeeding Yes   BMI 33.95 kg/m²   Constitutional: She is oriented to person, place, and time. She appears well-developed and well-nourished. No distress.   HENT:   Head: Normocephalic and atraumatic.   Eyes: Conjunctivae and EOM are normal. No scleral icterus.   Neck: Normal range of motion. Neck supple. No tracheal deviation present.   Cardiovascular: Normal rate.    Pulmonary/Chest: Effort normal. No respiratory distress. She exhibits no tenderness.  Breasts: def  Abdominal: Soft. She exhibits no distension and no mass. There is no tenderness. There is no rebound and no guarding. Incision healing well  Genitourinary: deferred  Musculoskeletal: Normal range of motion.   Neurological: She is alert and oriented to person, place, and time. Coordination normal.   Skin: Skin is warm and dry. She is not diaphoretic.   Psychiatric: She has a normal mood and affect.      Assessment:  2 week interval PP exam, doing well    Plan:  Follow up 4 weeks for pelvic and Birth control    "

## 2023-01-11 ENCOUNTER — TELEPHONE (OUTPATIENT)
Dept: OTOLARYNGOLOGY | Facility: CLINIC | Age: 28
End: 2023-01-11
Payer: COMMERCIAL

## 2023-01-12 ENCOUNTER — PATIENT MESSAGE (OUTPATIENT)
Dept: OBSTETRICS AND GYNECOLOGY | Facility: CLINIC | Age: 28
End: 2023-01-12
Payer: COMMERCIAL

## 2023-01-17 ENCOUNTER — OFFICE VISIT (OUTPATIENT)
Dept: OTOLARYNGOLOGY | Facility: CLINIC | Age: 28
End: 2023-01-17
Payer: COMMERCIAL

## 2023-01-17 VITALS — RESPIRATION RATE: 16 BRPM | HEIGHT: 61 IN | BODY MASS INDEX: 33.93 KG/M2 | TEMPERATURE: 99 F | WEIGHT: 179.69 LBS

## 2023-01-17 DIAGNOSIS — R09.89 THROAT CLEARING: Primary | ICD-10-CM

## 2023-01-17 PROCEDURE — 1111F DSCHRG MED/CURRENT MED MERGE: CPT | Mod: CPTII,S$GLB,, | Performed by: PHYSICIAN ASSISTANT

## 2023-01-17 PROCEDURE — 99999 PR PBB SHADOW E&M-EST. PATIENT-LVL III: ICD-10-PCS | Mod: PBBFAC,,, | Performed by: PHYSICIAN ASSISTANT

## 2023-01-17 PROCEDURE — 3008F BODY MASS INDEX DOCD: CPT | Mod: CPTII,S$GLB,, | Performed by: PHYSICIAN ASSISTANT

## 2023-01-17 PROCEDURE — 99214 OFFICE O/P EST MOD 30 MIN: CPT | Mod: S$GLB,,, | Performed by: PHYSICIAN ASSISTANT

## 2023-01-17 PROCEDURE — 1111F PR DISCHARGE MEDS RECONCILED W/ CURRENT OUTPATIENT MED LIST: ICD-10-PCS | Mod: CPTII,S$GLB,, | Performed by: PHYSICIAN ASSISTANT

## 2023-01-17 PROCEDURE — 1159F MED LIST DOCD IN RCRD: CPT | Mod: CPTII,S$GLB,, | Performed by: PHYSICIAN ASSISTANT

## 2023-01-17 PROCEDURE — 99999 PR PBB SHADOW E&M-EST. PATIENT-LVL III: CPT | Mod: PBBFAC,,, | Performed by: PHYSICIAN ASSISTANT

## 2023-01-17 PROCEDURE — 99214 PR OFFICE/OUTPT VISIT, EST, LEVL IV, 30-39 MIN: ICD-10-PCS | Mod: S$GLB,,, | Performed by: PHYSICIAN ASSISTANT

## 2023-01-17 PROCEDURE — 3008F PR BODY MASS INDEX (BMI) DOCUMENTED: ICD-10-PCS | Mod: CPTII,S$GLB,, | Performed by: PHYSICIAN ASSISTANT

## 2023-01-17 PROCEDURE — 1159F PR MEDICATION LIST DOCUMENTED IN MEDICAL RECORD: ICD-10-PCS | Mod: CPTII,S$GLB,, | Performed by: PHYSICIAN ASSISTANT

## 2023-01-17 PROCEDURE — 1160F RVW MEDS BY RX/DR IN RCRD: CPT | Mod: CPTII,S$GLB,, | Performed by: PHYSICIAN ASSISTANT

## 2023-01-17 PROCEDURE — 1160F PR REVIEW ALL MEDS BY PRESCRIBER/CLIN PHARMACIST DOCUMENTED: ICD-10-PCS | Mod: CPTII,S$GLB,, | Performed by: PHYSICIAN ASSISTANT

## 2023-01-17 NOTE — PROGRESS NOTES
[facility]    Subjective:      Patient: Gisell Villafuerte Patient PCP: Yazan Jj MD         :  1995     Sex:  female      MRN:  [id number]          Date of Visit: 2023      Chief Complaint: LPRD/throat clearing follow up    Interval History 2023: Pt seen at last office visit and laryngoscopy was performed revealing post cricoid edema with erythema indicative of LPRD. Pt also had signs of post-nasal drip. Pt started on pepcid 40mg at night for acid reflux and advised to use shivani med sinus rinse twice daily. After using shivani med sinus rinse, use flonase 1 puff to each nostril twice daily. Pt provided with acid reflux instructions via AVS. Pt states today in office that she has had improvement of throat clearing and mucous production since giving birth. Pt also states that acid reflux has resolved since giving birth. Pt states she still has episodes of throat clearing, but states that she has had significant improvement. Pt was taking pepcid, but stopped due to breast feeding and being concerned over taking pepcid while breast feeding. Pt states that she has also stopped taking allegra. Pt has been following conservative management as advised at last office visit.    Patient ID 2022: Gisell Villafuerte is a 27 y.o. female who presents to clinic for evaluation of mucous in her throat. Pt is 25 weeks pregnant with expected due date 2022. Pt's most recent free T4 lab WNL. Pt has had issues with mucous being stuck in her throat for months. Pt states that she has been getting heartburn everytime she eats or drinks anything. Pt denies dysphagia or throat pain. No current shortness of breath or wheezing. Pt has had globus sensation. Pt has had chronic throat clearing with mucous in throat worse int he morning. Pt denies post-nasal drip.    Past Medical History  She has a past medical history of Anemia, Anxiety, Asthma, Blood transfusion, Depression, Hashimoto's disease, and HTN  (hypertension).    Family History  Her family history includes Diabetes in her father; Hashimoto's thyroiditis in her mother; Thyroid disease in her mother.    Past Surgical History:   Procedure Laterality Date     SECTION N/A 2022    Procedure:  SECTION;  Surgeon: Víctor Arriaga MD;  Location: [facility];  Service: OB/GYN;  Laterality: N/A;    DILATION AND CURETTAGE OF UTERUS N/A 2021    Procedure: DILATION AND CURETTAGE, UTERUS;  Surgeon: Víctor Arriaga MD;  [facility];  Service: OB/GYN;  Laterality: N/A;     Social History     Tobacco Use    Smoking status: Never    Smokeless tobacco: Never   Substance and Sexual Activity    Alcohol use: Not Currently    Drug use: No    Sexual activity: Yes     Partners: Male     Birth control/protection: None     Medications  She has a current medication list which includes the following prescription(s): prenatal vit/iron fum/folic ac, albuterol, pulmicort flexhaler, epinephrine, and fluticasone propionate.    Review of patient's allergies indicates:   Allergen Reactions    No known drug allergies      All medications, allergies, and past history have been reviewed.    Objective:      Vitals:  Vitals - 1 value per visit 2023   SYSTOLIC 118 - -   DIASTOLIC 80 - -   Pulse - - -   Temp - - 98.9   Resp - - 16   SPO2 - - -   Weight (lb) 179.68 - 179.68   Weight (kg) 81.5 - 81.5   Height 61 - 61   BMI (Calculated) 34 - 34   VISIT REPORT - - -   Pain Score  - 0 -   Some recent data might be hidden       Body surface area is 1.87 meters squared.  Physical Exam  Constitutional:       General: She is not in acute distress.     Appearance: Normal appearance. She is not ill-appearing.   HENT:      Head: Normocephalic and atraumatic.      Right Ear: Tympanic membrane, ear canal and external ear normal.      Left Ear: Tympanic membrane, ear canal and external ear normal.      Nose: Septal deviation present.      Mouth/Throat:       Lips: Pink. No lesions.      Mouth: Mucous membranes are moist. No oral lesions.      Tongue: No lesions.      Palate: No lesions.      Pharynx: Oropharynx is clear. Uvula midline. No pharyngeal swelling, oropharyngeal exudate, posterior oropharyngeal erythema or uvula swelling.   Eyes:      General:         Right eye: No discharge.         Left eye: No discharge.      Extraocular Movements: Extraocular movements intact.      Conjunctiva/sclera: Conjunctivae normal.   Pulmonary:      Effort: Pulmonary effort is normal.   Neurological:      General: No focal deficit present.      Mental Status: She is alert and oriented to person, place, and time. Mental status is at baseline.   Psychiatric:         Mood and Affect: Mood normal.         Behavior: Behavior normal.         Thought Content: Thought content normal.         Judgment: Judgment normal.     Labs:  WBC   Date Value Ref Range Status   12/21/2022 13.55 (H) 3.90 - 12.70 K/uL Final     Eosinophil %   Date Value Ref Range Status   12/21/2022 0.6 0.0 - 8.0 % Final     Eos #   Date Value Ref Range Status   12/21/2022 0.1 0.0 - 0.5 K/uL Final     Platelets   Date Value Ref Range Status   12/21/2022 163 150 - 450 K/uL Final     Glucose   Date Value Ref Range Status   09/08/2022 96 70 - 110 mg/dL Final     All lab results, imaging results, and data have been reviewed.    Assessment:        ICD-10-CM ICD-9-CM   1. Throat clearing  R09.89 786.09          Plan:     Pt has had improvement of throat clearing and mucous production and acid reflux since giving birth. Pt states that throat clearing has improved, but she still has some residual issues with throat clearing. Pt advised that she can follow acid reflux conservative management as instructed and provided via AVS at last office visit and does not have to start back on pepcid since she has had significant improvement with acid reflux symptoms since giving birth. Pt advised she can start back on allegra once daily with  breast feeding. Continue conservative management with sinus rinses and flonase 1 spray to each nostril twice daily. Pt can try mucinex to help with mucous relief, but advised that if she has worsening of throat clearing, this can be secondary to thickened mucous due to mucinex and allegra and advised to just use allegra once daily if this occurs and discontinue mucinex. Pt is to follow up as needed for ENT issues/concerns.

## 2023-01-23 RX ORDER — BUDESONIDE 180 UG/1
1 AEROSOL, POWDER RESPIRATORY (INHALATION) 2 TIMES DAILY
Qty: 1 EACH | Refills: 0 | Status: SHIPPED | OUTPATIENT
Start: 2023-01-23 | End: 2023-12-18

## 2023-02-02 ENCOUNTER — POSTPARTUM VISIT (OUTPATIENT)
Dept: OBSTETRICS AND GYNECOLOGY | Facility: CLINIC | Age: 28
End: 2023-02-02
Payer: COMMERCIAL

## 2023-02-02 VITALS
HEIGHT: 61 IN | DIASTOLIC BLOOD PRESSURE: 80 MMHG | WEIGHT: 184.31 LBS | SYSTOLIC BLOOD PRESSURE: 124 MMHG | BODY MASS INDEX: 34.8 KG/M2

## 2023-02-02 DIAGNOSIS — Z01.419 GYNECOLOGIC EXAM NORMAL: Primary | ICD-10-CM

## 2023-02-02 PROCEDURE — 99999 PR PBB SHADOW E&M-EST. PATIENT-LVL III: ICD-10-PCS | Mod: PBBFAC,,, | Performed by: OBSTETRICS & GYNECOLOGY

## 2023-02-02 PROCEDURE — 0503F PR POSTPARTUM CARE VISIT: ICD-10-PCS | Mod: CPTII,S$GLB,, | Performed by: OBSTETRICS & GYNECOLOGY

## 2023-02-02 PROCEDURE — 99999 PR PBB SHADOW E&M-EST. PATIENT-LVL III: CPT | Mod: PBBFAC,,, | Performed by: OBSTETRICS & GYNECOLOGY

## 2023-02-02 PROCEDURE — 87624 HPV HI-RISK TYP POOLED RSLT: CPT | Performed by: OBSTETRICS & GYNECOLOGY

## 2023-02-02 PROCEDURE — 88175 CYTOPATH C/V AUTO FLUID REDO: CPT | Performed by: OBSTETRICS & GYNECOLOGY

## 2023-02-02 PROCEDURE — 0503F POSTPARTUM CARE VISIT: CPT | Mod: CPTII,S$GLB,, | Performed by: OBSTETRICS & GYNECOLOGY

## 2023-02-02 NOTE — PROGRESS NOTES
History of Present Illness:   Pateint presents today  6 weeks postPartum, status post  Section , with complaint of none.      Past medical and surgical history reviewed.   I have reviewed the patient's medical history in detail and updated the computerized patient record.    Physical exam:  Vital Signs: as above, reviewed.  Constitutional: She is oriented to person, place, and time. She appears well-developed and well-nourished. No distress.   HENT:   Head: Normocephalic and atraumatic.   Eyes: Conjunctivae and EOM are normal. No scleral icterus.   Neck: Normal range of motion. Neck supple. No tracheal deviation present.   Cardiovascular: Normal rate.    Pulmonary/Chest: Effort normal. No respiratory distress. She exhibits no tenderness.  Breasts: deferred, breast feeding  Abdominal: Soft. She exhibits no distension and no mass. The incision is healed well. There is no rebound and no guarding.   Genitourinary:    External rectal exam shows no thrombosed external hemorrhoids.    Pelvic exam was performed with patient supine.   No labial fusion.   There is no rash, lesion or injury on the right labia.   There is no rash, lesion or injury on the left labia.   No bleeding and no signs of injury around the vaginal introitus, healed well, urethra is without lesions and well supported. The cervix is visualized with no discharge, lesions or friability.   No vaginal discharge found.    No significant Cystocele, Enterocele or rectocele, and uterus well supported.   Bimanual exam:   The urethra is normal to palpation and there are no palpable vaginal wall masses.   Uterus is not deviated, not enlarged, not fixed, normal shape and not tender.   Cervix exhibits no motion tenderness.    Right adnexum displays no mass and no tenderness.   Left adnexum displays no mass and no tenderness.  Musculoskeletal: Normal range of motion.   Lymphadenopathy: No inguinal adenopathy present.   Neurological: She is alert and oriented to  person, place, and time. Coordination normal.   Skin: Skin is warm and dry. She is not diaphoretic.   Psychiatric: She has a normal mood and affect.    Assessment:  Normal pp postop c/section    Plan:  Follow up  1 year  PAP done today  Declined Birth control

## 2023-02-13 ENCOUNTER — PATIENT MESSAGE (OUTPATIENT)
Dept: OBSTETRICS AND GYNECOLOGY | Facility: CLINIC | Age: 28
End: 2023-02-13
Payer: COMMERCIAL

## 2023-02-13 LAB
CLINICAL INFO: ABNORMAL
CYTO CVX: ABNORMAL
CYTOLOGIST CVX/VAG CYTO: ABNORMAL
CYTOLOGIST CVX/VAG CYTO: ABNORMAL
CYTOLOGY CMNT CVX/VAG CYTO-IMP: ABNORMAL
CYTOLOGY PAP THIN PREP EXPLANATION: ABNORMAL
DATE OF PREVIOUS PAP: ABNORMAL
DATE PREVIOUS BX: NO
GEN CATEG CVX/VAG CYTO-IMP: ABNORMAL
HPV I/H RISK 4 DNA CVX QL NAA+PROBE: NOT DETECTED
LMP START DATE: ABNORMAL
MICROORGANISM CVX/VAG CYTO: ABNORMAL
PATHOLOGIST CVX/VAG CYTO: ABNORMAL
SERVICE CMNT-IMP: ABNORMAL
SPECIMEN SOURCE CVX/VAG CYTO: ABNORMAL
STAT OF ADQ CVX/VAG CYTO-IMP: ABNORMAL

## 2023-02-14 ENCOUNTER — PATIENT MESSAGE (OUTPATIENT)
Dept: OBSTETRICS AND GYNECOLOGY | Facility: CLINIC | Age: 28
End: 2023-02-14
Payer: COMMERCIAL

## 2023-02-28 ENCOUNTER — PATIENT MESSAGE (OUTPATIENT)
Dept: OBSTETRICS AND GYNECOLOGY | Facility: CLINIC | Age: 28
End: 2023-02-28
Payer: COMMERCIAL

## 2023-02-28 RX ORDER — NORETHINDRONE ACETATE AND ETHINYL ESTRADIOL .02; 1 MG/1; MG/1
1 TABLET ORAL DAILY
Qty: 30 TABLET | Refills: 6 | Status: SHIPPED | OUTPATIENT
Start: 2023-02-28 | End: 2024-02-28

## 2023-06-01 ENCOUNTER — PATIENT MESSAGE (OUTPATIENT)
Dept: FAMILY MEDICINE | Facility: CLINIC | Age: 28
End: 2023-06-01
Payer: COMMERCIAL

## 2023-09-28 NOTE — PROGRESS NOTES
Health Maintenance Due   Topic Date Due    Hepatitis C Screening  Never done    HIV Screening  Never done    Lipid Panel  07/10/2018    COVID-19 Vaccine (3 - Booster for Pfizer series) 04/23/2022     Updates were requested from care everywhere.  Chart was reviewed for overdue Proactive [facility] Encounters ([facility]) topics (CRS, Breast Cancer Screening, Eye exam)  Health Maintenance has been updated.  LINKS immunization registry triggered.  Immunizations were reconciled.       Resulted

## 2023-12-18 ENCOUNTER — PATIENT MESSAGE (OUTPATIENT)
Dept: FAMILY MEDICINE | Facility: CLINIC | Age: 28
End: 2023-12-18

## 2023-12-18 ENCOUNTER — OFFICE VISIT (OUTPATIENT)
Dept: FAMILY MEDICINE | Facility: CLINIC | Age: 28
End: 2023-12-18
Payer: COMMERCIAL

## 2023-12-18 VITALS
HEART RATE: 101 BPM | SYSTOLIC BLOOD PRESSURE: 130 MMHG | DIASTOLIC BLOOD PRESSURE: 80 MMHG | HEIGHT: 61 IN | WEIGHT: 184.5 LBS | OXYGEN SATURATION: 98 % | BODY MASS INDEX: 34.83 KG/M2

## 2023-12-18 DIAGNOSIS — J45.40 MODERATE PERSISTENT ASTHMA WITHOUT COMPLICATION: Primary | ICD-10-CM

## 2023-12-18 PROCEDURE — 3079F PR MOST RECENT DIASTOLIC BLOOD PRESSURE 80-89 MM HG: ICD-10-PCS | Mod: CPTII,S$GLB,,

## 2023-12-18 PROCEDURE — 99214 OFFICE O/P EST MOD 30 MIN: CPT | Mod: S$GLB,,,

## 2023-12-18 PROCEDURE — 3075F SYST BP GE 130 - 139MM HG: CPT | Mod: CPTII,S$GLB,,

## 2023-12-18 PROCEDURE — 3075F PR MOST RECENT SYSTOLIC BLOOD PRESS GE 130-139MM HG: ICD-10-PCS | Mod: CPTII,S$GLB,,

## 2023-12-18 PROCEDURE — 1159F MED LIST DOCD IN RCRD: CPT | Mod: CPTII,S$GLB,,

## 2023-12-18 PROCEDURE — 99999 PR PBB SHADOW E&M-EST. PATIENT-LVL III: CPT | Mod: PBBFAC,,,

## 2023-12-18 PROCEDURE — 1159F PR MEDICATION LIST DOCUMENTED IN MEDICAL RECORD: ICD-10-PCS | Mod: CPTII,S$GLB,,

## 2023-12-18 PROCEDURE — 99999 PR PBB SHADOW E&M-EST. PATIENT-LVL III: ICD-10-PCS | Mod: PBBFAC,,,

## 2023-12-18 PROCEDURE — 3079F DIAST BP 80-89 MM HG: CPT | Mod: CPTII,S$GLB,,

## 2023-12-18 PROCEDURE — 3008F PR BODY MASS INDEX (BMI) DOCUMENTED: ICD-10-PCS | Mod: CPTII,S$GLB,,

## 2023-12-18 PROCEDURE — 3008F BODY MASS INDEX DOCD: CPT | Mod: CPTII,S$GLB,,

## 2023-12-18 PROCEDURE — 99214 PR OFFICE/OUTPT VISIT, EST, LEVL IV, 30-39 MIN: ICD-10-PCS | Mod: S$GLB,,,

## 2023-12-18 RX ORDER — ALBUTEROL SULFATE 90 UG/1
2 AEROSOL, METERED RESPIRATORY (INHALATION) EVERY 6 HOURS PRN
Qty: 18 G | Refills: 0 | Status: SHIPPED | OUTPATIENT
Start: 2023-12-18 | End: 2024-12-17

## 2023-12-18 RX ORDER — ALBUTEROL SULFATE 90 UG/1
2 AEROSOL, METERED RESPIRATORY (INHALATION) EVERY 6 HOURS PRN
Qty: 18 G | Refills: 0 | Status: SHIPPED | OUTPATIENT
Start: 2023-12-18 | End: 2023-12-18

## 2023-12-18 RX ORDER — CEPHALEXIN 250 MG/1
1 CAPSULE ORAL 2 TIMES DAILY
Qty: 60 EACH | Refills: 11 | Status: SHIPPED | OUTPATIENT
Start: 2023-12-18 | End: 2023-12-18

## 2023-12-18 RX ORDER — FLUTICASONE PROPIONATE AND SALMETEROL 100; 50 UG/1; UG/1
1 POWDER RESPIRATORY (INHALATION) 2 TIMES DAILY
Qty: 60 EACH | Refills: 11 | Status: SHIPPED | OUTPATIENT
Start: 2023-12-18 | End: 2023-12-19

## 2023-12-18 RX ORDER — METHYLPREDNISOLONE 4 MG/1
TABLET ORAL
Qty: 21 EACH | Refills: 0 | Status: SHIPPED | OUTPATIENT
Start: 2023-12-18 | End: 2023-12-18

## 2023-12-18 RX ORDER — METHYLPREDNISOLONE 4 MG/1
TABLET ORAL
Qty: 21 EACH | Refills: 0 | Status: SHIPPED | OUTPATIENT
Start: 2023-12-18 | End: 2023-12-27

## 2023-12-18 NOTE — PROGRESS NOTES
[facility]  [address]  [place] [address]    Subjective    Chief Complaint:   Chief Complaint   Patient presents with    Shortness of Breath     Shortness of breath going on for 2 years very wheezing        History of Present Illness:     Gisell Villafuerte is a(n) 28 y.o. female with past medical history as noted below who presents to the clinic today for chronic shortness of breath.    Reports shortness of breath daily and with mild activity for about 2 years. She gets winded easily with activity. She has been told she had asthma as a child. No history of smoking. Previously was on albuterol inhalers which helped, but started to cost >$1000 so she discontinued. She remembers being prescribed a different inhaler by an allergist but states she never tried this. No chest pain, fever, or worsening symptoms lately. +cough for the past 2 years.        Problem List:   Patient Active Problem List   Diagnosis    Contraception    DUB (dysfunctional uterine bleeding)    Obesity, Class II, BMI 35-39.9, with comorbidity    Missed menses    Missed        Current Outpatient Medications:   Current Outpatient Medications   Medication Instructions    albuterol (PROVENTIL) 2.5 mg /3 mL (0.083 %) nebulizer solution USE 1 VIAL IN NEBULIZER EVERY 6 HOURS AS NEEDED FOR WHEEZING (RESCUE)    budesonide 180mcg (PULMICORT FLEXHALER) 180 mcg/actuation AePB 1 puff, Inhalation, 2 times daily, Controller    EPINEPHrine (EPIPEN 2-BELEN) 0.3 mg, Intramuscular, Once    fluticasone propionate (FLONASE) 50 mcg, Each Nostril, 2 times daily    norethindrone-ethinyl estradiol (MICROGESTIN ) 1-20 mg-mcg per tablet 1 tablet, Oral, Daily    prenatal vit/iron fum/folic ac (PRENATAL 1+1 ORAL) Oral       Surgical History:   Past Surgical History:   Procedure Laterality Date     SECTION N/A 2022    Procedure:  SECTION;  Surgeon: Víctor Arriaga MD;  Location: [facility]  "L&D;  Service: OB/GYN;  Laterality: N/A;    DILATION AND CURETTAGE OF UTERUS N/A 6/24/2021    Procedure: DILATION AND CURETTAGE, UTERUS;  Surgeon: Víctor Arriaga MD;  Location: [facility] OR;  Service: OB/GYN;  Laterality: N/A;       Family History:   Family History   Problem Relation Age of Onset    Thyroid disease Mother     Hashimoto's thyroiditis Mother     Diabetes Father     Breast cancer Neg Hx     Ovarian cancer Neg Hx     Melanoma Neg Hx     Psoriasis Neg Hx     Lupus Neg Hx     Eczema Neg Hx        Allergies:   Review of patient's allergies indicates:   Allergen Reactions    No known drug allergies        Tobacco Status:   Tobacco Use: Low Risk  (12/18/2023)    Patient History     Smoking Tobacco Use: Never     Smokeless Tobacco Use: Never     Passive Exposure: Not on file       Sexual Activity:   Social History     Substance and Sexual Activity   Sexual Activity Yes    Partners: Male    Birth control/protection: None       Alcohol Use:   Social History     Substance and Sexual Activity   Alcohol Use Not Currently         Objective       Vitals:    12/18/23 1642   BP: 130/80   Pulse: 101   SpO2: 98%   Weight: 83.7 kg (184 lb 8.4 oz)   Height: 5' 1" (1.549 m)       Review of Systems   Constitutional:  Negative for chills and fever.   Respiratory:  Positive for cough, shortness of breath and wheezing. Negative for hemoptysis and sputum production.    Cardiovascular:  Negative for chest pain and palpitations.       Physical Exam  Constitutional:       General: She is not in acute distress.     Appearance: Normal appearance.   HENT:      Head: Normocephalic and atraumatic.   Cardiovascular:      Rate and Rhythm: Normal rate and regular rhythm.      Heart sounds: Normal heart sounds. No murmur heard.  Pulmonary:      Effort: Pulmonary effort is normal. No respiratory distress.      Breath sounds: No stridor. Wheezing present. No rhonchi or rales.   Skin:     General: Skin is warm.   Neurological:      Mental " Status: She is alert and oriented to person, place, and time.   Psychiatric:         Behavior: Behavior normal.                Ref Range & Units 1 yr ago   Interpretation     Moderately severe obstruction. FEV1 50.67 % predicted.  Airflow is not clearly improved after bronchodilator. Clinical improvement following bronchodilator therapy may occur in the absence of spirometric improvement.   Normal lung volumes.   ?The diffusing capacity for carbon monoxide is normal (unadjusted for hemoglobin).   Â          Post FVC L 3.00   Post FEV1 L 1.50   Post FEV1 FVC % 49.87   Post FEF 25 75 L/s 1.11   Post PEF L/s 1.74   Post  sec 7.59   Pre DLCO ml/(min*mmHg) 25.65   DLCO ADJ PRE ml/(min*mmHg) 25.65   DLCOVA PRE ml/(min*mmHg*L) 6.86   DLVAAdj PRE ml/(min*mmHg*L) 6.86   VA PRE L 3.75   IVC PRE L 2.86   TLCN2 PRE L 4.47   VCMAXN2 PRE L 3.36   Pre FRC N2 L 1.85   ERVN2 PRE L 0.69   RVN2 PRE L 1.11   [id number] PRE % 24.79   Pre FVC L 3.36   Resulting Agency  [facility]   This result contains additional information. Click to view the full report.    Assessment and Plan:    1. Moderate persistent asthma without complication  -     Discontinue: albuterol (PROVENTIL/VENTOLIN HFA) 90 mcg/actuation inhaler; Inhale 2 puffs into the lungs every 6 (six) hours as needed for Wheezing. Rescue  Dispense: 18 g; Refill: 0  -     Discontinue: fluticasone-salmeterol 100-50 mcg/dose (ADVAIR DISKUS) 100-50 mcg/dose diskus inhaler; Inhale 1 puff into the lungs 2 (two) times a day. Controller  Dispense: 60 each; Refill: 11  -     X-Ray Chest PA And Lateral; Future; Expected date: 12/18/2023  -     Discontinue: methylPREDNISolone (MEDROL DOSEPACK) 4 mg tablet; use as directed  Dispense: 21 each; Refill: 0  -     fluticasone-salmeterol diskus inhaler 100-50 mcg; Inhale 1 puff into the lungs 2 (two) times a day. Controller  Dispense: 60 each; Refill: 11  -     albuterol (PROVENTIL/VENTOLIN HFA) 90 mcg/actuation inhaler; Inhale 2 puffs into the  lungs every 6 (six) hours as needed for Wheezing. Rescue  Dispense: 18 g; Refill: 0  -     methylPREDNISolone (MEDROL DOSEPACK) 4 mg tablet; use as directed  Dispense: 21 each; Refill: 0        Visit summary:    Gisell Villafuerte presented today for shortness of breath.    PFT from about 2 years ago shows severe obstruction consistent with asthma. Refilled albuterol, will start Advair maintenance inhaler. CXR as well. Oral steroid to help with current symptoms. Would consider getting her established with allergy again for persistent symptoms.    Reassessment in about 1 week     PRINCESS, NAD.  Patient was instructed to report to ER if symptoms become severe.    Follow up: in 1 week       Kajal Geiger PA-C    This note was created partially with voice dictation software and is prone to errors. This note has been reviewed by me but some errors are inevitable.

## 2023-12-19 ENCOUNTER — HOSPITAL ENCOUNTER (OUTPATIENT)
Dept: RADIOLOGY | Facility: CLINIC | Age: 28
Discharge: HOME | End: 2023-12-19
Payer: COMMERCIAL

## 2023-12-19 DIAGNOSIS — J45.40 MODERATE PERSISTENT ASTHMA WITHOUT COMPLICATION: ICD-10-CM

## 2023-12-19 PROCEDURE — 71046 XR CHEST PA AND LATERAL: ICD-10-PCS | Mod: 26,,, | Performed by: RADIOLOGY

## 2023-12-19 PROCEDURE — 71046 X-RAY EXAM CHEST 2 VIEWS: CPT | Mod: 26,,, | Performed by: RADIOLOGY

## 2023-12-19 PROCEDURE — 71046 X-RAY EXAM CHEST 2 VIEWS: CPT | Mod: TC,FY,PO

## 2023-12-19 RX ORDER — FLUTICASONE PROPIONATE 110 UG/1
1 AEROSOL, METERED RESPIRATORY (INHALATION) 2 TIMES DAILY
Qty: 12 G | Refills: 5 | Status: SHIPPED | OUTPATIENT
Start: 2023-12-19 | End: 2023-12-21

## 2023-12-26 ENCOUNTER — PATIENT MESSAGE (OUTPATIENT)
Dept: FAMILY MEDICINE | Facility: CLINIC | Age: 28
End: 2023-12-26
Payer: COMMERCIAL

## 2023-12-27 ENCOUNTER — OFFICE VISIT (OUTPATIENT)
Dept: FAMILY MEDICINE | Facility: CLINIC | Age: 28
End: 2023-12-27
Payer: COMMERCIAL

## 2023-12-27 VITALS
HEIGHT: 61 IN | WEIGHT: 184.06 LBS | DIASTOLIC BLOOD PRESSURE: 80 MMHG | BODY MASS INDEX: 34.75 KG/M2 | HEART RATE: 98 BPM | SYSTOLIC BLOOD PRESSURE: 120 MMHG | OXYGEN SATURATION: 98 %

## 2023-12-27 DIAGNOSIS — Z00.00 GENERAL MEDICAL EXAM: ICD-10-CM

## 2023-12-27 DIAGNOSIS — J45.40 MODERATE PERSISTENT ASTHMA WITHOUT COMPLICATION: Primary | ICD-10-CM

## 2023-12-27 PROCEDURE — 3008F BODY MASS INDEX DOCD: CPT | Mod: CPTII,S$GLB,,

## 2023-12-27 PROCEDURE — 99214 PR OFFICE/OUTPT VISIT, EST, LEVL IV, 30-39 MIN: ICD-10-PCS | Mod: S$GLB,,,

## 2023-12-27 PROCEDURE — 3074F SYST BP LT 130 MM HG: CPT | Mod: CPTII,S$GLB,,

## 2023-12-27 PROCEDURE — 99999 PR PBB SHADOW E&M-EST. PATIENT-LVL III: ICD-10-PCS | Mod: PBBFAC,,,

## 2023-12-27 PROCEDURE — 3008F PR BODY MASS INDEX (BMI) DOCUMENTED: ICD-10-PCS | Mod: CPTII,S$GLB,,

## 2023-12-27 PROCEDURE — 3079F PR MOST RECENT DIASTOLIC BLOOD PRESSURE 80-89 MM HG: ICD-10-PCS | Mod: CPTII,S$GLB,,

## 2023-12-27 PROCEDURE — 99214 OFFICE O/P EST MOD 30 MIN: CPT | Mod: S$GLB,,,

## 2023-12-27 PROCEDURE — 3079F DIAST BP 80-89 MM HG: CPT | Mod: CPTII,S$GLB,,

## 2023-12-27 PROCEDURE — 1159F PR MEDICATION LIST DOCUMENTED IN MEDICAL RECORD: ICD-10-PCS | Mod: CPTII,S$GLB,,

## 2023-12-27 PROCEDURE — 1159F MED LIST DOCD IN RCRD: CPT | Mod: CPTII,S$GLB,,

## 2023-12-27 PROCEDURE — 3074F PR MOST RECENT SYSTOLIC BLOOD PRESSURE < 130 MM HG: ICD-10-PCS | Mod: CPTII,S$GLB,,

## 2023-12-27 PROCEDURE — 99999 PR PBB SHADOW E&M-EST. PATIENT-LVL III: CPT | Mod: PBBFAC,,,

## 2023-12-27 NOTE — PROGRESS NOTES
[facility]  [address]  [place] [address]    Subjective    Chief Complaint:   Chief Complaint   Patient presents with    Follow-up       History of Present Illness:     Gisell Villafuerte is a(n) 28 y.o. female with past medical history as noted below who presents to the clinic today for follow up. She is present with her mother.     Little improvement in wheezing and chronic SOB after adding albuterol PRN, 2 puffs budesonide 90 mcg BID, and medrol dose pack. CXR without acute findings. She feels like she has less mucous than usual but overall about the same. Still reports wheezing and SOB on a daily basis.            Problem List:   Patient Active Problem List   Diagnosis    Contraception    DUB (dysfunctional uterine bleeding)    Obesity, Class II, BMI 35-39.9, with comorbidity    Missed menses    Missed        Current Outpatient Medications:   Current Outpatient Medications   Medication Instructions    albuterol (PROVENTIL) 2.5 mg /3 mL (0.083 %) nebulizer solution USE 1 VIAL IN NEBULIZER EVERY 6 HOURS AS NEEDED FOR WHEEZING (RESCUE)    albuterol (PROVENTIL/VENTOLIN HFA) 90 mcg/actuation inhaler 2 puffs, Inhalation, Every 6 hours PRN, Rescue    budesonide (PULMICORT FLEXHALER) 180 mcg, Inhalation, 2 times daily, Controller    EPINEPHrine (EPIPEN 2-BELEN) 0.3 mg, Intramuscular, Once    fluticasone propionate (FLONASE) 50 mcg, Each Nostril, 2 times daily    methylPREDNISolone (MEDROL DOSEPACK) 4 mg tablet use as directed    norethindrone-ethinyl estradiol (MICROGESTIN ) 1-20 mg-mcg per tablet 1 tablet, Oral, Daily    prenatal vit/iron fum/folic ac (PRENATAL 1+1 ORAL) Oral       Surgical History:   Past Surgical History:   Procedure Laterality Date     SECTION N/A 2022    Procedure:  SECTION;  Surgeon: Víctor Arriaga MD;  [facility]&D;  Service: OB/GYN;  Laterality: N/A;    DILATION AND CURETTAGE OF UTERUS N/A  "6/24/2021    Procedure: DILATION AND CURETTAGE, UTERUS;  Surgeon: Víctor Arriaga MD;  Location: [facility] OR;  Service: OB/GYN;  Laterality: N/A;       Family History:   Family History   Problem Relation Age of Onset    Thyroid disease Mother     Hashimoto's thyroiditis Mother     Diabetes Father     Breast cancer Neg Hx     Ovarian cancer Neg Hx     Melanoma Neg Hx     Psoriasis Neg Hx     Lupus Neg Hx     Eczema Neg Hx        Allergies:   Review of patient's allergies indicates:   Allergen Reactions    No known drug allergies        Tobacco Status:   Tobacco Use: Low Risk  (12/27/2023)    Patient History     Smoking Tobacco Use: Never     Smokeless Tobacco Use: Never     Passive Exposure: Not on file       Sexual Activity:   Social History     Substance and Sexual Activity   Sexual Activity Yes    Partners: Male    Birth control/protection: None       Alcohol Use:   Social History     Substance and Sexual Activity   Alcohol Use Not Currently         Objective       Vitals:    12/27/23 1416   BP: 120/80   Pulse: 98   SpO2: 98%   Weight: 83.5 kg (184 lb 1.4 oz)   Height: 5' 1" (1.549 m)       Review of Systems   Constitutional:  Negative for chills and fever.   Respiratory:  Positive for shortness of breath and wheezing. Negative for cough and sputum production.    Cardiovascular:  Negative for chest pain.       Physical Exam  Constitutional:       General: She is not in acute distress.     Appearance: Normal appearance.   HENT:      Head: Normocephalic and atraumatic.   Cardiovascular:      Rate and Rhythm: Normal rate and regular rhythm.      Heart sounds: Normal heart sounds. No murmur heard.  Pulmonary:      Effort: Pulmonary effort is normal. No respiratory distress.      Breath sounds: No stridor. No wheezing, rhonchi or rales.   Skin:     General: Skin is warm.   Neurological:      Mental Status: She is alert and oriented to person, place, and time.   Psychiatric:         Behavior: Behavior normal. "           Assessment and Plan:    1. Moderate persistent asthma without complication  -     budesonide 180mcg (PULMICORT 180MCG) 180 mcg/actuation AePB; Inhale 2 puffs into the lungs 2 (two) times a day. Controller  Dispense: 1 each; Refill: 11    2. General medical exam  -     CBC Auto Differential; Future; Expected date: 12/27/2023  -     Comprehensive Metabolic Panel; Future; Expected date: 12/27/2023  -     Lipid Panel; Future; Expected date: 12/27/2023  -     TSH; Future; Expected date: 12/27/2023  -     Hemoglobin A1C; Future; Expected date: 12/27/2023        Visit summary:    Gisell Villafuerte presented today for f/u.    Will increase pulmicort dose. Recommend she follow up with allergy vs pulmonology- she has an upcoming pulmonology appointment. Minimal improvement after addition of oral steroid course.     She is stable in clinic today but I recommend prompt ER evaluation if symptoms worsen at all, as asthma has not responded to therapies adequately so far.     Patient was instructed to report to ER if symptoms become severe.    Follow up: with pulmonology as scheduled      Kajal Geiger PA-C    This note was created partially with voice dictation software and is prone to errors. This note has been reviewed by me but some errors are inevitable.

## 2024-01-05 ENCOUNTER — LAB VISIT (OUTPATIENT)
Dept: LAB | Facility: HOSPITAL | Age: 29
End: 2024-01-05
Payer: COMMERCIAL

## 2024-01-05 DIAGNOSIS — Z00.00 GENERAL MEDICAL EXAM: ICD-10-CM

## 2024-01-05 LAB
ALBUMIN SERPL BCP-MCNC: 4.1 G/DL (ref 3.5–5.2)
ALP SERPL-CCNC: 71 U/L (ref 55–135)
ALT SERPL W/O P-5'-P-CCNC: 14 U/L (ref 10–44)
ANION GAP SERPL CALC-SCNC: 10 MMOL/L (ref 8–16)
AST SERPL-CCNC: 15 U/L (ref 10–40)
BASOPHILS # BLD AUTO: 0.03 K/UL (ref 0–0.2)
BASOPHILS NFR BLD: 0.3 % (ref 0–1.9)
BILIRUB SERPL-MCNC: 0.8 MG/DL (ref 0.1–1)
BUN SERPL-MCNC: 8 MG/DL (ref 6–20)
CALCIUM SERPL-MCNC: 9.2 MG/DL (ref 8.7–10.5)
CHLORIDE SERPL-SCNC: 108 MMOL/L (ref 95–110)
CHOLEST SERPL-MCNC: 179 MG/DL (ref 120–199)
CHOLEST/HDLC SERPL: 4.6 {RATIO} (ref 2–5)
CO2 SERPL-SCNC: 23 MMOL/L (ref 23–29)
CREAT SERPL-MCNC: 0.8 MG/DL (ref 0.5–1.4)
DIFFERENTIAL METHOD BLD: ABNORMAL
EOSINOPHIL # BLD AUTO: 0.1 K/UL (ref 0–0.5)
EOSINOPHIL NFR BLD: 0.5 % (ref 0–8)
ERYTHROCYTE [DISTWIDTH] IN BLOOD BY AUTOMATED COUNT: 12.7 % (ref 11.5–14.5)
EST. GFR  (NO RACE VARIABLE): >60 ML/MIN/1.73 M^2
ESTIMATED AVG GLUCOSE: 91 MG/DL (ref 68–131)
GLUCOSE SERPL-MCNC: 76 MG/DL (ref 70–110)
HBA1C MFR BLD: 4.8 % (ref 4–5.6)
HCT VFR BLD AUTO: 40.3 % (ref 37–48.5)
HDLC SERPL-MCNC: 39 MG/DL (ref 40–75)
HDLC SERPL: 21.8 % (ref 20–50)
HGB BLD-MCNC: 14.3 G/DL (ref 12–16)
IMM GRANULOCYTES # BLD AUTO: 0.04 K/UL (ref 0–0.04)
IMM GRANULOCYTES NFR BLD AUTO: 0.3 % (ref 0–0.5)
LDLC SERPL CALC-MCNC: 120.6 MG/DL (ref 63–159)
LYMPHOCYTES # BLD AUTO: 2.4 K/UL (ref 1–4.8)
LYMPHOCYTES NFR BLD: 20.2 % (ref 18–48)
MCH RBC QN AUTO: 28.5 PG (ref 27–31)
MCHC RBC AUTO-ENTMCNC: 35.5 G/DL (ref 32–36)
MCV RBC AUTO: 80 FL (ref 82–98)
MONOCYTES # BLD AUTO: 0.6 K/UL (ref 0.3–1)
MONOCYTES NFR BLD: 4.8 % (ref 4–15)
NEUTROPHILS # BLD AUTO: 8.9 K/UL (ref 1.8–7.7)
NEUTROPHILS NFR BLD: 73.9 % (ref 38–73)
NONHDLC SERPL-MCNC: 140 MG/DL
NRBC BLD-RTO: 0 /100 WBC
PLATELET # BLD AUTO: 336 K/UL (ref 150–450)
PMV BLD AUTO: 10.5 FL (ref 9.2–12.9)
POTASSIUM SERPL-SCNC: 3.8 MMOL/L (ref 3.5–5.1)
PROT SERPL-MCNC: 7.5 G/DL (ref 6–8.4)
RBC # BLD AUTO: 5.01 M/UL (ref 4–5.4)
SODIUM SERPL-SCNC: 141 MMOL/L (ref 136–145)
TRIGL SERPL-MCNC: 97 MG/DL (ref 30–150)
TSH SERPL DL<=0.005 MIU/L-ACNC: 1.86 UIU/ML (ref 0.4–4)
WBC # BLD AUTO: 12 K/UL (ref 3.9–12.7)

## 2024-01-05 PROCEDURE — 80053 COMPREHEN METABOLIC PANEL: CPT

## 2024-01-05 PROCEDURE — 83036 HEMOGLOBIN GLYCOSYLATED A1C: CPT

## 2024-01-05 PROCEDURE — 36415 COLL VENOUS BLD VENIPUNCTURE: CPT | Mod: PN

## 2024-01-05 PROCEDURE — 80061 LIPID PANEL: CPT

## 2024-01-05 PROCEDURE — 85025 COMPLETE CBC W/AUTO DIFF WBC: CPT

## 2024-01-05 PROCEDURE — 84443 ASSAY THYROID STIM HORMONE: CPT

## 2024-01-10 ENCOUNTER — OFFICE VISIT (OUTPATIENT)
Dept: PULMONOLOGY | Facility: CLINIC | Age: 29
End: 2024-01-10
Payer: COMMERCIAL

## 2024-01-10 VITALS
HEIGHT: 61 IN | DIASTOLIC BLOOD PRESSURE: 92 MMHG | HEART RATE: 94 BPM | BODY MASS INDEX: 35.05 KG/M2 | SYSTOLIC BLOOD PRESSURE: 138 MMHG | OXYGEN SATURATION: 97 % | WEIGHT: 185.63 LBS

## 2024-01-10 DIAGNOSIS — J45.51 SEVERE PERSISTENT ASTHMA WITH ACUTE EXACERBATION: Primary | ICD-10-CM

## 2024-01-10 DIAGNOSIS — R05.9 COUGH, UNSPECIFIED TYPE: ICD-10-CM

## 2024-01-10 PROCEDURE — 3008F BODY MASS INDEX DOCD: CPT | Mod: CPTII,S$GLB,, | Performed by: NURSE PRACTITIONER

## 2024-01-10 PROCEDURE — 99999 PR PBB SHADOW E&M-EST. PATIENT-LVL III: CPT | Mod: PBBFAC,,, | Performed by: NURSE PRACTITIONER

## 2024-01-10 PROCEDURE — 3080F DIAST BP >= 90 MM HG: CPT | Mod: CPTII,S$GLB,, | Performed by: NURSE PRACTITIONER

## 2024-01-10 PROCEDURE — 3075F SYST BP GE 130 - 139MM HG: CPT | Mod: CPTII,S$GLB,, | Performed by: NURSE PRACTITIONER

## 2024-01-10 PROCEDURE — 3044F HG A1C LEVEL LT 7.0%: CPT | Mod: CPTII,S$GLB,, | Performed by: NURSE PRACTITIONER

## 2024-01-10 PROCEDURE — 99204 OFFICE O/P NEW MOD 45 MIN: CPT | Mod: S$GLB,,, | Performed by: NURSE PRACTITIONER

## 2024-01-10 PROCEDURE — 1159F MED LIST DOCD IN RCRD: CPT | Mod: CPTII,S$GLB,, | Performed by: NURSE PRACTITIONER

## 2024-01-10 RX ORDER — ALBUTEROL SULFATE 90 UG/1
2 AEROSOL, METERED RESPIRATORY (INHALATION) EVERY 6 HOURS PRN
Qty: 18 G | Refills: 11 | Status: SHIPPED | OUTPATIENT
Start: 2024-01-10 | End: 2025-01-09

## 2024-01-10 RX ORDER — IPRATROPIUM BROMIDE AND ALBUTEROL SULFATE 2.5; .5 MG/3ML; MG/3ML
3 SOLUTION RESPIRATORY (INHALATION) EVERY 6 HOURS PRN
Qty: 120 ML | Refills: 0 | Status: SHIPPED | OUTPATIENT
Start: 2024-01-10 | End: 2025-01-09

## 2024-01-10 RX ORDER — BUDESONIDE AND FORMOTEROL FUMARATE DIHYDRATE 160; 4.5 UG/1; UG/1
2 AEROSOL RESPIRATORY (INHALATION) EVERY 12 HOURS
Qty: 10.2 G | Refills: 11 | Status: SHIPPED | OUTPATIENT
Start: 2024-01-10 | End: 2024-01-12

## 2024-01-10 RX ORDER — PREDNISONE 20 MG/1
TABLET ORAL
Qty: 18 TABLET | Refills: 0 | Status: SHIPPED | OUTPATIENT
Start: 2024-01-10

## 2024-01-10 NOTE — PATIENT INSTRUCTIONS
Overall suspect acute asthma exacerbation given reported symptoms.    PFT from 2022 was reviewed, shows significant lung obstruction with positive bronchodilator response.  This is consistent with asthma diagnosis.    Stop Pulmicort inhaler and start Symbicort.  This is 2 puffs twice per day. This inhaler  contains an inhaled steroid component. Rinse mouth after each use due to risk for thrush development. If mouth or tongue develops white sores please contact the clinic and I will order a prescription mouth wash.     Continue albuterol inhaler as needed for shortness of breath, wheezing, cough.    Prednisone- take as prescribed.    Will consider biologic Therapy in the future if symptoms do not improve.    Recent chest x-ray normal.    Continue current prescription medication regiment. Keep follow up appointment as scheduled. Please call the office if you have any questions or concerns.

## 2024-01-10 NOTE — PROGRESS NOTES
"1/10/2024    Gisellflorencia Villafuerte  New Patient Consult    Chief Complaint   Patient presents with    Asthma       HPI:  01/10/2024: Hx: Asthma   In office today alone.  Denies being seen by prior Pulmonologist. Denies current use of supplemental oxygen, CPAP use. Denies personal history of cancer, PE, current anticoagulation use.  Has been suffering with asthma symptoms for at least the past 2 years.  Patient was reluctant to use inhaler therapy over the last year as she was pregnant.  Shortness of breath:  Persistent over last 2 years, occurs with exertion but also occurs at rest.  Affecting ADLs, can not walk throughout the home without getting short winded.  Occurs with Conversation as well.  Associated with wheezing, denies chest tightness.  Cough:  Productive at times with yellow mucus, difficult to expectorate.  Cough is worse in the morning.  Recently seen per PCP on 12/18/2023 in which Advair and methylprednisone Dosepak or prescribed.  States she did not get Advair filled as her insurance did not cover.  Completed steroid pack, however did not notice great improvement with use.  Seen again per PCP on 12/27/2023 in which Pulmicort was prescribed.  Patient is currently taking Pulmicort 2 puffs twice per day with minimal reported benefit.  Also using albuterol as needed, approximally 3 times per day.  Also using albuterol nebulized as needed, approximally once per day.            Social Hx: Lives with mother, fiance - 5 dogs in the home. Works as Paramedic. No Asbestosis exposure, Smoking Hx: Never smoker  Family Hx: No Lung Cancer, No COPD, Mother with Asthma  Medical Hx: No previous pneumonia ; No previous shoulder/chest surgery          The Chief Complaint is: New to me      PFSH:  Past Medical History:   Diagnosis Date    Anemia     s/p transfusion from menstral bleeding    Anxiety     Asthma     "athletic" asthma    Blood transfusion     Depression     Hashimoto's disease     HTN (hypertension)  "         Past Surgical History:   Procedure Laterality Date     SECTION N/A 2022    Procedure:  SECTION;  Surgeon: Víctor Arriaga MD;  Location: [facility] L&D;  Service: OB/GYN;  Laterality: N/A;    DILATION AND CURETTAGE OF UTERUS N/A 2021    Procedure: DILATION AND CURETTAGE, UTERUS;  Surgeon: Víctor Arriaga MD;  Location: [facility] OR;  Service: OB/GYN;  Laterality: N/A;     Social History     Tobacco Use    Smoking status: Never    Smokeless tobacco: Never   Substance Use Topics    Alcohol use: Not Currently    Drug use: No     Family History   Problem Relation Age of Onset    Thyroid disease Mother     Hashimoto's thyroiditis Mother     Diabetes Father     Breast cancer Neg Hx     Ovarian cancer Neg Hx     Melanoma Neg Hx     Psoriasis Neg Hx     Lupus Neg Hx     Eczema Neg Hx      Review of patient's allergies indicates:   Allergen Reactions    No known drug allergies          I have reviewed past medical, family, and social history.     Performance Status:The patient's activity level is functions out of house.        Review of Systems   Constitutional:  Positive for fatigue. Negative for activity change, appetite change, chills, diaphoresis, fever and unexpected weight change.   HENT:  Positive for congestion, postnasal drip and rhinorrhea. Negative for sinus pressure, sinus pain, sore throat and trouble swallowing.    Eyes:  Negative for photophobia and visual disturbance.   Respiratory:  Positive for cough, shortness of breath and wheezing. Negative for choking and chest tightness.    Cardiovascular:  Negative for chest pain, palpitations and leg swelling.   Gastrointestinal:  Negative for abdominal distention, abdominal pain, blood in stool, constipation, diarrhea, nausea and vomiting.   Genitourinary:  Negative for difficulty urinating, dysuria, flank pain and hematuria.   Musculoskeletal:  Negative for back pain, gait problem, joint swelling and neck pain.   Skin:  Negative for rash  "and wound.   Allergic/Immunologic: Positive for environmental allergies. Negative for food allergies.   Neurological:  Negative for dizziness, seizures, syncope, weakness, light-headedness, numbness and headaches.   Hematological:  Does not bruise/bleed easily.   Psychiatric/Behavioral:  Negative for confusion and sleep disturbance. The patient is not nervous/anxious.          Exam:Comprehensive exam done. BP (!) 138/92 (BP Location: Left arm, Patient Position: Sitting, BP Method: Medium (Automatic))   Pulse 94   Ht 5' 1" (1.549 m)   Wt 84.2 kg (185 lb 10 oz)   LMP  (LMP Unknown)   SpO2 97%   BMI 35.07 kg/m²   Exam included Vitals as listed  Constitutional: She is oriented to person, place, and time. She appears well-developed. No distress.   Nose: Nose normal.   Mouth/Throat: Uvula is midline, oropharynx is clear and moist and mucous membranes are normal. No dental caries. No oropharyngeal exudate, posterior oropharyngeal edema, posterior oropharyngeal erythema or tonsillar abscesses.    Eyes: Pupils are equal, round, and reactive to light.   Neck: No JVD present. No thyromegaly present.   Cardiovascular: Normal rate, regular rhythm and normal heart sounds. Exam reveals no gallop and no friction rub.   No murmur heard.  Pulmonary/Chest: Effort normal and breath sounds normal. No accessory muscle usage or stridor. No apnea and no tachypnea. No respiratory distress, decreased breath sounds, wheezes, rhonchi, rales, or tenderness. Clear breath sounds throughout, on room air, in no acute distress.   Abdominal: Soft. She exhibits no mass. There is no tenderness. No hepatosplenomegaly, hernias and normoactive bowel sounds  Musculoskeletal: Normal range of motion. exhibits no edema.   Neurological:  alert and oriented to person, place, and time. not disoriented.   Skin: Skin is warm and dry. Capillary refill takes less 2 sec. No cyanosis or erythema. No pallor. Nails show no clubbing.   Psychiatric: normal mood and " affect. behavior is normal. Judgment and thought content normal.       Radiographs (ct chest and cxr) reviewed: was done by direct view  Patient imaging studies were reviewed and interpreted independently. My personal interpretation of most recent images include:  CXR - 12/19/2023 - No acute process       Labs Patient's labs were reviewed including CBC and CMP  Lab Results   Component Value Date    WBC 12.00 01/05/2024    RBC 5.01 01/05/2024    HGB 14.3 01/05/2024    HCT 40.3 01/05/2024    MCV 80 (L) 01/05/2024    MCH 28.5 01/05/2024    MCHC 35.5 01/05/2024    RDW 12.7 01/05/2024     01/05/2024    MPV 10.5 01/05/2024    GRAN 8.9 (H) 01/05/2024    GRAN 73.9 (H) 01/05/2024    LYMPH 2.4 01/05/2024    LYMPH 20.2 01/05/2024    MONO 0.6 01/05/2024    MONO 4.8 01/05/2024    EOS 0.1 01/05/2024    BASO 0.03 01/05/2024    EOSINOPHIL 0.5 01/05/2024    BASOPHIL 0.3 01/05/2024   CMP  Sodium   Date Value Ref Range Status   01/05/2024 141 136 - 145 mmol/L Final     Potassium   Date Value Ref Range Status   01/05/2024 3.8 3.5 - 5.1 mmol/L Final     Chloride   Date Value Ref Range Status   01/05/2024 108 95 - 110 mmol/L Final     CO2   Date Value Ref Range Status   01/05/2024 23 23 - 29 mmol/L Final     Glucose   Date Value Ref Range Status   01/05/2024 76 70 - 110 mg/dL Final     BUN   Date Value Ref Range Status   01/05/2024 8 6 - 20 mg/dL Final     Creatinine   Date Value Ref Range Status   01/05/2024 0.8 0.5 - 1.4 mg/dL Final     Calcium   Date Value Ref Range Status   01/05/2024 9.2 8.7 - 10.5 mg/dL Final     Total Protein   Date Value Ref Range Status   01/05/2024 7.5 6.0 - 8.4 g/dL Final     Albumin   Date Value Ref Range Status   01/05/2024 4.1 3.5 - 5.2 g/dL Final     Total Bilirubin   Date Value Ref Range Status   01/05/2024 0.8 0.1 - 1.0 mg/dL Final     Comment:     For infants and newborns, interpretation of results should be based  on gestational age, weight and in agreement with  clinical  observations.    Premature Infant recommended reference ranges:  Up to 24 hours.............<8.0 mg/dL  Up to 48 hours............<12.0 mg/dL  3-5 days..................<15.0 mg/dL  6-29 days.................<15.0 mg/dL       Alkaline Phosphatase   Date Value Ref Range Status   01/05/2024 71 55 - 135 U/L Final     AST   Date Value Ref Range Status   01/05/2024 15 10 - 40 U/L Final     ALT   Date Value Ref Range Status   01/05/2024 14 10 - 44 U/L Final     Anion Gap   Date Value Ref Range Status   01/05/2024 10 8 - 16 mmol/L Final     eGFR   Date Value Ref Range Status   01/05/2024 >60.0 >60 mL/min/1.73 m^2 Final         PFT results reviewed  Pulmonary Functions Testing Results:        Plan:  Clinical impression is resonably certain and repeated evaluation prn +/- follow up will be needed as below.    Gisell was seen today for asthma.    Diagnoses and all orders for this visit:    Severe persistent asthma with acute exacerbation  -     budesonide-formoterol 160-4.5 mcg (SYMBICORT) 160-4.5 mcg/actuation HFAA; Inhale 2 puffs into the lungs every 12 (twelve) hours. Controller  -     albuterol (PROVENTIL/VENTOLIN HFA) 90 mcg/actuation inhaler; Inhale 2 puffs into the lungs every 6 (six) hours as needed for Wheezing or Shortness of Breath. Rescue  -     albuterol-ipratropium (DUO-NEB) 2.5 mg-0.5 mg/3 mL nebulizer solution; Take 3 mLs by nebulization every 6 (six) hours as needed for Wheezing or Shortness of Breath. Rescue  -     predniSONE (DELTASONE) 20 MG tablet; Take three pills per day for three days followed by two pills per day for three days followed by one pill per day for three days.    Cough, unspecified type        Follow up in about 2 months (around 3/10/2024), or if symptoms worsen or fail to improve.    Discussed with patient above for education the following:      Patient Instructions   Overall suspect acute asthma exacerbation given reported symptoms.    PFT from 2022 was reviewed, shows  significant lung obstruction with positive bronchodilator response.  This is consistent with asthma diagnosis.    Stop Pulmicort inhaler and start Symbicort.  This is 2 puffs twice per day. This inhaler  contains an inhaled steroid component. Rinse mouth after each use due to risk for thrush development. If mouth or tongue develops white sores please contact the clinic and I will order a prescription mouth wash.     Continue albuterol inhaler as needed for shortness of breath, wheezing, cough.    Prednisone- take as prescribed.    Will consider biologic Therapy in the future if symptoms do not improve.    Recent chest x-ray normal.    Continue current prescription medication regiment. Keep follow up appointment as scheduled. Please call the office if you have any questions or concerns.

## 2024-01-12 ENCOUNTER — PATIENT MESSAGE (OUTPATIENT)
Dept: PULMONOLOGY | Facility: CLINIC | Age: 29
End: 2024-01-12
Payer: COMMERCIAL

## 2024-01-12 DIAGNOSIS — J45.51 SEVERE PERSISTENT ASTHMA WITH ACUTE EXACERBATION: Primary | ICD-10-CM

## 2024-01-12 RX ORDER — FLUTICASONE PROPIONATE AND SALMETEROL XINAFOATE 230; 21 UG/1; UG/1
2 AEROSOL, METERED RESPIRATORY (INHALATION) 2 TIMES DAILY
Qty: 12 G | Refills: 11 | Status: SHIPPED | OUTPATIENT
Start: 2024-01-12 | End: 2025-01-11

## 2024-01-12 NOTE — TELEPHONE ENCOUNTER
Symbicort is not covered under ins plan  covered inhalers include:    Wixela, Breo, fluticasone-salmeterol      Can the pt be changed to one of the above inhalers? If so, please send rx to local pharmacy

## 2024-03-21 NOTE — PROGRESS NOTES
Complaints today:None, Good fetal movements reported,No Bleeding or pains    /80   Wt 90.8 kg (200 lb 2.8 oz)   LMP 2022   BMI 37.82 kg/m²     27 y.o., at 35w6d by Estimated Date of Delivery: 22  Patient Active Problem List   Diagnosis    Contraception    DUB (dysfunctional uterine bleeding)    Obesity, Class II, BMI 35-39.9, with comorbidity    Missed menses    Missed      OB History    Para Term  AB Living   2       1     SAB IAB Ectopic Multiple Live Births   1              # Outcome Date GA Lbr Donn/2nd Weight Sex Delivery Anes PTL Lv   2 Current            1 SAB                Dating reviewed    Allergies and problem list reviewed and updated    Medical and surgical history reviewed    Prenatal labs reviewed and updated    Physical Exam:  ABD: soft, gravid, nontender,       Assessment:  Gisell was seen today for routine prenatal visit and gbs.    Diagnoses and all orders for this visit:    35 weeks gestation of pregnancy  -     STREP B SCREEN, VAGINAL / RECTAL  -     POCT Urinalysis(Instrument)  -     CBC Auto Differential; Future  -     RPR; Future  -     HIV 1/2 Ag/Ab (4th Gen)          Plan:   - GBS/3T labs today  - growth US reviewed 85%, breech, placenta posterior/fundla  - discussed ECV vs primary c/s, leaning towards primary c/s   - watch BP, seems like chronic HTN  - TDAP today    follow up 1Weeks, bleeding/pain precautions , kick counts, labor precautions given     Tiffanie Hou M.D.  Obstetrics and Gynecology        Called to patient's room for delivery. Vertex  upon entering room. Vertex delivered with coached maternal pushing efforts over supported perineum. Tight nuchal noted and unable to be reduced. Anterior shoulder delivered with gentle downward traction from CNM followed immediately by posterior shoulder and body. Cord around  R shoulder. Infant noted to be limp and not crying. Placed on maternal abdomen. Drying/tactile stimulation and bulb suction by RN staff. Delayed cord clamping x approx. 45s. Cord double clamped by CNM and then cut by FOB and baby moved to warmer for ongoing care. Teresita placenta delivered intact. Pitocin IV per protocol. Vulva, vagina and perineum inspected and found to have 2nd degree perineal lac. Repaired completed in usual fashion to obtain excellent hemostasis under epidural anesthesia. Yue care completed. Mom left stable and attended.  Infant now skin to skin with pulse oximeter in place under observation of RN staff.       HANNY Means [[id number]]      Labor Events     Labor: No   Steroids: None  Cervical Ripening Date/Time:      Cervical Ripening Type: Misoprostol  Antibiotics Received during Labor: No  Rupture Identifier: Sac 1  Rupture Date/Time:  3/20/24 10:45:00   Rupture Type: AROM  Fluid Color: Clear  Fluid Odor: None  Fluid Volume: Moderate  Augmentation: Oxytocin  Labor Complications: None              Anesthesia    Method: Epidural       Delivery Details      Delivery Date: 3/20/24 Delivery Time: 23:44:00   Delivery Type: Vaginal, Spontaneous               Presentation    Presentation: Vertex  Position: Middle  _: Occiput  _: Anterior       Shoulder Dystocia    Shoulder Dystocia Present?: No       Assisted Delivery Details    Forceps Attempted?: No  Vacuum Extractor Attempted?: No                           Cord    Vessels: 3 Vessels  Complications: Nuchal Tight  Cord Around: Head, Shoulders  Cord Clamped Date/Time: 3/20/2024 23:45:00  Cord Blood  Disposition: Lab  Gases Sent?: No              Placenta    Date/Time: 3/20/2024 23:50:00  Removal: Spontaneous  Appearance: Intact  Disposition: Discarded       Lacerations    Episiotomy: None  Perineal Lacerations: 2nd  Other Lacerations: no non-perineal laceration  Number of Repair Packets: 1       Vaginal Counts    Initial Count Personnel: LANDEN CHRISTIAN RN  Initial Count Verified By: LANDEN PETERS RN  Intial Sponge Count: Correct Intial Needles Count: Correct Intial Instruments Count: Correct          Blood Loss  Mother: Dawn Means #[id number]     Start of Mother's Information      Delivery Blood Loss  24 1144 - 24 0017      None                 End of Mother's Information  Mother: Dawn Means #[id number]                Delivery Providers    Delivering clinician: Karla Restrepo APRN - CNM     Provider Role    Taylor Vu RN Primary Nurse    Mary Kate Husain RN Primary  Nurse    Karla Restrepo APRN - CNM Midwife    Marilu Ballesteros RN Charge Nurse               Assessment    Living Status: Living  Delivery Location Comment: LD  6        Skin Color:   Heart Rate:   Reflex Irritability:   Muscle Tone:   Respiratory Effort:   Total:            1 Minute:    1    2    1    2    1    7         5 Minute:    1    2    2    2    2    9                                        Apgars Assigned By: EDGARDO HUSAIN / EDGARDO BALLESTEROS              Resuscitation    Method: Bulb Suction, Stimulation, CPAP, PPV < 1 minute, Suctioning             [place] [employer]

## 2024-04-19 ENCOUNTER — TELEPHONE (OUTPATIENT)
Dept: PULMONOLOGY | Facility: CLINIC | Age: 29
End: 2024-04-19
Payer: COMMERCIAL

## 2024-04-22 RX ORDER — ESTAZOLAM 2 MG/1
1 TABLET ORAL
Qty: 63 TABLET | Refills: 0 | Status: SHIPPED | OUTPATIENT
Start: 2024-04-22

## 2024-04-23 NOTE — TELEPHONE ENCOUNTER
Refill Decision Note   Gisell Noy  is requesting a refill authorization.  Brief Assessment and Rationale for Refill:  Approve     Medication Therapy Plan: LOV 2/2/2023      Comments:     Note composed:8:19 PM 04/22/2024

## 2024-05-20 ENCOUNTER — OFFICE VISIT (OUTPATIENT)
Dept: CARDIOLOGY | Facility: CLINIC | Age: 29
End: 2024-05-20
Payer: COMMERCIAL

## 2024-05-20 ENCOUNTER — OCCUPATIONAL HEALTH (OUTPATIENT)
Dept: URGENT CARE | Facility: CLINIC | Age: 29
End: 2024-05-20

## 2024-05-20 VITALS
OXYGEN SATURATION: 96 % | WEIGHT: 184.5 LBS | HEART RATE: 82 BPM | SYSTOLIC BLOOD PRESSURE: 116 MMHG | HEIGHT: 61 IN | BODY MASS INDEX: 34.83 KG/M2 | DIASTOLIC BLOOD PRESSURE: 77 MMHG

## 2024-05-20 DIAGNOSIS — R06.02 SOB (SHORTNESS OF BREATH) ON EXERTION: Primary | ICD-10-CM

## 2024-05-20 PROCEDURE — 3074F SYST BP LT 130 MM HG: CPT | Mod: CPTII,S$GLB,, | Performed by: INTERNAL MEDICINE

## 2024-05-20 PROCEDURE — 1159F MED LIST DOCD IN RCRD: CPT | Mod: CPTII,S$GLB,, | Performed by: INTERNAL MEDICINE

## 2024-05-20 PROCEDURE — 80305 DRUG TEST PRSMV DIR OPT OBS: CPT | Mod: S$GLB,,, | Performed by: EMERGENCY MEDICINE

## 2024-05-20 PROCEDURE — 3008F BODY MASS INDEX DOCD: CPT | Mod: CPTII,S$GLB,, | Performed by: INTERNAL MEDICINE

## 2024-05-20 PROCEDURE — 99999 PR PBB SHADOW E&M-EST. PATIENT-LVL IV: CPT | Mod: PBBFAC,,, | Performed by: INTERNAL MEDICINE

## 2024-05-20 PROCEDURE — 99203 OFFICE O/P NEW LOW 30 MIN: CPT | Mod: S$GLB,,, | Performed by: INTERNAL MEDICINE

## 2024-05-20 PROCEDURE — 3044F HG A1C LEVEL LT 7.0%: CPT | Mod: CPTII,S$GLB,, | Performed by: INTERNAL MEDICINE

## 2024-05-20 PROCEDURE — 3078F DIAST BP <80 MM HG: CPT | Mod: CPTII,S$GLB,, | Performed by: INTERNAL MEDICINE

## 2024-05-20 NOTE — PROGRESS NOTES
"[facility] [place]    Subjective:     Patient ID:  Gisell Villafuerte is a 28 y.o. female patient here for evaluation Shortness of Breath (Patient gets sob with anything. She does state she has asthma. )      HPI:  28-year-old female with history of asthma here for evaluation of shortness of breath.  Reports shortness of breath with mild-to-moderate exertion without any chest pain or chest tightness.  Does have wheezing on exam today.    Review of Systems   All other systems reviewed and are negative.       Past Medical History:   Diagnosis Date    Anemia     s/p transfusion from menstral bleeding    Anxiety     Asthma     "athletic" asthma    Blood transfusion     Depression     Hashimoto's disease     HTN (hypertension)        Past Surgical History:   Procedure Laterality Date     SECTION N/A 2022    Procedure:  SECTION;  Surgeon: Víctor Arriaga MD;  Location: [facility] L&D;  Service: OB/GYN;  Laterality: N/A;    DILATION AND CURETTAGE OF UTERUS N/A 2021    Procedure: DILATION AND CURETTAGE, UTERUS;  Surgeon: Víctor Arriaga MD;  Location: [facility] OR;  Service: OB/GYN;  Laterality: N/A;       Family History   Problem Relation Name Age of Onset    Thyroid disease Mother      Hashimoto's thyroiditis Mother      Diabetes Father      Breast cancer Neg Hx      Ovarian cancer Neg Hx      Melanoma Neg Hx      Psoriasis Neg Hx      Lupus Neg Hx      Eczema Neg Hx         Social History     Socioeconomic History    Marital status: Single   Tobacco Use    Smoking status: Never    Smokeless tobacco: Never   Substance and Sexual Activity    Alcohol use: Not Currently    Drug use: No    Sexual activity: Yes     Partners: Male     Birth control/protection: None       Current Outpatient Medications   Medication Sig Dispense Refill    albuterol (PROVENTIL) 2.5 mg /3 mL (0.083 %) nebulizer solution USE 1 VIAL IN NEBULIZER EVERY 6 HOURS AS NEEDED FOR " WHEEZING (RESCUE) 90 each 11    albuterol (PROVENTIL/VENTOLIN HFA) 90 mcg/actuation inhaler Inhale 2 puffs into the lungs every 6 (six) hours as needed for Wheezing. Rescue 18 g 0    albuterol (PROVENTIL/VENTOLIN HFA) 90 mcg/actuation inhaler Inhale 2 puffs into the lungs every 6 (six) hours as needed for Wheezing or Shortness of Breath. Rescue 18 g 11    albuterol-ipratropium (DUO-NEB) 2.5 mg-0.5 mg/3 mL nebulizer solution Take 3 mLs by nebulization every 6 (six) hours as needed for Wheezing or Shortness of Breath. Rescue 120 mL 0    EPINEPHrine (EPIPEN 2-BELEN) 0.3 mg/0.3 mL AtIn Inject 0.3 mLs (0.3 mg total) into the muscle once. for 1 dose 0.3 mL 3    fluticasone-salmeterol 230-21 mcg/dose (ADVAIR HFA) 230-21 mcg/actuation HFAA inhaler Inhale 2 puffs into the lungs 2 (two) times daily. Controller 12 g 11    norethindrone-ethinyl estradiol (MICROGESTIN 1/20, 21,) 1-20 mg-mcg per tablet Take 1 tablet by mouth once daily 63 tablet 0    prenatal vit/iron fum/folic ac (PRENATAL 1+1 ORAL) Take by mouth.      fluticasone propionate (FLONASE) 50 mcg/actuation nasal spray 1 spray (50 mcg total) by Each Nostril route 2 (two) times daily. (Patient not taking: Reported on 5/20/2024) 16 g 3    predniSONE (DELTASONE) 20 MG tablet Take three pills per day for three days followed by two pills per day for three days followed by one pill per day for three days. (Patient not taking: Reported on 5/20/2024) 18 tablet 0     No current facility-administered medications for this visit.       Review of patient's allergies indicates:   Allergen Reactions    No known drug allergies          Objective:        Vitals:    05/20/24 1537   BP: 116/77   Pulse: 82       Physical Exam  Vitals reviewed.   Constitutional:       Appearance: Normal appearance.   HENT:      Mouth/Throat:      Mouth: Mucous membranes are moist.   Eyes:      Pupils: Pupils are equal, round, and reactive to light.   Cardiovascular:      Rate and Rhythm: Normal rate and  regular rhythm.      Pulses: Normal pulses.      Heart sounds: Normal heart sounds. No murmur heard.     No gallop.   Pulmonary:      Effort: Pulmonary effort is normal.      Breath sounds: Wheezing present.   Abdominal:      General: Bowel sounds are normal.      Palpations: Abdomen is soft.   Musculoskeletal:         General: Normal range of motion.   Skin:     General: Skin is warm and dry.   Neurological:      General: No focal deficit present.      Mental Status: She is alert and oriented to person, place, and time.   Psychiatric:         Mood and Affect: Mood normal.         LIPIDS - LAST 2   Lab Results   Component Value Date    CHOL 179 01/05/2024    CHOL 139 07/10/2017    HDL 39 (L) 01/05/2024    HDL 42 07/10/2017    LDLCALC 120.6 01/05/2024    LDLCALC 79.2 07/10/2017    TRIG 97 01/05/2024    TRIG 89 07/10/2017    CHOLHDL 21.8 01/05/2024    CHOLHDL 30.2 07/10/2017       CBC - LAST 2  Lab Results   Component Value Date    WBC 12.00 01/05/2024    WBC 13.55 (H) 12/21/2022    RBC 5.01 01/05/2024    RBC 3.79 (L) 12/21/2022    HGB 14.3 01/05/2024    HGB 10.4 (L) 12/21/2022    HCT 40.3 01/05/2024    HCT 30.6 (L) 12/21/2022    MCV 80 (L) 01/05/2024    MCV 81 (L) 12/21/2022    MCH 28.5 01/05/2024    MCH 27.4 12/21/2022    MCHC 35.5 01/05/2024    MCHC 34.0 12/21/2022    RDW 12.7 01/05/2024    RDW 13.1 12/21/2022     01/05/2024     12/21/2022    MPV 10.5 01/05/2024    MPV 12.0 12/21/2022    GRAN 8.9 (H) 01/05/2024    GRAN 73.9 (H) 01/05/2024    LYMPH 2.4 01/05/2024    LYMPH 20.2 01/05/2024    MONO 0.6 01/05/2024    MONO 4.8 01/05/2024    BASO 0.03 01/05/2024    BASO 0.02 12/21/2022    NRBC 0 01/05/2024    NRBC 0 12/21/2022       CHEMISTRY & LIVER FUNCTION - LAST 2  Lab Results   Component Value Date     01/05/2024     09/08/2022    K 3.8 01/05/2024    K 3.7 09/08/2022     01/05/2024     09/08/2022    CO2 23 01/05/2024    CO2 21 (L) 09/08/2022    ANIONGAP 10 01/05/2024    ANIONGAP  "10 09/08/2022    BUN 8 01/05/2024    BUN 7 09/08/2022    CREATININE 0.8 01/05/2024    CREATININE 0.6 09/08/2022    GLU 76 01/05/2024    GLU 96 09/08/2022    CALCIUM 9.2 01/05/2024    CALCIUM 10.1 09/08/2022    ALBUMIN 4.1 01/05/2024    ALBUMIN 4.2 07/10/2017    PROT 7.5 01/05/2024    PROT 7.7 07/10/2017    ALKPHOS 71 01/05/2024    ALKPHOS 72 07/10/2017    ALT 14 01/05/2024    ALT 20 07/10/2017    AST 15 01/05/2024    AST 16 07/10/2017    BILITOT 0.8 01/05/2024    BILITOT 0.6 07/10/2017        CARDIAC PROFILE - LAST 2  No results found for: "BNP", "CPK", "CPKMB", "LDH", "TROPONINI"     COAGULATION - LAST 2  Lab Results   Component Value Date    INR 1.0 06/28/2010    APTT 21.0 06/28/2010       ENDOCRINE & PSA - LAST 2  Lab Results   Component Value Date    HGBA1C 4.8 01/05/2024    HGBA1C 4.6 07/10/2017    TSH 1.860 01/05/2024    TSH 1.460 06/24/2021        ECHOCARDIOGRAM RESULTS  No results found for this or any previous visit.      CURRENT/PREVIOUS VISIT EKG  No results found for this or any previous visit.  No valid procedures specified.   No results found for this or any previous visit.    No valid procedures specified.        Assessment:       1. SOB (shortness of breath) on exertion           Plan:       SOB (shortness of breath) on exertion  -     IN OFFICE EKG 12-LEAD (to Muse)  -     B-TYPE NATRIURETIC PEPTIDE; Future; Expected date: 05/20/2024  -     Echo; Future    Likely etiology is asthma.  Will get echo and BNP.  Do not want to do a stress test as that will likely make the asthma worse.    Follow up in about 2 months (around 7/20/2024) for bnp, echo.          MD [facility] [place]  [place]    "

## 2024-05-23 LAB
OHS QRS DURATION: 80 MS
OHS QTC CALCULATION: 390 MS

## 2024-08-12 ENCOUNTER — TELEPHONE (OUTPATIENT)
Dept: OBSTETRICS AND GYNECOLOGY | Facility: CLINIC | Age: 29
End: 2024-08-12
Payer: COMMERCIAL

## 2024-08-12 NOTE — TELEPHONE ENCOUNTER
----- Message from Fei Calderón sent at 8/12/2024 12:00 PM CDT -----  Type:  Patient Returning Call    Who Called:  pt  Who Left Message for Patient:  Chacorta  Does the patient know what this is regarding?:  Yes  Best Call Back Number:  [phone]  Additional Information:  Please call back to advise Thanks!

## 2024-08-12 NOTE — TELEPHONE ENCOUNTER
----- Message from Vance Weiss sent at 8/12/2024  2:06 PM CDT -----  Contact: Self  Type:  Patient Returning Call    Who Called:  Patient  Who Left Message for Patient:  Chacorta  Does the patient know what this is regarding?:  yes  Best Call Back Number:  [phone]   Additional Information:   Pt lvm in the nurse line requesting a apt to establish with PCP; called back unable to reach and left a voice mail to give us a call back

## 2024-08-23 ENCOUNTER — PATIENT MESSAGE (OUTPATIENT)
Dept: OBSTETRICS AND GYNECOLOGY | Facility: CLINIC | Age: 29
End: 2024-08-23
Payer: COMMERCIAL

## 2024-08-29 ENCOUNTER — OFFICE VISIT (OUTPATIENT)
Dept: OBSTETRICS AND GYNECOLOGY | Facility: CLINIC | Age: 29
End: 2024-08-29
Payer: COMMERCIAL

## 2024-08-29 VITALS
HEIGHT: 61 IN | SYSTOLIC BLOOD PRESSURE: 120 MMHG | BODY MASS INDEX: 33.55 KG/M2 | WEIGHT: 177.69 LBS | DIASTOLIC BLOOD PRESSURE: 70 MMHG

## 2024-08-29 DIAGNOSIS — Z32.00 POSSIBLE PREGNANCY: Primary | ICD-10-CM

## 2024-08-29 DIAGNOSIS — O36.8390 UNABLE TO HEAR FETAL HEART TONES AS REASON FOR ULTRASOUND SCAN: ICD-10-CM

## 2024-08-29 LAB
B-HCG UR QL: POSITIVE
CTP QC/QA: YES

## 2024-08-29 PROCEDURE — 87491 CHLMYD TRACH DNA AMP PROBE: CPT | Performed by: OBSTETRICS & GYNECOLOGY

## 2024-08-29 PROCEDURE — 87086 URINE CULTURE/COLONY COUNT: CPT | Performed by: OBSTETRICS & GYNECOLOGY

## 2024-08-29 PROCEDURE — 87591 N.GONORRHOEAE DNA AMP PROB: CPT | Performed by: OBSTETRICS & GYNECOLOGY

## 2024-08-29 PROCEDURE — 99999 PR PBB SHADOW E&M-EST. PATIENT-LVL III: CPT | Mod: PBBFAC,,, | Performed by: OBSTETRICS & GYNECOLOGY

## 2024-08-29 NOTE — PROGRESS NOTES
"  History of Present Illness   29 y.o.  female patient presents today for missed menses ,  LMP:   If basing this visit on time, please document that in your note.    Past medical and surgical history reviewed.   I have reviewed the patient's medical history in detail and updated the computerized patient record.      Please let me know if you have any questions.    Review of patient's allergies indicates:   Allergen Reactions    No known drug allergies          Past Medical History:   Diagnosis Date    Anemia     s/p transfusion from menstral bleeding    Anxiety     Asthma     "athletic" asthma    Blood transfusion     Depression     Hashimoto's disease     HTN (hypertension)        Past Surgical History:   Procedure Laterality Date     SECTION N/A 2022    Procedure:  SECTION;  Surgeon: Víctor Arriaga MD;  Location: [facility] L&D;  Service: OB/GYN;  Laterality: N/A;    DILATION AND CURETTAGE OF UTERUS N/A 2021    Procedure: DILATION AND CURETTAGE, UTERUS;  Surgeon: Víctor Arriaga MD;  Location: [facility] OR;  Service: OB/GYN;  Laterality: N/A;       MEDS:   Current Outpatient Medications on File Prior to Visit   Medication Sig Dispense Refill    albuterol (PROVENTIL/VENTOLIN HFA) 90 mcg/actuation inhaler Inhale 2 puffs into the lungs every 6 (six) hours as needed for Wheezing. Rescue 18 g 0    albuterol-ipratropium (DUO-NEB) 2.5 mg-0.5 mg/3 mL nebulizer solution Take 3 mLs by nebulization every 6 (six) hours as needed for Wheezing or Shortness of Breath. Rescue 120 mL 0    fluticasone-salmeterol 230-21 mcg/dose (ADVAIR HFA) 230-21 mcg/actuation HFAA inhaler Inhale 2 puffs into the lungs 2 (two) times daily. Controller 12 g 11    prenatal vit/iron fum/folic ac (PRENATAL 1+1 ORAL) Take by mouth.      EPINEPHrine (EPIPEN 2-BELEN) 0.3 mg/0.3 mL AtIn Inject 0.3 mLs (0.3 mg total) into the muscle once. for 1 dose 0.3 mL 3    [DISCONTINUED] albuterol (PROVENTIL) 2.5 mg /3 mL (0.083 %) " nebulizer solution USE 1 VIAL IN NEBULIZER EVERY 6 HOURS AS NEEDED FOR WHEEZING (RESCUE) (Patient not taking: Reported on 2024) 90 each 11    [DISCONTINUED] albuterol (PROVENTIL/VENTOLIN HFA) 90 mcg/actuation inhaler Inhale 2 puffs into the lungs every 6 (six) hours as needed for Wheezing or Shortness of Breath. Rescue (Patient not taking: Reported on 2024) 18 g 11    [DISCONTINUED] fluticasone propionate (FLONASE) 50 mcg/actuation nasal spray 1 spray (50 mcg total) by Each Nostril route 2 (two) times daily. (Patient not taking: Reported on 2024) 16 g 3    [DISCONTINUED] norethindrone-ethinyl estradiol (MICROGESTIN ,) 1-20 mg-mcg per tablet Take 1 tablet by mouth once daily (Patient not taking: Reported on 2024) 63 tablet 0    [DISCONTINUED] predniSONE (DELTASONE) 20 MG tablet Take three pills per day for three days followed by two pills per day for three days followed by one pill per day for three days. (Patient not taking: Reported on 2024) 18 tablet 0     No current facility-administered medications on file prior to visit.       OB History          2    Para   1    Term   1            AB   1    Living   1         SAB   1    IAB        Ectopic        Multiple   0    Live Births   1                 Social History     Socioeconomic History    Marital status: Single   Tobacco Use    Smoking status: Never    Smokeless tobacco: Never   Substance and Sexual Activity    Alcohol use: Not Currently    Drug use: No    Sexual activity: Yes     Partners: Male     Birth control/protection: None       Family History   Problem Relation Name Age of Onset    Thyroid disease Mother      Hashimoto's thyroiditis Mother      Diabetes Father      Breast cancer Neg Hx      Ovarian cancer Neg Hx      Melanoma Neg Hx      Psoriasis Neg Hx      Lupus Neg Hx      Eczema Neg Hx           Review of Systems -   GEN ROS:   Breast ROS:   Genito-Urinary ROS:      Urine pregnancy test in office:  "POSITIVE    Physical Examination:  /70   Ht 5' 1" (1.549 m)   Wt 80.6 kg (177 lb 11.1 oz)   LMP 06/30/2024 (Approximate)   Breastfeeding No   BMI 33.57 kg/m²    deferred      Assessment:  Early IUP  1. Possible pregnancy  POCT Urine Pregnancy    C. trachomatis/N. gonorrhoeae by AMP DNA [facility]; Urine    Urine culture          Plan:  Bleeding/pain precautions   ultrasound ordered w MD todfay  Prenatal vitamins prescription written  requesting or summarizing old records (information regarding previous ob history)  Patient informed will be contacted with results within 2 weeks. Encouraged to please call back or email if she has not heard from us by then.        "

## 2024-08-29 NOTE — PROCEDURES
Procedures    ULTRASOUND:   Bedside Ultrasound Findings    EXAMINATION:  US PELVIS COMP WITH TRANSVAG OB    CLINICAL HISTORY:  LMP= 6/30/24, c/w 8w4d gestation    TECHNIQUE:  Transvaginal sonography was performed to better evaluate the uterus and ovaries.    COMPARISON:  None.    FINDINGS:  1. Uterus:    Appearance: Viable vazquez intrauterine pregnancy was seen, yolk sac was seen. Crown-rump length = 22 mm with flicker, consistent with 9w0d and EDC 4/3/25.    Size: Normal    Masses: None    Endometrium: Normal    2. Right ovary: Not seen    3. Left ovary: Not seen    Free Fluid:none    Adnexal pathology: None seen        Impression      1. Single viable intrauterine pregnancy   2. Crown rump length consistent with 9w0d, and estimated due date4/3/2025

## 2024-08-31 LAB — BACTERIA UR CULT: ABNORMAL

## 2024-09-01 ENCOUNTER — PATIENT MESSAGE (OUTPATIENT)
Dept: OBSTETRICS AND GYNECOLOGY | Facility: CLINIC | Age: 29
End: 2024-09-01
Payer: COMMERCIAL

## 2024-09-03 ENCOUNTER — PATIENT MESSAGE (OUTPATIENT)
Dept: OBSTETRICS AND GYNECOLOGY | Facility: CLINIC | Age: 29
End: 2024-09-03
Payer: COMMERCIAL

## 2024-09-03 ENCOUNTER — PATIENT MESSAGE (OUTPATIENT)
Dept: PULMONOLOGY | Facility: CLINIC | Age: 29
End: 2024-09-03
Payer: COMMERCIAL

## 2024-09-03 DIAGNOSIS — N30.00 ACUTE CYSTITIS WITHOUT HEMATURIA: Primary | ICD-10-CM

## 2024-09-03 RX ORDER — NITROFURANTOIN 25; 75 MG/1; MG/1
100 CAPSULE ORAL 2 TIMES DAILY
Qty: 20 CAPSULE | Refills: 0 | Status: SHIPPED | OUTPATIENT
Start: 2024-09-03 | End: 2024-09-13

## 2024-09-03 NOTE — TELEPHONE ENCOUNTER
Please see patient msg.  Provider isn't in the office today.  I did advise her to reach out to OB-GYN as well.

## 2024-09-04 ENCOUNTER — PATIENT MESSAGE (OUTPATIENT)
Dept: OBSTETRICS AND GYNECOLOGY | Facility: CLINIC | Age: 29
End: 2024-09-04
Payer: COMMERCIAL

## 2024-09-05 DIAGNOSIS — J45.50 SEVERE PERSISTENT ASTHMA WITHOUT COMPLICATION: Primary | ICD-10-CM

## 2024-09-05 RX ORDER — BUDESONIDE AND FORMOTEROL FUMARATE DIHYDRATE 160; 4.5 UG/1; UG/1
2 AEROSOL RESPIRATORY (INHALATION) EVERY 12 HOURS
Qty: 10.2 G | Refills: 11 | Status: SHIPPED | OUTPATIENT
Start: 2024-09-05 | End: 2025-09-05

## 2024-09-06 ENCOUNTER — TELEPHONE (OUTPATIENT)
Dept: PULMONOLOGY | Facility: CLINIC | Age: 29
End: 2024-09-06
Payer: COMMERCIAL

## 2024-09-10 ENCOUNTER — PATIENT MESSAGE (OUTPATIENT)
Dept: PULMONOLOGY | Facility: CLINIC | Age: 29
End: 2024-09-10
Payer: COMMERCIAL

## 2024-09-16 ENCOUNTER — ROUTINE PRENATAL (OUTPATIENT)
Dept: OBSTETRICS AND GYNECOLOGY | Facility: CLINIC | Age: 29
End: 2024-09-16
Payer: COMMERCIAL

## 2024-09-16 ENCOUNTER — LAB VISIT (OUTPATIENT)
Dept: LAB | Facility: HOSPITAL | Age: 29
End: 2024-09-16
Attending: OBSTETRICS & GYNECOLOGY
Payer: COMMERCIAL

## 2024-09-16 VITALS
WEIGHT: 176.13 LBS | SYSTOLIC BLOOD PRESSURE: 114 MMHG | BODY MASS INDEX: 33.28 KG/M2 | DIASTOLIC BLOOD PRESSURE: 64 MMHG

## 2024-09-16 DIAGNOSIS — Z3A.11 11 WEEKS GESTATION OF PREGNANCY: ICD-10-CM

## 2024-09-16 DIAGNOSIS — Z3A.11 11 WEEKS GESTATION OF PREGNANCY: Primary | ICD-10-CM

## 2024-09-16 LAB
ABO + RH BLD: NORMAL
ANION GAP SERPL CALC-SCNC: 9 MMOL/L (ref 8–16)
BASOPHILS # BLD AUTO: 0.03 K/UL (ref 0–0.2)
BASOPHILS NFR BLD: 0.3 % (ref 0–1.9)
BILIRUBIN, UA POC OHS: NEGATIVE
BLD GP AB SCN CELLS X3 SERPL QL: NORMAL
BLOOD, UA POC OHS: NEGATIVE
BUN SERPL-MCNC: 8 MG/DL (ref 6–20)
CALCIUM SERPL-MCNC: 9.2 MG/DL (ref 8.7–10.5)
CHLORIDE SERPL-SCNC: 105 MMOL/L (ref 95–110)
CLARITY, UA POC OHS: CLEAR
CO2 SERPL-SCNC: 22 MMOL/L (ref 23–29)
COLOR, UA POC OHS: YELLOW
CREAT SERPL-MCNC: 0.6 MG/DL (ref 0.5–1.4)
DIFFERENTIAL METHOD BLD: ABNORMAL
EOSINOPHIL # BLD AUTO: 0.1 K/UL (ref 0–0.5)
EOSINOPHIL NFR BLD: 0.9 % (ref 0–8)
ERYTHROCYTE [DISTWIDTH] IN BLOOD BY AUTOMATED COUNT: 12.6 % (ref 11.5–14.5)
EST. GFR  (NO RACE VARIABLE): >60 ML/MIN/1.73 M^2
GLUCOSE SERPL-MCNC: 84 MG/DL (ref 70–110)
GLUCOSE, UA POC OHS: NEGATIVE
HBV SURFACE AG SERPL QL IA: NORMAL
HCT VFR BLD AUTO: 39.1 % (ref 37–48.5)
HCV AB SERPL QL IA: NORMAL
HGB BLD-MCNC: 13.4 G/DL (ref 12–16)
HIV 1+2 AB+HIV1 P24 AG SERPL QL IA: NORMAL
IMM GRANULOCYTES # BLD AUTO: 0.03 K/UL (ref 0–0.04)
IMM GRANULOCYTES NFR BLD AUTO: 0.3 % (ref 0–0.5)
KETONES, UA POC OHS: NEGATIVE
LEUKOCYTES, UA POC OHS: ABNORMAL
LYMPHOCYTES # BLD AUTO: 1.7 K/UL (ref 1–4.8)
LYMPHOCYTES NFR BLD: 19 % (ref 18–48)
MCH RBC QN AUTO: 29.3 PG (ref 27–31)
MCHC RBC AUTO-ENTMCNC: 34.3 G/DL (ref 32–36)
MCV RBC AUTO: 86 FL (ref 82–98)
MONOCYTES # BLD AUTO: 0.5 K/UL (ref 0.3–1)
MONOCYTES NFR BLD: 5.5 % (ref 4–15)
NEUTROPHILS # BLD AUTO: 6.5 K/UL (ref 1.8–7.7)
NEUTROPHILS NFR BLD: 74 % (ref 38–73)
NITRITE, UA POC OHS: NEGATIVE
NRBC BLD-RTO: 0 /100 WBC
PH, UA POC OHS: 7.5
PLATELET # BLD AUTO: 252 K/UL (ref 150–450)
PMV BLD AUTO: 11.4 FL (ref 9.2–12.9)
POTASSIUM SERPL-SCNC: 3.8 MMOL/L (ref 3.5–5.1)
PROTEIN, UA POC OHS: NEGATIVE
RBC # BLD AUTO: 4.57 M/UL (ref 4–5.4)
SODIUM SERPL-SCNC: 136 MMOL/L (ref 136–145)
SPECIFIC GRAVITY, UA POC OHS: 1.02
SPECIMEN OUTDATE: NORMAL
T4 FREE SERPL-MCNC: 0.84 NG/DL (ref 0.71–1.51)
TREPONEMA PALLIDUM IGG+IGM AB [PRESENCE] IN SERUM OR PLASMA BY IMMUNOASSAY: NONREACTIVE
UROBILINOGEN, UA POC OHS: 0.2
WBC # BLD AUTO: 8.84 K/UL (ref 3.9–12.7)

## 2024-09-16 PROCEDURE — 80048 BASIC METABOLIC PNL TOTAL CA: CPT | Performed by: OBSTETRICS & GYNECOLOGY

## 2024-09-16 PROCEDURE — 99999 PR PBB SHADOW E&M-EST. PATIENT-LVL III: CPT | Mod: PBBFAC,,, | Performed by: OBSTETRICS & GYNECOLOGY

## 2024-09-16 PROCEDURE — 86593 SYPHILIS TEST NON-TREP QUANT: CPT | Performed by: OBSTETRICS & GYNECOLOGY

## 2024-09-16 PROCEDURE — 86900 BLOOD TYPING SEROLOGIC ABO: CPT | Performed by: OBSTETRICS & GYNECOLOGY

## 2024-09-16 PROCEDURE — 87340 HEPATITIS B SURFACE AG IA: CPT | Performed by: OBSTETRICS & GYNECOLOGY

## 2024-09-16 PROCEDURE — 84439 ASSAY OF FREE THYROXINE: CPT | Performed by: OBSTETRICS & GYNECOLOGY

## 2024-09-16 PROCEDURE — 36415 COLL VENOUS BLD VENIPUNCTURE: CPT | Mod: PN | Performed by: OBSTETRICS & GYNECOLOGY

## 2024-09-16 PROCEDURE — 86803 HEPATITIS C AB TEST: CPT | Performed by: OBSTETRICS & GYNECOLOGY

## 2024-09-16 PROCEDURE — 86762 RUBELLA ANTIBODY: CPT | Performed by: OBSTETRICS & GYNECOLOGY

## 2024-09-16 PROCEDURE — 87086 URINE CULTURE/COLONY COUNT: CPT | Performed by: OBSTETRICS & GYNECOLOGY

## 2024-09-16 PROCEDURE — 86901 BLOOD TYPING SEROLOGIC RH(D): CPT | Performed by: OBSTETRICS & GYNECOLOGY

## 2024-09-16 PROCEDURE — 85025 COMPLETE CBC W/AUTO DIFF WBC: CPT | Performed by: OBSTETRICS & GYNECOLOGY

## 2024-09-16 PROCEDURE — 0502F SUBSEQUENT PRENATAL CARE: CPT | Mod: CPTII,S$GLB,, | Performed by: OBSTETRICS & GYNECOLOGY

## 2024-09-16 PROCEDURE — 87389 HIV-1 AG W/HIV-1&-2 AB AG IA: CPT | Performed by: OBSTETRICS & GYNECOLOGY

## 2024-09-16 NOTE — PROGRESS NOTES
The patient presents with No complaints.   Reports:No Bleeding or pains    2024  29 y.o. 11w4d Estimated Date of Delivery: 4/3/25, dating reviewed.   OB History    Para Term  AB Living   3 1 1   1 1   SAB IAB Ectopic Multiple Live Births   1     0 1      # Outcome Date GA Lbr Donn/2nd Weight Sex Type Anes PTL Lv   3 Current            2 Term 22 39w6d  3.841 kg (8 lb 7.5 oz) M CS-LTranv EPI N SOSA   1 SAB                Prenatal labs reviewed and updated today    Review of Systems:  General ROS: negative for headache or visual changes  Breast ROS: negative for breast lumps  Gastrointestinal ROS: negative for constipation, diarrhea or nausea/vomiting  Musculoskeletal ROS: negative for pain in joints or swelling in face or hands.   Neurological ROS: negative for - headaches, numbness/tingling or visual changes      Physical Exam:  /64   Wt 79.9 kg (176 lb 2.4 oz)   LMP 2024 (Approximate)   BMI 33.28 kg/m²   Urine Dip: Pending  Fundal Height:10cm  Fetal Heart Tones: 178bpm  Constitutional: She is oriented to person, place, and time. She appears well-developed and well-nourished. No distress. Normal weight   Cardiovascular: Normal rate.    Pulmonary/Chest: Effort normal. No respiratory distress  Abdominal: Soft, gravid, nontender. No rebound and no guarding.     Genitourinary: Deferred    Musculoskeletal: Normal range of motion, Minimal peripheral edema.   Neurological: She is alert and oriented to person, place, and time. Coordination normal.   Skin: Skin is warm and dry. She is not diaphoretic.  Psychiatric: She has a normal mood and affect.        Assessment:  29 y.o., at 11w4d Gestation   Patient Active Problem List   Diagnosis    Contraception    DUB (dysfunctional uterine bleeding)    Obesity, Class II, BMI 35-39.9, with comorbidity    Missed menses    Missed      Current Outpatient Medications on File Prior to Visit   Medication Sig Dispense Refill    albuterol  (PROVENTIL/VENTOLIN HFA) 90 mcg/actuation inhaler Inhale 2 puffs into the lungs every 6 (six) hours as needed for Wheezing. Rescue 18 g 0    albuterol-ipratropium (DUO-NEB) 2.5 mg-0.5 mg/3 mL nebulizer solution Take 3 mLs by nebulization every 6 (six) hours as needed for Wheezing or Shortness of Breath. Rescue 120 mL 0    budesonide-formoterol 160-4.5 mcg (SYMBICORT) 160-4.5 mcg/actuation HFAA Inhale 2 puffs into the lungs every 12 (twelve) hours. Controller 10.2 g 11    prenatal vit/iron fum/folic ac (PRENATAL 1+1 ORAL) Take by mouth.      EPINEPHrine (EPIPEN 2-BELEN) 0.3 mg/0.3 mL AtIn Inject 0.3 mLs (0.3 mg total) into the muscle once. for 1 dose 0.3 mL 3     No current facility-administered medications on file prior to visit.       Plan:   Labs today: PNL unity today  Orders today: none  MEds today: none  Procedures Today: none  Follow up 3 Weeks, bleeding/pain precautions , kick counts, labor precautions

## 2024-09-17 LAB
BACTERIA UR CULT: NO GROWTH
RUBV IGG SER-ACNC: 12.2 IU/ML
RUBV IGG SER-IMP: REACTIVE

## 2024-09-18 ENCOUNTER — PATIENT MESSAGE (OUTPATIENT)
Dept: OBSTETRICS AND GYNECOLOGY | Facility: CLINIC | Age: 29
End: 2024-09-18
Payer: COMMERCIAL

## 2024-10-14 ENCOUNTER — ROUTINE PRENATAL (OUTPATIENT)
Dept: OBSTETRICS AND GYNECOLOGY | Facility: CLINIC | Age: 29
End: 2024-10-14
Payer: COMMERCIAL

## 2024-10-14 VITALS
DIASTOLIC BLOOD PRESSURE: 68 MMHG | SYSTOLIC BLOOD PRESSURE: 108 MMHG | WEIGHT: 176.13 LBS | BODY MASS INDEX: 33.28 KG/M2

## 2024-10-14 DIAGNOSIS — Z34.80 SUPERVISION OF OTHER NORMAL PREGNANCY, ANTEPARTUM: Primary | ICD-10-CM

## 2024-10-14 LAB
BILIRUBIN, UA POC OHS: NEGATIVE
BLOOD, UA POC OHS: NEGATIVE
CLARITY, UA POC OHS: CLEAR
COLOR, UA POC OHS: YELLOW
GLUCOSE, UA POC OHS: NEGATIVE
KETONES, UA POC OHS: NEGATIVE
LEUKOCYTES, UA POC OHS: NEGATIVE
NITRITE, UA POC OHS: NEGATIVE
PH, UA POC OHS: 7
PROTEIN, UA POC OHS: NEGATIVE
SPECIFIC GRAVITY, UA POC OHS: 1.02
UROBILINOGEN, UA POC OHS: 0.2

## 2024-10-14 PROCEDURE — 0502F SUBSEQUENT PRENATAL CARE: CPT | Mod: CPTII,S$GLB,, | Performed by: OBSTETRICS & GYNECOLOGY

## 2024-10-14 PROCEDURE — 99999 PR PBB SHADOW E&M-EST. PATIENT-LVL III: CPT | Mod: PBBFAC,,, | Performed by: OBSTETRICS & GYNECOLOGY

## 2024-10-14 NOTE — PROGRESS NOTES
The patient presents with No complaints - requestr repeat c/section w BTL- counseled .   Reports: No Bleeding or pains    10/14/2024  29 y.o. 15w4d Estimated Date of Delivery: 4/3/25, dating reviewed.   OB History    Para Term  AB Living   3 1 1   1 1   SAB IAB Ectopic Multiple Live Births   1     0 1      # Outcome Date GA Lbr Donn/2nd Weight Sex Type Anes PTL Lv   3 Current            2 Term 22 39w6d  3.841 kg (8 lb 7.5 oz) M CS-LTranv EPI N SOSA   1 SAB                Prenatal labs reviewed and updated today    Review of Systems:  General ROS: negative for headache or visual changes  Breast ROS: negative for breast lumps  Gastrointestinal ROS: negative for  constipation, diarrhea or nausea/vomiting  Musculoskeletal ROS: negative for pain in joints or swelling in face or hands.   Neurological ROS: negative for - headaches, numbness/tingling or visual changes      Physical Exam:  LMP 2024 (Approximate)   Urine Dip: Pending  Fundal Height:14cm  Fetal Heart Tones: 167bpm  Constitutional: She is oriented to person, place, and time. She appears well-developed and well-nourished. No distress. Normal weight   Cardiovascular: Normal rate.    Pulmonary/Chest: Effort normal. No respiratory distress  Abdominal: Soft, gravid, nontender. No rebound and no guarding.     Genitourinary: Deferred    Musculoskeletal: Normal range of motion, Minimal peripheral edema.   Neurological: She is alert and oriented to person, place, and time. Coordination normal.   Skin: Skin is warm and dry. She is not diaphoretic.  Psychiatric: She has a normal mood and affect.        Assessment:  29 y.o., at 15w4d Gestation   Patient Active Problem List   Diagnosis    Contraception    DUB (dysfunctional uterine bleeding)    Obesity, Class II, BMI 35-39.9, with comorbidity    Missed menses    Missed      Current Outpatient Medications on File Prior to Visit   Medication Sig Dispense Refill    albuterol (PROVENTIL/VENTOLIN  HFA) 90 mcg/actuation inhaler Inhale 2 puffs into the lungs every 6 (six) hours as needed for Wheezing. Rescue 18 g 0    albuterol-ipratropium (DUO-NEB) 2.5 mg-0.5 mg/3 mL nebulizer solution Take 3 mLs by nebulization every 6 (six) hours as needed for Wheezing or Shortness of Breath. Rescue 120 mL 0    budesonide-formoterol 160-4.5 mcg (SYMBICORT) 160-4.5 mcg/actuation HFAA Inhale 2 puffs into the lungs every 12 (twelve) hours. Controller 10.2 g 11    prenatal vit/iron fum/folic ac (PRENATAL 1+1 ORAL) Take by mouth.      EPINEPHrine (EPIPEN 2-BELEN) 0.3 mg/0.3 mL AtIn Inject 0.3 mLs (0.3 mg total) into the muscle once. for 1 dose 0.3 mL 3     No current facility-administered medications on file prior to visit.       Plan:   Labs today: none  Orders today: none  MEds today: none  Procedures Today: none  Follow up 5 Weeks, bleeding/pain precautions ,  kick counts, labor precautions , u/s for anatomy at follow up

## 2024-10-17 ENCOUNTER — PATIENT MESSAGE (OUTPATIENT)
Dept: OTHER | Facility: OTHER | Age: 29
End: 2024-10-17
Payer: COMMERCIAL

## 2024-10-24 ENCOUNTER — PATIENT MESSAGE (OUTPATIENT)
Dept: OTHER | Facility: OTHER | Age: 29
End: 2024-10-24
Payer: COMMERCIAL

## 2024-11-14 ENCOUNTER — PATIENT MESSAGE (OUTPATIENT)
Dept: OTHER | Facility: OTHER | Age: 29
End: 2024-11-14
Payer: COMMERCIAL

## 2024-11-25 ENCOUNTER — HOSPITAL ENCOUNTER (OUTPATIENT)
Dept: RADIOLOGY | Facility: HOSPITAL | Age: 29
Discharge: HOME | End: 2024-11-25
Attending: OBSTETRICS & GYNECOLOGY
Payer: COMMERCIAL

## 2024-11-25 ENCOUNTER — ROUTINE PRENATAL (OUTPATIENT)
Dept: OBSTETRICS AND GYNECOLOGY | Facility: CLINIC | Age: 29
End: 2024-11-25
Payer: COMMERCIAL

## 2024-11-25 ENCOUNTER — PATIENT MESSAGE (OUTPATIENT)
Dept: OBSTETRICS AND GYNECOLOGY | Facility: CLINIC | Age: 29
End: 2024-11-25

## 2024-11-25 VITALS
SYSTOLIC BLOOD PRESSURE: 122 MMHG | DIASTOLIC BLOOD PRESSURE: 72 MMHG | BODY MASS INDEX: 32.95 KG/M2 | WEIGHT: 174.38 LBS

## 2024-11-25 DIAGNOSIS — Z3A.21 21 WEEKS GESTATION OF PREGNANCY: Primary | ICD-10-CM

## 2024-11-25 DIAGNOSIS — Z34.80 SUPERVISION OF OTHER NORMAL PREGNANCY, ANTEPARTUM: ICD-10-CM

## 2024-11-25 LAB
BILIRUBIN, UA POC OHS: NEGATIVE
BLOOD, UA POC OHS: NEGATIVE
CLARITY, UA POC OHS: CLEAR
COLOR, UA POC OHS: YELLOW
GLUCOSE, UA POC OHS: NEGATIVE
KETONES, UA POC OHS: NEGATIVE
LEUKOCYTES, UA POC OHS: ABNORMAL
NITRITE, UA POC OHS: NEGATIVE
PH, UA POC OHS: 6.5
PROTEIN, UA POC OHS: NEGATIVE
SPECIFIC GRAVITY, UA POC OHS: 1.01
UROBILINOGEN, UA POC OHS: 0.2

## 2024-11-25 PROCEDURE — 99999 PR PBB SHADOW E&M-EST. PATIENT-LVL II: CPT | Mod: PBBFAC,,, | Performed by: OBSTETRICS & GYNECOLOGY

## 2024-11-25 PROCEDURE — 76805 OB US >/= 14 WKS SNGL FETUS: CPT | Mod: TC,PN

## 2024-11-25 PROCEDURE — 76805 OB US >/= 14 WKS SNGL FETUS: CPT | Mod: 26,,, | Performed by: RADIOLOGY

## 2024-11-25 PROCEDURE — 0502F SUBSEQUENT PRENATAL CARE: CPT | Mod: CPTII,S$GLB,, | Performed by: OBSTETRICS & GYNECOLOGY

## 2024-11-25 NOTE — PROGRESS NOTES
The patient presents with No complaints.   Reports: Good fetal movements reported, No Bleeding or pains    2024  29 y.o. 21w4d Estimated Date of Delivery: 4/3/25, dating reviewed.   OB History    Para Term  AB Living   3 1 1   1 1   SAB IAB Ectopic Multiple Live Births   1     0 1      # Outcome Date GA Lbr Donn/2nd Weight Sex Type Anes PTL Lv   3 Current            2 Term 22 39w6d  3.841 kg (8 lb 7.5 oz) M CS-LTranv EPI N SOSA   1 SAB                Prenatal labs reviewed and updated today    Review of Systems:  General ROS: negative for headache or visual changes  Breast ROS: negative for breast lumps  Gastrointestinal ROS: negative for constipation, diarrhea or nausea/vomiting  Musculoskeletal ROS: negative for pain in joints or swelling in face or hands.   Neurological ROS: negative for - headaches, numbness/tingling or visual changes      Physical Exam:  /72   Wt 79.1 kg (174 lb 6.1 oz)   LMP 2024 (Approximate)   BMI 32.95 kg/m²   Urine Dip: Pending  Fundal Height: 21cm  Fetal Heart Tones: 165 u/s bpm  Constitutional: She is oriented to person, place, and time. She appears well-developed and well-nourished. No distress. Overweight   Cardiovascular: Normal rate.    Pulmonary/Chest: Effort normal. No respiratory distress  Abdominal: Soft, gravid, nontender. No rebound and no guarding.     Genitourinary: Deferred    Musculoskeletal: Normal range of motion, Minimal peripheral edema.   Neurological: She is alert and oriented to person, place, and time. Coordination normal.   Skin: Skin is warm and dry. She is not diaphoretic.  Psychiatric: She has a normal mood and affect.        Assessment:  29 y.o., at 21w4d Gestation   Patient Active Problem List   Diagnosis    Contraception    DUB (dysfunctional uterine bleeding)    Obesity, Class II, BMI 35-39.9, with comorbidity    Missed menses    Missed      Current Outpatient Medications on File Prior to Visit   Medication  Sig Dispense Refill    albuterol (PROVENTIL/VENTOLIN HFA) 90 mcg/actuation inhaler Inhale 2 puffs into the lungs every 6 (six) hours as needed for Wheezing. Rescue 18 g 0    albuterol-ipratropium (DUO-NEB) 2.5 mg-0.5 mg/3 mL nebulizer solution Take 3 mLs by nebulization every 6 (six) hours as needed for Wheezing or Shortness of Breath. Rescue 120 mL 0    budesonide-formoterol 160-4.5 mcg (SYMBICORT) 160-4.5 mcg/actuation HFAA Inhale 2 puffs into the lungs every 12 (twelve) hours. Controller 10.2 g 11    prenatal vit/iron fum/folic ac (PRENATAL 1+1 ORAL) Take by mouth.      EPINEPHrine (EPIPEN 2-BELEN) 0.3 mg/0.3 mL AtIn Inject 0.3 mLs (0.3 mg total) into the muscle once. for 1 dose 0.3 mL 3     No current facility-administered medications on file prior to visit.         Plan:   Labs today: none  Orders today: none  MEds today: none  Procedures Today: u/s anatomytoday none  Follow up 3 Weeks, bleeding/pain precautions , kick counts, labor precautions

## 2024-11-28 ENCOUNTER — PATIENT MESSAGE (OUTPATIENT)
Dept: PULMONOLOGY | Facility: CLINIC | Age: 29
End: 2024-11-28
Payer: COMMERCIAL

## 2024-12-12 ENCOUNTER — PATIENT MESSAGE (OUTPATIENT)
Dept: OTHER | Facility: OTHER | Age: 29
End: 2024-12-12
Payer: COMMERCIAL

## 2024-12-23 ENCOUNTER — TELEPHONE (OUTPATIENT)
Dept: FAMILY MEDICINE | Facility: CLINIC | Age: 29
End: 2024-12-23
Payer: COMMERCIAL

## 2024-12-23 ENCOUNTER — ROUTINE PRENATAL (OUTPATIENT)
Dept: OBSTETRICS AND GYNECOLOGY | Facility: CLINIC | Age: 29
End: 2024-12-23
Payer: COMMERCIAL

## 2024-12-23 VITALS — DIASTOLIC BLOOD PRESSURE: 70 MMHG | WEIGHT: 173.5 LBS | SYSTOLIC BLOOD PRESSURE: 120 MMHG | BODY MASS INDEX: 32.78 KG/M2

## 2024-12-23 DIAGNOSIS — Z3A.25 25 WEEKS GESTATION OF PREGNANCY: Primary | ICD-10-CM

## 2024-12-23 PROCEDURE — 99999 PR PBB SHADOW E&M-EST. PATIENT-LVL III: CPT | Mod: PBBFAC,,, | Performed by: OBSTETRICS & GYNECOLOGY

## 2024-12-23 NOTE — TELEPHONE ENCOUNTER
----- Message from Romina sent at 12/23/2024 12:06 PM CST -----  Regarding: Next ultrasound  Patient wants to know when she would do another ultrasound since the last one did not get any good images of the baby's right kidney. Please advise. Thanks!

## 2024-12-23 NOTE — PROGRESS NOTES
The patient presents with No complaints.   Reports: Good fetal movements reported, No Bleeding or pains    2024  29 y.o. 25w4d Estimated Date of Delivery: 4/3/25, dating reviewed.   OB History    Para Term  AB Living   3 1 1   1 1   SAB IAB Ectopic Multiple Live Births   1     0 1      # Outcome Date GA Lbr Donn/2nd Weight Sex Type Anes PTL Lv   3 Current            2 Term 22 39w6d  3.841 kg (8 lb 7.5 oz) M CS-LTranv EPI N SOSA   1 SAB                Prenatal labs reviewed and updated today    Review of Systems:  General ROS: negative for headache or visual changes  Breast ROS: negative for breast lumps  Gastrointestinal ROS: negative for constipation, diarrhea or nausea/vomiting  Musculoskeletal ROS: negative for pain in joints or swelling in face or hands.   Neurological ROS: negative for - headaches, numbness/tingling or visual changes      Physical Exam:  /70   Wt 78.7 kg (173 lb 8 oz)   LMP 2024 (Approximate)   BMI 32.78 kg/m²   Urine Dip: Pending  Fundal Height:26cm  Fetal Heart Tones: 170bpm  Constitutional: She is oriented to person, place, and time. She appears well-developed and well-nourished. No distress. Overweight   Cardiovascular: Normal rate.    Pulmonary/Chest: Effort normal. No respiratory distress  Abdominal: Soft, gravid, nontender. No rebound and no guarding.     Genitourinary: Deferred    Musculoskeletal: Normal range of motion, Minimal peripheral edema.   Neurological: She is alert and oriented to person, place, and time. Coordination normal.   Skin: Skin is warm and dry. She is not diaphoretic.  Psychiatric: She has a normal mood and affect.        Assessment:  29 y.o., at 25w4d Gestation   Patient Active Problem List   Diagnosis    Contraception    DUB (dysfunctional uterine bleeding)    Obesity, Class II, BMI 35-39.9, with comorbidity    Missed menses    Missed      Current Outpatient Medications on File Prior to Visit   Medication Sig  Dispense Refill    albuterol (PROVENTIL/VENTOLIN HFA) 90 mcg/actuation inhaler Inhale 2 puffs into the lungs every 6 (six) hours as needed for Wheezing. Rescue 18 g 0    budesonide-formoterol 160-4.5 mcg (SYMBICORT) 160-4.5 mcg/actuation HFAA Inhale 2 puffs into the lungs every 12 (twelve) hours. Controller 10.2 g 11    prenatal vit/iron fum/folic ac (PRENATAL 1+1 ORAL) Take by mouth.      albuterol-ipratropium (DUO-NEB) 2.5 mg-0.5 mg/3 mL nebulizer solution Take 3 mLs by nebulization every 6 (six) hours as needed for Wheezing or Shortness of Breath. Rescue (Patient not taking: Reported on 12/23/2024) 120 mL 0    EPINEPHrine (EPIPEN 2-BELEN) 0.3 mg/0.3 mL AtIn Inject 0.3 mLs (0.3 mg total) into the muscle once. for 1 dose (Patient not taking: Reported on 12/23/2024) 0.3 mL 3     No current facility-administered medications on file prior to visit.       Plan:   Labs today: glucoal 2 weeks  Orders today: none  MEds today: none  Procedures Today: none  Follow up 2 Weeks, bleeding/pain precautions , kick counts, labor precautions

## 2024-12-25 ENCOUNTER — PATIENT MESSAGE (OUTPATIENT)
Dept: OBSTETRICS AND GYNECOLOGY | Facility: CLINIC | Age: 29
End: 2024-12-25
Payer: COMMERCIAL

## 2024-12-26 ENCOUNTER — PATIENT MESSAGE (OUTPATIENT)
Dept: OBSTETRICS AND GYNECOLOGY | Facility: CLINIC | Age: 29
End: 2024-12-26
Payer: COMMERCIAL

## 2024-12-26 ENCOUNTER — PATIENT MESSAGE (OUTPATIENT)
Dept: OTHER | Facility: OTHER | Age: 29
End: 2024-12-26
Payer: COMMERCIAL

## 2024-12-29 ENCOUNTER — NURSE TRIAGE (OUTPATIENT)
Dept: ADMINISTRATIVE | Facility: CLINIC | Age: 29
End: 2024-12-29
Payer: COMMERCIAL

## 2024-12-29 NOTE — TELEPHONE ENCOUNTER
Pt 26 weeks 3 days pregnant. Pt states that she is Flu positive and woke up this morning with hives to abdomen and back. Denies itching and fever at this time. Pt advised home care per protocol. Pt also advised safe to take meds per A to Z of Your Pregnancy on [employer]. VU. Encounter routed to provider.     Reason for Disposition   Localized hives    Additional Information   Looks like hives (pink bumps with pale centers)   Negative: [1] Life-threatening reaction (anaphylaxis) in the past to similar substance (e.g., food, insect bite/sting, chemical, etc.) AND [2] < 2 hours since exposure   Negative: Difficulty breathing or wheezing now   Negative: [1] Swollen tongue AND [2] rapid onset   Negative: [1] Hoarseness or cough now AND [2] rapid onset   Negative: Shock suspected (e.g., cold/pale/clammy skin, too weak to stand, low BP, rapid pulse)   Negative: Difficult to awaken or acting confused (e.g., disoriented, slurred speech)   Negative: Sounds like a life-threatening emergency to the triager   Negative: Swollen tongue   Negative: [1] Widespread hives, itching or facial swelling AND [2] onset < 2 hours of exposure to high-risk allergen (e.g., sting, nuts, 1st dose of antibiotic)   Negative: Patient sounds very sick or weak to the triager   Negative: [1] MODERATE-SEVERE hives (e.g.,hives interfere with normal activities or work) AND [2] not improved after taking antihistamine (e.g., cetirizine, fexofenadine, or loratadine) > 24 hours   Negative: [1] Hives have become worse AND [2] taking oral steroids (e.g., prednisone) > 24 hours   Negative: Joint swelling   Negative: [1] Abdominal pain AND [2] pain present > 12 hours   Negative: Fever   Negative: [1] Widespread hives, itching or facial swelling AND [2] onset > 2 hours after exposure to high-risk allergen (e.g., sting, nuts, 1st dose of antibiotic)   Negative: [1] Hives from food reaction AND [2] diagnosis never confirmed by a doctor (or NP/PA)   Negative: Hives  persist > 1 week   Negative: [1] Hives has occurred 3 or more times in the last year AND [2] the cause was not found   Negative: [1] Hives from food reaction AND [2] diagnosis already confirmed    Protocols used: Hives-A-AH, Pregnancy - Itching-A-AH

## 2025-01-05 PROBLEM — Z3A.27 27 WEEKS GESTATION OF PREGNANCY: Status: ACTIVE | Noted: 2025-01-05

## 2025-01-05 PROBLEM — R06.09 DYSPNEA ON EXERTION: Status: ACTIVE | Noted: 2025-01-05

## 2025-01-05 PROBLEM — J45.41 MODERATE PERSISTENT ASTHMA WITH ACUTE EXACERBATION: Status: ACTIVE | Noted: 2025-01-05

## 2025-01-05 PROBLEM — Z98.891 PREVIOUS CESAREAN SECTION: Status: ACTIVE | Noted: 2025-01-05

## 2025-01-06 ENCOUNTER — OFFICE VISIT (OUTPATIENT)
Dept: PULMONOLOGY | Facility: CLINIC | Age: 30
End: 2025-01-06
Payer: COMMERCIAL

## 2025-01-06 DIAGNOSIS — J45.51 SEVERE PERSISTENT ASTHMA WITH ACUTE EXACERBATION: Primary | ICD-10-CM

## 2025-01-06 PROBLEM — R00.0 TACHYCARDIA: Status: ACTIVE | Noted: 2025-01-06

## 2025-01-06 PROBLEM — F41.9 ANXIETY: Status: ACTIVE | Noted: 2025-01-06

## 2025-01-09 ENCOUNTER — PATIENT MESSAGE (OUTPATIENT)
Dept: OTHER | Facility: OTHER | Age: 30
End: 2025-01-09
Payer: COMMERCIAL

## 2025-01-10 ENCOUNTER — PATIENT MESSAGE (OUTPATIENT)
Dept: OBSTETRICS AND GYNECOLOGY | Facility: CLINIC | Age: 30
End: 2025-01-10
Payer: COMMERCIAL

## 2025-01-10 ENCOUNTER — PATIENT MESSAGE (OUTPATIENT)
Dept: PULMONOLOGY | Facility: CLINIC | Age: 30
End: 2025-01-10
Payer: COMMERCIAL

## 2025-01-16 ENCOUNTER — HOSPITAL ENCOUNTER (OUTPATIENT)
Dept: RADIOLOGY | Facility: HOSPITAL | Age: 30
Discharge: HOME | End: 2025-01-16
Attending: OBSTETRICS & GYNECOLOGY
Payer: COMMERCIAL

## 2025-01-16 ENCOUNTER — ROUTINE PRENATAL (OUTPATIENT)
Dept: OBSTETRICS AND GYNECOLOGY | Facility: CLINIC | Age: 30
End: 2025-01-16
Payer: COMMERCIAL

## 2025-01-16 ENCOUNTER — PATIENT MESSAGE (OUTPATIENT)
Dept: OBSTETRICS AND GYNECOLOGY | Facility: CLINIC | Age: 30
End: 2025-01-16

## 2025-01-16 VITALS
BODY MASS INDEX: 32.57 KG/M2 | SYSTOLIC BLOOD PRESSURE: 122 MMHG | DIASTOLIC BLOOD PRESSURE: 70 MMHG | WEIGHT: 172.38 LBS

## 2025-01-16 DIAGNOSIS — Z3A.25 25 WEEKS GESTATION OF PREGNANCY: ICD-10-CM

## 2025-01-16 DIAGNOSIS — Z3A.29 29 WEEKS GESTATION OF PREGNANCY: Primary | ICD-10-CM

## 2025-01-16 LAB
BILIRUBIN, UA POC OHS: NEGATIVE
BLOOD, UA POC OHS: NEGATIVE
CLARITY, UA POC OHS: CLEAR
COLOR, UA POC OHS: YELLOW
GLUCOSE, UA POC OHS: NEGATIVE
KETONES, UA POC OHS: NEGATIVE
LEUKOCYTES, UA POC OHS: ABNORMAL
NITRITE, UA POC OHS: NEGATIVE
PH, UA POC OHS: 7
PROTEIN, UA POC OHS: NEGATIVE
SPECIFIC GRAVITY, UA POC OHS: 1.01
UROBILINOGEN, UA POC OHS: 0.2

## 2025-01-16 PROCEDURE — 87186 SC STD MICRODIL/AGAR DIL: CPT | Performed by: OBSTETRICS & GYNECOLOGY

## 2025-01-16 PROCEDURE — 99999 PR PBB SHADOW E&M-EST. PATIENT-LVL III: CPT | Mod: PBBFAC,,, | Performed by: OBSTETRICS & GYNECOLOGY

## 2025-01-16 PROCEDURE — 76815 OB US LIMITED FETUS(S): CPT | Mod: TC,PN

## 2025-01-16 PROCEDURE — 76815 OB US LIMITED FETUS(S): CPT | Mod: 26,,, | Performed by: RADIOLOGY

## 2025-01-16 PROCEDURE — 87088 URINE BACTERIA CULTURE: CPT | Performed by: OBSTETRICS & GYNECOLOGY

## 2025-01-16 PROCEDURE — 0502F SUBSEQUENT PRENATAL CARE: CPT | Mod: CPTII,S$GLB,, | Performed by: OBSTETRICS & GYNECOLOGY

## 2025-01-16 PROCEDURE — 87086 URINE CULTURE/COLONY COUNT: CPT | Performed by: OBSTETRICS & GYNECOLOGY

## 2025-01-16 NOTE — PROGRESS NOTES
The patient presents with routine OB complaints - asthma - meds changed, counseled. .   Reports: Good fetal movements reported, No Bleeding or pains    2025  29 y.o. 29w0d Estimated Date of Delivery: 4/3/25, dating reviewed.   OB History    Para Term  AB Living   3 1 1   1 1   SAB IAB Ectopic Multiple Live Births   1     0 1      # Outcome Date GA Lbr Donn/2nd Weight Sex Type Anes PTL Lv   3 Current            2 Term 22 39w6d  3.841 kg (8 lb 7.5 oz) M CS-LTranv EPI N SOSA   1 SAB                Prenatal labs reviewed and updated today    Review of Systems:  General ROS: negative for headache or visual changes  Breast ROS: negative for breast lumps  Gastrointestinal ROS: negative for constipation, diarrhea or nausea/vomiting  Musculoskeletal ROS: negative for pain in joints or swelling in face or hands.   Neurological ROS: negative for - headaches, numbness/tingling or visual changes      Physical Exam:  /70   Wt 78.2 kg (172 lb 6.4 oz)   LMP 2024 (Approximate)   BMI 32.57 kg/m²   Urine Dip: Pending  Fundal Height:28cm  Fetal Heart Tones: 158bpm  Constitutional: She is oriented to person, place, and time. She appears well-developed and well-nourished. No distress. Normal weight   Cardiovascular: Normal rate.    Pulmonary/Chest: Effort normal. No respiratory distress  Abdominal: Soft, gravid, nontender. No rebound and no guarding.     Genitourinary: Deferred    Musculoskeletal: Normal range of motion, Minimal peripheral edema.   Neurological: She is alert and oriented to person, place, and time. Coordination normal.   Skin: Skin is warm and dry. She is not diaphoretic.  Psychiatric: She has a normal mood and affect.        Assessment:  29 y.o., at 29w0d Gestation   Patient Active Problem List   Diagnosis    Contraception    DUB (dysfunctional uterine bleeding)    Obesity, Class II, BMI 35-39.9, with comorbidity    Missed menses    Missed     Dyspnea on exertion    27 weeks  gestation of pregnancy    Severe persistent asthma with acute exacerbation    Previous  section    Tachycardia    Anxiety     Current Outpatient Medications on File Prior to Visit   Medication Sig Dispense Refill    albuterol (VENTOLIN HFA) 90 mcg/actuation inhaler Inhale 2 puffs into the lungs every 6 (six) hours as needed for Wheezing. Rescue 18 g 0    albuterol sulfate 2.5 mg/0.5 mL Nebu Take 2.5 mg by nebulization every 6 (six) hours as needed (shortness of breath, wheezing, or chest tightness.). Rescue 1 each 0    cetirizine (ZYRTEC) 10 MG tablet Take 1 tablet (10 mg total) by mouth every evening. 30 tablet 0    famotidine (PEPCID) 20 MG tablet Take 1 tablet (20 mg total) by mouth 2 (two) times daily. 180 tablet 0    fluticasone propion-salmeterol 115-21 mcg/dose (ADVAIR HFA) 115-21 mcg/actuation HFAA inhaler Inhale 2 puffs into the lungs 2 (two) times a day. Controller 12 g 0    fluticasone propionate (FLONASE) 50 mcg/actuation nasal spray 2 sprays (100 mcg total) by Each Nostril route once daily. 18.2 mL 0    montelukast (SINGULAIR) 5 MG chewable tablet Take 2 tablets (10 mg total) by mouth every evening. 60 tablet 0    predniSONE (DELTASONE) 20 MG tablet Take 1 tablet (20 mg total) by mouth once daily for 5 days, THEN 0.5 tablets (10 mg total) once daily for 5 days. 7.5 tablet 0    prenatal vit/iron fum/folic ac (PRENATAL 1+1 ORAL) Take 1 tablet by mouth once daily.      EPINEPHrine (EPIPEN 2-BELEN) 0.3 mg/0.3 mL AtIn Inject 0.3 mLs (0.3 mg total) into the muscle once. for 1 dose (Patient not taking: Reported on 2024) 0.3 mL 3    ondansetron (ZOFRAN-ODT) 4 MG TbDL Take 1 tablet (4 mg total) by mouth every 6 (six) hours as needed (nausea/vomiting). (Patient not taking: Reported on 2025) 12 tablet 0     No current facility-administered medications on file prior to visit.       Plan:   Labs today: glucola today - on steroids, if fail repeat 3hr GTT off of steroids.   Orders today: none  MEds  today: none  Procedures Today: u/s for growth  Follow up 2 Weeks, bleeding/pain precautions , kick counts, labor precautions

## 2025-01-17 ENCOUNTER — PATIENT MESSAGE (OUTPATIENT)
Dept: OBSTETRICS AND GYNECOLOGY | Facility: CLINIC | Age: 30
End: 2025-01-17
Payer: COMMERCIAL

## 2025-01-17 DIAGNOSIS — O35.9XX0 SUSPECTED FETAL ANOMALY, ANTEPARTUM, SINGLE OR UNSPECIFIED FETUS: Primary | ICD-10-CM

## 2025-01-18 ENCOUNTER — PATIENT MESSAGE (OUTPATIENT)
Dept: OBSTETRICS AND GYNECOLOGY | Facility: CLINIC | Age: 30
End: 2025-01-18
Payer: COMMERCIAL

## 2025-01-18 RX ORDER — NITROFURANTOIN 25; 75 MG/1; MG/1
100 CAPSULE ORAL 2 TIMES DAILY
Qty: 20 CAPSULE | Refills: 0 | Status: ON HOLD | OUTPATIENT
Start: 2025-01-18 | End: 2025-01-28

## 2025-01-19 LAB — BACTERIA UR CULT: ABNORMAL

## 2025-01-21 ENCOUNTER — PATIENT MESSAGE (OUTPATIENT)
Dept: OBSTETRICS AND GYNECOLOGY | Facility: CLINIC | Age: 30
End: 2025-01-21
Payer: COMMERCIAL

## 2025-01-21 PROBLEM — Z3A.29 29 WEEKS GESTATION OF PREGNANCY: Status: ACTIVE | Noted: 2025-01-21

## 2025-01-21 PROBLEM — J38.6: Status: ACTIVE | Noted: 2025-01-21

## 2025-01-21 PROBLEM — N30.00 ACUTE CYSTITIS: Status: ACTIVE | Noted: 2025-01-21

## 2025-01-23 ENCOUNTER — PATIENT MESSAGE (OUTPATIENT)
Dept: OTHER | Facility: OTHER | Age: 30
End: 2025-01-23
Payer: COMMERCIAL

## 2025-01-24 ENCOUNTER — HOSPITAL ENCOUNTER (INPATIENT)
Facility: HOSPITAL | Age: 30
LOS: 14 days | Discharge: HOME | DRG: 818 | End: 2025-02-07
Attending: INTERNAL MEDICINE | Admitting: INTERNAL MEDICINE
Payer: COMMERCIAL

## 2025-01-24 DIAGNOSIS — Z3A.27 27 WEEKS GESTATION OF PREGNANCY: Primary | ICD-10-CM

## 2025-01-24 DIAGNOSIS — J38.6 ACQUIRED SUBGLOTTIC STENOSIS: ICD-10-CM

## 2025-01-24 DIAGNOSIS — J38.6 SUBGLOTTIC STENOSIS: ICD-10-CM

## 2025-01-24 DIAGNOSIS — Z3A.30 30 WEEKS GESTATION OF PREGNANCY: ICD-10-CM

## 2025-01-24 DIAGNOSIS — J45.51 SEVERE PERSISTENT ASTHMA WITH ACUTE EXACERBATION: ICD-10-CM

## 2025-01-24 LAB
ALBUMIN SERPL BCP-MCNC: 3.2 G/DL (ref 3.5–5.2)
ALP SERPL-CCNC: 57 U/L (ref 40–150)
ALT SERPL W/O P-5'-P-CCNC: 9 U/L (ref 10–44)
ANION GAP SERPL CALC-SCNC: 10 MMOL/L (ref 8–16)
AST SERPL-CCNC: 7 U/L (ref 10–40)
BILIRUB SERPL-MCNC: 0.4 MG/DL (ref 0.1–1)
BUN SERPL-MCNC: 5 MG/DL (ref 6–20)
CALCIUM SERPL-MCNC: 8.6 MG/DL (ref 8.7–10.5)
CHLORIDE SERPL-SCNC: 106 MMOL/L (ref 95–110)
CO2 SERPL-SCNC: 22 MMOL/L (ref 23–29)
CREAT SERPL-MCNC: 0.5 MG/DL (ref 0.5–1.4)
EST. GFR  (NO RACE VARIABLE): >60 ML/MIN/1.73 M^2
GLUCOSE SERPL-MCNC: 91 MG/DL (ref 70–110)
MAGNESIUM SERPL-MCNC: 1.7 MG/DL (ref 1.6–2.6)
PHOSPHATE SERPL-MCNC: 2.8 MG/DL (ref 2.7–4.5)
POTASSIUM SERPL-SCNC: 3.3 MMOL/L (ref 3.5–5.1)
PROT SERPL-MCNC: 6.3 G/DL (ref 6–8.4)
SODIUM SERPL-SCNC: 138 MMOL/L (ref 136–145)

## 2025-01-24 PROCEDURE — 25000242 PHARM REV CODE 250 ALT 637 W/ HCPCS: Performed by: STUDENT IN AN ORGANIZED HEALTH CARE EDUCATION/TRAINING PROGRAM

## 2025-01-24 PROCEDURE — 83735 ASSAY OF MAGNESIUM: CPT | Performed by: STUDENT IN AN ORGANIZED HEALTH CARE EDUCATION/TRAINING PROGRAM

## 2025-01-24 PROCEDURE — 63600175 PHARM REV CODE 636 W HCPCS: Mod: JZ,TB | Performed by: STUDENT IN AN ORGANIZED HEALTH CARE EDUCATION/TRAINING PROGRAM

## 2025-01-24 PROCEDURE — 94761 N-INVAS EAR/PLS OXIMETRY MLT: CPT

## 2025-01-24 PROCEDURE — 25000003 PHARM REV CODE 250: Performed by: STUDENT IN AN ORGANIZED HEALTH CARE EDUCATION/TRAINING PROGRAM

## 2025-01-24 PROCEDURE — 20000000 HC ICU ROOM

## 2025-01-24 PROCEDURE — 94640 AIRWAY INHALATION TREATMENT: CPT

## 2025-01-24 PROCEDURE — 85025 COMPLETE CBC W/AUTO DIFF WBC: CPT | Performed by: STUDENT IN AN ORGANIZED HEALTH CARE EDUCATION/TRAINING PROGRAM

## 2025-01-24 PROCEDURE — 80053 COMPREHEN METABOLIC PANEL: CPT | Performed by: STUDENT IN AN ORGANIZED HEALTH CARE EDUCATION/TRAINING PROGRAM

## 2025-01-24 PROCEDURE — 84100 ASSAY OF PHOSPHORUS: CPT | Performed by: STUDENT IN AN ORGANIZED HEALTH CARE EDUCATION/TRAINING PROGRAM

## 2025-01-24 RX ORDER — SODIUM,POTASSIUM PHOSPHATES 280-250MG
2 POWDER IN PACKET (EA) ORAL
Status: DISCONTINUED | OUTPATIENT
Start: 2025-01-25 | End: 2025-02-07 | Stop reason: HOSPADM

## 2025-01-24 RX ORDER — LANOLIN ALCOHOL/MO/W.PET/CERES
800 CREAM (GRAM) TOPICAL
Status: DISCONTINUED | OUTPATIENT
Start: 2025-01-25 | End: 2025-02-07 | Stop reason: HOSPADM

## 2025-01-24 RX ORDER — MONTELUKAST SODIUM 10 MG/1
10 TABLET ORAL NIGHTLY
Status: DISCONTINUED | OUTPATIENT
Start: 2025-01-24 | End: 2025-02-07 | Stop reason: HOSPADM

## 2025-01-24 RX ORDER — PANTOPRAZOLE SODIUM 40 MG/10ML
40 INJECTION, POWDER, LYOPHILIZED, FOR SOLUTION INTRAVENOUS EVERY 12 HOURS
Status: DISCONTINUED | OUTPATIENT
Start: 2025-01-24 | End: 2025-01-29

## 2025-01-24 RX ORDER — ALBUTEROL SULFATE 2.5 MG/.5ML
2.5 SOLUTION RESPIRATORY (INHALATION)
Status: DISCONTINUED | OUTPATIENT
Start: 2025-01-25 | End: 2025-01-24

## 2025-01-24 RX ORDER — SODIUM CHLORIDE 0.9 % (FLUSH) 0.9 %
10 SYRINGE (ML) INJECTION
Status: DISCONTINUED | OUTPATIENT
Start: 2025-01-24 | End: 2025-02-07 | Stop reason: HOSPADM

## 2025-01-24 RX ORDER — BUDESONIDE 0.5 MG/2ML
0.5 INHALANT ORAL EVERY 12 HOURS
Status: DISCONTINUED | OUTPATIENT
Start: 2025-01-25 | End: 2025-01-24

## 2025-01-24 RX ORDER — ALBUTEROL SULFATE 2.5 MG/.5ML
2.5 SOLUTION RESPIRATORY (INHALATION) EVERY 4 HOURS PRN
Status: DISCONTINUED | OUTPATIENT
Start: 2025-01-24 | End: 2025-02-01

## 2025-01-24 RX ORDER — ONDANSETRON HYDROCHLORIDE 2 MG/ML
4 INJECTION, SOLUTION INTRAVENOUS EVERY 8 HOURS PRN
Status: DISCONTINUED | OUTPATIENT
Start: 2025-01-24 | End: 2025-02-07 | Stop reason: HOSPADM

## 2025-01-24 RX ORDER — FAMOTIDINE 20 MG/1
40 TABLET, FILM COATED ORAL NIGHTLY
Status: DISCONTINUED | OUTPATIENT
Start: 2025-01-25 | End: 2025-02-07 | Stop reason: HOSPADM

## 2025-01-24 RX ORDER — ACETAMINOPHEN 325 MG/1
650 TABLET ORAL EVERY 4 HOURS PRN
Status: DISCONTINUED | OUTPATIENT
Start: 2025-01-24 | End: 2025-02-07 | Stop reason: HOSPADM

## 2025-01-24 RX ORDER — PRENATAL WITH FERROUS FUM AND FOLIC ACID 3080; 920; 120; 400; 22; 1.84; 3; 20; 10; 1; 12; 200; 27; 25; 2 [IU]/1; [IU]/1; MG/1; [IU]/1; MG/1; MG/1; MG/1; MG/1; MG/1; MG/1; UG/1; MG/1; MG/1; MG/1; MG/1
1 TABLET ORAL DAILY
Status: DISCONTINUED | OUTPATIENT
Start: 2025-01-25 | End: 2025-02-07 | Stop reason: HOSPADM

## 2025-01-24 RX ORDER — CETIRIZINE HYDROCHLORIDE 10 MG/1
10 TABLET ORAL NIGHTLY
Status: DISCONTINUED | OUTPATIENT
Start: 2025-01-24 | End: 2025-01-24

## 2025-01-24 RX ORDER — ALBUTEROL SULFATE 2.5 MG/.5ML
2.5 SOLUTION RESPIRATORY (INHALATION)
Status: DISCONTINUED | OUTPATIENT
Start: 2025-01-24 | End: 2025-02-07 | Stop reason: HOSPADM

## 2025-01-24 RX ORDER — BUDESONIDE 0.5 MG/2ML
0.5 INHALANT ORAL EVERY 12 HOURS
Status: DISCONTINUED | OUTPATIENT
Start: 2025-01-24 | End: 2025-02-07 | Stop reason: HOSPADM

## 2025-01-24 RX ORDER — DIPHENHYDRAMINE HCL 25 MG
50 CAPSULE ORAL NIGHTLY PRN
Status: DISCONTINUED | OUTPATIENT
Start: 2025-01-24 | End: 2025-01-28

## 2025-01-24 RX ADMIN — ALBUTEROL SULFATE 2.5 MG: 2.5 SOLUTION RESPIRATORY (INHALATION) at 11:01

## 2025-01-24 RX ADMIN — BUDESONIDE 0.5 MG: 0.5 INHALANT RESPIRATORY (INHALATION) at 11:01

## 2025-01-24 RX ADMIN — DIPHENHYDRAMINE HYDROCHLORIDE 50 MG: 25 CAPSULE ORAL at 11:01

## 2025-01-24 RX ADMIN — METHYLPREDNISOLONE SODIUM SUCCINATE 40 MG: 40 INJECTION, POWDER, FOR SOLUTION INTRAMUSCULAR; INTRAVENOUS at 11:01

## 2025-01-24 RX ADMIN — MONTELUKAST 10 MG: 10 TABLET, FILM COATED ORAL at 11:01

## 2025-01-25 LAB
ABO + RH BLD: NORMAL
ALBUMIN SERPL BCP-MCNC: 3.2 G/DL (ref 3.5–5.2)
ALP SERPL-CCNC: 56 U/L (ref 40–150)
ALT SERPL W/O P-5'-P-CCNC: 7 U/L (ref 10–44)
ANION GAP SERPL CALC-SCNC: 10 MMOL/L (ref 8–16)
AST SERPL-CCNC: 8 U/L (ref 10–40)
BASOPHILS # BLD AUTO: 0.03 K/UL (ref 0–0.2)
BASOPHILS # BLD AUTO: 0.03 K/UL (ref 0–0.2)
BASOPHILS NFR BLD: 0.2 % (ref 0–1.9)
BASOPHILS NFR BLD: 0.2 % (ref 0–1.9)
BILIRUB SERPL-MCNC: 0.3 MG/DL (ref 0.1–1)
BLD GP AB SCN CELLS X3 SERPL QL: NORMAL
BUN SERPL-MCNC: 6 MG/DL (ref 6–20)
CALCIUM SERPL-MCNC: 8.4 MG/DL (ref 8.7–10.5)
CCP AB SER IA-ACNC: <0.5 U/ML
CHLORIDE SERPL-SCNC: 107 MMOL/L (ref 95–110)
CO2 SERPL-SCNC: 21 MMOL/L (ref 23–29)
CREAT SERPL-MCNC: 0.6 MG/DL (ref 0.5–1.4)
DIFFERENTIAL METHOD BLD: ABNORMAL
DIFFERENTIAL METHOD BLD: ABNORMAL
EOSINOPHIL # BLD AUTO: 0 K/UL (ref 0–0.5)
EOSINOPHIL # BLD AUTO: 0 K/UL (ref 0–0.5)
EOSINOPHIL NFR BLD: 0 % (ref 0–8)
EOSINOPHIL NFR BLD: 0 % (ref 0–8)
ERYTHROCYTE [DISTWIDTH] IN BLOOD BY AUTOMATED COUNT: 13 % (ref 11.5–14.5)
ERYTHROCYTE [DISTWIDTH] IN BLOOD BY AUTOMATED COUNT: 13.1 % (ref 11.5–14.5)
EST. GFR  (NO RACE VARIABLE): >60 ML/MIN/1.73 M^2
GLUCOSE SERPL-MCNC: 121 MG/DL (ref 70–110)
HCT VFR BLD AUTO: 31.3 % (ref 37–48.5)
HCT VFR BLD AUTO: 32.9 % (ref 37–48.5)
HGB BLD-MCNC: 10.8 G/DL (ref 12–16)
HGB BLD-MCNC: 11.2 G/DL (ref 12–16)
IMM GRANULOCYTES # BLD AUTO: 0.19 K/UL (ref 0–0.04)
IMM GRANULOCYTES # BLD AUTO: 0.22 K/UL (ref 0–0.04)
IMM GRANULOCYTES NFR BLD AUTO: 1.2 % (ref 0–0.5)
IMM GRANULOCYTES NFR BLD AUTO: 1.6 % (ref 0–0.5)
INR PPP: 0.9 (ref 0.8–1.2)
LYMPHOCYTES # BLD AUTO: 1.2 K/UL (ref 1–4.8)
LYMPHOCYTES # BLD AUTO: 2.7 K/UL (ref 1–4.8)
LYMPHOCYTES NFR BLD: 17.8 % (ref 18–48)
LYMPHOCYTES NFR BLD: 8.6 % (ref 18–48)
MAGNESIUM SERPL-MCNC: 1.7 MG/DL (ref 1.6–2.6)
MCH RBC QN AUTO: 29.5 PG (ref 27–31)
MCH RBC QN AUTO: 29.7 PG (ref 27–31)
MCHC RBC AUTO-ENTMCNC: 34 G/DL (ref 32–36)
MCHC RBC AUTO-ENTMCNC: 34.5 G/DL (ref 32–36)
MCV RBC AUTO: 86 FL (ref 82–98)
MCV RBC AUTO: 87 FL (ref 82–98)
MONOCYTES # BLD AUTO: 0.4 K/UL (ref 0.3–1)
MONOCYTES # BLD AUTO: 0.9 K/UL (ref 0.3–1)
MONOCYTES NFR BLD: 3 % (ref 4–15)
MONOCYTES NFR BLD: 5.7 % (ref 4–15)
MRSA SCREEN BY PCR: NOT DETECTED
NEUTROPHILS # BLD AUTO: 11.6 K/UL (ref 1.8–7.7)
NEUTROPHILS # BLD AUTO: 11.7 K/UL (ref 1.8–7.7)
NEUTROPHILS NFR BLD: 75.1 % (ref 38–73)
NEUTROPHILS NFR BLD: 86.6 % (ref 38–73)
NRBC BLD-RTO: 0 /100 WBC
NRBC BLD-RTO: 0 /100 WBC
PHOSPHATE SERPL-MCNC: 2.7 MG/DL (ref 2.7–4.5)
PLATELET # BLD AUTO: 183 K/UL (ref 150–450)
PLATELET # BLD AUTO: 185 K/UL (ref 150–450)
PMV BLD AUTO: 11 FL (ref 9.2–12.9)
PMV BLD AUTO: 11.2 FL (ref 9.2–12.9)
POTASSIUM SERPL-SCNC: 3.6 MMOL/L (ref 3.5–5.1)
PROT SERPL-MCNC: 6.1 G/DL (ref 6–8.4)
PROTHROMBIN TIME: 10.4 SEC (ref 9–12.5)
RBC # BLD AUTO: 3.64 M/UL (ref 4–5.4)
RBC # BLD AUTO: 3.8 M/UL (ref 4–5.4)
RHEUMATOID FACT SERPL-ACNC: <13 IU/ML (ref 0–15)
SODIUM SERPL-SCNC: 138 MMOL/L (ref 136–145)
SPECIMEN OUTDATE: NORMAL
T4 FREE SERPL-MCNC: 0.94 NG/DL (ref 0.71–1.51)
TSH SERPL DL<=0.005 MIU/L-ACNC: 0.72 UIU/ML (ref 0.4–4)
WBC # BLD AUTO: 13.54 K/UL (ref 3.9–12.7)
WBC # BLD AUTO: 15.37 K/UL (ref 3.9–12.7)

## 2025-01-25 PROCEDURE — 25000242 PHARM REV CODE 250 ALT 637 W/ HCPCS: Performed by: STUDENT IN AN ORGANIZED HEALTH CARE EDUCATION/TRAINING PROGRAM

## 2025-01-25 PROCEDURE — 20000000 HC ICU ROOM

## 2025-01-25 PROCEDURE — 86256 FLUORESCENT ANTIBODY TITER: CPT | Performed by: STUDENT IN AN ORGANIZED HEALTH CARE EDUCATION/TRAINING PROGRAM

## 2025-01-25 PROCEDURE — 85025 COMPLETE CBC W/AUTO DIFF WBC: CPT | Performed by: STUDENT IN AN ORGANIZED HEALTH CARE EDUCATION/TRAINING PROGRAM

## 2025-01-25 PROCEDURE — 80053 COMPREHEN METABOLIC PANEL: CPT | Performed by: STUDENT IN AN ORGANIZED HEALTH CARE EDUCATION/TRAINING PROGRAM

## 2025-01-25 PROCEDURE — 86235 NUCLEAR ANTIGEN ANTIBODY: CPT | Mod: 59 | Performed by: STUDENT IN AN ORGANIZED HEALTH CARE EDUCATION/TRAINING PROGRAM

## 2025-01-25 PROCEDURE — 86235 NUCLEAR ANTIGEN ANTIBODY: CPT | Mod: 91 | Performed by: STUDENT IN AN ORGANIZED HEALTH CARE EDUCATION/TRAINING PROGRAM

## 2025-01-25 PROCEDURE — 99223 1ST HOSP IP/OBS HIGH 75: CPT | Mod: ,,, | Performed by: INTERNAL MEDICINE

## 2025-01-25 PROCEDURE — 86901 BLOOD TYPING SEROLOGIC RH(D): CPT | Performed by: INTERNAL MEDICINE

## 2025-01-25 PROCEDURE — 63600175 PHARM REV CODE 636 W HCPCS: Performed by: STUDENT IN AN ORGANIZED HEALTH CARE EDUCATION/TRAINING PROGRAM

## 2025-01-25 PROCEDURE — 84439 ASSAY OF FREE THYROXINE: CPT | Performed by: STUDENT IN AN ORGANIZED HEALTH CARE EDUCATION/TRAINING PROGRAM

## 2025-01-25 PROCEDURE — 86235 NUCLEAR ANTIGEN ANTIBODY: CPT | Performed by: STUDENT IN AN ORGANIZED HEALTH CARE EDUCATION/TRAINING PROGRAM

## 2025-01-25 PROCEDURE — 27000207 HC ISOLATION

## 2025-01-25 PROCEDURE — 0CJS8ZZ INSPECTION OF LARYNX, VIA NATURAL OR ARTIFICIAL OPENING ENDOSCOPIC: ICD-10-PCS | Performed by: OTOLARYNGOLOGY

## 2025-01-25 PROCEDURE — 86147 CARDIOLIPIN ANTIBODY EA IG: CPT | Mod: 59 | Performed by: STUDENT IN AN ORGANIZED HEALTH CARE EDUCATION/TRAINING PROGRAM

## 2025-01-25 PROCEDURE — 84443 ASSAY THYROID STIM HORMONE: CPT | Performed by: STUDENT IN AN ORGANIZED HEALTH CARE EDUCATION/TRAINING PROGRAM

## 2025-01-25 PROCEDURE — 82164 ANGIOTENSIN I ENZYME TEST: CPT | Performed by: STUDENT IN AN ORGANIZED HEALTH CARE EDUCATION/TRAINING PROGRAM

## 2025-01-25 PROCEDURE — 85610 PROTHROMBIN TIME: CPT | Performed by: STUDENT IN AN ORGANIZED HEALTH CARE EDUCATION/TRAINING PROGRAM

## 2025-01-25 PROCEDURE — 94640 AIRWAY INHALATION TREATMENT: CPT

## 2025-01-25 PROCEDURE — 25000003 PHARM REV CODE 250: Performed by: STUDENT IN AN ORGANIZED HEALTH CARE EDUCATION/TRAINING PROGRAM

## 2025-01-25 PROCEDURE — 86200 CCP ANTIBODY: CPT | Performed by: STUDENT IN AN ORGANIZED HEALTH CARE EDUCATION/TRAINING PROGRAM

## 2025-01-25 PROCEDURE — 86225 DNA ANTIBODY NATIVE: CPT | Performed by: STUDENT IN AN ORGANIZED HEALTH CARE EDUCATION/TRAINING PROGRAM

## 2025-01-25 PROCEDURE — 94761 N-INVAS EAR/PLS OXIMETRY MLT: CPT

## 2025-01-25 PROCEDURE — 84100 ASSAY OF PHOSPHORUS: CPT | Performed by: STUDENT IN AN ORGANIZED HEALTH CARE EDUCATION/TRAINING PROGRAM

## 2025-01-25 PROCEDURE — 87641 MR-STAPH DNA AMP PROBE: CPT | Performed by: STUDENT IN AN ORGANIZED HEALTH CARE EDUCATION/TRAINING PROGRAM

## 2025-01-25 PROCEDURE — 83735 ASSAY OF MAGNESIUM: CPT | Performed by: STUDENT IN AN ORGANIZED HEALTH CARE EDUCATION/TRAINING PROGRAM

## 2025-01-25 PROCEDURE — 63600175 PHARM REV CODE 636 W HCPCS: Mod: JZ,TB | Performed by: STUDENT IN AN ORGANIZED HEALTH CARE EDUCATION/TRAINING PROGRAM

## 2025-01-25 PROCEDURE — 86036 ANCA SCREEN EACH ANTIBODY: CPT | Performed by: STUDENT IN AN ORGANIZED HEALTH CARE EDUCATION/TRAINING PROGRAM

## 2025-01-25 PROCEDURE — 86038 ANTINUCLEAR ANTIBODIES: CPT | Performed by: STUDENT IN AN ORGANIZED HEALTH CARE EDUCATION/TRAINING PROGRAM

## 2025-01-25 PROCEDURE — 86431 RHEUMATOID FACTOR QUANT: CPT | Performed by: STUDENT IN AN ORGANIZED HEALTH CARE EDUCATION/TRAINING PROGRAM

## 2025-01-25 RX ADMIN — BUDESONIDE 0.5 MG: 0.5 INHALANT RESPIRATORY (INHALATION) at 07:01

## 2025-01-25 RX ADMIN — PANTOPRAZOLE SODIUM 40 MG: 40 INJECTION, POWDER, FOR SOLUTION INTRAVENOUS at 12:01

## 2025-01-25 RX ADMIN — ALBUTEROL SULFATE 2.5 MG: 2.5 SOLUTION RESPIRATORY (INHALATION) at 07:01

## 2025-01-25 RX ADMIN — FAMOTIDINE 40 MG: 20 TABLET ORAL at 09:01

## 2025-01-25 RX ADMIN — POTASSIUM BICARBONATE 35 MEQ: 391 TABLET, EFFERVESCENT ORAL at 12:01

## 2025-01-25 RX ADMIN — Medication 800 MG: at 03:01

## 2025-01-25 RX ADMIN — PRENATAL VIT W/ FE FUMARATE-FA TAB 27-0.8 MG 1 TABLET: 27-0.8 TAB at 09:01

## 2025-01-25 RX ADMIN — MONTELUKAST 10 MG: 10 TABLET, FILM COATED ORAL at 09:01

## 2025-01-25 RX ADMIN — ALBUTEROL SULFATE 2.5 MG: 2.5 SOLUTION RESPIRATORY (INHALATION) at 01:01

## 2025-01-25 RX ADMIN — METHYLPREDNISOLONE SODIUM SUCCINATE 40 MG: 40 INJECTION, POWDER, FOR SOLUTION INTRAMUSCULAR; INTRAVENOUS at 05:01

## 2025-01-25 RX ADMIN — PANTOPRAZOLE SODIUM 40 MG: 40 INJECTION, POWDER, FOR SOLUTION INTRAVENOUS at 09:01

## 2025-01-25 RX ADMIN — Medication 800 MG: at 12:01

## 2025-01-25 RX ADMIN — METHYLPREDNISOLONE SODIUM SUCCINATE 40 MG: 40 INJECTION, POWDER, FOR SOLUTION INTRAMUSCULAR; INTRAVENOUS at 04:01

## 2025-01-25 RX ADMIN — METHYLPREDNISOLONE SODIUM SUCCINATE 40 MG: 40 INJECTION, POWDER, FOR SOLUTION INTRAMUSCULAR; INTRAVENOUS at 09:01

## 2025-01-25 RX ADMIN — POTASSIUM BICARBONATE 35 MEQ: 391 TABLET, EFFERVESCENT ORAL at 03:01

## 2025-01-25 NOTE — SUBJECTIVE & OBJECTIVE
Interval History: No issues overnight. Patient with unlabored, quiet respirations on RA at rest. However, she is unable to get OOB and walk without difficulty. Reports breathing slightly improved from yesterday.     Medications:  Continuous Infusions:  Scheduled Meds:   albuterol sulfate  2.5 mg Nebulization Q6H WAKE    budesonide  0.5 mg Nebulization Q12H    famotidine  40 mg Oral QHS    methylPREDNISolone injection (PEDS and ADULTS)  40 mg Intravenous Q8H    montelukast  10 mg Oral QHS    pantoprazole  40 mg Intravenous Q12H    prenatal vitamin  1 tablet Oral Daily     PRN Meds:  Current Facility-Administered Medications:     acetaminophen, 650 mg, Oral, Q4H PRN    albuterol sulfate, 2.5 mg, Nebulization, Q4H PRN    diphenhydrAMINE, 50 mg, Oral, Nightly PRN    magnesium oxide, 800 mg, Oral, PRN    magnesium oxide, 800 mg, Oral, PRN    ondansetron, 4 mg, Intravenous, Q8H PRN    potassium bicarbonate, 35 mEq, Oral, PRN    potassium bicarbonate, 50 mEq, Oral, PRN    potassium bicarbonate, 60 mEq, Oral, PRN    potassium, sodium phosphates, 2 packet, Oral, PRN    potassium, sodium phosphates, 2 packet, Oral, PRN    potassium, sodium phosphates, 2 packet, Oral, PRN    racepinephrine, 0.5 mL, Nebulization, Q4H PRN    sodium chloride 0.9%, 10 mL, Intravenous, PRN     Review of patient's allergies indicates:   Allergen Reactions    No known drug allergies      Objective:     Vital Signs (24h Range):  Temp:  [98.1 °F (36.7 °C)-98.4 °F (36.9 °C)] 98.1 °F (36.7 °C)  Pulse:  [] 96  Resp:  [15-39] 27  SpO2:  [96 %-100 %] 100 %  BP: (113-146)/(64-96) 123/70       Lines/Drains/Airways       Peripheral Intravenous Line  Duration                  Peripheral IV - Single Lumen 01/24/25 1022 20 G No Left;Posterior Hand <1 day         Peripheral IV - Single Lumen 01/24/25 2255 18 G 1 3/4 in No Left;Anterior Forearm <1 day                     Physical Exam  NAD  Resting in bed comfortably   Head atraumatic   Auricles WNL AU  Nose  w/ normal external appearance  Normal WOB, mild biphasic stridor       Procedure: Flexible Laryngoscopy     After verbal consent was obtained, the left naris was anesthetized with topical lidocaine and neosynephrine. The flexible laryngoscope was introduced with the following findings:     Nasal cavity: no masses or lesions, pink mucosa, no purulence  Nasopharynx: no masses or lesions, niki wnl  Oropharynx: no masses or lesions, tonsils WNL, BOT WNL  Larynx and Hypopharynx: Bilateral vocal folds freely mobile to adduction and abduction, no masses or lesions visualized.   Grade 3 subglottic stenosis      The patient tolerated the procedure well.             Significant Labs:  CBC:   Recent Labs   Lab 01/25/25  0343   WBC 13.54*   RBC 3.64*   HGB 10.8*   HCT 31.3*      MCV 86   MCH 29.7   MCHC 34.5     CMP:   Recent Labs   Lab 01/25/25  0343   *   CALCIUM 8.4*   ALBUMIN 3.2*   PROT 6.1      K 3.6   CO2 21*      BUN 6   CREATININE 0.6   ALKPHOS 56   ALT 7*   AST 8*   BILITOT 0.3       Significant Diagnostics:  I have reviewed all pertinent imaging results/findings within the past 24 hours.

## 2025-01-25 NOTE — ASSESSMENT & PLAN NOTE
No wheezing on exam; treating for asthma flare but likely stenosis is major pathology.    -Nebulized budesonide BID, with duonebs q6WA and PRN  -Montelukast nightly, diphenhydramine

## 2025-01-25 NOTE — ASSESSMENT & PLAN NOTE
28 yo female 30 wks pregnant with grade 3 subglottic stenosis. Mild biphasic stridor noted on exam, no increased work of breathing. Patient saturating well on room air. Patient admitted to MICU for close airway monitoring.    - Airway plan: Patient has significant narrowing of her subglottis on scope exam which would make intubation difficult should she decompensate.  Could potentially be intubated with Glidescope and a 5-0 ETT.   - Recommend immediate contact of anesthesia and ENT team should patient have worsening respiratory status.    - Recommend trach set at bedside   - Recommend 5-0 ETT at bedside     - Recommend continuing scheduled rac epi and nebulized steroids  - Tentative plan for OR Monday  - ENT will continue to follow     Please page ENT resident on call with any questions or concerns.

## 2025-01-25 NOTE — HOSPITAL COURSE
Patient admitted for ENT evaluation of subglottic stenosis and potential balloon/laser procedure. Tentative plan for OR  with ENT. OBGYN also consulted and following given 30wks pregnant. She is comfortable on room air. OB following; performed NST which was reassuring. ENT and MFM are following and ENT will perform her procedure with MFM present. MFM recommending waiting till she is 32 weeks pregnant. She will need a steroid course one week prior to her procedure for fetal lung maturity. (Betamethasone scheduled for 2025). Asymptomatic UTI being treated with Augmentin. Patient had brief episode of worsening stridor that corrected with one dose of racemic epinephrine. Currently stable. Trach kit at bedside.  kit mostly complete minus the bandage scissors. ENT dilation of subglottic stenosis successful (per formed 2025). Patient reports no SOB after procedure. Discharged with PPI and ENT follow up.

## 2025-01-25 NOTE — PROGRESS NOTES
[facility] - Medical ICU  Critical Care Medicine  H+P    Patient Name: Gisell Villafuerte  MRN: [id number]  Admission Date: 2025  Hospital Length of Stay: 0 days  Code Status: Full Code  Attending Provider: Lukas Michel*  Primary Care Provider: Yazan Jj MD   Principal Problem: Acquired subglottic stenosis    Subjective:     HPI:  29F A1 30wks with recently diagnosed subglottic stenosis, here for ENT procedure. History of asthma, LPRD, allergies, anxiety. Reportedly seen by ENT PA in  and diagnosed with LPRD; has had worsening stridor which became acute after flu A infx . Was admitted for asthma exacerbation, however failed conservative tx and was investigated further given stridor.      HDS on outside admission. Labs with mild leukocytosis iso steroid adminsitration outpatient. RIP negative, normal inflammatory markers. Ucx noted to have E coli; was treated with ceftriaxone.        Interval History/Significant Events:   - stable on admission with stridor audible on auscultation of neck, saturating well, no wheezing    Review of Systems   All other systems reviewed and are negative.    Objective:     Vital Signs (Most Recent):  Temp: 98.2 °F (36.8 °C) (25)  Pulse: 95 (25)  Resp: (!) 28 (25)  BP: 122/67 (25)  SpO2: 98 % (25) Vital Signs (24h Range):  Temp:  [98.1 °F (36.7 °C)-98.4 °F (36.9 °C)] 98.2 °F (36.8 °C)  Pulse:  [] 95  Resp:  [12-35] 28  SpO2:  [97 %-99 %] 98 %  BP: (118-142)/(64-96) 122/67   Weight: 72.6 kg (160 lb 0.9 oz)  Body mass index is 30.24 kg/m².    No intake or output data in the 24 hours ending 25       Physical Exam  Constitutional:       Appearance: Normal appearance.   HENT:      Head: Normocephalic and atraumatic.      Nose: Nose normal.      Mouth/Throat:      Mouth: Mucous membranes are moist.      Comments: Stridor on auscultation  Cardiovascular:      Rate and Rhythm: Normal rate  "and regular rhythm.   Pulmonary:      Effort: Accessory muscle usage present. No respiratory distress.      Breath sounds: Stridor present. No wheezing or rales.   Abdominal:      Comments: Gravid, non-tender   Musculoskeletal:         General: No deformity or signs of injury.      Cervical back: Normal range of motion and neck supple.   Skin:     Coloration: Skin is not cyanotic.      Nails: There is no clubbing.   Neurological:      General: No focal deficit present.      Mental Status: She is alert and oriented to person, place, and time.   Psychiatric:         Mood and Affect: Mood normal.         Behavior: Behavior normal.            Vents:     Lines/Drains/Airways       Peripheral Intravenous Line  Duration                  Peripheral IV - Single Lumen 01/24/25 1022 20 G No Left;Posterior Hand <1 day         Peripheral IV - Single Lumen 01/24/25 2255 18 G 1 3/4 in No Left;Anterior Forearm <1 day                  Significant Labs:    CBC/Anemia Profile:  No results for input(s): "WBC", "HGB", "HCT", "PLT", "MCV", "RDW", "IRON", "FERRITIN", "RETIC", "FOLATE", "[id number]", "OCCULTBLOOD" in the last 48 hours.     Chemistries:  Recent Labs   Lab 01/23/25  0332      K 4.0      CO2 23   BUN 7   CREATININE 0.41*   CALCIUM 8.4*       All pertinent labs within the past 24 hours have been reviewed.    Significant Imaging:  I have reviewed all pertinent imaging results/findings within the past 24 hours.    ABG  No results for input(s): "PH", "PO2", "PCO2", "HCO3", "BE" in the last 168 hours.  Assessment/Plan:     ENT  * Acquired subglottic stenosis  Thought to be acquired iso LPRD. ENT at [facility] transferred for higher procedural level of care; ENT here consulted. Reportedly both balloon dilatation and tracheostomy have been discussed with her.    - reflux management with PPI BID and pepcid 40 mg nightly   - methylpred 40mg q8H   - NPO midnight; type and screen in  - racemic epi q6H PRN for 24 hours  - " ordered auto-immune labs; MRSA PCR, thyroid labs      Pulmonary  Severe persistent asthma with acute exacerbation  Nebulized budesonide BID, with duonebs q6WA and PRN  No wheezing on exam; treating for asthma flare but likely stenosis is major pathology  Montelukast nightly, diphenhydramine    Obstetric  30 weeks gestation of pregnancy  Obstetrics consulted       Critical Care Daily Checklist:    A: Awake: RASS Goal/Actual N/a   B: Spontaneous Breathing Trial Performed?  N/a   C: SAT & SBT Coordinated?  N/a                   D: Delirium: CAM-ICU     E: Early Mobility Performed? Yes   F: Feeding Goal:    Status:     Current Diet Order   Procedures    Diet Adult Regular      AS: Analgesia/Sedation N/a   T: Thromboembolic Prophylaxis SCDs   H: HOB > 300 Yes   U: Stress Ulcer Prophylaxis (if needed) yes   G: Glucose Control N/A   B: Bowel Function     I: Indwelling Catheter (Lines & Arnett) Necessity PIVs   D: De-escalation of Antimicrobials/Pharmacotherapies N/A         Code Status:  Family/Goals of Care: Full Code         Critical secondary to Patient has a condition that poses threat to life and bodily function: Severe Respiratory Distress      Critical care was time spent personally by me on the following activities: development of treatment plan with patient or surrogate and bedside caregivers, discussions with consultants, evaluation of patient's response to treatment, examination of patient, ordering and performing treatments and interventions, ordering and review of laboratory studies, ordering and review of radiographic studies, pulse oximetry, re-evaluation of patient's condition. This critical care time did not overlap with that of any other provider or involve time for any procedures.     Camila Hazel MD  Critical Care Medicine  [facility] - Medical ICU

## 2025-01-25 NOTE — PLAN OF CARE
Pt remains free from falls and injuries. PIVs remain. Laryngoscopy done at bedside by ENT. Otherwise, no c/o pain, breathing stable, all VSS. Pt in no acute distress. Plan of care reviewed with pt and SO, who remains at bedside.

## 2025-01-25 NOTE — PROGRESS NOTES
[facility] - Medical ICU  Critical Care Medicine  Progress Note    Patient Name: Gisell Villafuerte  MRN: [id number]  Admission Date: 2025  Hospital Length of Stay: 1 days  Code Status: Full Code  Attending Provider: Lukas Michel*  Primary Care Provider: Yazan Jj MD   Principal Problem: Acquired subglottic stenosis    Subjective:     HPI:  29F A1 30wks with recently diagnosed subglottic stenosis, here for ENT procedure. History of asthma, LPRD, allergies, anxiety. Reportedly seen by ENT PA in  and diagnosed with LPRD; has had worsening stridor which became acute after flu A infx . Was admitted for asthma exacerbation, however failed conservative tx and was investigated further given stridor.      HDS on outside admission. Labs with mild leukocytosis iso steroid adminsitration outpatient. RIP negative, normal inflammatory markers. Ucx noted to have E coli; was treated with ceftriaxone.    Hospital/ICU Course:  Patient admitted for ENT evaluation of subglottic stenosis and potential balloon/laser procedure. Tentative plan for OR  with ENT. OBGYN also consulted and following given 30wks pregnant. She is comfortable on room air.    Interval History/Significant Events: NAEON. Resting comfortably in bed on room air, saturating 98%. Her only complaint this morning is dyspnea on exertion, however, sheis not SOB at rest. Tentative plan for OR Monday with ENT to address her subglottic narrowing.     Review of Systems  Objective:     Vital Signs (Most Recent):  Temp: 98.3 °F (36.8 °C) (25 1135)  Pulse: 104 (25 1306)  Resp: (!) 22 (25 1306)  BP: 131/66 (25 1306)  SpO2: 98 % (25 1306) Vital Signs (24h Range):  Temp:  [98.1 °F (36.7 °C)-98.3 °F (36.8 °C)] 98.3 °F (36.8 °C)  Pulse:  [] 104  Resp:  [13-39] 22  SpO2:  [96 %-100 %] 98 %  BP: (113-146)/(64-88) 131/66   Weight: 72.6 kg (160 lb 0.9 oz)  Body mass index is 30.24 kg/m².      Intake/Output  Summary (Last 24 hours) at 1/25/2025 1320  Last data filed at 1/25/2025 1245  Gross per 24 hour   Intake 2350 ml   Output 1700 ml   Net 650 ml          Physical Exam  Constitutional:       Appearance: Normal appearance.   HENT:      Head: Normocephalic and atraumatic.      Nose: Nose normal.      Mouth/Throat:      Mouth: Mucous membranes are moist.      Comments: Stridor on auscultation  Cardiovascular:      Rate and Rhythm: Normal rate and regular rhythm.   Pulmonary:      Effort: Accessory muscle usage present. No respiratory distress.      Breath sounds: Stridor (inspiratory) present. No wheezing or rales.   Abdominal:      Comments: Gravid, non-tender   Musculoskeletal:         General: No deformity or signs of injury.      Cervical back: Normal range of motion and neck supple.   Skin:     Coloration: Skin is not cyanotic.      Nails: There is no clubbing.   Neurological:      General: No focal deficit present.      Mental Status: She is alert and oriented to person, place, and time.   Psychiatric:         Mood and Affect: Mood normal.         Behavior: Behavior normal.            Vents:     Lines/Drains/Airways       Peripheral Intravenous Line  Duration                  Peripheral IV - Single Lumen 01/24/25 1022 20 G No Left;Posterior Hand 1 day         Peripheral IV - Single Lumen 01/24/25 2255 18 G 1 3/4 in No Left;Anterior Forearm <1 day                  Significant Labs:    CBC/Anemia Profile:  Recent Labs   Lab 01/24/25 2251 01/25/25  0343   WBC 15.37* 13.54*   HGB 11.2* 10.8*   HCT 32.9* 31.3*    185   MCV 87 86   RDW 13.0 13.1        Chemistries:  Recent Labs   Lab 01/24/25 2251 01/25/25  0343    138   K 3.3* 3.6    107   CO2 22* 21*   BUN 5* 6   CREATININE 0.5 0.6   CALCIUM 8.6* 8.4*   ALBUMIN 3.2* 3.2*   PROT 6.3 6.1   BILITOT 0.4 0.3   ALKPHOS 57 56   ALT 9* 7*   AST 7* 8*   MG 1.7 1.7   PHOS 2.8 2.7       All pertinent labs within the past 24 hours have been  "reviewed.    Significant Imaging:  I have reviewed all pertinent imaging results/findings within the past 24 hours.    ABG  No results for input(s): "PH", "PO2", "PCO2", "HCO3", "BE" in the last 168 hours.  Assessment/Plan:     ENT  * Acquired subglottic stenosis  Thought to be acquired iso LPRD. ENT at [facility] transferred for higher procedural level of care; ENT here consulted. Reportedly both balloon dilatation and tracheostomy have been discussed with her.    - reflux management with PPI BID and pepcid 40 mg nightly   - methylpred 40mg q8H   - racemic epi q6H PRN for 24 hours  - ordered auto-immune labs  - TSH WNL      Pulmonary  Severe persistent asthma with acute exacerbation  No wheezing on exam; treating for asthma flare but likely stenosis is major pathology.    -Nebulized budesonide BID, with duonebs q6WA and PRN  -Montelukast nightly, diphenhydramine    Obstetric  30 weeks gestation of pregnancy  - OBGYN consulted, appreciate their recs       Critical Care Daily Checklist:    A: Awake: RASS Goal/Actual Goal:    Actual:     B: Spontaneous Breathing Trial Performed?     C: SAT & SBT Coordinated?  N/a                      D: Delirium: CAM-ICU     E: Early Mobility Performed? No   F: Feeding Goal:    Status:     Current Diet Order   Procedures    Diet Adult Regular      AS: Analgesia/Sedation N/a   T: Thromboembolic Prophylaxis N/a   H: HOB > 300 Yes   U: Stress Ulcer Prophylaxis (if needed) PPI   G: Glucose Control N/a   B: Bowel Function     I: Indwelling Catheter (Lines & Arnett) Necessity pIV   D: De-escalation of Antimicrobials/Pharmacotherapies N/a    Plan for the day/ETD Continue to monitor    Code Status:  Family/Goals of Care: Full Code         Critical secondary to Patient has a condition that poses threat to life and bodily function: subglottic stenosis      Critical care was time spent personally by me on the following activities: development of treatment plan with patient or surrogate and " bedside caregivers, discussions with consultants, evaluation of patient's response to treatment, examination of patient, ordering and performing treatments and interventions, ordering and review of laboratory studies, ordering and review of radiographic studies, pulse oximetry, re-evaluation of patient's condition. This critical care time did not overlap with that of any other provider or involve time for any procedures.     Valdemar Mckeon MD  Critical Care Medicine  [facility]

## 2025-01-25 NOTE — NURSING
Pt received from EMS. PIV in place, VSS, in no acute distress. Belongings with pt. CC team at bedside.

## 2025-01-25 NOTE — SUBJECTIVE & OBJECTIVE
Medications:  Continuous Infusions:  Scheduled Meds:   albuterol sulfate  2.5 mg Nebulization Q6H WAKE    budesonide  0.5 mg Nebulization Q12H    famotidine  40 mg Oral QHS    methylPREDNISolone injection (PEDS and ADULTS)  40 mg Intravenous Q8H    montelukast  10 mg Oral QHS    pantoprazole  40 mg Intravenous Q12H    prenatal vitamin  1 tablet Oral Daily     PRN Meds:  Current Facility-Administered Medications:     acetaminophen, 650 mg, Oral, Q4H PRN    albuterol sulfate, 2.5 mg, Nebulization, Q4H PRN    diphenhydrAMINE, 50 mg, Oral, Nightly PRN    magnesium oxide, 800 mg, Oral, PRN    magnesium oxide, 800 mg, Oral, PRN    ondansetron, 4 mg, Intravenous, Q8H PRN    potassium bicarbonate, 35 mEq, Oral, PRN    potassium bicarbonate, 50 mEq, Oral, PRN    potassium bicarbonate, 60 mEq, Oral, PRN    potassium, sodium phosphates, 2 packet, Oral, PRN    potassium, sodium phosphates, 2 packet, Oral, PRN    potassium, sodium phosphates, 2 packet, Oral, PRN    racepinephrine, 0.5 mL, Nebulization, Q4H PRN    sodium chloride 0.9%, 10 mL, Intravenous, PRN     Current Facility-Administered Medications on File Prior to Encounter   Medication    [DISCONTINUED] acetaminophen tablet 650 mg    [DISCONTINUED] albuterol sulfate nebulizer solution 2.5 mg    [DISCONTINUED] budesonide nebulizer solution 0.5 mg    [DISCONTINUED] cetirizine tablet 10 mg    [DISCONTINUED] dextrose 50% injection 12.5 g    [DISCONTINUED] dextrose 50% injection 25 g    [DISCONTINUED] dextrose oral liquid/gel 15 g    [DISCONTINUED] dextrose oral liquid/gel 30 g    [DISCONTINUED] enoxaparin injection 40 mg    [DISCONTINUED] famotidine (PF) injection 20 mg    [DISCONTINUED] glucagon (human recombinant) injection 1 mg    [DISCONTINUED] ipratropium 0.02 % nebulizer solution 0.5 mg    [DISCONTINUED] lactated ringers infusion    [DISCONTINUED] LIDOcaine HCl 2% urojet    [DISCONTINUED] methylPREDNISolone sodium succinate injection 40 mg    [DISCONTINUED] montelukast  chewable tablet 10 mg    [DISCONTINUED] naloxone 0.4 mg/mL injection 0.02 mg    [DISCONTINUED] ondansetron injection 4 mg    [DISCONTINUED] pantoprazole injection 40 mg    [DISCONTINUED] prenatal vitamin oral tablet    [DISCONTINUED] senna-docusate 8.6-50 mg per tablet 1 tablet    [DISCONTINUED] sodium chloride 0.9% flush 10 mL     Current Outpatient Medications on File Prior to Encounter   Medication Sig    albuterol (PROVENTIL/VENTOLIN HFA) 90 mcg/actuation inhaler Inhale 1-2 puffs into the lungs every 6 (six) hours as needed for Wheezing or Shortness of Breath (chest tightness). Rescue    albuterol sulfate 2.5 mg/0.5 mL Nebu Take 2.5 mg by nebulization every 6 (six) hours as needed (shortness of breath, wheezing, or chest tightness.). Rescue    cetirizine (ZYRTEC) 10 MG tablet Take 1 tablet (10 mg total) by mouth every evening.    famotidine (PEPCID) 20 MG tablet Take 1 tablet (20 mg total) by mouth 2 (two) times daily.    fluticasone propion-salmeterol 115-21 mcg/dose (ADVAIR HFA) 115-21 mcg/actuation HFAA inhaler Inhale 2 puffs into the lungs 2 (two) times a day. Controller    fluticasone propionate (FLONASE) 50 mcg/actuation nasal spray 2 sprays (100 mcg total) by Each Nostril route once daily.    montelukast (SINGULAIR) 5 MG chewable tablet Take 2 tablets (10 mg total) by mouth every evening.    [] predniSONE (DELTASONE) 20 MG tablet Take 1 tablet (20 mg total) by mouth once daily for 5 days, THEN 0.5 tablets (10 mg total) once daily for 5 days.    prenatal vit/iron fum/folic ac (PRENATAL 1+1 ORAL) Take 1 tablet by mouth once daily.    albuterol (VENTOLIN HFA) 90 mcg/actuation inhaler Inhale 2 puffs into the lungs every 6 (six) hours as needed for Wheezing. Rescue    EPINEPHrine (EPIPEN 2-BELEN) 0.3 mg/0.3 mL AtIn Inject 0.3 mLs (0.3 mg total) into the muscle once. for 1 dose    nitrofurantoin, macrocrystal-monohydrate, (MACROBID) 100 MG capsule Take 1 capsule (100 mg total) by mouth 2 (two) times daily.  "for 10 days    ondansetron (ZOFRAN-ODT) 4 MG TbDL Take 1 tablet (4 mg total) by mouth every 6 (six) hours as needed (nausea/vomiting).       Review of patient's allergies indicates:   Allergen Reactions    No known drug allergies        Past Medical History:   Diagnosis Date    Anemia     s/p transfusion from menstral bleeding    Anxiety     Asthma     "athletic" asthma    Blood transfusion     Hashimoto's disease     HTN (hypertension)      Past Surgical History:   Procedure Laterality Date     SECTION N/A 2022    Procedure:  SECTION;  Surgeon: Víctor Arriaga MD;  Location: [facility] L&D;  Service: OB/GYN;  Laterality: N/A;    DILATION AND CURETTAGE OF UTERUS N/A 2021    Procedure: DILATION AND CURETTAGE, UTERUS;  Surgeon: Víctor Arriaga MD;  Location: [facility] OR;  Service: OB/GYN;  Laterality: N/A;     Family History       Problem Relation (Age of Onset)    Diabetes Father    Hashimoto's thyroiditis Mother    Thyroid disease Mother          Tobacco Use    Smoking status: Never     Passive exposure: Never    Smokeless tobacco: Never   Substance and Sexual Activity    Alcohol use: Not Currently    Drug use: No    Sexual activity: Yes     Partners: Male     Birth control/protection: None     Review of Systems  Objective:     Vital Signs (Most Recent):  Temp: 98.2 °F (36.8 °C) (25 2315)  Pulse: 101 (25 0100)  Resp: (!) 39 (25)  BP: (!) 146/69 (25 0100)  SpO2: 99 % (25 0100) Vital Signs (24h Range):  Temp:  [98.1 °F (36.7 °C)-98.4 °F (36.9 °C)] 98.2 °F (36.8 °C)  Pulse:  [] 101  Resp:  [12-39] 39  SpO2:  [97 %-99 %] 99 %  BP: (118-146)/(64-96) 146/69     Weight: 72.6 kg (160 lb 0.9 oz)  Body mass index is 30.24 kg/m².         Physical Exam  NAD  Resting in bed comfortably   Head atraumatic   Auricles WNL AU  Nose w/ normal external appearance  Normal WOB, mild inspiratory stridor      Procedure: Flexible Laryngoscopy    After verbal consent was obtained, " the left naris was anesthetized with topical lidocaine and neosynephrine. The flexible laryngoscope was introduced with the following findings:    Nasal cavity: no masses or lesions, pink mucosa, no purulence  Nasopharynx: no masses or lesions, niki wnl  Oropharynx: no masses or lesions, tonsils WNL, BOT WNL  Larynx and Hypopharynx: Bilateral vocal folds freely mobile to adduction and abduction, no masses or lesions visualized.   Grade 3 subglottic stenosis     The patient tolerated the procedure well.                                   Significant Labs:  CBC:   Recent Labs   Lab 01/24/25  2251   WBC 15.37*   RBC 3.80*   HGB 11.2*   HCT 32.9*      MCV 87   MCH 29.5   MCHC 34.0     CMP:   Recent Labs   Lab 01/24/25  2251   GLU 91   CALCIUM 8.6*   ALBUMIN 3.2*   PROT 6.3      K 3.3*   CO2 22*      BUN 5*   CREATININE 0.5   ALKPHOS 57   ALT 9*   AST 7*   BILITOT 0.4       Significant Diagnostics:  I have reviewed all pertinent imaging results/findings within the past 24 hours.

## 2025-01-25 NOTE — HPI
29F A1 30wks with recently diagnosed subglottic stenosis, here for ENT procedure. History of asthma, LPRD, allergies, anxiety. Reportedly seen by ENT PA in  and diagnosed with LPRD; has had worsening stridor which became acute after flu A infx . Was admitted for asthma exacerbation, however failed conservative tx and was investigated further given stridor.      HDS on outside admission. Labs with mild leukocytosis iso steroid adminsitration outpatient. RIP negative, normal inflammatory markers. Ucx noted to have E coli; was treated with ceftriaxone.

## 2025-01-25 NOTE — SUBJECTIVE & OBJECTIVE
Interval History/Significant Events:   - stable on admission with stridor audible on auscultation of neck, saturating well, no wheezing    Review of Systems   All other systems reviewed and are negative.    Objective:     Vital Signs (Most Recent):  Temp: 98.2 °F (36.8 °C) (01/24/25 2315)  Pulse: 95 (01/24/25 2245)  Resp: (!) 28 (01/24/25 2245)  BP: 122/67 (01/24/25 2245)  SpO2: 98 % (01/24/25 2245) Vital Signs (24h Range):  Temp:  [98.1 °F (36.7 °C)-98.4 °F (36.9 °C)] 98.2 °F (36.8 °C)  Pulse:  [] 95  Resp:  [12-35] 28  SpO2:  [97 %-99 %] 98 %  BP: (118-142)/(64-96) 122/67   Weight: 72.6 kg (160 lb 0.9 oz)  Body mass index is 30.24 kg/m².    No intake or output data in the 24 hours ending 01/24/25 2326       Physical Exam  Constitutional:       Appearance: Normal appearance.   HENT:      Head: Normocephalic and atraumatic.      Nose: Nose normal.      Mouth/Throat:      Mouth: Mucous membranes are moist.      Comments: Stridor on auscultation  Cardiovascular:      Rate and Rhythm: Normal rate and regular rhythm.   Pulmonary:      Effort: Accessory muscle usage present. No respiratory distress.      Breath sounds: Stridor present. No wheezing or rales.   Abdominal:      Comments: Gravid, non-tender   Musculoskeletal:         General: No deformity or signs of injury.      Cervical back: Normal range of motion and neck supple.   Skin:     Coloration: Skin is not cyanotic.      Nails: There is no clubbing.   Neurological:      General: No focal deficit present.      Mental Status: She is alert and oriented to person, place, and time.   Psychiatric:         Mood and Affect: Mood normal.         Behavior: Behavior normal.            Vents:     Lines/Drains/Airways       Peripheral Intravenous Line  Duration                  Peripheral IV - Single Lumen 01/24/25 1022 20 G No Left;Posterior Hand <1 day         Peripheral IV - Single Lumen 01/24/25 2255 18 G 1 3/4 in No Left;Anterior Forearm <1 day               "    Significant Labs:    CBC/Anemia Profile:  No results for input(s): "WBC", "HGB", "HCT", "PLT", "MCV", "RDW", "IRON", "FERRITIN", "RETIC", "FOLATE", "[id number]", "OCCULTBLOOD" in the last 48 hours.     Chemistries:  Recent Labs   Lab 01/23/25  0332      K 4.0      CO2 23   BUN 7   CREATININE 0.41*   CALCIUM 8.4*       All pertinent labs within the past 24 hours have been reviewed.    Significant Imaging:  I have reviewed all pertinent imaging results/findings within the past 24 hours.  "

## 2025-01-25 NOTE — ASSESSMENT & PLAN NOTE
Thought to be acquired iso LPRD. ENT at [facility] transferred for higher procedural level of care; ENT here consulted. Reportedly both balloon dilatation and tracheostomy have been discussed with her.    - reflux management with PPI BID and pepcid 40 mg nightly   - methylpred 40mg q8H   - NPO midnight; type and screen in  - racemic epi q6H PRN for 24 hours  - ordered auto-immune labs; MRSA PCR, thyroid labs

## 2025-01-25 NOTE — HPI
Patient is a 30 yo female hx Hashimotos, asthma, LPRD, anxiety, A1 30 wks pregnant with recently diagnosed subglottic stenosis transferred to [facility] for ENT evaluation.     Patient notes she has always struggled with dyspnea but attributed it to asthma and asthma exacerbations. Over the last few weeks patient noticed a distinct change in her ability to perform ADLs. She was unable to fold clothes or walk a few steps without significant respiratory distress. Patient was admitted and treated for asthma exacerbation. Upon discharge, patient had follow up with Pulmonology who recommended patient go to ED for ENT evaluation.     Patient does not smoke cigarettes. Denies any similar issues previously. No respiratory issues during last pregnancy.

## 2025-01-25 NOTE — ASSESSMENT & PLAN NOTE
Thought to be acquired iso LPRD. ENT at [facility] transferred for higher procedural level of care; ENT here consulted. Reportedly both balloon dilatation and tracheostomy have been discussed with her.    - reflux management with PPI BID and pepcid 40 mg nightly   - methylpred 40mg q8H   - racemic epi q6H PRN for 24 hours  - ordered auto-immune labs  - TSH WNL

## 2025-01-25 NOTE — PROGRESS NOTES
[facility] Medical [facility]  Otorhinolaryngology-Head & Neck Surgery  Progress Note    Subjective:     Post-Op Info:  * No surgery found *      Hospital Day: 2     Interval History: No issues overnight. Patient with unlabored, quiet respirations on RA at rest. However, she is unable to get OOB and walk without difficulty. Reports breathing slightly improved from yesterday.     Medications:  Continuous Infusions:  Scheduled Meds:   albuterol sulfate  2.5 mg Nebulization Q6H WAKE    budesonide  0.5 mg Nebulization Q12H    famotidine  40 mg Oral QHS    methylPREDNISolone injection (PEDS and ADULTS)  40 mg Intravenous Q8H    montelukast  10 mg Oral QHS    pantoprazole  40 mg Intravenous Q12H    prenatal vitamin  1 tablet Oral Daily     PRN Meds:  Current Facility-Administered Medications:     acetaminophen, 650 mg, Oral, Q4H PRN    albuterol sulfate, 2.5 mg, Nebulization, Q4H PRN    diphenhydrAMINE, 50 mg, Oral, Nightly PRN    magnesium oxide, 800 mg, Oral, PRN    magnesium oxide, 800 mg, Oral, PRN    ondansetron, 4 mg, Intravenous, Q8H PRN    potassium bicarbonate, 35 mEq, Oral, PRN    potassium bicarbonate, 50 mEq, Oral, PRN    potassium bicarbonate, 60 mEq, Oral, PRN    potassium, sodium phosphates, 2 packet, Oral, PRN    potassium, sodium phosphates, 2 packet, Oral, PRN    potassium, sodium phosphates, 2 packet, Oral, PRN    racepinephrine, 0.5 mL, Nebulization, Q4H PRN    sodium chloride 0.9%, 10 mL, Intravenous, PRN     Review of patient's allergies indicates:   Allergen Reactions    No known drug allergies      Objective:     Vital Signs (24h Range):  Temp:  [98.1 °F (36.7 °C)-98.4 °F (36.9 °C)] 98.1 °F (36.7 °C)  Pulse:  [] 96  Resp:  [15-39] 27  SpO2:  [96 %-100 %] 100 %  BP: (113-146)/(64-96) 123/70       Lines/Drains/Airways       Peripheral Intravenous Line  Duration                  Peripheral IV - Single Lumen 01/24/25 1022 20 G No Left;Posterior Hand <1 day         Peripheral IV - Single Lumen 01/24/25  2255 18 G 1 3/4 in No Left;Anterior Forearm <1 day                     Physical Exam  NAD  Resting in bed comfortably   Head atraumatic   Auricles WNL AU  Nose w/ normal external appearance  Normal WOB, mild biphasic stridor       Procedure: Flexible Laryngoscopy     After verbal consent was obtained, the left naris was anesthetized with topical lidocaine and neosynephrine. The flexible laryngoscope was introduced with the following findings:     Nasal cavity: no masses or lesions, pink mucosa, no purulence  Nasopharynx: no masses or lesions, niki wnl  Oropharynx: no masses or lesions, tonsils WNL, BOT WNL  Larynx and Hypopharynx: Bilateral vocal folds freely mobile to adduction and abduction, no masses or lesions visualized.   Grade 3 subglottic stenosis      The patient tolerated the procedure well.             Significant Labs:  CBC:   Recent Labs   Lab 01/25/25  0343   WBC 13.54*   RBC 3.64*   HGB 10.8*   HCT 31.3*      MCV 86   MCH 29.7   MCHC 34.5     CMP:   Recent Labs   Lab 01/25/25  0343   *   CALCIUM 8.4*   ALBUMIN 3.2*   PROT 6.1      K 3.6   CO2 21*      BUN 6   CREATININE 0.6   ALKPHOS 56   ALT 7*   AST 8*   BILITOT 0.3       Significant Diagnostics:  I have reviewed all pertinent imaging results/findings within the past 24 hours.  Assessment/Plan:     * Acquired subglottic stenosis  28 yo female 30 wks pregnant with grade 3 subglottic stenosis. Mild biphasic stridor noted on exam, no increased work of breathing. Patient saturating well on room air. Patient admitted to MICU for close airway monitoring.    - Airway plan: Patient has significant narrowing of her subglottis on scope exam which would make intubation difficult should she decompensate.  Could potentially be intubated with Glidescope and a 5-0 ETT.   - Recommend immediate contact of anesthesia and ENT team should patient have worsening respiratory status.    - Recommend trach set at bedside   - Recommend 5-0 ETT at  bedside     - Recommend continuing scheduled rac epi and nebulized steroids  - Tentative plan for OR Monday  - ENT will continue to follow     Please page ENT resident on call with any questions or concerns.            Supriya Ovalle MD  Otorhinolaryngology-Head & Neck Surgery  [facility]

## 2025-01-25 NOTE — PLAN OF CARE
[facility] - Medical [facility]  Initial Discharge Assessment       Primary Care Provider: Yazan Jj MD    Admission Diagnosis: Subglottic stenosis [J38.6]    Admission Date: 1/24/2025  Expected Discharge Date:     Transition of Care Barriers: (P) None    Payor: [place] CROSS BLUE [facility] / Plan: BCBS ALL OUT OF STATE / Product Type: PPO /     Extended Emergency Contact Information  Primary Emergency Contact: Fartun Villafuerte  Address: [address]           [place] [address] [place]  Mobile Phone: [phone]  Relation: Mother  Preferred language: English   needed? No  Secondary Emergency Contact: SRINI LR  Mobile Phone: [phone]  Relation: Significant other  Preferred language: English    Discharge Plan A: (P) Home with family  Discharge Plan B: (P) Home      [id number] [place] [facility] [address] [address] AT [facility] [address]  [address] [address] [address]  Phone: [phone] Fax: [phone]    [facility] [place], [place] - [address] [facility]  [address] [facility] [address]  Phone: [phone] Fax: [phone]    [facility] [place], [place] - [address] [facility]  [address] [facility] [address]  Phone: [phone] Fax: [phone]    [facility] - [place], [place] - [address]   [facility] [address]  Phone: [phone] Fax: [phone]      Initial Assessment (most recent)       Adult Discharge Assessment - 01/25/25 0952          Discharge Assessment    Assessment Type Discharge Planning Assessment (P)      Confirmed/corrected address, phone number and insurance Yes (P)      Confirmed Demographics Correct on Facesheet (P)      Source of Information patient (P)      If unable to respond/provide information was family/caregiver contacted? Yes (P)      Contact Name/Number Srini Lr [phone] (P)      When was your last doctors appointment? -- (P)     unsure    Does patient/caregiver understand observation status Yes (P)      Communicated JOVANY with patient/caregiver Yes (P)      Reason For Admission subglottic stenosis (P)      People in Home child(ilya), dependent;spouse (P)      Do you expect to return to your current living situation? Yes (P)      Do you have help at home or someone to help you manage your care at home? Yes (P)      Who are your caregiver(s) and their phone number(s)? Harry Lr [phone] (P)      Prior to hospitilization cognitive status: Alert/Oriented (P)      Current cognitive status: Alert/Oriented (P)      Walking or Climbing Stairs Difficulty no (P)      Dressing/Bathing Difficulty no (P)      Home Layout Able to live on 1st floor (P)      Equipment Currently Used at Home none (P)      Readmission within 30 days? No (P)      Patient currently being followed by outpatient case management? No (P)      Do you currently have service(s) that help you manage your care at home? No (P)      Do you take prescription medications? Yes (P)      Do you have prescription coverage? Yes (P)      Coverage BCBS out of state (P)      Do you have any problems affording any of your prescribed medications? No (P)      Is the patient taking medications as prescribed? yes (P)      Who is going to help you get home at discharge? Harry Lr [phone] (P)      How do you get to doctors appointments? car, drives self (P)      Are you on dialysis? No (P)      Do you take coumadin? No (P)      Discharge Plan A Home with family (P)      Discharge Plan B Home (P)      DME Needed Upon Discharge  none (P)      Discharge Plan discussed with: Patient (P)      Transition of Care Barriers None (P)                       SW met with the patient at the bedside who was accompanied by her S/O and discussed the discharge plan.  Patient alert and sitting up in bed.  Patient verified her name , , PCP, Insurance and Pharmacy . Stated she lives with her spouse ans toddler  and has 0 steps to point of entry . Prior to admission patient was independent with all ADLS and wasn't receiving any HH services. She is not on coumadin.     Dialysis:none   DME's include:none.  She takes  medication as prescribed and has no problems getting  medication. spouse will provide transportation at [place].      Discharge Plan A and Plan B have been determined by review of patient's clinical status, future medical and therapeutic needs, and coverage/benefits for post-acute care in coordination with multidisciplinary team members.     Sharyn AKERS,   Case Management   [phone]

## 2025-01-25 NOTE — SUBJECTIVE & OBJECTIVE
Interval History/Significant Events: NAEON. Resting comfortably in bed on room air, saturating 98%. Her only complaint this morning is dyspnea on exertion, however, sheis not SOB at rest. Tentative plan for OR Monday with ENT to address her subglottic narrowing.     Review of Systems  Objective:     Vital Signs (Most Recent):  Temp: 98.3 °F (36.8 °C) (01/25/25 1135)  Pulse: 104 (01/25/25 1306)  Resp: (!) 22 (01/25/25 1306)  BP: 131/66 (01/25/25 1306)  SpO2: 98 % (01/25/25 1306) Vital Signs (24h Range):  Temp:  [98.1 °F (36.7 °C)-98.3 °F (36.8 °C)] 98.3 °F (36.8 °C)  Pulse:  [] 104  Resp:  [13-39] 22  SpO2:  [96 %-100 %] 98 %  BP: (113-146)/(64-88) 131/66   Weight: 72.6 kg (160 lb 0.9 oz)  Body mass index is 30.24 kg/m².      Intake/Output Summary (Last 24 hours) at 1/25/2025 1320  Last data filed at 1/25/2025 1245  Gross per 24 hour   Intake 2350 ml   Output 1700 ml   Net 650 ml          Physical Exam  Constitutional:       Appearance: Normal appearance.   HENT:      Head: Normocephalic and atraumatic.      Nose: Nose normal.      Mouth/Throat:      Mouth: Mucous membranes are moist.      Comments: Stridor on auscultation  Cardiovascular:      Rate and Rhythm: Normal rate and regular rhythm.   Pulmonary:      Effort: Accessory muscle usage present. No respiratory distress.      Breath sounds: Stridor (inspiratory) present. No wheezing or rales.   Abdominal:      Comments: Gravid, non-tender   Musculoskeletal:         General: No deformity or signs of injury.      Cervical back: Normal range of motion and neck supple.   Skin:     Coloration: Skin is not cyanotic.      Nails: There is no clubbing.   Neurological:      General: No focal deficit present.      Mental Status: She is alert and oriented to person, place, and time.   Psychiatric:         Mood and Affect: Mood normal.         Behavior: Behavior normal.            Vents:     Lines/Drains/Airways       Peripheral Intravenous Line  Duration                   Peripheral IV - Single Lumen 01/24/25 1022 20 G No Left;Posterior Hand 1 day         Peripheral IV - Single Lumen 01/24/25 2255 18 G 1 3/4 in No Left;Anterior Forearm <1 day                  Significant Labs:    CBC/Anemia Profile:  Recent Labs   Lab 01/24/25 2251 01/25/25  0343   WBC 15.37* 13.54*   HGB 11.2* 10.8*   HCT 32.9* 31.3*    185   MCV 87 86   RDW 13.0 13.1        Chemistries:  Recent Labs   Lab 01/24/25 2251 01/25/25  0343    138   K 3.3* 3.6    107   CO2 22* 21*   BUN 5* 6   CREATININE 0.5 0.6   CALCIUM 8.6* 8.4*   ALBUMIN 3.2* 3.2*   PROT 6.3 6.1   BILITOT 0.4 0.3   ALKPHOS 57 56   ALT 9* 7*   AST 7* 8*   MG 1.7 1.7   PHOS 2.8 2.7       All pertinent labs within the past 24 hours have been reviewed.    Significant Imaging:  I have reviewed all pertinent imaging results/findings within the past 24 hours.

## 2025-01-25 NOTE — ASSESSMENT & PLAN NOTE
30 yo female 30 wks pregnant with grade 3 subglottic stenosis. Mild biphasic stridor noted on exam, no increased work of breathing. Patient saturating well on room air. Patient admitted to MICU for close airway monitoring.    - Airway plan: Patient has significant narrowing of her subglottis on scope exam which would make intubation difficult should she decompensate.  Could potentially be intubated with Glidescope and a 5-0 ETT.   - Recommend immediate contact of anesthesia and ENT team should patient have worsening respiratory status.    - Recommend trach set at bedside   - Recommend 5-0 ETT at bedside     - Recommend continuing scheduled rac epi and nebulized steroids  - Tentative plan for OR Monday   - ENT will continue to follow     Please page ENT resident on call with any questions or concerns.

## 2025-01-25 NOTE — PLAN OF CARE
MICU DAILY GOALS     Family/Goals of care/Code Status   Code Status: Full Code    24H Vital Sign Range  Temp:  [98.1 °F (36.7 °C)-98.3 °F (36.8 °C)]   Pulse:  []   Resp:  [13-39]   BP: (113-146)/(63-88)   SpO2:  [96 %-100 %]      Shift Events (include procedures and significant events)   No acute events throughout shift. Patient awaiting surgery on Monday.     AWAKE RASS: Goal -    Actual -      Restraint necessity: Not necessary   BREATHE SBT: Not intubated    Coordinate A & B, analgesics/sedatives Pain: managed   SAT: Not intubated   Delirium CAM-ICU:     Early(intubated/ Progressive (non-intubated) Mobility MOVE Screen (INTUBATED ONLY): Not intubated    Activity: Activity Management: Arm raise - L1   Feeding/Nutrition Diet order: Diet/Nutrition Received: regular,     Thrombus DVT prophylaxis: VTE Core Measure: Pharmacological prophylaxis initiated/maintained   HOB Elevation Head of Bed (HOB) Positioning: HOB elevated   Ulcer Prophylaxis GI: yes   Glucose control managed     Skin Skin assessment:     Sacrum intact/not altered? Yes  Heels intact/not altered? Yes  Surgical wound? No    CHECK ONE!   (no altered skin or altered skin) and sub boxes:  [x] No Altered Skin Integrity Present    [x]Prevention Measures Documented    [] Altered Skin Integrity Present or Discovered   [] LDA present in EPIC, daily doc completed              [] LDA added if not in EPIC (describe wound).                    When describing wound, do not stage, use descriptive words only.    [] Wound Image Taken (required on admit,                   transfer/discharge and every Tuesday)    Wound Care Consulted? No   Bowel Function no issues    Indwelling Catheter Necessity            De-escalation Antibiotics No        VS and assessment per flow sheet, patient progressing towards goals as tolerated, plan of care reviewed with family, all concerns addressed, will continue to monitor.

## 2025-01-25 NOTE — CONSULTS
[facility] - Medical ICU  Otorhinolaryngology-Head & Neck Surgery  Consult Note    Patient Name: Gisell Villafuerte  MRN: [id number]  Code Status: Full Code  Admission Date: 2025  Hospital Length of Stay: 1 days  Attending Physician: Lukas Michel*  Primary Care Provider: Yazan Jj MD    Patient information was obtained from patient, spouse/SO, past medical records, and ER records.     Inpatient consult to ENT  Consult performed by: Supriya Ovalle MD  Consult ordered by: Camila Hazel MD        Subjective:     Chief Complaint/Reason for Admission: subglottic stenosis     History of Present Illness: Patient is a 28 yo female hx Hashimotos, asthma, LPRD, anxiety, A1 30 wks pregnant with recently diagnosed subglottic stenosis transferred to [facility] for ENT evaluation.     Patient notes she has always struggled with dyspnea but attributed it to asthma and asthma exacerbations. Over the last few weeks patient noticed a distinct change in her ability to perform ADLs. She was unable to fold clothes or walk a few steps without significant respiratory distress. Patient was admitted and treated for asthma exacerbation. Upon discharge, patient had follow up with Pulmonology who recommended patient go to ED for ENT evaluation. Patient admitted to [facility] but transferred to [facility] for higher level of care.     Patient reports symptoms currently stable.     Patient does not smoke cigarettes. Denies any similar issues previously. No respiratory issues during last pregnancy.     Medications:  Continuous Infusions:  Scheduled Meds:   albuterol sulfate  2.5 mg Nebulization Q6H WAKE    budesonide  0.5 mg Nebulization Q12H    famotidine  40 mg Oral QHS    methylPREDNISolone injection (PEDS and ADULTS)  40 mg Intravenous Q8H    montelukast  10 mg Oral QHS    pantoprazole  40 mg Intravenous Q12H    prenatal vitamin  1 tablet Oral Daily     PRN Meds:  Current Facility-Administered Medications:      acetaminophen, 650 mg, Oral, Q4H PRN    albuterol sulfate, 2.5 mg, Nebulization, Q4H PRN    diphenhydrAMINE, 50 mg, Oral, Nightly PRN    magnesium oxide, 800 mg, Oral, PRN    magnesium oxide, 800 mg, Oral, PRN    ondansetron, 4 mg, Intravenous, Q8H PRN    potassium bicarbonate, 35 mEq, Oral, PRN    potassium bicarbonate, 50 mEq, Oral, PRN    potassium bicarbonate, 60 mEq, Oral, PRN    potassium, sodium phosphates, 2 packet, Oral, PRN    potassium, sodium phosphates, 2 packet, Oral, PRN    potassium, sodium phosphates, 2 packet, Oral, PRN    racepinephrine, 0.5 mL, Nebulization, Q4H PRN    sodium chloride 0.9%, 10 mL, Intravenous, PRN     Current Facility-Administered Medications on File Prior to Encounter   Medication    [DISCONTINUED] acetaminophen tablet 650 mg    [DISCONTINUED] albuterol sulfate nebulizer solution 2.5 mg    [DISCONTINUED] budesonide nebulizer solution 0.5 mg    [DISCONTINUED] cetirizine tablet 10 mg    [DISCONTINUED] dextrose 50% injection 12.5 g    [DISCONTINUED] dextrose 50% injection 25 g    [DISCONTINUED] dextrose oral liquid/gel 15 g    [DISCONTINUED] dextrose oral liquid/gel 30 g    [DISCONTINUED] enoxaparin injection 40 mg    [DISCONTINUED] famotidine (PF) injection 20 mg    [DISCONTINUED] glucagon (human recombinant) injection 1 mg    [DISCONTINUED] ipratropium 0.02 % nebulizer solution 0.5 mg    [DISCONTINUED] lactated ringers infusion    [DISCONTINUED] LIDOcaine HCl 2% urojet    [DISCONTINUED] methylPREDNISolone sodium succinate injection 40 mg    [DISCONTINUED] montelukast chewable tablet 10 mg    [DISCONTINUED] naloxone 0.4 mg/mL injection 0.02 mg    [DISCONTINUED] ondansetron injection 4 mg    [DISCONTINUED] pantoprazole injection 40 mg    [DISCONTINUED] prenatal vitamin oral tablet    [DISCONTINUED] senna-docusate 8.6-50 mg per tablet 1 tablet    [DISCONTINUED] sodium chloride 0.9% flush 10 mL     Current Outpatient Medications on File Prior to Encounter   Medication Sig     "albuterol (PROVENTIL/VENTOLIN HFA) 90 mcg/actuation inhaler Inhale 1-2 puffs into the lungs every 6 (six) hours as needed for Wheezing or Shortness of Breath (chest tightness). Rescue    albuterol sulfate 2.5 mg/0.5 mL Nebu Take 2.5 mg by nebulization every 6 (six) hours as needed (shortness of breath, wheezing, or chest tightness.). Rescue    cetirizine (ZYRTEC) 10 MG tablet Take 1 tablet (10 mg total) by mouth every evening.    famotidine (PEPCID) 20 MG tablet Take 1 tablet (20 mg total) by mouth 2 (two) times daily.    fluticasone propion-salmeterol 115-21 mcg/dose (ADVAIR HFA) 115-21 mcg/actuation HFAA inhaler Inhale 2 puffs into the lungs 2 (two) times a day. Controller    fluticasone propionate (FLONASE) 50 mcg/actuation nasal spray 2 sprays (100 mcg total) by Each Nostril route once daily.    montelukast (SINGULAIR) 5 MG chewable tablet Take 2 tablets (10 mg total) by mouth every evening.    [] predniSONE (DELTASONE) 20 MG tablet Take 1 tablet (20 mg total) by mouth once daily for 5 days, THEN 0.5 tablets (10 mg total) once daily for 5 days.    prenatal vit/iron fum/folic ac (PRENATAL 1+1 ORAL) Take 1 tablet by mouth once daily.    albuterol (VENTOLIN HFA) 90 mcg/actuation inhaler Inhale 2 puffs into the lungs every 6 (six) hours as needed for Wheezing. Rescue    EPINEPHrine (EPIPEN 2-BELEN) 0.3 mg/0.3 mL AtIn Inject 0.3 mLs (0.3 mg total) into the muscle once. for 1 dose    nitrofurantoin, macrocrystal-monohydrate, (MACROBID) 100 MG capsule Take 1 capsule (100 mg total) by mouth 2 (two) times daily. for 10 days    ondansetron (ZOFRAN-ODT) 4 MG TbDL Take 1 tablet (4 mg total) by mouth every 6 (six) hours as needed (nausea/vomiting).       Review of patient's allergies indicates:   Allergen Reactions    No known drug allergies        Past Medical History:   Diagnosis Date    Anemia     s/p transfusion from menstral bleeding    Anxiety     Asthma     "athletic" asthma    Blood transfusion     Hashimoto's " disease     HTN (hypertension)      Past Surgical History:   Procedure Laterality Date     SECTION N/A 2022    Procedure:  SECTION;  Surgeon: Víctor Arriaga MD;  Location: [facility] L&D;  Service: OB/GYN;  Laterality: N/A;    DILATION AND CURETTAGE OF UTERUS N/A 2021    Procedure: DILATION AND CURETTAGE, UTERUS;  Surgeon: Víctor Arriaga MD;  Location: [facility];  Service: OB/GYN;  Laterality: N/A;     Family History       Problem Relation (Age of Onset)    Diabetes Father    Hashimoto's thyroiditis Mother    Thyroid disease Mother          Tobacco Use    Smoking status: Never     Passive exposure: Never    Smokeless tobacco: Never   Substance and Sexual Activity    Alcohol use: Not Currently    Drug use: No    Sexual activity: Yes     Partners: Male     Birth control/protection: None     Review of Systems  Objective:     Vital Signs (Most Recent):  Temp: 98.2 °F (36.8 °C) (25 2315)  Pulse: 101 (25 0100)  Resp: (!) 39 (25 010)  BP: (!) 146/69 (25 0100)  SpO2: 99 % (25 0100) Vital Signs (24h Range):  Temp:  [98.1 °F (36.7 °C)-98.4 °F (36.9 °C)] 98.2 °F (36.8 °C)  Pulse:  [] 101  Resp:  [12-39] 39  SpO2:  [97 %-99 %] 99 %  BP: (118-146)/(64-96) 146/69     Weight: 72.6 kg (160 lb 0.9 oz)  Body mass index is 30.24 kg/m².         Physical Exam  NAD  Resting in bed comfortably   Head atraumatic   Auricles WNL AU  Nose w/ normal external appearance  Normal WOB, mild inspiratory stridor      Procedure: Flexible Laryngoscopy    After verbal consent was obtained, the left naris was anesthetized with topical lidocaine and neosynephrine. The flexible laryngoscope was introduced with the following findings:    Nasal cavity: no masses or lesions, pink mucosa, no purulence  Nasopharynx: no masses or lesions, niki wnl  Oropharynx: no masses or lesions, tonsils WNL, BOT WNL  Larynx and Hypopharynx: Bilateral vocal folds freely mobile to adduction and abduction, no masses or  lesions visualized.   Grade 3 subglottic stenosis     The patient tolerated the procedure well.                                   Significant Labs:  CBC:   Recent Labs   Lab 01/24/25  2251   WBC 15.37*   RBC 3.80*   HGB 11.2*   HCT 32.9*      MCV 87   MCH 29.5   MCHC 34.0     CMP:   Recent Labs   Lab 01/24/25  2251   GLU 91   CALCIUM 8.6*   ALBUMIN 3.2*   PROT 6.3      K 3.3*   CO2 22*      BUN 5*   CREATININE 0.5   ALKPHOS 57   ALT 9*   AST 7*   BILITOT 0.4       Significant Diagnostics:  I have reviewed all pertinent imaging results/findings within the past 24 hours.    Assessment/Plan:     * Acquired subglottic stenosis  28 yo female 30 wks pregnant with grade 3 subglottic stenosis. Mild biphasic stridor noted on exam, no increased work of breathing. Patient saturating well on room air. Patient admitted to MICU for close airway monitoring.    - Airway plan: Patient has significant narrowing of her subglottis on scope exam which would make intubation difficult should she decompensate.  Could potentially be intubated with Glidescope and a 5-0 ETT.   - Recommend immediate contact of anesthesia and ENT team should patient have worsening respiratory status.    - Recommend trach set at bedside   - Recommend 5-0 ETT at bedside     - Recommend continuing scheduled rac epi and nebulized steroids  - Tentative plan for OR Monday   - ENT will continue to follow     Please page ENT resident on call with any questions or concerns.                Supriya Ovalle MD  Otorhinolaryngology-Head & Neck Surgery  [facility] - [facility]

## 2025-01-25 NOTE — ASSESSMENT & PLAN NOTE
Nebulized budesonide BID, with duonebs q6WA and PRN  No wheezing on exam; treating for asthma flare but likely stenosis is major pathology  Montelukast nightly, diphenhydramine

## 2025-01-26 LAB
ALBUMIN SERPL BCP-MCNC: 3.2 G/DL (ref 3.5–5.2)
ALP SERPL-CCNC: 55 U/L (ref 40–150)
ALT SERPL W/O P-5'-P-CCNC: 9 U/L (ref 10–44)
ANION GAP SERPL CALC-SCNC: 12 MMOL/L (ref 8–16)
AST SERPL-CCNC: 10 U/L (ref 10–40)
BASOPHILS # BLD AUTO: 0.03 K/UL (ref 0–0.2)
BASOPHILS NFR BLD: 0.2 % (ref 0–1.9)
BILIRUB SERPL-MCNC: 0.4 MG/DL (ref 0.1–1)
BUN SERPL-MCNC: 8 MG/DL (ref 6–20)
CALCIUM SERPL-MCNC: 8.8 MG/DL (ref 8.7–10.5)
CHLORIDE SERPL-SCNC: 106 MMOL/L (ref 95–110)
CO2 SERPL-SCNC: 20 MMOL/L (ref 23–29)
CREAT SERPL-MCNC: 0.5 MG/DL (ref 0.5–1.4)
DIFFERENTIAL METHOD BLD: ABNORMAL
EOSINOPHIL # BLD AUTO: 0 K/UL (ref 0–0.5)
EOSINOPHIL NFR BLD: 0 % (ref 0–8)
ERYTHROCYTE [DISTWIDTH] IN BLOOD BY AUTOMATED COUNT: 13.1 % (ref 11.5–14.5)
EST. GFR  (NO RACE VARIABLE): >60 ML/MIN/1.73 M^2
GLUCOSE SERPL-MCNC: 101 MG/DL (ref 70–110)
HCT VFR BLD AUTO: 33.3 % (ref 37–48.5)
HGB BLD-MCNC: 11.4 G/DL (ref 12–16)
IMM GRANULOCYTES # BLD AUTO: 0.2 K/UL (ref 0–0.04)
IMM GRANULOCYTES NFR BLD AUTO: 1.3 % (ref 0–0.5)
LYMPHOCYTES # BLD AUTO: 1.2 K/UL (ref 1–4.8)
LYMPHOCYTES NFR BLD: 7.6 % (ref 18–48)
MAGNESIUM SERPL-MCNC: 1.8 MG/DL (ref 1.6–2.6)
MCH RBC QN AUTO: 29.4 PG (ref 27–31)
MCHC RBC AUTO-ENTMCNC: 34.2 G/DL (ref 32–36)
MCV RBC AUTO: 86 FL (ref 82–98)
MONOCYTES # BLD AUTO: 0.4 K/UL (ref 0.3–1)
MONOCYTES NFR BLD: 2.6 % (ref 4–15)
NEUTROPHILS # BLD AUTO: 13.3 K/UL (ref 1.8–7.7)
NEUTROPHILS NFR BLD: 88.3 % (ref 38–73)
NRBC BLD-RTO: 0 /100 WBC
PHOSPHATE SERPL-MCNC: 3.9 MG/DL (ref 2.7–4.5)
PLATELET # BLD AUTO: 179 K/UL (ref 150–450)
PMV BLD AUTO: 11.4 FL (ref 9.2–12.9)
POTASSIUM SERPL-SCNC: 4.1 MMOL/L (ref 3.5–5.1)
PROT SERPL-MCNC: 6.2 G/DL (ref 6–8.4)
RBC # BLD AUTO: 3.88 M/UL (ref 4–5.4)
SODIUM SERPL-SCNC: 138 MMOL/L (ref 136–145)
WBC # BLD AUTO: 15.12 K/UL (ref 3.9–12.7)

## 2025-01-26 PROCEDURE — 27000207 HC ISOLATION

## 2025-01-26 PROCEDURE — 25000003 PHARM REV CODE 250: Performed by: STUDENT IN AN ORGANIZED HEALTH CARE EDUCATION/TRAINING PROGRAM

## 2025-01-26 PROCEDURE — 80053 COMPREHEN METABOLIC PANEL: CPT | Performed by: STUDENT IN AN ORGANIZED HEALTH CARE EDUCATION/TRAINING PROGRAM

## 2025-01-26 PROCEDURE — 87086 URINE CULTURE/COLONY COUNT: CPT

## 2025-01-26 PROCEDURE — 94640 AIRWAY INHALATION TREATMENT: CPT

## 2025-01-26 PROCEDURE — 94761 N-INVAS EAR/PLS OXIMETRY MLT: CPT

## 2025-01-26 PROCEDURE — 63600175 PHARM REV CODE 636 W HCPCS: Mod: JZ,TB | Performed by: STUDENT IN AN ORGANIZED HEALTH CARE EDUCATION/TRAINING PROGRAM

## 2025-01-26 PROCEDURE — 63600175 PHARM REV CODE 636 W HCPCS: Performed by: STUDENT IN AN ORGANIZED HEALTH CARE EDUCATION/TRAINING PROGRAM

## 2025-01-26 PROCEDURE — 25000242 PHARM REV CODE 250 ALT 637 W/ HCPCS: Performed by: STUDENT IN AN ORGANIZED HEALTH CARE EDUCATION/TRAINING PROGRAM

## 2025-01-26 PROCEDURE — 83735 ASSAY OF MAGNESIUM: CPT | Performed by: STUDENT IN AN ORGANIZED HEALTH CARE EDUCATION/TRAINING PROGRAM

## 2025-01-26 PROCEDURE — 85025 COMPLETE CBC W/AUTO DIFF WBC: CPT | Performed by: INTERNAL MEDICINE

## 2025-01-26 PROCEDURE — 84100 ASSAY OF PHOSPHORUS: CPT | Performed by: STUDENT IN AN ORGANIZED HEALTH CARE EDUCATION/TRAINING PROGRAM

## 2025-01-26 PROCEDURE — 20000000 HC ICU ROOM

## 2025-01-26 PROCEDURE — 99232 SBSQ HOSP IP/OBS MODERATE 35: CPT | Mod: ,,, | Performed by: INTERNAL MEDICINE

## 2025-01-26 RX ADMIN — ALBUTEROL SULFATE 2.5 MG: 2.5 SOLUTION RESPIRATORY (INHALATION) at 07:01

## 2025-01-26 RX ADMIN — BUDESONIDE 0.5 MG: 0.5 INHALANT RESPIRATORY (INHALATION) at 07:01

## 2025-01-26 RX ADMIN — PANTOPRAZOLE SODIUM 40 MG: 40 INJECTION, POWDER, FOR SOLUTION INTRAVENOUS at 09:01

## 2025-01-26 RX ADMIN — METHYLPREDNISOLONE SODIUM SUCCINATE 40 MG: 40 INJECTION, POWDER, FOR SOLUTION INTRAMUSCULAR; INTRAVENOUS at 02:01

## 2025-01-26 RX ADMIN — PRENATAL VIT W/ FE FUMARATE-FA TAB 27-0.8 MG 1 TABLET: 27-0.8 TAB at 08:01

## 2025-01-26 RX ADMIN — FAMOTIDINE 40 MG: 20 TABLET ORAL at 09:01

## 2025-01-26 RX ADMIN — MONTELUKAST 10 MG: 10 TABLET, FILM COATED ORAL at 09:01

## 2025-01-26 RX ADMIN — METHYLPREDNISOLONE SODIUM SUCCINATE 40 MG: 40 INJECTION, POWDER, FOR SOLUTION INTRAMUSCULAR; INTRAVENOUS at 05:01

## 2025-01-26 RX ADMIN — METHYLPREDNISOLONE SODIUM SUCCINATE 40 MG: 40 INJECTION, POWDER, FOR SOLUTION INTRAMUSCULAR; INTRAVENOUS at 09:01

## 2025-01-26 RX ADMIN — ALBUTEROL SULFATE 2.5 MG: 2.5 SOLUTION RESPIRATORY (INHALATION) at 02:01

## 2025-01-26 NOTE — NURSING
Received nursing communication stating patient is okay to come off of telemetry to shower. Patient taken off telemetry and will be placed back on monitor once she has showered.

## 2025-01-26 NOTE — PROGRESS NOTES
[facility] ICU  Otorhinolaryngology-Head & Neck Surgery  Progress Note    Subjective:     Post-Op Info:  Procedure(s) (LRB):  MICROLARYNGOSCOPY, SUSPENSION (N/A)  CREATION, TRACHEOSTOMY (N/A)      Hospital Day: 3     Interval History: NAEON. Remains stable on room air. Tentatively proceeding to the OR tomorrow for airway intervention.     Medications:  Continuous Infusions:  Scheduled Meds:   albuterol sulfate  2.5 mg Nebulization Q6H WAKE    budesonide  0.5 mg Nebulization Q12H    famotidine  40 mg Oral QHS    methylPREDNISolone injection (PEDS and ADULTS)  40 mg Intravenous Q8H    montelukast  10 mg Oral QHS    pantoprazole  40 mg Intravenous Q12H    prenatal vitamin  1 tablet Oral Daily     PRN Meds:  Current Facility-Administered Medications:     acetaminophen, 650 mg, Oral, Q4H PRN    albuterol sulfate, 2.5 mg, Nebulization, Q4H PRN    diphenhydrAMINE, 50 mg, Oral, Nightly PRN    magnesium oxide, 800 mg, Oral, PRN    magnesium oxide, 800 mg, Oral, PRN    ondansetron, 4 mg, Intravenous, Q8H PRN    potassium bicarbonate, 35 mEq, Oral, PRN    potassium bicarbonate, 50 mEq, Oral, PRN    potassium bicarbonate, 60 mEq, Oral, PRN    potassium, sodium phosphates, 2 packet, Oral, PRN    potassium, sodium phosphates, 2 packet, Oral, PRN    potassium, sodium phosphates, 2 packet, Oral, PRN    racepinephrine, 0.5 mL, Nebulization, Q4H PRN    sodium chloride 0.9%, 10 mL, Intravenous, PRN     Review of patient's allergies indicates:   Allergen Reactions    No known drug allergies      Objective:     Vital Signs (24h Range):  Temp:  [97.8 °F (36.6 °C)-98.4 °F (36.9 °C)] 98.1 °F (36.7 °C)  Pulse:  [] 97  Resp:  [13-42] 17  SpO2:  [92 %-98 %] 98 %  BP: ()/(54-81) 134/70     Date 01/26/25 0700 - 01/27/25 0659   Shift 3361-1448 6780-4624 2357-1172 24 Hour Total   INTAKE   P.O. 120   120   Shift Total(mL/kg) 120(1.7)   120(1.7)   OUTPUT   Shift Total(mL/kg)       Weight (kg) 72.6 72.6 72.6 72.6      Lines/Drains/Airways       Peripheral Intravenous Line  Duration                  Peripheral IV - Single Lumen 01/24/25 1022 20 G No Left;Posterior Hand 1 day         Peripheral IV - Single Lumen 01/24/25 2255 18 G 1 3/4 in No Left;Anterior Forearm 1 day                     Physical Exam  NAD  Resting in bed comfortably   Head atraumatic   Auricles WNL AU  Nose w/ normal external appearance  Normal WOB, mild biphasic stridor     Significant Labs:  BMP:   Recent Labs   Lab 01/26/25  0521         CO2 20*   BUN 8   CREATININE 0.5   CALCIUM 8.8   MG 1.8     CBC:   Recent Labs   Lab 01/26/25  0715   WBC 15.12*   RBC 3.88*   HGB 11.4*   HCT 33.3*      MCV 86   MCH 29.4   MCHC 34.2       Significant Diagnostics:  I have reviewed and interpreted all pertinent imaging results/findings within the past 24 hours.  Assessment/Plan:     * Acquired subglottic stenosis  30 yo female 30 wks pregnant with grade 3 subglottic stenosis. Mild biphasic stridor noted on exam, no increased work of breathing. Patient saturating well on room air. Patient admitted to MICU for close airway monitoring.    - Airway plan: Patient has significant narrowing of her subglottis on scope exam which would make intubation difficult should she decompensate.  Could potentially be intubated with Glidescope and a 5-0 ETT.   - Recommend immediate contact of anesthesia and ENT team should patient have worsening respiratory status.    - Recommend trach set at bedside   - Recommend 5-0 ETT at bedside     - Recommend continuing scheduled rac epi and nebulized steroids  - Tentative plan for OR Monday  - ENT will continue to follow     Please page ENT resident on call with any questions or concerns.            Porfirio Coats MD  Otorhinolaryngology-Head & Neck Surgery  [facility] Medical ICU

## 2025-01-26 NOTE — SUBJECTIVE & OBJECTIVE
Interval History: NAEON. Remains stable on room air. Tentatively proceeding to the OR tomorrow for airway intervention.     Medications:  Continuous Infusions:  Scheduled Meds:   albuterol sulfate  2.5 mg Nebulization Q6H WAKE    budesonide  0.5 mg Nebulization Q12H    famotidine  40 mg Oral QHS    methylPREDNISolone injection (PEDS and ADULTS)  40 mg Intravenous Q8H    montelukast  10 mg Oral QHS    pantoprazole  40 mg Intravenous Q12H    prenatal vitamin  1 tablet Oral Daily     PRN Meds:  Current Facility-Administered Medications:     acetaminophen, 650 mg, Oral, Q4H PRN    albuterol sulfate, 2.5 mg, Nebulization, Q4H PRN    diphenhydrAMINE, 50 mg, Oral, Nightly PRN    magnesium oxide, 800 mg, Oral, PRN    magnesium oxide, 800 mg, Oral, PRN    ondansetron, 4 mg, Intravenous, Q8H PRN    potassium bicarbonate, 35 mEq, Oral, PRN    potassium bicarbonate, 50 mEq, Oral, PRN    potassium bicarbonate, 60 mEq, Oral, PRN    potassium, sodium phosphates, 2 packet, Oral, PRN    potassium, sodium phosphates, 2 packet, Oral, PRN    potassium, sodium phosphates, 2 packet, Oral, PRN    racepinephrine, 0.5 mL, Nebulization, Q4H PRN    sodium chloride 0.9%, 10 mL, Intravenous, PRN     Review of patient's allergies indicates:   Allergen Reactions    No known drug allergies      Objective:     Vital Signs (24h Range):  Temp:  [97.8 °F (36.6 °C)-98.4 °F (36.9 °C)] 98.1 °F (36.7 °C)  Pulse:  [] 97  Resp:  [13-42] 17  SpO2:  [92 %-98 %] 98 %  BP: ()/(54-81) 134/70     Date 01/26/25 0700 - 01/27/25 0659   Shift 6911-0425 0924-9943 5733-2227 24 Hour Total   INTAKE   P.O. 120   120   Shift Total(mL/kg) 120(1.7)   120(1.7)   OUTPUT   Shift Total(mL/kg)       Weight (kg) 72.6 72.6 72.6 72.6     Lines/Drains/Airways       Peripheral Intravenous Line  Duration                  Peripheral IV - Single Lumen 01/24/25 1022 20 G No Left;Posterior Hand 1 day         Peripheral IV - Single Lumen 01/24/25 2255 18 G 1 3/4 in No  Left;Anterior Forearm 1 day                     Physical Exam  NAD  Resting in bed comfortably   Head atraumatic   Auricles WNL AU  Nose w/ normal external appearance  Normal WOB, mild biphasic stridor     Significant Labs:  BMP:   Recent Labs   Lab 01/26/25  0521         CO2 20*   BUN 8   CREATININE 0.5   CALCIUM 8.8   MG 1.8     CBC:   Recent Labs   Lab 01/26/25  0715   WBC 15.12*   RBC 3.88*   HGB 11.4*   HCT 33.3*      MCV 86   MCH 29.4   MCHC 34.2       Significant Diagnostics:  I have reviewed and interpreted all pertinent imaging results/findings within the past 24 hours.

## 2025-01-26 NOTE — CARE UPDATE
NST      mod estefany +10x10 accels - decels RR appropriate for GA   [place] none     Plan for NST before and after surgery tomorrow.     Annelise Diaz MD  PGY 4  Obstetrics and Gynecology

## 2025-01-26 NOTE — SUBJECTIVE & OBJECTIVE
Interval History/Significant Events: NAOE   Denies IBARRA, LOC, Falls, Trauma, hemoptysis, hematemis, melena, fever, chills, nausea, vomiting, diarrhea.  Denies shortness of breath.      Review of Systems  Objective:     Vital Signs (Most Recent):  Temp: 98.2 °F (36.8 °C) (01/26/25 1100)  Pulse: 85 (01/26/25 1408)  Resp: 20 (01/26/25 1408)  BP: 126/74 (01/26/25 1408)  SpO2: 98 % (01/26/25 1408) Vital Signs (24h Range):  Temp:  [97.8 °F (36.6 °C)-98.4 °F (36.9 °C)] 98.2 °F (36.8 °C)  Pulse:  [] 85  Resp:  [16-42] 20  SpO2:  [92 %-99 %] 98 %  BP: ()/(54-81) 126/74   Weight: 72.6 kg (160 lb 0.9 oz)  Body mass index is 30.24 kg/m².      Intake/Output Summary (Last 24 hours) at 1/26/2025 1424  Last data filed at 1/26/2025 1300  Gross per 24 hour   Intake 720 ml   Output --   Net 720 ml          Physical Exam  Vitals and nursing note reviewed.   Constitutional:       Appearance: She is well-developed.   HENT:      Head: Normocephalic and atraumatic.      Comments: Mild inspiratory stridor similar to prior.     Mouth/Throat:      Mouth: Mucous membranes are moist.   Eyes:      Pupils: Pupils are equal, round, and reactive to light.   Cardiovascular:      Rate and Rhythm: Normal rate and regular rhythm.      Pulses: Normal pulses.   Pulmonary:      Effort: Pulmonary effort is normal.   Abdominal:      General: Bowel sounds are normal. There is no distension.      Palpations: Abdomen is soft.      Tenderness: There is no abdominal tenderness.   Musculoskeletal:         General: No swelling.   Skin:     General: Skin is warm and dry.   Neurological:      Mental Status: She is alert.            Vents:     Lines/Drains/Airways       Peripheral Intravenous Line  Duration                  Peripheral IV - Single Lumen 01/24/25 1022 20 G No Left;Posterior Hand 2 days         Peripheral IV - Single Lumen 01/24/25 2255 18 G 1 3/4 in No Left;Anterior Forearm 1 day                  Significant Labs:    CBC/Anemia  Profile:  Recent Labs   Lab 01/24/25 2251 01/25/25  0343 01/26/25  0715   WBC 15.37* 13.54* 15.12*   HGB 11.2* 10.8* 11.4*   HCT 32.9* 31.3* 33.3*    185 179   MCV 87 86 86   RDW 13.0 13.1 13.1        Chemistries:  Recent Labs   Lab 01/24/25 2251 01/25/25 0343 01/26/25  0521    138 138   K 3.3* 3.6 4.1    107 106   CO2 22* 21* 20*   BUN 5* 6 8   CREATININE 0.5 0.6 0.5   CALCIUM 8.6* 8.4* 8.8   ALBUMIN 3.2* 3.2* 3.2*   PROT 6.3 6.1 6.2   BILITOT 0.4 0.3 0.4   ALKPHOS 57 56 55   ALT 9* 7* 9*   AST 7* 8* 10   MG 1.7 1.7 1.8   PHOS 2.8 2.7 3.9       All pertinent labs within the past 24 hours have been reviewed.    Significant Imaging:  I have reviewed all pertinent imaging results/findings within the past 24 hours.

## 2025-01-26 NOTE — PLAN OF CARE
MICU DAILY GOALS     Family/Goals of care/Code Status   Code Status: Full Code    24H Vital Sign Range  Temp:  [97.8 °F (36.6 °C)-98.4 °F (36.9 °C)]   Pulse:  []   Resp:  [16-42]   BP: ()/(54-81)   SpO2:  [92 %-100 %]      Shift Events    No acute events throughout shift    AWAKE RASS: Goal -    Actual - RASS ([place]herty Agitation-Sedation Scale): alert and calm    Restraint necessity: Not necessary   BREATHE SBT: Not intubated    Coordinate A & B, analgesics/sedatives Pain: managed   SAT: Not intubated   Delirium CAM-ICU:     Early(intubated/ Progressive (non-intubated) Mobility MOVE Screen (INTUBATED ONLY): Not intubated    Activity: Activity Management: Ambulated in room - L4   Feeding/Nutrition Diet order: Diet/Nutrition Received: regular,     Thrombus DVT prophylaxis: VTE Core Measure: Pharmacological prophylaxis initiated/maintained   HOB Elevation Head of Bed (HOB) Positioning: HOB at 30 degrees   Ulcer Prophylaxis GI: yes   Glucose control managed     Skin Skin assessment:     Sacrum intact/not altered? Yes  Heels intact/not altered? Yes  Surgical wound? No    [x] No Altered Skin Integrity Present    [x]Prevention Measures Documented  Wound Care Consulted? No   Bowel Function no issues    Indwelling Catheter Necessity            De-escalation Antibiotics         VS and assessment per flow sheet, patient progressing towards goals as tolerated, plan of care reviewed with patient, all concerns addressed, will continue to monitor.

## 2025-01-26 NOTE — PLAN OF CARE
MICU DAILY GOALS     Family/Goals of care/Code Status   Code Status: Full Code    24H Vital Sign Range  Temp:  [98.1 °F (36.7 °C)-98.4 °F (36.9 °C)]   Pulse:  []   Resp:  [13-42]   BP: (112-147)/(56-80)   SpO2:  [92 %-100 %]      Shift Events (include procedures and significant events)   No acute events throughout shift    AWAKE RASS: Goal -    Actual - RASS (Daugherty Agitation-Sedation Scale): alert and calm    Restraint necessity: Not necessary   BREATHE SBT: Not intubated    Coordinate A & B, analgesics/sedatives Pain: managed   SAT: Not intubated   Delirium CAM-ICU:     Early(intubated/ Progressive (non-intubated) Mobility MOVE Screen (INTUBATED ONLY): Not intubated    Activity: Activity Management: Ambulated to bathroom - L4   Feeding/Nutrition Diet order: Diet/Nutrition Received: regular,     Thrombus DVT prophylaxis: VTE Core Measure: Pharmacological prophylaxis initiated/maintained   HOB Elevation Head of Bed (HOB) Positioning: HOB elevated   Ulcer Prophylaxis GI: yes   Glucose control managed     Skin Skin assessment:     Sacrum intact/not altered? Yes  Heels intact/not altered? Yes  Surgical wound? No    CHECK ONE!   (no altered skin or altered skin) and sub boxes:  [x] No Altered Skin Integrity Present    [x]Prevention Measures Documented    [] Altered Skin Integrity Present or Discovered   [] LDA present in EPIC, daily doc completed              [] LDA added if not in EPIC (describe wound).                    When describing wound, do not stage, use descriptive words only.    [] Wound Image Taken (required on admit,                   transfer/discharge and every Tuesday)    Wound Care Consulted? No   Bowel Function no issues    Indwelling Catheter Necessity            De-escalation Antibiotics No        VS and assessment per flow sheet, patient progressing towards goals as tolerated, plan of care reviewed with  patient , all concerns addressed, will continue to monitor.

## 2025-01-26 NOTE — CONSULTS
MATERNAL-FETAL MEDICINE   CONSULT NOTE    Provider requesting consultation: ENT    SUBJECTIVE:     Ms. Gisell Villafuerte is a 29 y.o.  female with IUP at 30w2d who is seen in consultation by MFM for evaluation and management of pregnancy in the setting of subglottic stenosis, admission for airway management/surgery.    Patient reports active fetal movement, denies any obstetric complaints. Reports an uncomplicated pregnancy thus far.       Current Discharge Medication List        CONTINUE these medications which have NOT CHANGED    Details   !! albuterol (PROVENTIL/VENTOLIN HFA) 90 mcg/actuation inhaler Inhale 1-2 puffs into the lungs every 6 (six) hours as needed for Wheezing or Shortness of Breath (chest tightness). Rescue  Qty: 18 g, Refills: 6    Associated Diagnoses: Severe persistent asthma with acute exacerbation      albuterol sulfate 2.5 mg/0.5 mL Nebu Take 2.5 mg by nebulization every 6 (six) hours as needed (shortness of breath, wheezing, or chest tightness.). Rescue  Qty: 1 each, Refills: 0      cetirizine (ZYRTEC) 10 MG tablet Take 1 tablet (10 mg total) by mouth every evening.  Qty: 30 tablet, Refills: 0      famotidine (PEPCID) 20 MG tablet Take 1 tablet (20 mg total) by mouth 2 (two) times daily.  Qty: 180 tablet, Refills: 0      fluticasone propion-salmeterol 115-21 mcg/dose (ADVAIR HFA) 115-21 mcg/actuation HFAA inhaler Inhale 2 puffs into the lungs 2 (two) times a day. Controller  Qty: 12 g, Refills: 0      fluticasone propionate (FLONASE) 50 mcg/actuation nasal spray 2 sprays (100 mcg total) by Each Nostril route once daily.  Qty: 18.2 mL, Refills: 0      montelukast (SINGULAIR) 5 MG chewable tablet Take 2 tablets (10 mg total) by mouth every evening.  Qty: 60 tablet, Refills: 0      prenatal vit/iron fum/folic ac (PRENATAL 1+1 ORAL) Take 1 tablet by mouth once daily.      !! albuterol (VENTOLIN HFA) 90 mcg/actuation inhaler Inhale 2 puffs into the lungs every 6 (six) hours as needed for  "Wheezing. Rescue  Qty: 18 g, Refills: 0      EPINEPHrine (EPIPEN 2-BELEN) 0.3 mg/0.3 mL AtIn Inject 0.3 mLs (0.3 mg total) into the muscle once. for 1 dose  Qty: 0.3 mL, Refills: 3      nitrofurantoin, macrocrystal-monohydrate, (MACROBID) 100 MG capsule Take 1 capsule (100 mg total) by mouth 2 (two) times daily. for 10 days  Qty: 20 capsule, Refills: 0      ondansetron (ZOFRAN-ODT) 4 MG TbDL Take 1 tablet (4 mg total) by mouth every 6 (six) hours as needed (nausea/vomiting).  Qty: 12 tablet, Refills: 0       !! - Potential duplicate medications found. Please discuss with provider.        STOP taking these medications       predniSONE (DELTASONE) 20 MG tablet Comments:   Reason for Stopping:               Review of patient's allergies indicates:   Allergen Reactions    No known drug allergies        PMH:  Past Medical History:   Diagnosis Date    Anemia     s/p transfusion from menstral bleeding    Anxiety     Asthma     "athletic" asthma    Blood transfusion     Hashimoto's disease     HTN (hypertension)        PObHx:  OB History    Para Term  AB Living   3 1 1   1 1   SAB IAB Ectopic Multiple Live Births   1     0 1      # Outcome Date GA Lbr Donn/2nd Weight Sex Type Anes PTL Lv   3 Current            2 Term 21 39w6d  3.841 kg (8 lb 7.5 oz) M CS-LTranv EPI N SOSA   1 SAB                PSH:  Past Surgical History:   Procedure Laterality Date     SECTION N/A 2022    Procedure:  SECTION;  Surgeon: Víctor Arriaga MD;  Location: [facility] L&D;  Service: OB/GYN;  Laterality: N/A;    DILATION AND CURETTAGE OF UTERUS N/A 2021    Procedure: DILATION AND CURETTAGE, UTERUS;  Surgeon: Víctor Arriaga MD;  Location: [facility] OR;  Service: OB/GYN;  Laterality: N/A;       Family history:family history includes Diabetes in her father; Hashimoto's thyroiditis in her mother; Thyroid disease in her mother.    Social history: reports that she has never smoked. She has never been exposed to tobacco " "smoke. She has never used smokeless tobacco. She reports that she does not currently use alcohol. She reports that she does not use drugs.      Objective:   /80 (BP Location: Left arm, Patient Position: Lying)   Pulse 103   Temp 98.1 °F (36.7 °C) (Oral)   Resp 17   Ht 5' 1" (1.549 m)   Wt 72.6 kg (160 lb 0.9 oz)   LMP 2024 (Approximate)   SpO2 99%   Breastfeeding No   BMI 30.24 kg/m²     Physical Exam  NAD  Audible stridor, non-labored breathing  A/O x3  Ab gravid, soft, NTND    Significant labs/imaging:  Reviewed in Epic    Most recent ultrasound :  1. Biophysical profile is 8/8.  2. The DONTAE is documented approximately 13 cm.  3. The fetal presentation is breech.  4. Anterior placenta. No evidence of placenta previa.    ASSESSMENT/PLAN:     29 y.o.  female with IUP at 30w2d, grade 3 subglottic stenosis. Admitted to MICU for close airway monitoring. Plan for dilation procedure with ENT on Monday.    Medication review: I reviewed the patient's current inpatient medications. They have reasonable safety profiles in the context of pregnancy. The general approach to this patient should be standard of care in regards to her airway management/medication choice; this will be best for both the patient and her baby. I am happy to answer any specific medication questions as they arise.   Fetal surveillance: I counseled the patient that, while we do not have an in-house obstetric service at [facility], we are following along and plan to provide pre and post procedure fetal monitoring (a 20 minute NST, Non-Stress Test, to be performed by an L&D nurse). The OB team will be available for any questions/concerns but we will not be on site during the procedure and do not plan on monitoring during the procedure. I had a long talk with the patient about this. The baby has had very reassuring ultrasounds, testing thus-far and by treating mom and securing mom's airway, this will be best/safest for " baby.   Please obtain fetal heart tones with bedside doppler daily and contact OB with any questions/concerns.  Airway plan: Recommend maintaining O2 saturations >95%. Ff there is concern for worsening airway status, or if O2 saturations are persistently dropping below 95%, please contact the OBGYN/MFM team immediately.   Surgery plan: agree with ENT plan for OR on Monday. As described above, will plan on pre and post procedure fetal testing. Otherwise, surgery routine per ENT.      Please page MFM on call with any questions or concerns.  We will continue to follow along remotely.    NATHEN Barrientos MD  Maternal-Fetal Medicine

## 2025-01-26 NOTE — PROGRESS NOTES
[facility] - Medical ICU  Critical Care Medicine  Progress Note    Patient Name: Gisell Villafuerte  MRN: [id number]  Admission Date: 2025  Hospital Length of Stay: 2 days  Code Status: Full Code  Attending Provider: Lukas Michel*  Primary Care Provider: Yazan Jj MD   Principal Problem: Acquired subglottic stenosis    Subjective:     HPI:  29F A1 30wks with recently diagnosed subglottic stenosis, here for ENT procedure. History of asthma, LPRD, allergies, anxiety. Reportedly seen by ENT PA in  and diagnosed with LPRD; has had worsening stridor which became acute after flu A infx . Was admitted for asthma exacerbation, however failed conservative tx and was investigated further given stridor.      HDS on outside admission. Labs with mild leukocytosis iso steroid adminsitration outpatient. RIP negative, normal inflammatory markers. Ucx noted to have E coli; was treated with ceftriaxone.    Hospital/ICU Course:  Patient admitted for ENT evaluation of subglottic stenosis and potential balloon/laser procedure. Tentative plan for OR  with ENT. OBGYN also consulted and following given 30wks pregnant. She is comfortable on room air.    Interval History/Significant Events: NAOE   Denies IBARRA, LOC, Falls, Trauma, hemoptysis, hematemis, melena, fever, chills, nausea, vomiting, diarrhea.  Denies shortness of breath.      Review of Systems  Objective:     Vital Signs (Most Recent):  Temp: 98.2 °F (36.8 °C) (25 1100)  Pulse: 85 (25 1408)  Resp: 20 (25 1408)  BP: 126/74 (25 1408)  SpO2: 98 % (25 1408) Vital Signs (24h Range):  Temp:  [97.8 °F (36.6 °C)-98.4 °F (36.9 °C)] 98.2 °F (36.8 °C)  Pulse:  [] 85  Resp:  [16-42] 20  SpO2:  [92 %-99 %] 98 %  BP: ()/(54-81) 126/74   Weight: 72.6 kg (160 lb 0.9 oz)  Body mass index is 30.24 kg/m².      Intake/Output Summary (Last 24 hours) at 2025 1424  Last data filed at 2025 1300  Gross per 24 hour    Intake 720 ml   Output --   Net 720 ml          Physical Exam  Vitals and nursing note reviewed.   Constitutional:       Appearance: She is well-developed.   HENT:      Head: Normocephalic and atraumatic.      Comments: Mild inspiratory stridor similar to prior.     Mouth/Throat:      Mouth: Mucous membranes are moist.   Eyes:      Pupils: Pupils are equal, round, and reactive to light.   Cardiovascular:      Rate and Rhythm: Normal rate and regular rhythm.      Pulses: Normal pulses.   Pulmonary:      Effort: Pulmonary effort is normal.   Abdominal:      General: Bowel sounds are normal. There is no distension.      Palpations: Abdomen is soft.      Tenderness: There is no abdominal tenderness.   Musculoskeletal:         General: No swelling.   Skin:     General: Skin is warm and dry.   Neurological:      Mental Status: She is alert.            Vents:     Lines/Drains/Airways       Peripheral Intravenous Line  Duration                  Peripheral IV - Single Lumen 01/24/25 1022 20 G No Left;Posterior Hand 2 days         Peripheral IV - Single Lumen 01/24/25 2255 18 G 1 3/4 in No Left;Anterior Forearm 1 day                  Significant Labs:    CBC/Anemia Profile:  Recent Labs   Lab 01/24/25  2251 01/25/25  0343 01/26/25  0715   WBC 15.37* 13.54* 15.12*   HGB 11.2* 10.8* 11.4*   HCT 32.9* 31.3* 33.3*    185 179   MCV 87 86 86   RDW 13.0 13.1 13.1        Chemistries:  Recent Labs   Lab 01/24/25  2251 01/25/25  0343 01/26/25  0521    138 138   K 3.3* 3.6 4.1    107 106   CO2 22* 21* 20*   BUN 5* 6 8   CREATININE 0.5 0.6 0.5   CALCIUM 8.6* 8.4* 8.8   ALBUMIN 3.2* 3.2* 3.2*   PROT 6.3 6.1 6.2   BILITOT 0.4 0.3 0.4   ALKPHOS 57 56 55   ALT 9* 7* 9*   AST 7* 8* 10   MG 1.7 1.7 1.8   PHOS 2.8 2.7 3.9       All pertinent labs within the past 24 hours have been reviewed.    Significant Imaging:  I have reviewed all pertinent imaging results/findings within the past 24 hours.    ABG  No results for  "input(s): "PH", "PO2", "PCO2", "HCO3", "BE" in the last 168 hours.  Assessment/Plan:     ENT  * Acquired subglottic stenosis  Thought to be acquired iso LPRD. ENT at [facility] transferred for higher procedural level of care; ENT here consulted. Reportedly both balloon dilatation and tracheostomy have been discussed with her.    - reflux management with PPI BID and pepcid 40 mg nightly   - methylpred 40mg q8H   - racemic epi q6H PRN for 24 hours  - ordered auto-immune labs  - TSH WNL      Pulmonary  Severe persistent asthma with acute exacerbation  No wheezing on exam; treating for asthma flare but likely stenosis is major pathology.    -Nebulized budesonide BID, with duonebs q6WA and PRN  -Montelukast nightly, diphenhydramine    Renal/  Acute cystitis  - repeat UA to ensure clearance.     Obstetric  30 weeks gestation of pregnancy  - OBGYN consulted, appreciate their recs       Critical Care Daily Checklist:    A: Awake: RASS Goal/Actual Goal:    Actual:     B: Spontaneous Breathing Trial Performed?     C: SAT & SBT Coordinated?  Not intubated                      D: Delirium: CAM-ICU     E: Early Mobility Performed? Yes   F: Feeding Goal:    Status:     Current Diet Order   Procedures    Diet Adult Regular      AS: Analgesia/Sedation none   T: Thromboembolic Prophylaxis Hold at midnight   H: HOB > 300 Yes   U: Stress Ulcer Prophylaxis (if needed) none   G: Glucose Control Well controlled   B: Bowel Function     I: Indwelling Catheter (Lines & Arnett) Necessity piv   D: De-escalation of Antimicrobials/Pharmacotherapies none    Plan for the day/ETD ctm    Code Status:  Family/Goals of Care: Full Code         Critical secondary to Patient has a condition that poses threat to life and bodily function: Severe Respiratory Distress      Critical care was time spent personally by me on the following activities: development of treatment plan with patient or surrogate and bedside caregivers, discussions with consultants, " evaluation of patient's response to treatment, examination of patient, ordering and performing treatments and interventions, ordering and review of laboratory studies, ordering and review of radiographic studies, pulse oximetry, re-evaluation of patient's condition. This critical care time did not overlap with that of any other provider or involve time for any procedures.     Arcenio Spencer MD  Critical Care Medicine  [facility]

## 2025-01-27 LAB
ALBUMIN SERPL BCP-MCNC: 3.5 G/DL (ref 3.5–5.2)
ALP SERPL-CCNC: 57 U/L (ref 40–150)
ALT SERPL W/O P-5'-P-CCNC: 9 U/L (ref 10–44)
ANA SER QL IF: NORMAL
ANION GAP SERPL CALC-SCNC: 11 MMOL/L (ref 8–16)
ANTI SM ANTIBODY: 0.08 RATIO (ref 0–0.99)
ANTI SM ANTIBODY: 0.08 RATIO (ref 0–0.99)
ANTI-SM INTERPRETATION: NEGATIVE
ANTI-SM INTERPRETATION: NEGATIVE
ANTI-SSA ANTIBODY: 0.04 RATIO (ref 0–0.99)
ANTI-SSA INTERPRETATION: NEGATIVE
ANTI-SSB ANTIBODY: 0.05 RATIO (ref 0–0.99)
ANTI-SSB INTERPRETATION: NEGATIVE
AST SERPL-CCNC: 8 U/L (ref 10–40)
BASOPHILS # BLD AUTO: 0.03 K/UL (ref 0–0.2)
BASOPHILS NFR BLD: 0.2 % (ref 0–1.9)
BILIRUB SERPL-MCNC: 0.6 MG/DL (ref 0.1–1)
BUN SERPL-MCNC: 9 MG/DL (ref 6–20)
CALCIUM SERPL-MCNC: 9 MG/DL (ref 8.7–10.5)
CHLORIDE SERPL-SCNC: 104 MMOL/L (ref 95–110)
CO2 SERPL-SCNC: 22 MMOL/L (ref 23–29)
CREAT SERPL-MCNC: 0.5 MG/DL (ref 0.5–1.4)
DIFFERENTIAL METHOD BLD: ABNORMAL
DSDNA AB SER-ACNC: NORMAL [IU]/ML
EOSINOPHIL # BLD AUTO: 0 K/UL (ref 0–0.5)
EOSINOPHIL NFR BLD: 0 % (ref 0–8)
ERYTHROCYTE [DISTWIDTH] IN BLOOD BY AUTOMATED COUNT: 12.9 % (ref 11.5–14.5)
EST. GFR  (NO RACE VARIABLE): >60 ML/MIN/1.73 M^2
GIANT PLATELETS BLD QL SMEAR: PRESENT
GLUCOSE SERPL-MCNC: 110 MG/DL (ref 70–110)
HCT VFR BLD AUTO: 36 % (ref 37–48.5)
HGB BLD-MCNC: 12.4 G/DL (ref 12–16)
IMM GRANULOCYTES # BLD AUTO: 0.2 K/UL (ref 0–0.04)
IMM GRANULOCYTES NFR BLD AUTO: 1.2 % (ref 0–0.5)
LYMPHOCYTES # BLD AUTO: 1.3 K/UL (ref 1–4.8)
LYMPHOCYTES NFR BLD: 7.7 % (ref 18–48)
MAGNESIUM SERPL-MCNC: 1.9 MG/DL (ref 1.6–2.6)
MCH RBC QN AUTO: 29.6 PG (ref 27–31)
MCHC RBC AUTO-ENTMCNC: 34.4 G/DL (ref 32–36)
MCV RBC AUTO: 86 FL (ref 82–98)
MONOCYTES # BLD AUTO: 0.4 K/UL (ref 0.3–1)
MONOCYTES NFR BLD: 2.1 % (ref 4–15)
NEUTROPHILS # BLD AUTO: 14.7 K/UL (ref 1.8–7.7)
NEUTROPHILS NFR BLD: 88.8 % (ref 38–73)
NRBC BLD-RTO: 0 /100 WBC
PHOSPHATE SERPL-MCNC: 4.4 MG/DL (ref 2.7–4.5)
PLATELET # BLD AUTO: 183 K/UL (ref 150–450)
PLATELET BLD QL SMEAR: ABNORMAL
PMV BLD AUTO: 10.7 FL (ref 9.2–12.9)
POTASSIUM SERPL-SCNC: 3.9 MMOL/L (ref 3.5–5.1)
PROT SERPL-MCNC: 6.8 G/DL (ref 6–8.4)
RBC # BLD AUTO: 4.19 M/UL (ref 4–5.4)
SODIUM SERPL-SCNC: 137 MMOL/L (ref 136–145)
WBC # BLD AUTO: 16.59 K/UL (ref 3.9–12.7)

## 2025-01-27 PROCEDURE — 94761 N-INVAS EAR/PLS OXIMETRY MLT: CPT

## 2025-01-27 PROCEDURE — 25000003 PHARM REV CODE 250: Performed by: STUDENT IN AN ORGANIZED HEALTH CARE EDUCATION/TRAINING PROGRAM

## 2025-01-27 PROCEDURE — 99232 SBSQ HOSP IP/OBS MODERATE 35: CPT | Mod: 25,,, | Performed by: STUDENT IN AN ORGANIZED HEALTH CARE EDUCATION/TRAINING PROGRAM

## 2025-01-27 PROCEDURE — 83735 ASSAY OF MAGNESIUM: CPT | Performed by: STUDENT IN AN ORGANIZED HEALTH CARE EDUCATION/TRAINING PROGRAM

## 2025-01-27 PROCEDURE — 25000003 PHARM REV CODE 250: Performed by: INTERNAL MEDICINE

## 2025-01-27 PROCEDURE — 63600175 PHARM REV CODE 636 W HCPCS: Mod: JZ,TB | Performed by: STUDENT IN AN ORGANIZED HEALTH CARE EDUCATION/TRAINING PROGRAM

## 2025-01-27 PROCEDURE — 94640 AIRWAY INHALATION TREATMENT: CPT

## 2025-01-27 PROCEDURE — 20000000 HC ICU ROOM

## 2025-01-27 PROCEDURE — 59025 FETAL NON-STRESS TEST: CPT | Mod: 26,,, | Performed by: STUDENT IN AN ORGANIZED HEALTH CARE EDUCATION/TRAINING PROGRAM

## 2025-01-27 PROCEDURE — 63600175 PHARM REV CODE 636 W HCPCS

## 2025-01-27 PROCEDURE — 99499 UNLISTED E&M SERVICE: CPT | Mod: ,,, | Performed by: OTOLARYNGOLOGY

## 2025-01-27 PROCEDURE — 80053 COMPREHEN METABOLIC PANEL: CPT | Performed by: STUDENT IN AN ORGANIZED HEALTH CARE EDUCATION/TRAINING PROGRAM

## 2025-01-27 PROCEDURE — 25000242 PHARM REV CODE 250 ALT 637 W/ HCPCS: Performed by: STUDENT IN AN ORGANIZED HEALTH CARE EDUCATION/TRAINING PROGRAM

## 2025-01-27 PROCEDURE — 63600175 PHARM REV CODE 636 W HCPCS: Performed by: STUDENT IN AN ORGANIZED HEALTH CARE EDUCATION/TRAINING PROGRAM

## 2025-01-27 PROCEDURE — 84100 ASSAY OF PHOSPHORUS: CPT | Performed by: STUDENT IN AN ORGANIZED HEALTH CARE EDUCATION/TRAINING PROGRAM

## 2025-01-27 PROCEDURE — 27000207 HC ISOLATION

## 2025-01-27 PROCEDURE — 85025 COMPLETE CBC W/AUTO DIFF WBC: CPT | Performed by: STUDENT IN AN ORGANIZED HEALTH CARE EDUCATION/TRAINING PROGRAM

## 2025-01-27 PROCEDURE — 99291 CRITICAL CARE FIRST HOUR: CPT | Mod: ,,, | Performed by: INTERNAL MEDICINE

## 2025-01-27 RX ORDER — ENOXAPARIN SODIUM 100 MG/ML
40 INJECTION SUBCUTANEOUS EVERY 24 HOURS
Status: COMPLETED | OUTPATIENT
Start: 2025-01-27 | End: 2025-02-04

## 2025-01-27 RX ORDER — MUPIROCIN 20 MG/G
OINTMENT TOPICAL 2 TIMES DAILY
Status: DISPENSED | OUTPATIENT
Start: 2025-01-27 | End: 2025-02-01

## 2025-01-27 RX ADMIN — MONTELUKAST 10 MG: 10 TABLET, FILM COATED ORAL at 08:01

## 2025-01-27 RX ADMIN — METHYLPREDNISOLONE SODIUM SUCCINATE 40 MG: 40 INJECTION, POWDER, FOR SOLUTION INTRAMUSCULAR; INTRAVENOUS at 06:01

## 2025-01-27 RX ADMIN — MUPIROCIN: 20 OINTMENT TOPICAL at 08:01

## 2025-01-27 RX ADMIN — MUPIROCIN: 20 OINTMENT TOPICAL at 12:01

## 2025-01-27 RX ADMIN — PANTOPRAZOLE SODIUM 40 MG: 40 INJECTION, POWDER, FOR SOLUTION INTRAVENOUS at 08:01

## 2025-01-27 RX ADMIN — BUDESONIDE 0.5 MG: 0.5 INHALANT RESPIRATORY (INHALATION) at 07:01

## 2025-01-27 RX ADMIN — ALBUTEROL SULFATE 2.5 MG: 2.5 SOLUTION RESPIRATORY (INHALATION) at 07:01

## 2025-01-27 RX ADMIN — PANTOPRAZOLE SODIUM 40 MG: 40 INJECTION, POWDER, FOR SOLUTION INTRAVENOUS at 09:01

## 2025-01-27 RX ADMIN — PRENATAL VIT W/ FE FUMARATE-FA TAB 27-0.8 MG 1 TABLET: 27-0.8 TAB at 09:01

## 2025-01-27 RX ADMIN — METHYLPREDNISOLONE SODIUM SUCCINATE 40 MG: 40 INJECTION, POWDER, FOR SOLUTION INTRAMUSCULAR; INTRAVENOUS at 02:01

## 2025-01-27 RX ADMIN — ALBUTEROL SULFATE 2.5 MG: 2.5 SOLUTION RESPIRATORY (INHALATION) at 02:01

## 2025-01-27 RX ADMIN — ENOXAPARIN SODIUM 40 MG: 40 INJECTION SUBCUTANEOUS at 04:01

## 2025-01-27 RX ADMIN — FAMOTIDINE 40 MG: 20 TABLET ORAL at 08:01

## 2025-01-27 NOTE — SUBJECTIVE & OBJECTIVE
Interval History:   NAEON. Very anxious this am.    Medications:  Continuous Infusions:  Scheduled Meds:   albuterol sulfate  2.5 mg Nebulization Q6H WAKE    budesonide  0.5 mg Nebulization Q12H    famotidine  40 mg Oral QHS    methylPREDNISolone injection (PEDS and ADULTS)  40 mg Intravenous Q8H    montelukast  10 mg Oral QHS    pantoprazole  40 mg Intravenous Q12H    prenatal vitamin  1 tablet Oral Daily     PRN Meds:  Current Facility-Administered Medications:     acetaminophen, 650 mg, Oral, Q4H PRN    albuterol sulfate, 2.5 mg, Nebulization, Q4H PRN    diphenhydrAMINE, 50 mg, Oral, Nightly PRN    magnesium oxide, 800 mg, Oral, PRN    magnesium oxide, 800 mg, Oral, PRN    ondansetron, 4 mg, Intravenous, Q8H PRN    potassium bicarbonate, 35 mEq, Oral, PRN    potassium bicarbonate, 50 mEq, Oral, PRN    potassium bicarbonate, 60 mEq, Oral, PRN    potassium, sodium phosphates, 2 packet, Oral, PRN    potassium, sodium phosphates, 2 packet, Oral, PRN    potassium, sodium phosphates, 2 packet, Oral, PRN    racepinephrine, 0.5 mL, Nebulization, Q4H PRN    sodium chloride 0.9%, 10 mL, Intravenous, PRN     Review of patient's allergies indicates:   Allergen Reactions    No known drug allergies      Objective:     Vital Signs (24h Range):  Temp:  [98 °F (36.7 °C)-98.4 °F (36.9 °C)] 98.1 °F (36.7 °C)  Pulse:  [] 77  Resp:  [15-32] 17  SpO2:  [95 %-100 %] 97 %  BP: ()/(48-81) 102/58       Lines/Drains/Airways       Peripheral Intravenous Line  Duration                  Peripheral IV - Single Lumen 01/24/25 1022 20 G No Left;Posterior Hand 2 days         Peripheral IV - Single Lumen 01/24/25 2255 18 G 1 3/4 in No Left;Anterior Forearm 2 days                     Physical Exam  NAD  Sitting in bed comfortably   Head atraumatic   Auricles WNL AU  Nose w/ normal external appearance  Normal WOB, mild biphasic stridor       Significant Labs:  CBC:   Recent Labs   Lab 01/27/25  0203   WBC 16.59*   RBC 4.19   HGB 12.4    HCT 36.0*      MCV 86   MCH 29.6   MCHC 34.4     CMP:   Recent Labs   Lab 01/27/25  0203      CALCIUM 9.0   ALBUMIN 3.5   PROT 6.8      K 3.9   CO2 22*      BUN 9   CREATININE 0.5   ALKPHOS 57   ALT 9*   AST 8*   BILITOT 0.6       Significant Diagnostics:  I have reviewed and interpreted all pertinent imaging results/findings within the past 24 hours.

## 2025-01-27 NOTE — PLAN OF CARE
MICU DAILY GOALS     Family/Goals of care/Code Status   Code Status: Full Code    24H Vital Sign Range  Temp:  [98 °F (36.7 °C)-98.4 °F (36.9 °C)]   Pulse:  []   Resp:  [15-50]   BP: ()/(48-86)   SpO2:  [95 %-100 %]      Shift Events (include procedures and significant events)   No acute events throughout shift. Tentative procedure to be completed next Thursday, as M advises pt be at 32 wks pregnant to better improve outcomes.     AWAKE RASS: Goal -    Actual - RASS (Daugherty Agitation-Sedation Scale): alert and calm    Restraint necessity: Not necessary   BREATHE SBT: Not intubated    Coordinate A & B, analgesics/sedatives Pain: managed   SAT: Not intubated   Delirium CAM-ICU:     Early(intubated/ Progressive (non-intubated) Mobility MOVE Screen (INTUBATED ONLY): Not intubated    Activity: Activity Management: Arm raise - L1, Ambulated in room - L4   Feeding/Nutrition Diet order: Diet/Nutrition Received: regular,     Thrombus DVT prophylaxis: VTE Core Measure: Pharmacological prophylaxis initiated/maintained   HOB Elevation Head of Bed (HOB) Positioning: HOB at 30-45 degrees   Ulcer Prophylaxis GI: yes   Glucose control managed     Skin Skin assessment:     Sacrum intact/not altered? Yes  Heels intact/not altered? Yes  Surgical wound? No    CHECK ONE!   (no altered skin or altered skin) and sub boxes:  [x] No Altered Skin Integrity Present    [x]Prevention Measures Documented    [] Altered Skin Integrity Present or Discovered   [] LDA present in EPIC, daily doc completed              [] LDA added if not in EPIC (describe wound).                    When describing wound, do not stage, use descriptive words only.    [] Wound Image Taken (required on admit,                   transfer/discharge and every Tuesday)    Wound Care Consulted? No   Bowel Function no issues    Indwelling Catheter Necessity            De-escalation Antibiotics Yes        VS and assessment per flow sheet, patient progressing  towards goals as tolerated, plan of care reviewed with  patient , all concerns addressed, will continue to monitor.

## 2025-01-27 NOTE — PROGRESS NOTES
[facility]  Critical Care Medicine  Progress Note    Patient Name: Gisell Villafuerte  MRN: [id number]  Admission Date: 2025  Hospital Length of Stay: 3 days  Code Status: Full Code  Attending Provider: Lyle Sue MD  Primary Care Provider: Yazan Jj MD   Principal Problem: Acquired subglottic stenosis    Subjective:     HPI:  29F A1 30wks with recently diagnosed subglottic stenosis, here for ENT procedure. History of asthma, LPRD, allergies, anxiety. Reportedly seen by ENT PA in  and diagnosed with LPRD; has had worsening stridor which became acute after flu A infx . Was admitted for asthma exacerbation, however failed conservative tx and was investigated further given stridor.      HDS on outside admission. Labs with mild leukocytosis iso steroid adminsitration outpatient. RIP negative, normal inflammatory markers. Ucx noted to have E coli; was treated with ceftriaxone.    Hospital/ICU Course:  Patient admitted for ENT evaluation of subglottic stenosis and potential balloon/laser procedure. Tentative plan for OR  with ENT. OBGYN also consulted and following given 30wks pregnant. She is comfortable on room air. OB following; performed NST. ENT procedure delayed; maternal fetal medicine will need to be present for balloon/laser procedure.     Interval History/Significant Events: NAEO. ENT procedure delayed. ENT requesting maternal fetal medicine to be present during procedure. Will start Lovenox today for DVT ppx.     Review of Systems   Constitutional:  Negative for chills and fever.   Respiratory:  Negative for shortness of breath.    Cardiovascular:  Negative for chest pain.   Gastrointestinal:  Negative for nausea.     Objective:     Vital Signs (Most Recent):  Temp: 98.2 °F (36.8 °C) (25 1100)  Pulse: 96 (25 1300)  Resp: (!) 28 (25 1300)  BP: 136/77 (25 1300)  SpO2: 98 % (25 1300) Vital Signs (24h Range):  Temp:  [98 °F (36.7  °C)-98.4 °F (36.9 °C)] 98.2 °F (36.8 °C)  Pulse:  [] 96  Resp:  [15-32] 28  SpO2:  [95 %-100 %] 98 %  BP: ()/(48-86) 136/77   Weight: 73.5 kg (162 lb 0.6 oz)  Body mass index is 30.62 kg/m².      Intake/Output Summary (Last 24 hours) at 1/27/2025 1421  Last data filed at 1/26/2025 2330  Gross per 24 hour   Intake 200 ml   Output --   Net 200 ml          Physical Exam  Vitals and nursing note reviewed.   Constitutional:       Appearance: She is well-developed.   HENT:      Head: Normocephalic and atraumatic.      Comments: Mild inspiratory stridor similar to prior.  Cardiovascular:      Rate and Rhythm: Normal rate and regular rhythm.      Pulses: Normal pulses.   Pulmonary:      Effort: Pulmonary effort is normal.   Abdominal:      General: Bowel sounds are normal. There is no distension.      Palpations: Abdomen is soft.      Tenderness: There is no abdominal tenderness.   Musculoskeletal:      Right lower leg: No edema.      Left lower leg: No edema.   Skin:     General: Skin is warm and dry.   Neurological:      Mental Status: She is alert.            Vents:     Lines/Drains/Airways       Peripheral Intravenous Line  Duration                  Peripheral IV - Single Lumen 01/24/25 1022 20 G No Left;Posterior Hand 3 days         Peripheral IV - Single Lumen 01/24/25 2255 18 G 1 3/4 in No Left;Anterior Forearm 2 days                  Significant Labs:    CBC/Anemia Profile:  Recent Labs   Lab 01/26/25  0715 01/27/25  0203   WBC 15.12* 16.59*   HGB 11.4* 12.4   HCT 33.3* 36.0*    183   MCV 86 86   RDW 13.1 12.9        Chemistries:  Recent Labs   Lab 01/26/25  0521 01/27/25  0203    137   K 4.1 3.9    104   CO2 20* 22*   BUN 8 9   CREATININE 0.5 0.5   CALCIUM 8.8 9.0   ALBUMIN 3.2* 3.5   PROT 6.2 6.8   BILITOT 0.4 0.6   ALKPHOS 55 57   ALT 9* 9*   AST 10 8*   MG 1.8 1.9   PHOS 3.9 4.4       All pertinent labs within the past 24 hours have been reviewed.    Significant Imaging:  I have  "reviewed all pertinent imaging results/findings within the past 24 hours.    ABG  No results for input(s): "PH", "PO2", "PCO2", "HCO3", "BE" in the last 168 hours.  Assessment/Plan:     ENT  * Acquired subglottic stenosis  Thought to be acquired iso LPRD. ENT at [facility] transferred for higher procedural level of care; ENT here consulted. Reportedly both balloon dilatation and tracheostomy have been discussed with her.     ENT procedure delayed; ENT will need maternal fetal medicine present during procedure.     - reflux management with PPI BID and pepcid 40 mg nightly   - methylpred 40mg q8H   - racemic epi q6H PRN for 24 hours  - ordered auto-immune labs  - TSH WNL      Pulmonary  Severe persistent asthma with acute exacerbation  No wheezing on exam; treating for asthma flare but likely stenosis is major pathology.    -Nebulized budesonide BID, with duonebs q6WA and PRN  -Montelukast nightly, diphenhydramine    Renal/  Acute cystitis  - repeat UA to ensure clearance.     Obstetric  30 weeks gestation of pregnancy  - OBGYN consulted, appreciate their recs       Critical Care Daily Checklist:    A: Awake: RASS Goal/Actual Goal:    Actual:     B: Spontaneous Breathing Trial Performed?     C: SAT & SBT Coordinated?  Not on ventilator                       D: Delirium: CAM-ICU     E: Early Mobility Performed? Yes   F: Feeding Goal:    Status:     Current Diet Order   Procedures    Diet Adult Regular      AS: Analgesia/Sedation Tylenol 650 mg q4 PRN   T: Thromboembolic Prophylaxis Lovenox 40 mg IV daily   H: HOB > 300 Yes   U: Stress Ulcer Prophylaxis (if needed) Pantoprazole 40 IV daily   G: Glucose Control N/A   B: Bowel Function     I: Indwelling Catheter (Lines & Arnett) Necessity None   D: De-escalation of Antimicrobials/Pharmacotherapies N/A    Plan for the day/ETD ENT procedure delayed;     Code Status:  Family/Goals of Care: Full Code         Critical secondary to Patient has a condition that poses threat to " life and bodily function: Supraglottic stenosis leading to stridor and potential airway compromise; ENT procedure pending.       Critical care was time spent personally by me on the following activities: development of treatment plan with patient or surrogate and bedside caregivers, discussions with consultants, evaluation of patient's response to treatment, examination of patient, ordering and performing treatments and interventions, ordering and review of laboratory studies, ordering and review of radiographic studies, pulse oximetry, re-evaluation of patient's condition. This critical care time did not overlap with that of any other provider or involve time for any procedures.     Mali Pineda, DO  Critical Care Medicine  [facility] - [facility]

## 2025-01-27 NOTE — SUBJECTIVE & OBJECTIVE
Interval History/Significant Events: NAEO. ENT procedure delayed. ENT requesting maternal fetal medicine to be present during procedure. Will start Lovenox today for DVT ppx.     Review of Systems   Constitutional:  Negative for chills and fever.   Respiratory:  Negative for shortness of breath.    Cardiovascular:  Negative for chest pain.   Gastrointestinal:  Negative for nausea.     Objective:     Vital Signs (Most Recent):  Temp: 98.2 °F (36.8 °C) (01/27/25 1100)  Pulse: 96 (01/27/25 1300)  Resp: (!) 28 (01/27/25 1300)  BP: 136/77 (01/27/25 1300)  SpO2: 98 % (01/27/25 1300) Vital Signs (24h Range):  Temp:  [98 °F (36.7 °C)-98.4 °F (36.9 °C)] 98.2 °F (36.8 °C)  Pulse:  [] 96  Resp:  [15-32] 28  SpO2:  [95 %-100 %] 98 %  BP: ()/(48-86) 136/77   Weight: 73.5 kg (162 lb 0.6 oz)  Body mass index is 30.62 kg/m².      Intake/Output Summary (Last 24 hours) at 1/27/2025 1421  Last data filed at 1/26/2025 2330  Gross per 24 hour   Intake 200 ml   Output --   Net 200 ml          Physical Exam  Vitals and nursing note reviewed.   Constitutional:       Appearance: She is well-developed.   HENT:      Head: Normocephalic and atraumatic.      Comments: Mild inspiratory stridor similar to prior.  Cardiovascular:      Rate and Rhythm: Normal rate and regular rhythm.      Pulses: Normal pulses.   Pulmonary:      Effort: Pulmonary effort is normal.   Abdominal:      General: Bowel sounds are normal. There is no distension.      Palpations: Abdomen is soft.      Tenderness: There is no abdominal tenderness.   Musculoskeletal:      Right lower leg: No edema.      Left lower leg: No edema.   Skin:     General: Skin is warm and dry.   Neurological:      Mental Status: She is alert.            Vents:     Lines/Drains/Airways       Peripheral Intravenous Line  Duration                  Peripheral IV - Single Lumen 01/24/25 1022 20 G No Left;Posterior Hand 3 days         Peripheral IV - Single Lumen 01/24/25 2255 18 G 1 3/4 in  No Left;Anterior Forearm 2 days                  Significant Labs:    CBC/Anemia Profile:  Recent Labs   Lab 01/26/25  0715 01/27/25  0203   WBC 15.12* 16.59*   HGB 11.4* 12.4   HCT 33.3* 36.0*    183   MCV 86 86   RDW 13.1 12.9        Chemistries:  Recent Labs   Lab 01/26/25  0521 01/27/25  0203    137   K 4.1 3.9    104   CO2 20* 22*   BUN 8 9   CREATININE 0.5 0.5   CALCIUM 8.8 9.0   ALBUMIN 3.2* 3.5   PROT 6.2 6.8   BILITOT 0.4 0.6   ALKPHOS 55 57   ALT 9* 9*   AST 10 8*   MG 1.8 1.9   PHOS 3.9 4.4       All pertinent labs within the past 24 hours have been reviewed.    Significant Imaging:  I have reviewed all pertinent imaging results/findings within the past 24 hours.

## 2025-01-27 NOTE — ASSESSMENT & PLAN NOTE
Thought to be acquired iso LPRD. ENT at [place] transferred for higher procedural level of care; ENT here consulted. Reportedly both balloon dilatation and tracheostomy have been discussed with her.     ENT procedure delayed; ENT will need maternal fetal medicine present during procedure.     - reflux management with PPI BID and pepcid 40 mg nightly   - methylpred 40mg q8H   - racemic epi q6H PRN for 24 hours  - ordered auto-immune labs  - TSH WNL

## 2025-01-27 NOTE — PROGRESS NOTES
[facility]  Otorhinolaryngology-Head & Neck Surgery  Progress Note    Subjective:     Post-Op Info:  Procedure(s) (LRB):  MICROLARYNGOSCOPY, SUSPENSION (N/A)  CREATION, TRACHEOSTOMY (N/A)      Hospital Day: 4     Interval History:   NAEON. Very anxious this am.    Medications:  Continuous Infusions:  Scheduled Meds:   albuterol sulfate  2.5 mg Nebulization Q6H WAKE    budesonide  0.5 mg Nebulization Q12H    famotidine  40 mg Oral QHS    methylPREDNISolone injection (PEDS and ADULTS)  40 mg Intravenous Q8H    montelukast  10 mg Oral QHS    pantoprazole  40 mg Intravenous Q12H    prenatal vitamin  1 tablet Oral Daily     PRN Meds:  Current Facility-Administered Medications:     acetaminophen, 650 mg, Oral, Q4H PRN    albuterol sulfate, 2.5 mg, Nebulization, Q4H PRN    diphenhydrAMINE, 50 mg, Oral, Nightly PRN    magnesium oxide, 800 mg, Oral, PRN    magnesium oxide, 800 mg, Oral, PRN    ondansetron, 4 mg, Intravenous, Q8H PRN    potassium bicarbonate, 35 mEq, Oral, PRN    potassium bicarbonate, 50 mEq, Oral, PRN    potassium bicarbonate, 60 mEq, Oral, PRN    potassium, sodium phosphates, 2 packet, Oral, PRN    potassium, sodium phosphates, 2 packet, Oral, PRN    potassium, sodium phosphates, 2 packet, Oral, PRN    racepinephrine, 0.5 mL, Nebulization, Q4H PRN    sodium chloride 0.9%, 10 mL, Intravenous, PRN     Review of patient's allergies indicates:   Allergen Reactions    No known drug allergies      Objective:     Vital Signs (24h Range):  Temp:  [98 °F (36.7 °C)-98.4 °F (36.9 °C)] 98.1 °F (36.7 °C)  Pulse:  [] 77  Resp:  [15-32] 17  SpO2:  [95 %-100 %] 97 %  BP: ()/(48-81) 102/58       Lines/Drains/Airways       Peripheral Intravenous Line  Duration                  Peripheral IV - Single Lumen 01/24/25 1022 20 G No Left;Posterior Hand 2 days         Peripheral IV - Single Lumen 01/24/25 2255 18 G 1 3/4 in No Left;Anterior Forearm 2 days                     Physical Exam  NAD  Sitting in  bed comfortably   Head atraumatic   Auricles WNL AU  Nose w/ normal external appearance  Normal WOB, mild biphasic stridor       Significant Labs:  CBC:   Recent Labs   Lab 01/27/25  0203   WBC 16.59*   RBC 4.19   HGB 12.4   HCT 36.0*      MCV 86   MCH 29.6   MCHC 34.4     CMP:   Recent Labs   Lab 01/27/25  0203      CALCIUM 9.0   ALBUMIN 3.5   PROT 6.8      K 3.9   CO2 22*      BUN 9   CREATININE 0.5   ALKPHOS 57   ALT 9*   AST 8*   BILITOT 0.6       Significant Diagnostics:  I have reviewed and interpreted all pertinent imaging results/findings within the past 24 hours.  Assessment/Plan:     * Acquired subglottic stenosis  28 yo female 30 wks pregnant with grade 3 subglottic stenosis. Mild biphasic stridor noted on exam, no increased work of breathing. Patient saturating well on room air. Patient admitted to MICU for close airway monitoring.    - Airway plan: Patient has significant narrowing of her subglottis on scope exam which would make intubation difficult should she decompensate.  Could potentially be intubated with Glidescope and a 5-0 ETT.   - Recommend immediate contact of anesthesia and ENT team should patient have worsening respiratory status.    - Recommend trach set at bedside   - Recommend 5-0 ETT at bedside   - Recommend continuing scheduled rac epi and nebulized steroids  - OR today, remainder of plan pending intraoperative findings  - ENT will continue to follow     Please page ENT resident on call with any questions or concerns.            Kajal Carl MD  Otorhinolaryngology-Head & Neck Surgery  [facility] - [facility]

## 2025-01-27 NOTE — PROGRESS NOTES
"Maternal Fetal Medicine  Progress Note          Subjective:    Gisell Villafuerte is a 29 y.o.  at 30w4d admitted for observation in the setting of severe subglottic stenosis. Please see H&P for details regarding presentation at admission. This pregnancy is complicated by anxiety, anemia and cHTN.    Interim HPI:   Patient feels well today. She has no obstetric complaints at this time. She does note significant anxiety at the thought of delaying her procedure.    Medical, Surgical, Social, Family, and Obstetric History: reviewed in chart  Current Medications:    albuterol sulfate  2.5 mg Nebulization Q6H WAKE    budesonide  0.5 mg Nebulization Q12H    enoxparin  40 mg Subcutaneous Daily    famotidine  40 mg Oral QHS    montelukast  10 mg Oral QHS    mupirocin   Nasal BID    pantoprazole  40 mg Intravenous Q12H    prenatal vitamin  1 tablet Oral Daily      Current Facility-Administered Medications:     acetaminophen, 650 mg, Oral, Q4H PRN    albuterol sulfate, 2.5 mg, Nebulization, Q4H PRN    diphenhydrAMINE, 50 mg, Oral, Nightly PRN    magnesium oxide, 800 mg, Oral, PRN    magnesium oxide, 800 mg, Oral, PRN    ondansetron, 4 mg, Intravenous, Q8H PRN    potassium bicarbonate, 35 mEq, Oral, PRN    potassium bicarbonate, 50 mEq, Oral, PRN    potassium bicarbonate, 60 mEq, Oral, PRN    potassium, sodium phosphates, 2 packet, Oral, PRN    potassium, sodium phosphates, 2 packet, Oral, PRN    potassium, sodium phosphates, 2 packet, Oral, PRN    racepinephrine, 0.5 mL, Nebulization, Q4H PRN    sodium chloride 0.9%, 10 mL, Intravenous, PRN   Objective:   BP (!) 142/85 (BP Location: Right arm, Patient Position: Lying)   Pulse (!) 114   Temp 98.2 °F (36.8 °C) (Oral)   Resp (!) 36   Ht 5' 1" (1.549 m)   Wt 73.5 kg (162 lb 0.6 oz)   LMP 2024 (Approximate)   SpO2 99%   Breastfeeding No   BMI 30.62 kg/m²     Focused Physical Examination   General: well developed, no acute distress  Pulmonary: respiratory " effort normal with no retractions  Abdomen: gravid  Cervix:     Fetal Monitoring  EFM: 155 baseline moderate variability/+ accels/no decels  Tocometer: No contractions    Assessment:   29 y.o.  at 30w4d admitted for observation in the setting of severe subglottic stenosis and concern for potential airway compromise. AFVSS WNL, reassuring maternal status. NST this morning reactive, reassuring fetal status.     Patient was initially planned to undergo laser/balloon dilation with ENT today at [facility]. Ultimately the procedure was cancelled. Today an interdisciplinary meeting was held with OB Anesthesia, ENT, OB Nursing, MFM to determine best course of action with regard to timing and location of her procedure.     She remains at high risk for airway compromise due to her severe subglottic stenosis. She is stable currently on budesonide/albuterol inhalers and IV methylprednisolone, but because her airway could be tenuous at any time with small triggers such as URI, etc, ENT's recommendation is to remain admitted to the hospital until her procedure, particularly because the patient [place] from [facility] [place].    After discussion with ENT and OB Anesthesia, there is a risk that during her procedure she could have prolonged periods of desaturation. It is anticipated that her SpO2 could drop to the low 90s for a period of time during the procedure, possibly even lower. Because of this, oxygenation to the fetus could be compromised. Accordingly, the decision was made to proceed with fetal monitoring during the procedure. MFM and NICU will be at bedside during the procedure and available to intervene with STAT CS for delivery if there is significant maternal airway compromise that causes fetal harm and signs of distress on fetal monitoring. We recommend that during her procedure she avoid completely supine position if this is possible, to prevent uterine compression of the aorta and IVC. Supine with a left lateral  tilt relieves this compression.    While the risk of such significant maternal compromise during the procedure leading to fetal harm is overall low, we want to minimize the risks of prematurity in the event that she does need to be delivered. As such, we must weigh the risks of delaying the procedure and potential maternal airway compromise against the risks of prematurity in the event that she needs to be delivered during the procedure. After our discussion the decision was made to proceed with procedure at 32wga to balance these risks.    In the mean time, MFM will continue to follow peripherally remotely. OB Nursing will continue to perform daily NSTs. In preparation for her procedure and possible delivery, OB recommends administering BMZ 12mg IM x2 doses apart, or alternatively dexamethasone sodium phosphate 6 mg IM x4 doses 12 hours apart, for fetal lung maturity approximately 1 week prior to her procedure. While she has been receiving methylprednisolone 40mg IV q8, this does not cross the placenta and thus has no effect on fetal lung maturity.     In the event that her procedure goes as planned without any need for emergent delivery, will defer post operative management to ENT. Would recommend against NSAIDs for pain control post operatively as she is >32w and there is risk for ductus arteriosis closure. Would otherwise defer delivery timing at this time pending her post operative course. When delivery timing is determined, location will be at [facility].     Plan:   Appreciate excellent care by ICU and ENT  Will proceed with laser/balloon procedure with ENT at approximately 32w gestational age  Will provide fetal monitoring during her procedure  MFM and NICU will be available during her procedure for intervention by delivery for signs of significant fetal distress  Avoid fully supine positioning during surgery, if applicable and possible, supine with left lateral tilt is preferred to avoid  compression of the aorta and IVC.  Daily NSTs until procedure, as well as daily thereafter if she remains pregnant while admitted to the hospital  Recommend steroids for fetal lung maturity 1 week prior to her procedure: BMZ 12mg IM x2 doses apart OR alternatively dexamethasone sodium phosphate 6 mg IM x4 doses 12 hours apart  Recommend against NSAIDS post procedurally.  Delivery timing pending post operative course, delivery location will be at [facility]    Patient seen with Dr. Landeros.  Leticia Joyner PGY5  Maternal Fetal Medicine Fellow

## 2025-01-27 NOTE — PROGRESS NOTES
EFM complete. Strip reviewed with Dr. Whitten.  NST reactive. FHT baseline 155. Moderate variability noted. Variable decels. Accelerations noted. No contractions noted to strip.  Pt denies feeling contractions, denies leaking of fluid, reports +FM

## 2025-01-27 NOTE — ASSESSMENT & PLAN NOTE
30 yo female 30 wks pregnant with grade 3 subglottic stenosis. Mild biphasic stridor noted on exam, no increased work of breathing. Patient saturating well on room air. Patient admitted to MICU for close airway monitoring.    - Airway plan: Patient has significant narrowing of her subglottis on scope exam which would make intubation difficult should she decompensate.  Could potentially be intubated with Glidescope and a 5-0 ETT.   - Recommend immediate contact of anesthesia and ENT team should patient have worsening respiratory status.    - Recommend trach set at bedside   - Recommend 5-0 ETT at bedside   - Recommend continuing scheduled rac epi and nebulized steroids  - OR today, remainder of plan pending intraoperative findings  - ENT will continue to follow     Please page ENT resident on call with any questions or concerns.

## 2025-01-28 LAB
ACE SERPL-CCNC: 9 U/L (ref 16–85)
ALBUMIN SERPL BCP-MCNC: 3.2 G/DL (ref 3.5–5.2)
ALP SERPL-CCNC: 58 U/L (ref 40–150)
ALT SERPL W/O P-5'-P-CCNC: 7 U/L (ref 10–44)
ANION GAP SERPL CALC-SCNC: 11 MMOL/L (ref 8–16)
AST SERPL-CCNC: 8 U/L (ref 10–40)
BACTERIA UR CULT: NORMAL
BACTERIA UR CULT: NORMAL
BASOPHILS # BLD AUTO: 0.04 K/UL (ref 0–0.2)
BASOPHILS NFR BLD: 0.3 % (ref 0–1.9)
BILIRUB SERPL-MCNC: 0.5 MG/DL (ref 0.1–1)
BUN SERPL-MCNC: 9 MG/DL (ref 6–20)
CALCIUM SERPL-MCNC: 8.8 MG/DL (ref 8.7–10.5)
CHLORIDE SERPL-SCNC: 107 MMOL/L (ref 95–110)
CO2 SERPL-SCNC: 19 MMOL/L (ref 23–29)
CREAT SERPL-MCNC: 0.5 MG/DL (ref 0.5–1.4)
DIFFERENTIAL METHOD BLD: ABNORMAL
ENA JO1 IGG SER-ACNC: <0.2 U
EOSINOPHIL # BLD AUTO: 0 K/UL (ref 0–0.5)
EOSINOPHIL NFR BLD: 0.1 % (ref 0–8)
ERYTHROCYTE [DISTWIDTH] IN BLOOD BY AUTOMATED COUNT: 13.2 % (ref 11.5–14.5)
EST. GFR  (NO RACE VARIABLE): >60 ML/MIN/1.73 M^2
GLUCOSE SERPL-MCNC: 100 MG/DL (ref 70–110)
HCT VFR BLD AUTO: 33.9 % (ref 37–48.5)
HGB BLD-MCNC: 11.5 G/DL (ref 12–16)
IMM GRANULOCYTES # BLD AUTO: 0.27 K/UL (ref 0–0.04)
IMM GRANULOCYTES NFR BLD AUTO: 1.9 % (ref 0–0.5)
LYMPHOCYTES # BLD AUTO: 1.9 K/UL (ref 1–4.8)
LYMPHOCYTES NFR BLD: 13.7 % (ref 18–48)
MAGNESIUM SERPL-MCNC: 1.9 MG/DL (ref 1.6–2.6)
MCH RBC QN AUTO: 29.5 PG (ref 27–31)
MCHC RBC AUTO-ENTMCNC: 33.9 G/DL (ref 32–36)
MCV RBC AUTO: 87 FL (ref 82–98)
MONOCYTES # BLD AUTO: 0.8 K/UL (ref 0.3–1)
MONOCYTES NFR BLD: 6 % (ref 4–15)
NEUTROPHILS # BLD AUTO: 10.8 K/UL (ref 1.8–7.7)
NEUTROPHILS NFR BLD: 78 % (ref 38–73)
NRBC BLD-RTO: 0 /100 WBC
PHOSPHATE SERPL-MCNC: 2.8 MG/DL (ref 2.7–4.5)
PLATELET # BLD AUTO: 207 K/UL (ref 150–450)
PMV BLD AUTO: 10.7 FL (ref 9.2–12.9)
POTASSIUM SERPL-SCNC: 3.4 MMOL/L (ref 3.5–5.1)
PROT SERPL-MCNC: 6.3 G/DL (ref 6–8.4)
RBC # BLD AUTO: 3.9 M/UL (ref 4–5.4)
SODIUM SERPL-SCNC: 137 MMOL/L (ref 136–145)
WBC # BLD AUTO: 13.89 K/UL (ref 3.9–12.7)

## 2025-01-28 PROCEDURE — 63600175 PHARM REV CODE 636 W HCPCS: Performed by: STUDENT IN AN ORGANIZED HEALTH CARE EDUCATION/TRAINING PROGRAM

## 2025-01-28 PROCEDURE — 59025 FETAL NON-STRESS TEST: CPT | Mod: 26,,, | Performed by: STUDENT IN AN ORGANIZED HEALTH CARE EDUCATION/TRAINING PROGRAM

## 2025-01-28 PROCEDURE — 25000003 PHARM REV CODE 250

## 2025-01-28 PROCEDURE — 80053 COMPREHEN METABOLIC PANEL: CPT | Performed by: STUDENT IN AN ORGANIZED HEALTH CARE EDUCATION/TRAINING PROGRAM

## 2025-01-28 PROCEDURE — 20000000 HC ICU ROOM

## 2025-01-28 PROCEDURE — 94761 N-INVAS EAR/PLS OXIMETRY MLT: CPT

## 2025-01-28 PROCEDURE — 25000003 PHARM REV CODE 250: Performed by: STUDENT IN AN ORGANIZED HEALTH CARE EDUCATION/TRAINING PROGRAM

## 2025-01-28 PROCEDURE — 84100 ASSAY OF PHOSPHORUS: CPT | Performed by: STUDENT IN AN ORGANIZED HEALTH CARE EDUCATION/TRAINING PROGRAM

## 2025-01-28 PROCEDURE — 25000003 PHARM REV CODE 250: Performed by: INTERNAL MEDICINE

## 2025-01-28 PROCEDURE — 83735 ASSAY OF MAGNESIUM: CPT | Performed by: STUDENT IN AN ORGANIZED HEALTH CARE EDUCATION/TRAINING PROGRAM

## 2025-01-28 PROCEDURE — 25000242 PHARM REV CODE 250 ALT 637 W/ HCPCS: Performed by: STUDENT IN AN ORGANIZED HEALTH CARE EDUCATION/TRAINING PROGRAM

## 2025-01-28 PROCEDURE — 11000001 HC ACUTE MED/SURG PRIVATE ROOM

## 2025-01-28 PROCEDURE — 94640 AIRWAY INHALATION TREATMENT: CPT

## 2025-01-28 PROCEDURE — 99233 SBSQ HOSP IP/OBS HIGH 50: CPT | Mod: ,,, | Performed by: INTERNAL MEDICINE

## 2025-01-28 PROCEDURE — 63600175 PHARM REV CODE 636 W HCPCS

## 2025-01-28 PROCEDURE — 85025 COMPLETE CBC W/AUTO DIFF WBC: CPT | Performed by: STUDENT IN AN ORGANIZED HEALTH CARE EDUCATION/TRAINING PROGRAM

## 2025-01-28 PROCEDURE — 27000207 HC ISOLATION

## 2025-01-28 RX ORDER — CETIRIZINE HYDROCHLORIDE 10 MG/1
10 TABLET ORAL DAILY
Status: DISCONTINUED | OUTPATIENT
Start: 2025-01-28 | End: 2025-01-28

## 2025-01-28 RX ORDER — DIPHENHYDRAMINE HYDROCHLORIDE 50 MG/ML
25 INJECTION INTRAMUSCULAR; INTRAVENOUS EVERY 6 HOURS PRN
Status: DISCONTINUED | OUTPATIENT
Start: 2025-01-28 | End: 2025-02-07 | Stop reason: HOSPADM

## 2025-01-28 RX ORDER — HYDROXYZINE HYDROCHLORIDE 25 MG/1
25 TABLET, FILM COATED ORAL 3 TIMES DAILY PRN
Status: DISCONTINUED | OUTPATIENT
Start: 2025-01-28 | End: 2025-02-07 | Stop reason: HOSPADM

## 2025-01-28 RX ORDER — SERTRALINE HYDROCHLORIDE 25 MG/1
25 TABLET, FILM COATED ORAL DAILY
Status: DISCONTINUED | OUTPATIENT
Start: 2025-01-29 | End: 2025-02-04

## 2025-01-28 RX ORDER — AMOXICILLIN AND CLAVULANATE POTASSIUM 875; 125 MG/1; MG/1
1 TABLET, FILM COATED ORAL EVERY 12 HOURS
Status: COMPLETED | OUTPATIENT
Start: 2025-01-28 | End: 2025-02-01

## 2025-01-28 RX ORDER — CETIRIZINE HYDROCHLORIDE 10 MG/1
10 TABLET ORAL DAILY
Status: DISCONTINUED | OUTPATIENT
Start: 2025-01-29 | End: 2025-02-07 | Stop reason: HOSPADM

## 2025-01-28 RX ADMIN — ALBUTEROL SULFATE 2.5 MG: 2.5 SOLUTION RESPIRATORY (INHALATION) at 02:01

## 2025-01-28 RX ADMIN — MUPIROCIN: 20 OINTMENT TOPICAL at 10:01

## 2025-01-28 RX ADMIN — AMOXICILLIN AND CLAVULANATE POTASSIUM 1 TABLET: 875; 125 TABLET, FILM COATED ORAL at 12:01

## 2025-01-28 RX ADMIN — PANTOPRAZOLE SODIUM 40 MG: 40 INJECTION, POWDER, FOR SOLUTION INTRAVENOUS at 08:01

## 2025-01-28 RX ADMIN — BUDESONIDE 0.5 MG: 0.5 INHALANT RESPIRATORY (INHALATION) at 07:01

## 2025-01-28 RX ADMIN — POTASSIUM BICARBONATE 50 MEQ: 978 TABLET, EFFERVESCENT ORAL at 05:01

## 2025-01-28 RX ADMIN — ALBUTEROL SULFATE 2.5 MG: 2.5 SOLUTION RESPIRATORY (INHALATION) at 07:01

## 2025-01-28 RX ADMIN — HYDROXYZINE HYDROCHLORIDE 25 MG: 25 TABLET, FILM COATED ORAL at 06:01

## 2025-01-28 RX ADMIN — AMOXICILLIN AND CLAVULANATE POTASSIUM 1 TABLET: 875; 125 TABLET, FILM COATED ORAL at 08:01

## 2025-01-28 RX ADMIN — FAMOTIDINE 40 MG: 20 TABLET ORAL at 08:01

## 2025-01-28 RX ADMIN — MONTELUKAST 10 MG: 10 TABLET, FILM COATED ORAL at 08:01

## 2025-01-28 RX ADMIN — PRENATAL VIT W/ FE FUMARATE-FA TAB 27-0.8 MG 1 TABLET: 27-0.8 TAB at 10:01

## 2025-01-28 RX ADMIN — PANTOPRAZOLE SODIUM 40 MG: 40 INJECTION, POWDER, FOR SOLUTION INTRAVENOUS at 10:01

## 2025-01-28 RX ADMIN — ENOXAPARIN SODIUM 40 MG: 40 INJECTION SUBCUTANEOUS at 05:01

## 2025-01-28 RX ADMIN — MUPIROCIN: 20 OINTMENT TOPICAL at 08:01

## 2025-01-28 NOTE — ASSESSMENT & PLAN NOTE
Thought to be acquired iso LPRD. ENT at [facility] transferred for higher procedural level of care; ENT here consulted. Reportedly both balloon dilatation and tracheostomy have been discussed with her.     ENT in conjunction with [facility] are recommending waiting till she is 32 weeks pregnant or her ENT procedure. MFM will be with ENT during the procedure.    - reflux management with PPI BID and pepcid 40 mg nightly   - racemic epi q6H PRN for 24 hours  - ordered auto-immune labs  - TSH WNL  -She will need a steroid course one week prior to her surgery (tenatively scheduled for 2/6/2025) for fetal lung maturity. See MFM note on 1/27/2025 for specifics.

## 2025-01-28 NOTE — RESIDENT HANDOFF
Critical Care Handoff     Primary Team: [facility] [place] CRITICAL CARE MEDICINE TEAM 1 Room Number: [id number]     Patient Name: Gisell Villafuerte MRN: [id number]     Date of Birth: [birth year] Allergies: No known drug allergies     Age: 29 y.o. Admit Date: 2025     Sex: female  BMI: Body mass index is 30.62 kg/m².     Code Status: Full Code        llness Level (current clinical status): Watcher - No    Reason for Admission: Acquired subglottic stenosis    Brief HPI (pertinent PMH and diagnosis or differential diagnosis): 29F A1 30wks with recently diagnosed subglottic stenosis, here for ENT procedure. History of asthma, LPRD, allergies, anxiety. Reportedly seen by ENT PA in  and diagnosed with LPRD; has had worsening stridor which became acute after flu A infx . Was admitted for asthma exacerbation, however failed conservative tx and was investigated further given stridor.      HDS on outside admission. Labs with mild leukocytosis iso steroid adminsitration outpatient. RIP negative, normal inflammatory markers. Ucx noted to have E coli; was treated with ceftriaxone.     Procedure Date: Tenative date of 2025    Hospital Course (updated, brief assessment by system or problem, significant events): Patient admitted for ENT evaluation of subglottic stenosis and potential balloon/laser procedure. Tentative plan for OR  with ENT. OBGYN also consulted and following given 30wks pregnant. She is comfortable on room air. OB following; performed NST which was reassuring. ENT and MFM are following and ENT will perform her procedure with MFM present. MFM recommending waiting till she is 32 weeks pregnant. Patient is stable at this time.  She will need a steroid course one week prior to her procedure for fetal lung maturity. (See MFM note on 2025 for specifics). Asymptomatic UTI     Tasks (specific, using if-then statements): Patient will need steroid course for fetal lung maturity one week prior to procedure (see  MFM note on 1/27/2025 for specific recs). Procedure tentatively scheduled on 2/6/2025; verify with ENT.     Contingency Plan (special circumstances anticipated and plan): If any signs of airway distress; see ENT recommendations; can contact MICU. Patient has been stable.     Estimated Discharge Date: 2/1/2025    Discharge Disposition: Home or Self Care

## 2025-01-28 NOTE — ASSESSMENT & PLAN NOTE
- OBGYN consulted, appreciate their recs  -NST performed by OB and reassuring  -[facility] medicine following and will be ENT during her procdeure  -Recommending steroid course for fetal lung maturing one week prior to her procedure; See [facility] note on 1/27/2025 for specifics  Recommend steroids for fetal lung maturity 1 week prior to her procedure: BMZ 12mg IM x2 doses apart OR alternatively dexamethasone sodium phosphate 6 mg IM x4 doses 12 hours apart

## 2025-01-28 NOTE — SUBJECTIVE & OBJECTIVE
Interval History/Significant Events: NAEO. ENT in conjunction with [facility] are advising her ENT procedure to be postponed till she is 32 weeks pregnant. She will need a steroid course (see M note on X) one week prior to her ENT procedure for fetal lung maturity. ENT procedure tentatively scheduled for 2/6/2025.    Review of Systems   Constitutional:  Negative for chills and fever.   Respiratory:  Positive for shortness of breath (none at rest; some with activity).    Cardiovascular:  Negative for chest pain and palpitations.   Gastrointestinal:  Negative for abdominal pain and nausea.   Genitourinary:  Negative for difficulty urinating.   Neurological:  Negative for dizziness and headaches.   Psychiatric/Behavioral:  Negative for confusion.      Objective:     Vital Signs (Most Recent):  Temp: 98.1 °F (36.7 °C) (01/28/25 0700)  Pulse: 90 (01/28/25 1300)  Resp: (!) 26 (01/28/25 1300)  BP: (!) 116/57 (01/28/25 1300)  SpO2: 100 % (01/28/25 1300) Vital Signs (24h Range):  Temp:  [97.7 °F (36.5 °C)-98.1 °F (36.7 °C)] 98.1 °F (36.7 °C)  Pulse:  [] 90  Resp:  [16-50] 26  SpO2:  [96 %-100 %] 100 %  BP: ()/(51-85) 116/57   Weight: 73.5 kg (162 lb 0.6 oz)  Body mass index is 30.62 kg/m².      Intake/Output Summary (Last 24 hours) at 1/28/2025 1308  Last data filed at 1/28/2025 0334  Gross per 24 hour   Intake 700 ml   Output --   Net 700 ml          Physical Exam  Vitals and nursing note reviewed.   Constitutional:       General: She is not in acute distress.     Appearance: She is well-developed.   HENT:      Head: Normocephalic and atraumatic.      Comments: Mild inspiratory stridor similar to prior.  Eyes:      General: No scleral icterus.  Cardiovascular:      Rate and Rhythm: Normal rate and regular rhythm.      Pulses: Normal pulses.   Pulmonary:      Effort: Pulmonary effort is normal. No respiratory distress.   Abdominal:      General: Bowel sounds are normal. There is no distension.      Palpations: Abdomen  is soft.      Tenderness: There is no abdominal tenderness.   Musculoskeletal:      Right lower leg: No edema.      Left lower leg: No edema.   Skin:     General: Skin is warm and dry.   Neurological:      Mental Status: She is alert and oriented to person, place, and time.            Vents:     Lines/Drains/Airways       Peripheral Intravenous Line  Duration                  Peripheral IV - Single Lumen 01/24/25 1022 20 G No Left;Posterior Hand 4 days         Peripheral IV - Single Lumen 01/24/25 2255 18 G 1 3/4 in No Left;Anterior Forearm 3 days                  Significant Labs:    CBC/Anemia Profile:  Recent Labs   Lab 01/27/25  0203 01/28/25  0321   WBC 16.59* 13.89*   HGB 12.4 11.5*   HCT 36.0* 33.9*    207   MCV 86 87   RDW 12.9 13.2        Chemistries:  Recent Labs   Lab 01/27/25  0203 01/28/25  0321    137   K 3.9 3.4*    107   CO2 22* 19*   BUN 9 9   CREATININE 0.5 0.5   CALCIUM 9.0 8.8   ALBUMIN 3.5 3.2*   PROT 6.8 6.3   BILITOT 0.6 0.5   ALKPHOS 57 58   ALT 9* 7*   AST 8* 8*   MG 1.9 1.9   PHOS 4.4 2.8       All pertinent labs within the past 24 hours have been reviewed.    Significant Imaging:  I have reviewed all pertinent imaging results/findings within the past 24 hours.

## 2025-01-28 NOTE — PROGRESS NOTES
[facility] - Medical ICU  Otorhinolaryngology-Head & Neck Surgery  Progress Note    Subjective:     Post-Op Info:  Procedure(s) (LRB):  MICROLARYNGOSCOPY, SUSPENSION (N/A)  CREATION, TRACHEOSTOMY (N/A)   1 Day Post-Op  Hospital Day: 5     Interval History: No issues overnight.     Medications:  Continuous Infusions:  Scheduled Meds:   albuterol sulfate  2.5 mg Nebulization Q6H WAKE    budesonide  0.5 mg Nebulization Q12H    enoxparin  40 mg Subcutaneous Daily    famotidine  40 mg Oral QHS    montelukast  10 mg Oral QHS    mupirocin   Nasal BID    pantoprazole  40 mg Intravenous Q12H    prenatal vitamin  1 tablet Oral Daily     PRN Meds:  Current Facility-Administered Medications:     acetaminophen, 650 mg, Oral, Q4H PRN    albuterol sulfate, 2.5 mg, Nebulization, Q4H PRN    diphenhydrAMINE, 50 mg, Oral, Nightly PRN    magnesium oxide, 800 mg, Oral, PRN    magnesium oxide, 800 mg, Oral, PRN    ondansetron, 4 mg, Intravenous, Q8H PRN    potassium bicarbonate, 35 mEq, Oral, PRN    potassium bicarbonate, 50 mEq, Oral, PRN    potassium bicarbonate, 60 mEq, Oral, PRN    potassium, sodium phosphates, 2 packet, Oral, PRN    potassium, sodium phosphates, 2 packet, Oral, PRN    potassium, sodium phosphates, 2 packet, Oral, PRN    racepinephrine, 0.5 mL, Nebulization, Q4H PRN    sodium chloride 0.9%, 10 mL, Intravenous, PRN     Review of patient's allergies indicates:   Allergen Reactions    No known drug allergies      Objective:     Vital Signs (24h Range):  Temp:  [97.7 °F (36.5 °C)-98.2 °F (36.8 °C)] 97.7 °F (36.5 °C)  Pulse:  [] 79  Resp:  [16-50] 16  SpO2:  [96 %-100 %] 98 %  BP: ()/(51-86) 101/62       Lines/Drains/Airways       Peripheral Intravenous Line  Duration                  Peripheral IV - Single Lumen 01/24/25 1022 20 G No Left;Posterior Hand 3 days         Peripheral IV - Single Lumen 01/24/25 2255 18 G 1 3/4 in No Left;Anterior Forearm 3 days                     Physical Exam  NAD  Sitting in bed  comfortably   Head atraumatic   Auricles WNL AU  Nose w/ normal external appearance  Normal WOB, mild biphasic stridor        Significant Labs:  CBC:   Recent Labs   Lab 01/28/25  0321   WBC 13.89*   RBC 3.90*   HGB 11.5*   HCT 33.9*      MCV 87   MCH 29.5   MCHC 33.9     CMP:   Recent Labs   Lab 01/28/25  0321      CALCIUM 8.8   ALBUMIN 3.2*   PROT 6.3      K 3.4*   CO2 19*      BUN 9   CREATININE 0.5   ALKPHOS 58   ALT 7*   AST 8*   BILITOT 0.5       Significant Diagnostics:  I have reviewed all pertinent imaging results/findings within the past 24 hours.  Assessment/Plan:     * Acquired subglottic stenosis  30 yo female 30 wks pregnant with grade 3 subglottic stenosis. Mild biphasic stridor noted on exam, no increased work of breathing. Patient saturating well on room air. Patient admitted to MICU for close airway monitoring.    - Airway plan: Patient has significant narrowing of her subglottis on scope exam which would make intubation difficult should she decompensate.  Could potentially be intubated with Glidescope and a 5-0 ETT.   - Recommend immediate contact of anesthesia and ENT team should patient have worsening respiratory status.    - Recommend trach set at bedside   - Recommend 5-0 ETT at bedside   - Recommend continuing scheduled rac epi and nebulized steroids  - Case will be deferred until approximately 32w gestational age  - MFM provide fetal monitoring during her procedure  - MFM and NICU will be available during her procedure for intervention by delivery for signs of significant fetal distress  - ENT will continue to follow     Please page ENT resident on call with any questions or concerns.            Supriya Ovalle MD  Otorhinolaryngology-Head & Neck Surgery  [facility] - Medical ICU

## 2025-01-28 NOTE — ASSESSMENT & PLAN NOTE
-anxious about her upcoming ENT procedure and is requesting a short term course of medication for anxiety. Does not wish to start a long term treatment for anxiety as she belives her anxiety will resolve after her ENT procedure. Discussed options with MFM; Hydroxyzine will be given  -Hydroxyzine 25 mg TID PRN for anxiety (spaced at least 4 hours from diphenhydramine)

## 2025-01-28 NOTE — ASSESSMENT & PLAN NOTE
Patient states she is very anxious about her upcoming ENT procedure and is requesting a short term course of medication for anxiety. Does not wish to start a long term treatment for anxiety as she belives her anxiety will resolve after her ENT procedure. Discussed options with MFM; Hydroxyzine will be given      Plan:  Hydroxyzine 25 mg TID PRN for anxiety (spaced at least 4 hours from diphenhydramine)

## 2025-01-28 NOTE — ASSESSMENT & PLAN NOTE
Thought to be acquired iso LPRD. ENT at [facility] transferred for higher procedural level of care; ENT here consulted. Reportedly both balloon dilatation and tracheostomy have been discussed with her. ENT in conjunction with [facility] are recommending waiting till she is 32 weeks pregnant or her ENT procedure. MFM will be with ENT during the procedure.     - reflux management with PPI BID and pepcid 40 mg nightly   - racemic epi q6H PRN for 24 hours  - ordered auto-immune labs  - TSH WNL  -She will need a steroid course one week prior to her surgery (tenatively scheduled for 2/6/2025) for fetal lung maturity. See MFM note on 1/27/2025 for specifics.

## 2025-01-28 NOTE — ASSESSMENT & PLAN NOTE
28 yo female 30 wks pregnant with grade 3 subglottic stenosis. Mild biphasic stridor noted on exam, no increased work of breathing. Patient saturating well on room air. Patient admitted to MICU for close airway monitoring.    - Airway plan: Patient has significant narrowing of her subglottis on scope exam which would make intubation difficult should she decompensate.  Could potentially be intubated with Glidescope and a 5-0 ETT.   - Recommend immediate contact of anesthesia and ENT team should patient have worsening respiratory status.    - Recommend trach set at bedside   - Recommend 5-0 ETT at bedside   - Recommend continuing scheduled rac epi and nebulized steroids  - In coordination with MFM and Anesthesia, case will be deferred until approximately 32w gestational age  - MFM provide fetal monitoring during her procedure  - MFM and NICU will be available during her procedure for intervention by delivery for signs of significant fetal distress  - ENT will continue to follow     Please page ENT resident on call with any questions or concerns.

## 2025-01-28 NOTE — ASSESSMENT & PLAN NOTE
No wheezing on exam; treating for asthma flare but likely stenosis is major pathology.    -Nebulized budesonide BID, with duonebs q6WA and PRN  -Montelukast nightly, diphenhydramine prn for allergies (space at least 4 hours from hydroxyzine)

## 2025-01-28 NOTE — PLAN OF CARE
MICU DAILY GOALS     Family/Goals of care/Code Status   Code Status: Full Code    24H Vital Sign Range  Temp:  [97.7 °F (36.5 °C)-98.1 °F (36.7 °C)]   Pulse:  []   Resp:  [16-44]   BP: ()/(51-85)   SpO2:  [96 %-100 %]      Shift Events (include procedures and significant events)   No acute events throughout shift. Procedure scheduled for next Thursday. SD orders in place, awaiting bed assignment.     AWAKE RASS: Goal -    Actual - RASS (Daugherty Agitation-Sedation Scale): alert and calm    Restraint necessity: Not necessary   BREATHE SBT: Not intubated    Coordinate A & B, analgesics/sedatives Pain: managed   SAT: Not intubated   Delirium CAM-ICU:     Early(intubated/ Progressive (non-intubated) Mobility MOVE Screen (INTUBATED ONLY): Not intubated    Activity: Activity Management: Arm raise - L1   Feeding/Nutrition Diet order: Diet/Nutrition Received: regular,     Thrombus DVT prophylaxis: VTE Core Measure: Pharmacological prophylaxis initiated/maintained   HOB Elevation Head of Bed (HOB) Positioning: HOB at 30-45 degrees   Ulcer Prophylaxis GI: yes   Glucose control managed     Skin Skin assessment:     Sacrum intact/not altered? Yes  Heels intact/not altered? Yes  Surgical wound? No    CHECK ONE!   (no altered skin or altered skin) and sub boxes:  [x] No Altered Skin Integrity Present    [x]Prevention Measures Documented    [] Altered Skin Integrity Present or Discovered   [] LDA present in EPIC, daily doc completed              [] LDA added if not in EPIC (describe wound).                    When describing wound, do not stage, use descriptive words only.    [] Wound Image Taken (required on admit,                   transfer/discharge and every Tuesday)    Wound Care Consulted? No   Bowel Function no issues    Indwelling Catheter Necessity            De-escalation Antibiotics Yes        VS and assessment per flow sheet, patient progressing towards goals as tolerated, plan of care reviewed with   patient , all concerns addressed, will continue to monitor.

## 2025-01-28 NOTE — ASSESSMENT & PLAN NOTE
- OBGYN consulted, appreciate their recs  -NST performed by OB and reassuring  -M medicine following and will be ENT during her procdeure  -Recommending steroid course for fetal lung maturing one week prior to her procedure; See [facility] note on 1/27/2025 for specifics

## 2025-01-28 NOTE — PLAN OF CARE
MICU DAILY GOALS     Family/Goals of care/Code Status   Code Status: Full Code    24H Vital Sign Range  Temp:  [97.7 °F (36.5 °C)-98.2 °F (36.8 °C)]   Pulse:  []   Resp:  [15-50]   BP: ()/(48-86)   SpO2:  [96 %-100 %]      Shift Events (include procedures and significant events)   No acute events throughout shift. K replaced.    AWAKE RASS: Goal -    Actual - RASS (Daugherty Agitation-Sedation Scale): alert and calm    Restraint necessity: Not necessary   BREATHE SBT: Not intubated    Coordinate A & B, analgesics/sedatives Pain: managed   SAT: Not intubated   Delirium CAM-ICU:     Early(intubated/ Progressive (non-intubated) Mobility MOVE Screen (INTUBATED ONLY): Not intubated    Activity: Activity Management: Arm raise - L1, Rolling - L1   Feeding/Nutrition Diet order: Diet/Nutrition Received: regular,     Thrombus DVT prophylaxis: VTE Core Measure: Pharmacological prophylaxis initiated/maintained   HOB Elevation Head of Bed (HOB) Positioning: HOB at 30-45 degrees   Ulcer Prophylaxis GI: yes   Glucose control managed     Skin Skin assessment:     Sacrum intact/not altered? Yes  Heels intact/not altered? Yes  Surgical wound? No    CHECK ONE!   (no altered skin or altered skin) and sub boxes:  [x] No Altered Skin Integrity Present    [x]Prevention Measures Documented    [] Altered Skin Integrity Present or Discovered   [] LDA present in EPIC, daily doc completed              [] LDA added if not in EPIC (describe wound).                    When describing wound, do not stage, use descriptive words only.    [] Wound Image Taken (required on admit,                   transfer/discharge and every Tuesday)    Wound Care Consulted? No   Bowel Function no issues    Indwelling Catheter Necessity            De-escalation Antibiotics No        Problem: Adult Inpatient Plan of Care  Goal: Plan of Care Review  Outcome: Progressing  Goal: Patient-Specific Goal (Individualized)  Outcome: Progressing  Goal: Absence of  Hospital-Acquired Illness or Injury  Outcome: Progressing  Goal: Optimal Comfort and Wellbeing  Outcome: Progressing  Goal: Readiness for Transition of Care  Outcome: Progressing     Problem:  Fall Injury Risk  Goal: Absence of Fall, Infant Drop and Related Injury  Outcome: Progressing     Problem: Infection  Goal: Absence of Infection Signs and Symptoms  Outcome: Progressing

## 2025-01-28 NOTE — PROGRESS NOTES
[facility]  Critical Care Medicine  Progress Note    Patient Name: Gisell Villafuerte  MRN: [id number]  Admission Date: 2025  Hospital Length of Stay: 4 days  Code Status: Full Code  Attending Provider: Lyle Sue MD  Primary Care Provider: Yazan Jj MD   Principal Problem: Acquired subglottic stenosis    Subjective:     HPI:  29F A1 30wks with recently diagnosed subglottic stenosis, here for ENT procedure. History of asthma, LPRD, allergies, anxiety. Reportedly seen by ENT PA in  and diagnosed with LPRD; has had worsening stridor which became acute after flu A infx . Was admitted for asthma exacerbation, however failed conservative tx and was investigated further given stridor.      HDS on outside admission. Labs with mild leukocytosis iso steroid adminsitration outpatient. RIP negative, normal inflammatory markers. Ucx noted to have E coli; was treated with ceftriaxone.    Hospital/ICU Course:  Patient admitted for ENT evaluation of subglottic stenosis and potential balloon/laser procedure. Tentative plan for OR  with ENT. OBGYN also consulted and following given 30wks pregnant. She is comfortable on room air. OB following; performed NST which was reassuring. ENT and MFM are following and ENT will perform her procedure with MFM present. MFM recommending waiting till she is 32 weeks pregnant. Patient is stable at this time.  She will need a steroid course one week prior to her procedure for fetal lung maturity. (See MFM note on 2025 for specifics). Asymptomatic UTI    Interval History/Significant Events: NAEO. ENT in conjunction with MFM are advising her ENT procedure to be postponed till she is 32 weeks pregnant. She will need a steroid course (see MFM note on X) one week prior to her ENT procedure for fetal lung maturity. ENT procedure tentatively scheduled for 2025.    Review of Systems   Constitutional:  Negative for chills and fever.   Respiratory:   Positive for shortness of breath (none at rest; some with activity).    Cardiovascular:  Negative for chest pain and palpitations.   Gastrointestinal:  Negative for abdominal pain and nausea.   Genitourinary:  Negative for difficulty urinating.   Neurological:  Negative for dizziness and headaches.   Psychiatric/Behavioral:  Negative for confusion.      Objective:     Vital Signs (Most Recent):  Temp: 98.1 °F (36.7 °C) (01/28/25 0700)  Pulse: 90 (01/28/25 1300)  Resp: (!) 26 (01/28/25 1300)  BP: (!) 116/57 (01/28/25 1300)  SpO2: 100 % (01/28/25 1300) Vital Signs (24h Range):  Temp:  [97.7 °F (36.5 °C)-98.1 °F (36.7 °C)] 98.1 °F (36.7 °C)  Pulse:  [] 90  Resp:  [16-50] 26  SpO2:  [96 %-100 %] 100 %  BP: ()/(51-85) 116/57   Weight: 73.5 kg (162 lb 0.6 oz)  Body mass index is 30.62 kg/m².      Intake/Output Summary (Last 24 hours) at 1/28/2025 1308  Last data filed at 1/28/2025 0334  Gross per 24 hour   Intake 700 ml   Output --   Net 700 ml          Physical Exam  Vitals and nursing note reviewed.   Constitutional:       General: She is not in acute distress.     Appearance: She is well-developed.   HENT:      Head: Normocephalic and atraumatic.      Comments: Mild inspiratory stridor similar to prior.  Eyes:      General: No scleral icterus.  Cardiovascular:      Rate and Rhythm: Normal rate and regular rhythm.      Pulses: Normal pulses.   Pulmonary:      Effort: Pulmonary effort is normal. No respiratory distress.   Abdominal:      General: Bowel sounds are normal. There is no distension.      Palpations: Abdomen is soft.      Tenderness: There is no abdominal tenderness.   Musculoskeletal:      Right lower leg: No edema.      Left lower leg: No edema.   Skin:     General: Skin is warm and dry.   Neurological:      Mental Status: She is alert and oriented to person, place, and time.            Vents:     Lines/Drains/Airways       Peripheral Intravenous Line  Duration                  Peripheral IV -  "Single Lumen 01/24/25 1022 20 G No Left;Posterior Hand 4 days         Peripheral IV - Single Lumen 01/24/25 2255 18 G 1 3/4 in No Left;Anterior Forearm 3 days                  Significant Labs:    CBC/Anemia Profile:  Recent Labs   Lab 01/27/25  0203 01/28/25  0321   WBC 16.59* 13.89*   HGB 12.4 11.5*   HCT 36.0* 33.9*    207   MCV 86 87   RDW 12.9 13.2        Chemistries:  Recent Labs   Lab 01/27/25  0203 01/28/25  0321    137   K 3.9 3.4*    107   CO2 22* 19*   BUN 9 9   CREATININE 0.5 0.5   CALCIUM 9.0 8.8   ALBUMIN 3.5 3.2*   PROT 6.8 6.3   BILITOT 0.6 0.5   ALKPHOS 57 58   ALT 9* 7*   AST 8* 8*   MG 1.9 1.9   PHOS 4.4 2.8       All pertinent labs within the past 24 hours have been reviewed.    Significant Imaging:  I have reviewed all pertinent imaging results/findings within the past 24 hours.    ABG  No results for input(s): "PH", "PO2", "PCO2", "HCO3", "BE" in the last 168 hours.  Assessment/Plan:     Psychiatric  Anxiety  Patient states she is very anxious about her upcoming ENT procedure and is requesting a short term course of medication for anxiety. Does not wish to start a long term treatment for anxiety as she belives her anxiety will resolve after her ENT procedure. Discussed options with M; Hydroxyzine will be given      Plan:  Hydroxyzine 25 mg TID PRN for anxiety (spaced at least 4 hours from diphenhydramine)    ENT  * Acquired subglottic stenosis  Thought to be acquired iso LPRD. ENT at [facility] transferred for higher procedural level of care; ENT here consulted. Reportedly both balloon dilatation and tracheostomy have been discussed with her.     ENT in conjunction with [facility] are recommending waiting till she is 32 weeks pregnant or her ENT procedure. MFM will be with ENT during the procedure.    - reflux management with PPI BID and pepcid 40 mg nightly   - racemic epi q6H PRN for 24 hours  - ordered auto-immune labs  - TSH WNL  -She will need a steroid course one week prior " to her surgery (tenatively scheduled for 2/6/2025) for fetal lung maturity. See MFM note on 1/27/2025 for specifics.      Pulmonary  Severe persistent asthma with acute exacerbation  No wheezing on exam; treating for asthma flare but likely stenosis is major pathology.    -Nebulized budesonide BID, with duonebs q6WA and PRN  -Montelukast nightly, diphenhydramine prn for allergies (space at least 4 hours from hydroxyzine)     Renal/  Acute cystitis  Asymptomatic UTI    Plan:  -Augmentin q12 for 5 days; first dose given 1/28/2025    Obstetric  30 weeks gestation of pregnancy  - OBGYN consulted, appreciate their recs  -NST performed by OB and reassuring  -M medicine following and will be ENT during her procdeure  -Recommending steroid course for fetal lung maturing one week prior to her procedure; See MFM note on 1/27/2025 for specifics       Critical Care Daily Checklist:    A: Awake: RASS Goal/Actual Goal:    Actual:     B: Spontaneous Breathing Trial Performed?     C: SAT & SBT Coordinated?  Not on vent.                      D: Delirium: CAM-ICU     E: Early Mobility Performed? Yes   F: Feeding Goal:    Status:     Current Diet Order   Procedures    Diet Adult Regular      AS: Analgesia/Sedation Tylenol 650 mg q 6 PRN   T: Thromboembolic Prophylaxis Lovenox 40 mg SQ daily   H: HOB > 300 Yes   U: Stress Ulcer Prophylaxis (if needed) Pantoprazole IV 40 mg   G: Glucose Control None. Within range   B: Bowel Function     I: Indwelling Catheter (Lines & Arnett) Necessity Peripheral IV only   D: De-escalation of Antimicrobials/Pharmacotherapies On 5 day course of Augmentin for asymptomatic UTI in pregnancy     Plan for the day/ETD Step down to hospital medicine    Code Status:  Family/Goals of Care: Full Code         Critical secondary to Patient has a condition that poses threat to life and bodily function: admitted to MICU with subglotic stenosis for airway watch; stable stepping down      Critical care was time spent  personally by me on the following activities: development of treatment plan with patient or surrogate and bedside caregivers, discussions with consultants, evaluation of patient's response to treatment, examination of patient, ordering and performing treatments and interventions, ordering and review of laboratory studies, ordering and review of radiographic studies, pulse oximetry, re-evaluation of patient's condition. This critical care time did not overlap with that of any other provider or involve time for any procedures.     Mali Pineda, DO  Critical Care Medicine  [facility]

## 2025-01-28 NOTE — CARE UPDATE
Recommended supplies to be available at bedside in case of emergent  section:   10 blade disposable scalpel   2 winsome or kocher clamps  Bandage scissors  Large sterile laparotomy sponges to pack the abdomen  Sterile saline (1000cc bottle)    Please contact the MFM team at [phone] if there are any questions.

## 2025-01-28 NOTE — SUBJECTIVE & OBJECTIVE
Interval History: No issues overnight.     Medications:  Continuous Infusions:  Scheduled Meds:   albuterol sulfate  2.5 mg Nebulization Q6H WAKE    budesonide  0.5 mg Nebulization Q12H    enoxparin  40 mg Subcutaneous Daily    famotidine  40 mg Oral QHS    montelukast  10 mg Oral QHS    mupirocin   Nasal BID    pantoprazole  40 mg Intravenous Q12H    prenatal vitamin  1 tablet Oral Daily     PRN Meds:  Current Facility-Administered Medications:     acetaminophen, 650 mg, Oral, Q4H PRN    albuterol sulfate, 2.5 mg, Nebulization, Q4H PRN    diphenhydrAMINE, 50 mg, Oral, Nightly PRN    magnesium oxide, 800 mg, Oral, PRN    magnesium oxide, 800 mg, Oral, PRN    ondansetron, 4 mg, Intravenous, Q8H PRN    potassium bicarbonate, 35 mEq, Oral, PRN    potassium bicarbonate, 50 mEq, Oral, PRN    potassium bicarbonate, 60 mEq, Oral, PRN    potassium, sodium phosphates, 2 packet, Oral, PRN    potassium, sodium phosphates, 2 packet, Oral, PRN    potassium, sodium phosphates, 2 packet, Oral, PRN    racepinephrine, 0.5 mL, Nebulization, Q4H PRN    sodium chloride 0.9%, 10 mL, Intravenous, PRN     Review of patient's allergies indicates:   Allergen Reactions    No known drug allergies      Objective:     Vital Signs (24h Range):  Temp:  [97.7 °F (36.5 °C)-98.2 °F (36.8 °C)] 97.7 °F (36.5 °C)  Pulse:  [] 79  Resp:  [16-50] 16  SpO2:  [96 %-100 %] 98 %  BP: ()/(51-86) 101/62       Lines/Drains/Airways       Peripheral Intravenous Line  Duration                  Peripheral IV - Single Lumen 01/24/25 1022 20 G No Left;Posterior Hand 3 days         Peripheral IV - Single Lumen 01/24/25 2255 18 G 1 3/4 in No Left;Anterior Forearm 3 days                     Physical Exam  NAD  Sitting in bed comfortably   Head atraumatic   Auricles WNL AU  Nose w/ normal external appearance  Normal WOB, mild biphasic stridor        Significant Labs:  CBC:   Recent Labs   Lab 01/28/25  0321   WBC 13.89*   RBC 3.90*   HGB 11.5*   HCT 33.9*       MCV 87   MCH 29.5   MCHC 33.9     CMP:   Recent Labs   Lab 01/28/25  0321      CALCIUM 8.8   ALBUMIN 3.2*   PROT 6.3      K 3.4*   CO2 19*      BUN 9   CREATININE 0.5   ALKPHOS 58   ALT 7*   AST 8*   BILITOT 0.5       Significant Diagnostics:  I have reviewed all pertinent imaging results/findings within the past 24 hours.

## 2025-01-28 NOTE — ASSESSMENT & PLAN NOTE
-No wheezing on exam; treating for asthma flare but likely stenosis is major pathology.  -Nebulized budesonide BID, with duonebs q6WA and PRN  -Montelukast nightly, diphenhydramine prn for allergies (space at least 4 hours from hydroxyzine)

## 2025-01-28 NOTE — CARE UPDATE
NST 01/28/2025     Baseline 160bpm, moderate variability, + accels, - decels  [place]: no ctx    Reactive and reassuring. Continue NST QD.

## 2025-01-28 NOTE — PROGRESS NOTES
Seen on rounds yesterday.  Airway assessed with FFL.  Roughly 60% of lumen occluded. Initial plan was to proceed to the OR, administer anesthesia and to ensure that we could successfully ventilate her before instrumenting her airway.    I explained the airway plan to her and discussed the plan with OB anesthesia.  After discussion with Dr. Steele with anesthesia, we decided that mask ventilation without a secure airway may increase her risk of aspiration given the physiology of pregnancy. Also, we decided that it would be safest to have OB and NICU present should she require an emergent . Surgery was canceled so that we could arrange the above.  I explained this plan to the patient and her mother with Dr. Steele.  We later teleconferenced with [facility] and decided to wait until her pregnancy reased 32 wga before proceeding. I explained this plan to the patient.  She requested that Dr. Bautista assume her care. I contacted him and he will see her today.

## 2025-01-29 LAB
ANCA AB TITR SER IF: NORMAL TITER
P-ANCA TITR SER IF: NORMAL TITER

## 2025-01-29 PROCEDURE — 20000000 HC ICU ROOM

## 2025-01-29 PROCEDURE — 25000003 PHARM REV CODE 250: Performed by: STUDENT IN AN ORGANIZED HEALTH CARE EDUCATION/TRAINING PROGRAM

## 2025-01-29 PROCEDURE — 63600175 PHARM REV CODE 636 W HCPCS

## 2025-01-29 PROCEDURE — 27000207 HC ISOLATION

## 2025-01-29 PROCEDURE — 94640 AIRWAY INHALATION TREATMENT: CPT

## 2025-01-29 PROCEDURE — 63600175 PHARM REV CODE 636 W HCPCS: Performed by: STUDENT IN AN ORGANIZED HEALTH CARE EDUCATION/TRAINING PROGRAM

## 2025-01-29 PROCEDURE — 94761 N-INVAS EAR/PLS OXIMETRY MLT: CPT

## 2025-01-29 PROCEDURE — 25000242 PHARM REV CODE 250 ALT 637 W/ HCPCS: Performed by: STUDENT IN AN ORGANIZED HEALTH CARE EDUCATION/TRAINING PROGRAM

## 2025-01-29 PROCEDURE — 99291 CRITICAL CARE FIRST HOUR: CPT | Mod: ,,, | Performed by: INTERNAL MEDICINE

## 2025-01-29 PROCEDURE — 25000003 PHARM REV CODE 250: Performed by: INTERNAL MEDICINE

## 2025-01-29 RX ORDER — PANTOPRAZOLE SODIUM 40 MG/1
40 TABLET, DELAYED RELEASE ORAL
Status: DISCONTINUED | OUTPATIENT
Start: 2025-01-29 | End: 2025-02-07 | Stop reason: HOSPADM

## 2025-01-29 RX ORDER — POLYETHYLENE GLYCOL 3350 17 G/17G
17 POWDER, FOR SOLUTION ORAL 2 TIMES DAILY
Status: DISCONTINUED | OUTPATIENT
Start: 2025-01-29 | End: 2025-02-07 | Stop reason: HOSPADM

## 2025-01-29 RX ORDER — BETAMETHASONE SODIUM PHOSPHATE AND BETAMETHASONE ACETATE 3; 3 MG/ML; MG/ML
12 INJECTION, SUSPENSION INTRA-ARTICULAR; INTRALESIONAL; INTRAMUSCULAR; SOFT TISSUE DAILY
Status: COMPLETED | OUTPATIENT
Start: 2025-01-30 | End: 2025-01-31

## 2025-01-29 RX ADMIN — ALBUTEROL SULFATE 2.5 MG: 2.5 SOLUTION RESPIRATORY (INHALATION) at 07:01

## 2025-01-29 RX ADMIN — ALBUTEROL SULFATE 2.5 MG: 2.5 SOLUTION RESPIRATORY (INHALATION) at 01:01

## 2025-01-29 RX ADMIN — PANTOPRAZOLE SODIUM 40 MG: 40 TABLET, DELAYED RELEASE ORAL at 05:01

## 2025-01-29 RX ADMIN — FAMOTIDINE 40 MG: 20 TABLET ORAL at 08:01

## 2025-01-29 RX ADMIN — PANTOPRAZOLE SODIUM 40 MG: 40 INJECTION, POWDER, FOR SOLUTION INTRAVENOUS at 08:01

## 2025-01-29 RX ADMIN — AMOXICILLIN AND CLAVULANATE POTASSIUM 1 TABLET: 875; 125 TABLET, FILM COATED ORAL at 08:01

## 2025-01-29 RX ADMIN — SERTRALINE HYDROCHLORIDE 25 MG: 25 TABLET ORAL at 08:01

## 2025-01-29 RX ADMIN — CETIRIZINE HYDROCHLORIDE 10 MG: 10 TABLET, FILM COATED ORAL at 08:01

## 2025-01-29 RX ADMIN — BUDESONIDE 0.5 MG: 0.5 INHALANT RESPIRATORY (INHALATION) at 07:01

## 2025-01-29 RX ADMIN — RACEPINEPHRINE HYDROCHLORIDE 0.5 ML: 11.25 SOLUTION RESPIRATORY (INHALATION) at 03:01

## 2025-01-29 RX ADMIN — MONTELUKAST 10 MG: 10 TABLET, FILM COATED ORAL at 08:01

## 2025-01-29 RX ADMIN — POLYETHYLENE GLYCOL 3350 17 G: 17 POWDER, FOR SOLUTION ORAL at 08:01

## 2025-01-29 RX ADMIN — POLYETHYLENE GLYCOL 3350 17 G: 17 POWDER, FOR SOLUTION ORAL at 09:01

## 2025-01-29 RX ADMIN — MUPIROCIN: 20 OINTMENT TOPICAL at 08:01

## 2025-01-29 RX ADMIN — ENOXAPARIN SODIUM 40 MG: 40 INJECTION SUBCUTANEOUS at 05:01

## 2025-01-29 RX ADMIN — PRENATAL VIT W/ FE FUMARATE-FA TAB 27-0.8 MG 1 TABLET: 27-0.8 TAB at 08:01

## 2025-01-29 NOTE — PROGRESS NOTES
[facility] ICU  Otorhinolaryngology-Head & Neck Surgery  Progress Note    Subjective:     Post-Op Info:  Procedure(s) (LRB):  MICROLARYNGOSCOPY, SUSPENSION (N/A)  CREATION, TRACHEOSTOMY (N/A)   2 Days Post-Op  Hospital Day: 6     Interval History: Remains with stable stridor. Discussed we still have ongoing conversations regarding best plan forward, voiced understanding.    Medications:  Continuous Infusions:  Scheduled Meds:   albuterol sulfate  2.5 mg Nebulization Q6H WAKE    amoxicillin-clavulanate 875-125mg  1 tablet Oral Q12H    [START ON 1/30/2025] betamethasone acetate-betamethasone sodium phosphate  12 mg Intramuscular Daily    budesonide  0.5 mg Nebulization Q12H    cetirizine  10 mg Oral Daily    enoxparin  40 mg Subcutaneous Daily    famotidine  40 mg Oral QHS    montelukast  10 mg Oral QHS    mupirocin   Nasal BID    pantoprazole  40 mg Oral BID AC    polyethylene glycol  17 g Oral BID    prenatal vitamin  1 tablet Oral Daily    sertraline  25 mg Oral Daily     PRN Meds:  Current Facility-Administered Medications:     acetaminophen, 650 mg, Oral, Q4H PRN    albuterol sulfate, 2.5 mg, Nebulization, Q4H PRN    diphenhydrAMINE, 25 mg, Intravenous, Q6H PRN    hydrOXYzine HCL, 25 mg, Oral, TID PRN    magnesium oxide, 800 mg, Oral, PRN    magnesium oxide, 800 mg, Oral, PRN    ondansetron, 4 mg, Intravenous, Q8H PRN    potassium bicarbonate, 35 mEq, Oral, PRN    potassium bicarbonate, 50 mEq, Oral, PRN    potassium bicarbonate, 60 mEq, Oral, PRN    potassium, sodium phosphates, 2 packet, Oral, PRN    potassium, sodium phosphates, 2 packet, Oral, PRN    potassium, sodium phosphates, 2 packet, Oral, PRN    racepinephrine, 0.5 mL, Nebulization, Q4H PRN    sodium chloride 0.9%, 10 mL, Intravenous, PRN     Review of patient's allergies indicates:   Allergen Reactions    No known drug allergies      Objective:     Vital Signs (24h Range):  Temp:  [97.9 °F (36.6 °C)-98.2 °F (36.8 °C)] 98.2 °F (36.8 °C)  Pulse:   [] 120  Resp:  [13-30] 20  SpO2:  [97 %-100 %] 98 %  BP: ()/(51-92) 134/64     Date 01/29/25 0700 - 01/30/25 0659   Shift 2117-7864 2954-6985 7413-6830 24 Hour Total   INTAKE   P.O. 240   240   Shift Total(mL/kg) 240(3.3)   240(3.3)   OUTPUT   Shift Total(mL/kg)       Weight (kg) 73.5 73.5 73.5 73.5     Lines/Drains/Airways       Peripheral Intravenous Line  Duration                  Peripheral IV - Single Lumen 01/24/25 1022 20 G No Left;Posterior Hand 5 days         Peripheral IV - Single Lumen 01/24/25 2255 18 G 1 3/4 in No Left;Anterior Forearm 4 days                     Physical Exam  Inspiratory>expiratory stridor at rest on room air  Anxious but remains comfortable   Euphonic   Neck soft w palpable landmarks       Significant Labs:  CBC:   Recent Labs   Lab 01/28/25  0321   WBC 13.89*   RBC 3.90*   HGB 11.5*   HCT 33.9*      MCV 87   MCH 29.5   MCHC 33.9         Assessment/Plan:     * Acquired subglottic stenosis  30 yo female 30 wks pregnant with grade 3 subglottic stenosis. Mild biphasic stridor noted on exam, no increased work of breathing. Patient saturating well on room air. Patient admitted to MICU for close airway monitoring.    - Airway plan: Patient has significant narrowing of her subglottis on scope exam which would make intubation difficult should she decompensate.  Could potentially be intubated with Glidescope and a 5-0 ETT.    - Discussed contingency plan w [facility] provider today - if significant decompensation airway would remain priority with ok  for tracheostomy if deemed appropriate  - Recommend immediate contact of anesthesia and ENT team should patient have worsening respiratory status.    - Recommend trach set at bedside   - Recommend 5-0 ETT at bedside   - Recommend continuing scheduled rac epi and nebulized steroids  - In coordination with [facility] and Anesthesia, case will be deferred until approximately 32w gestational age  - [facility] provide fetal monitoring during her  procedure -- pending details after multidisciplinary discussion  - MFM and NICU will be available during her procedure for intervention by delivery for signs of significant fetal distress  - steroid dosing for fetal lung maturity to begin 1/30  - ENT will continue to follow     Please page ENT resident on call with any questions or concerns.            Kt Montez MD  Otorhinolaryngology-Head & Neck Surgery  [facility] - Medical [facility]

## 2025-01-29 NOTE — PROGRESS NOTES
[facility] - Medical [facility]  Critical Care Medicine  Progress Note    Patient Name: Gisell Villafuerte  MRN: [id number]  Admission Date: 2025  Hospital Length of Stay: 5 days  Code Status: Full Code  Attending Provider: Lyle Sue MD  Primary Care Provider: Yazan Jj MD   Principal Problem: Acquired subglottic stenosis    Subjective:     HPI:  29F A1 30wks with recently diagnosed subglottic stenosis, here for ENT procedure. History of asthma, LPRD, allergies, anxiety. Reportedly seen by ENT PA in  and diagnosed with LPRD; has had worsening stridor which became acute after flu A infx . Was admitted for asthma exacerbation, however failed conservative tx and was investigated further given stridor.      HDS on outside admission. Labs with mild leukocytosis iso steroid adminsitration outpatient. RIP negative, normal inflammatory markers. Ucx noted to have E coli; was treated with ceftriaxone.    Hospital/ICU Course:  Patient admitted for ENT evaluation of subglottic stenosis and potential balloon/laser procedure. Tentative plan for OR  with ENT. OBGYN also consulted and following given 30wks pregnant. She is comfortable on room air. OB following; performed NST which was reassuring. ENT and MFM are following and ENT will perform her procedure with MFM present. MFM recommending waiting till she is 32 weeks pregnant. Patient is stable at this time.  She will need a steroid course one week prior to her procedure for fetal lung maturity. (Betamethasone scheduled for 2025). Asymptomatic UTI being treated with Augmentin.     Interval History/Significant Events: NAEO. Patient remains stable. No SOB at rest. MFM have requested supplies to be kept at bedside; we are in the process of gathering them.     Review of Systems   Constitutional:  Negative for chills and fever.   Respiratory:  Positive for shortness of breath (none at rest; some with activity).    Cardiovascular:  Negative for chest  pain and palpitations.   Gastrointestinal:  Negative for abdominal pain and nausea.   Genitourinary:  Negative for difficulty urinating.   Neurological:  Negative for dizziness and headaches.   Psychiatric/Behavioral:  Negative for confusion.      Objective:     Vital Signs (Most Recent):  Temp: 98.2 °F (36.8 °C) (01/29/25 0701)  Pulse: 100 (01/29/25 0905)  Resp: 13 (01/29/25 0905)  BP: 124/67 (01/29/25 0900)  SpO2: 100 % (01/29/25 0905) Vital Signs (24h Range):  Temp:  [97.9 °F (36.6 °C)-98.2 °F (36.8 °C)] 98.2 °F (36.8 °C)  Pulse:  [] 100  Resp:  [13-30] 13  SpO2:  [97 %-100 %] 100 %  BP: ()/(51-92) 124/67   Weight: 73.5 kg (162 lb 0.6 oz)  Body mass index is 30.62 kg/m².    No intake or output data in the 24 hours ending 01/29/25 1136       Physical Exam  Vitals and nursing note reviewed.   Constitutional:       General: She is not in acute distress.     Appearance: She is well-developed.   HENT:      Head: Normocephalic and atraumatic.      Comments: Mild inspiratory stridor similar to prior.  Eyes:      General: No scleral icterus.  Cardiovascular:      Rate and Rhythm: Normal rate and regular rhythm.      Pulses: Normal pulses.   Pulmonary:      Effort: Pulmonary effort is normal. No respiratory distress.   Abdominal:      General: Bowel sounds are normal. There is no distension.      Palpations: Abdomen is soft.      Tenderness: There is no abdominal tenderness.   Musculoskeletal:      Right lower leg: No edema.      Left lower leg: No edema.   Skin:     General: Skin is warm and dry.   Neurological:      Mental Status: She is alert and oriented to person, place, and time.            Vents:     Lines/Drains/Airways       Peripheral Intravenous Line  Duration                  Peripheral IV - Single Lumen 01/24/25 1022 20 G No Left;Posterior Hand 5 days         Peripheral IV - Single Lumen 01/24/25 2255 18 G 1 3/4 in No Left;Anterior Forearm 4 days                  Significant Labs:    CBC/Anemia  "Profile:  Recent Labs   Lab 01/28/25  0321   WBC 13.89*   HGB 11.5*   HCT 33.9*      MCV 87   RDW 13.2        Chemistries:  Recent Labs   Lab 01/28/25  0321      K 3.4*      CO2 19*   BUN 9   CREATININE 0.5   CALCIUM 8.8   ALBUMIN 3.2*   PROT 6.3   BILITOT 0.5   ALKPHOS 58   ALT 7*   AST 8*   MG 1.9   PHOS 2.8       All pertinent labs within the past 24 hours have been reviewed.    Significant Imaging:  I have reviewed all pertinent imaging results/findings within the past 24 hours.    ABG  No results for input(s): "PH", "PO2", "PCO2", "HCO3", "BE" in the last 168 hours.  Assessment/Plan:     Psychiatric  Anxiety  Patient states she is very anxious about her upcoming ENT procedure and is requesting a short term course of medication for anxiety. Does not wish to start a long term treatment for anxiety as she belives her anxiety will resolve after her ENT procedure. Discussed options with MFM; Hydroxyzine will be given      Plan:  Hydroxyzine 25 mg TID PRN for anxiety (spaced at least 4 hours from diphenhydramine)    ENT  * Acquired subglottic stenosis  Thought to be acquired iso LPRD. ENT at [facility] transferred for higher procedural level of care; ENT here consulted. Reportedly both balloon dilatation and tracheostomy have been discussed with her.     ENT in conjunction with MFM are recommending waiting till she is 32 weeks pregnant or her ENT procedure. MFM will be with ENT during the procedure.    - reflux management with PPI BID and pepcid 40 mg nightly   - racemic epi q6H PRN for 24 hours  - ordered auto-immune labs  - TSH WNL  -[facility] recommends a steroid course one week prior to her surgery (betamethasone scheduled for 1/30/2025) for fetal lung maturity; appreciate recs      Pulmonary  Severe persistent asthma with acute exacerbation  No wheezing on exam; treating for asthma flare but likely stenosis is major pathology.    -Nebulized budesonide BID, with duonebs q6WA and PRN  -Montelukast " nightly, diphenhydramine prn for allergies (space at least 4 hours from hydroxyzine)     Renal/  Acute cystitis  Asymptomatic UTI    Plan:  -Augmentin q12 for 5 days; first dose given 2025    Obstetric  30 weeks gestation of pregnancy  - OBGYN consulted, appreciate their recs  -NST performed by OB and reassuring  -MFM medicine following and will be ENT during her procdeure  -MFM recommending steroid course for fetal lung maturing one week prior to her procedure; Betamethasone scheduled for 2025; appreciate recs       Critical Care Daily Checklist:    A: Awake: RASS Goal/Actual Goal:    Actual:     B: Spontaneous Breathing Trial Performed?     C: SAT & SBT Coordinated?  Not on ventilator                      D: Delirium: CAM-ICU     E: Early Mobility Performed? Yes   F: Feeding Goal:    Status:     Current Diet Order   Procedures    Diet Adult Regular      AS: Analgesia/Sedation Tylenol 650 mg q4 PRN   T: Thromboembolic Prophylaxis Lovenox 40 SQ daily   H: HOB > 300 Yes   U: Stress Ulcer Prophylaxis (if needed) Pantoprazole EC 40 mg BID; Famotidine 40 mg PO nightly   G: Glucose Control Not needed   B: Bowel Function     I: Indwelling Catheter (Lines & Arnett) Necessity Peripheral IV only   D: De-escalation of Antimicrobials/Pharmacotherapies Continuing 5 day course of Augmentin for asymptomatic for UTI    Plan for the day/ETD Patient ready for step down; continuing to gather supplies in case of emergent      Code Status:  Family/Goals of Care: Full Code         Critical secondary to Patient has a condition that poses threat to life and bodily function: admitted to MICU with subglotic stenosis for airway watch; stable stepping down      Critical care was time spent personally by me on the following activities: development of treatment plan with patient or surrogate and bedside caregivers, discussions with consultants, evaluation of patient's response to treatment, examination of patient, ordering  and performing treatments and interventions, ordering and review of laboratory studies, ordering and review of radiographic studies, pulse oximetry, re-evaluation of patient's condition. This critical care time did not overlap with that of any other provider or involve time for any procedures.     Mali Pineda, DO  Critical Care Medicine  [facility] - Medical ICU

## 2025-01-29 NOTE — SUBJECTIVE & OBJECTIVE
Interval History: Remains with stable stridor. Discussed we still have ongoing conversations regarding best plan forward, voiced understanding.    Medications:  Continuous Infusions:  Scheduled Meds:   albuterol sulfate  2.5 mg Nebulization Q6H WAKE    amoxicillin-clavulanate 875-125mg  1 tablet Oral Q12H    [START ON 1/30/2025] betamethasone acetate-betamethasone sodium phosphate  12 mg Intramuscular Daily    budesonide  0.5 mg Nebulization Q12H    cetirizine  10 mg Oral Daily    enoxparin  40 mg Subcutaneous Daily    famotidine  40 mg Oral QHS    montelukast  10 mg Oral QHS    mupirocin   Nasal BID    pantoprazole  40 mg Oral BID AC    polyethylene glycol  17 g Oral BID    prenatal vitamin  1 tablet Oral Daily    sertraline  25 mg Oral Daily     PRN Meds:  Current Facility-Administered Medications:     acetaminophen, 650 mg, Oral, Q4H PRN    albuterol sulfate, 2.5 mg, Nebulization, Q4H PRN    diphenhydrAMINE, 25 mg, Intravenous, Q6H PRN    hydrOXYzine HCL, 25 mg, Oral, TID PRN    magnesium oxide, 800 mg, Oral, PRN    magnesium oxide, 800 mg, Oral, PRN    ondansetron, 4 mg, Intravenous, Q8H PRN    potassium bicarbonate, 35 mEq, Oral, PRN    potassium bicarbonate, 50 mEq, Oral, PRN    potassium bicarbonate, 60 mEq, Oral, PRN    potassium, sodium phosphates, 2 packet, Oral, PRN    potassium, sodium phosphates, 2 packet, Oral, PRN    potassium, sodium phosphates, 2 packet, Oral, PRN    racepinephrine, 0.5 mL, Nebulization, Q4H PRN    sodium chloride 0.9%, 10 mL, Intravenous, PRN     Review of patient's allergies indicates:   Allergen Reactions    No known drug allergies      Objective:     Vital Signs (24h Range):  Temp:  [97.9 °F (36.6 °C)-98.2 °F (36.8 °C)] 98.2 °F (36.8 °C)  Pulse:  [] 120  Resp:  [13-30] 20  SpO2:  [97 %-100 %] 98 %  BP: ()/(51-92) 134/64     Date 01/29/25 0700 - 01/30/25 0659   Shift 4777-2041 8774-3085 4848-6037 24 Hour Total   INTAKE   P.O. 240   240   Shift Total(mL/kg) 240(3.3)    240(3.3)   OUTPUT   Shift Total(mL/kg)       Weight (kg) 73.5 73.5 73.5 73.5     Lines/Drains/Airways       Peripheral Intravenous Line  Duration                  Peripheral IV - Single Lumen 01/24/25 1022 20 G No Left;Posterior Hand 5 days         Peripheral IV - Single Lumen 01/24/25 2255 18 G 1 3/4 in No Left;Anterior Forearm 4 days                     Physical Exam  Inspiratory>expiratory stridor at rest  Anxious but remains comfortable   Euphonic   Neck soft w palpable landmarks       Significant Labs:  CBC:   Recent Labs   Lab 01/28/25  0321   WBC 13.89*   RBC 3.90*   HGB 11.5*   HCT 33.9*      MCV 87   MCH 29.5   MCHC 33.9

## 2025-01-29 NOTE — ASSESSMENT & PLAN NOTE
Thought to be acquired iso LPRD. ENT at [facility] transferred for higher procedural level of care; ENT here consulted. Reportedly both balloon dilatation and tracheostomy have been discussed with her.     ENT in conjunction with MFM are recommending waiting till she is 32 weeks pregnant or her ENT procedure. MFM will be with ENT during the procedure.    - reflux management with PPI BID and pepcid 40 mg nightly   - racemic epi q6H PRN for 24 hours  - ordered auto-immune labs  - TSH WNL  -MF recommends a steroid course one week prior to her surgery (betamethasone scheduled for 1/30/2025) for fetal lung maturity; appreciate recs

## 2025-01-29 NOTE — SUBJECTIVE & OBJECTIVE
Interval History/Significant Events: NAEO. Patient remains stable. No SOB at rest. [facility] have requested supplies to be kept at bedside; we are in the process of gathering them.     Review of Systems   Constitutional:  Negative for chills and fever.   Respiratory:  Positive for shortness of breath (none at rest; some with activity).    Cardiovascular:  Negative for chest pain and palpitations.   Gastrointestinal:  Negative for abdominal pain and nausea.   Genitourinary:  Negative for difficulty urinating.   Neurological:  Negative for dizziness and headaches.   Psychiatric/Behavioral:  Negative for confusion.      Objective:     Vital Signs (Most Recent):  Temp: 98.2 °F (36.8 °C) (01/29/25 0701)  Pulse: 100 (01/29/25 0905)  Resp: 13 (01/29/25 0905)  BP: 124/67 (01/29/25 0900)  SpO2: 100 % (01/29/25 0905) Vital Signs (24h Range):  Temp:  [97.9 °F (36.6 °C)-98.2 °F (36.8 °C)] 98.2 °F (36.8 °C)  Pulse:  [] 100  Resp:  [13-30] 13  SpO2:  [97 %-100 %] 100 %  BP: ()/(51-92) 124/67   Weight: 73.5 kg (162 lb 0.6 oz)  Body mass index is 30.62 kg/m².    No intake or output data in the 24 hours ending 01/29/25 1136       Physical Exam  Vitals and nursing note reviewed.   Constitutional:       General: She is not in acute distress.     Appearance: She is well-developed.   HENT:      Head: Normocephalic and atraumatic.      Comments: Mild inspiratory stridor similar to prior.  Eyes:      General: No scleral icterus.  Cardiovascular:      Rate and Rhythm: Normal rate and regular rhythm.      Pulses: Normal pulses.   Pulmonary:      Effort: Pulmonary effort is normal. No respiratory distress.   Abdominal:      General: Bowel sounds are normal. There is no distension.      Palpations: Abdomen is soft.      Tenderness: There is no abdominal tenderness.   Musculoskeletal:      Right lower leg: No edema.      Left lower leg: No edema.   Skin:     General: Skin is warm and dry.   Neurological:      Mental Status: She is alert  and oriented to person, place, and time.            Vents:     Lines/Drains/Airways       Peripheral Intravenous Line  Duration                  Peripheral IV - Single Lumen 01/24/25 1022 20 G No Left;Posterior Hand 5 days         Peripheral IV - Single Lumen 01/24/25 2255 18 G 1 3/4 in No Left;Anterior Forearm 4 days                  Significant Labs:    CBC/Anemia Profile:  Recent Labs   Lab 01/28/25  0321   WBC 13.89*   HGB 11.5*   HCT 33.9*      MCV 87   RDW 13.2        Chemistries:  Recent Labs   Lab 01/28/25  0321      K 3.4*      CO2 19*   BUN 9   CREATININE 0.5   CALCIUM 8.8   ALBUMIN 3.2*   PROT 6.3   BILITOT 0.5   ALKPHOS 58   ALT 7*   AST 8*   MG 1.9   PHOS 2.8       All pertinent labs within the past 24 hours have been reviewed.    Significant Imaging:  I have reviewed all pertinent imaging results/findings within the past 24 hours.

## 2025-01-29 NOTE — ASSESSMENT & PLAN NOTE
- OBGYN consulted, appreciate their recs  -NST performed by OB and reassuring  -MFM medicine following and will be ENT during her procdeure  -M recommending steroid course for fetal lung maturing one week prior to her procedure; Betamethasone scheduled for 1/30/2025; appreciate recs

## 2025-01-29 NOTE — PLAN OF CARE
MICU DAILY GOALS     Family/Goals of care/Code Status   Code Status: Full Code    24H Vital Sign Range  Temp:  [97.9 °F (36.6 °C)-98.1 °F (36.7 °C)]   Pulse:  []   Resp:  [16-30]   BP: ()/(51-92)   SpO2:  [97 %-100 %]      Shift Events (include procedures and significant events)   No acute events throughout shift    AWAKE RASS: Goal -    Actual - RASS (Daugherty Agitation-Sedation Scale): alert and calm    Restraint necessity: Not necessary   BREATHE SBT: Not intubated    Coordinate A & B, analgesics/sedatives Pain: managed   SAT: Not intubated   Delirium CAM-ICU:     Early(intubated/ Progressive (non-intubated) Mobility MOVE Screen (INTUBATED ONLY): Not intubated    Activity: Activity Management: Arm raise - L1, Rolling - L1   Feeding/Nutrition Diet order: Diet/Nutrition Received: regular,     Thrombus DVT prophylaxis: VTE Core Measure: Pharmacological prophylaxis initiated/maintained   HOB Elevation Head of Bed (HOB) Positioning: HOB at 30-45 degrees   Ulcer Prophylaxis GI: yes   Glucose control managed     Skin Skin assessment:     Sacrum intact/not altered? Yes  Heels intact/not altered? Yes  Surgical wound? No    CHECK ONE!   (no altered skin or altered skin) and sub boxes:  [x] No Altered Skin Integrity Present    [x]Prevention Measures Documented    [] Altered Skin Integrity Present or Discovered   [] LDA present in EPIC, daily doc completed              [] LDA added if not in EPIC (describe wound).                    When describing wound, do not stage, use descriptive words only.    [] Wound Image Taken (required on admit,                   transfer/discharge and every Tuesday)    Wound Care Consulted? No   Bowel Function no issues    Indwelling Catheter Necessity            De-escalation Antibiotics Yes          Problem: Adult Inpatient Plan of Care  Goal: Plan of Care Review  Outcome: Progressing  Goal: Patient-Specific Goal (Individualized)  Outcome: Progressing  Goal: Absence of  Hospital-Acquired Illness or Injury  Outcome: Progressing  Goal: Optimal Comfort and Wellbeing  Outcome: Progressing  Goal: Readiness for Transition of Care  Outcome: Progressing     Problem:  Fall Injury Risk  Goal: Absence of Fall, Infant Drop and Related Injury  Outcome: Progressing     Problem: Infection  Goal: Absence of Infection Signs and Symptoms  Outcome: Progressing

## 2025-01-29 NOTE — PLAN OF CARE
[facility] Medicine ICU Acceptance Note    Date of Admit: 1/24/2025  Date of Transfer / Stepdown: 1/28/2025  Boagnieszka, C/J, L, Onc (IV chemo w/in 1 month), Gyn/Onc, or other special case?: No  ICU team stepping patient down: MICU/CCU/SICU/NCC  ICU team member giving handoff: Mali Pineda DO    Accepting  team: U     History of Present Illness:     stenosis, here for ENT procedure. History of asthma, LPRD, allergies, anxiety. Reportedly seen by ENT PA in 2022 and diagnosed with LPRD; has had worsening stridor which became acute after flu A infx 12/25. Was admitted for asthma exacerbation, however failed conservative tx and was investigated further given stridor.       HDS on outside admission. Labs with mild leukocytosis iso steroid adminsitration outpatient. RIP negative, normal inflammatory markers. Ucx noted to have E coli; was treated with ceftriaxone.       Hospital/ICU Course:     Patient admitted for ENT evaluation of subglottic stenosis and potential balloon/laser procedure. Tentative plan for OR 1/27 with ENT. OBGYN also consulted and following given 30wks pregnant. She is comfortable on room air. OB following; performed NST which was reassuring. ENT and MFM are following and ENT will perform her procedure with MFM present. MFM recommending waiting till she is 32 weeks pregnant. Patient is stable at this time. She will need a steroid course one week prior to her procedure for fetal lung maturity. (See MFM note on 1/27/2025 for specifics). Asymptomatic UTI     Deemed appropriate for transfer to the floor on 1/28/2025, and was accepted to  team U for further care and management.    Consultants and Procedures:     Consultants:  Consults (From admission, onward)          Status Ordering Provider     Inpatient consult to ENT  Once        Provider:  (Not yet assigned)    Completed MALLORIE DAS     Inpatient consult to Obstetrics  Once        Provider:  (Not yet assigned)    Completed RD  BRISSA MODI            Procedures:    As documented    Transfer Information:     Diet:  As ordered    Physical Activity:  As tolerated      Pending plan at time of transfer to the floor:  Continue current plan initiated by ICU, will further monitor and adjust as clinically indicated upon arrival to the floor.    Patient has been accepted by Hospital Medicine Team U, who will assume care of the patient upon arrival to the floor from the ICU. Please contact ICU team with any concerns prior to transfer to the floor.      Chiara Saenz MD  Attending Physician  Department of Hospital Medicine  1/28/2025

## 2025-01-29 NOTE — ASSESSMENT & PLAN NOTE
28 yo female 30 wks pregnant with grade 3 subglottic stenosis. Mild biphasic stridor noted on exam, no increased work of breathing. Patient saturating well on room air. Patient admitted to MICU for close airway monitoring.    - Airway plan: Patient has significant narrowing of her subglottis on scope exam which would make intubation difficult should she decompensate.  Could potentially be intubated with Glidescope and a 5-0 ETT.    - Discussed contingency plan w MFM provider today - if significant decompensation airway would remain priority with ok  for tracheostomy if deemed appropriate  - Recommend immediate contact of anesthesia and ENT team should patient have worsening respiratory status.    - Recommend trach set at bedside   - Recommend 5-0 ETT at bedside   - Recommend continuing scheduled rac epi and nebulized steroids  - In coordination with MFM and Anesthesia, case will be deferred until approximately 32w gestational age  - MFM provide fetal monitoring during her procedure -- pending details after multidisciplinary discussion  - MFM and NICU will be available during her procedure for intervention by delivery for signs of significant fetal distress  - steroid dosing for fetal lung maturity to begin 1/30  - ENT will continue to follow     Please page ENT resident on call with any questions or concerns.

## 2025-01-29 NOTE — PLAN OF CARE
MICU DAILY GOALS     Family/Goals of care/Code Status   Code Status: Full Code    24H Vital Sign Range  Temp:  [97.9 °F (36.6 °C)-98.2 °F (36.8 °C)]   Pulse:  []   Resp:  [13-30]   BP: ()/(51-92)   SpO2:  [97 %-100 %]      Shift Events (include procedures and significant events)   No acute events throughout shift PRN given for stridor.     AWAKE RASS: Goal -    Actual - RASS (Daugherty Agitation-Sedation Scale): alert and calm    Restraint necessity: Not necessary   BREATHE SBT: Not intubated    Coordinate A & B, analgesics/sedatives Pain: managed   SAT: Not intubated   Delirium CAM-ICU:     Early(intubated/ Progressive (non-intubated) Mobility MOVE Screen (INTUBATED ONLY): Not intubated    Activity: Activity Management: Rolling - L1   Feeding/Nutrition Diet order: Diet/Nutrition Received: regular,     Thrombus DVT prophylaxis: VTE Core Measure: Pharmacological prophylaxis initiated/maintained   HOB Elevation Head of Bed (HOB) Positioning: HOB at 30-45 degrees   Ulcer Prophylaxis GI: yes   Glucose control managed     Skin Skin assessment:     Sacrum intact/not altered? Yes  Heels intact/not altered? Yes  Surgical wound? No    CHECK ONE!   (no altered skin or altered skin) and sub boxes:  [x] No Altered Skin Integrity Present    [x]Prevention Measures Documented    [] Altered Skin Integrity Present or Discovered   [] LDA present in EPIC, daily doc completed              [] LDA added if not in EPIC (describe wound).                    When describing wound, do not stage, use descriptive words only.    [] Wound Image Taken (required on admit,                   transfer/discharge and every Tuesday)    Wound Care Consulted? No   Bowel Function no issues    Indwelling Catheter Necessity            De-escalation Antibiotics Yes        VS and assessment per flow sheet, patient progressing towards goals as tolerated, plan of care reviewed with  patient and spouse , all concerns addressed, will continue to  monitor.

## 2025-01-30 LAB
ANTI-CENTROMERE ANTIBODY: <0.4 U/ML
CARDIOLIPIN IGG SER IA-ACNC: <9.4 GPL (ref 0–14.99)
CARDIOLIPIN IGM SER IA-ACNC: <9.4 MPL (ref 0–12.49)
ENA SCL70 AB SER-ACNC: <0.6 U/ML

## 2025-01-30 PROCEDURE — 27000207 HC ISOLATION

## 2025-01-30 PROCEDURE — 94761 N-INVAS EAR/PLS OXIMETRY MLT: CPT

## 2025-01-30 PROCEDURE — 25000003 PHARM REV CODE 250: Performed by: STUDENT IN AN ORGANIZED HEALTH CARE EDUCATION/TRAINING PROGRAM

## 2025-01-30 PROCEDURE — 25000003 PHARM REV CODE 250: Performed by: INTERNAL MEDICINE

## 2025-01-30 PROCEDURE — 99291 CRITICAL CARE FIRST HOUR: CPT | Mod: ,,, | Performed by: INTERNAL MEDICINE

## 2025-01-30 PROCEDURE — 20000000 HC ICU ROOM

## 2025-01-30 PROCEDURE — 94640 AIRWAY INHALATION TREATMENT: CPT

## 2025-01-30 PROCEDURE — 63600175 PHARM REV CODE 636 W HCPCS

## 2025-01-30 PROCEDURE — 25000242 PHARM REV CODE 250 ALT 637 W/ HCPCS: Performed by: STUDENT IN AN ORGANIZED HEALTH CARE EDUCATION/TRAINING PROGRAM

## 2025-01-30 RX ADMIN — MONTELUKAST 10 MG: 10 TABLET, FILM COATED ORAL at 08:01

## 2025-01-30 RX ADMIN — CETIRIZINE HYDROCHLORIDE 10 MG: 10 TABLET, FILM COATED ORAL at 08:01

## 2025-01-30 RX ADMIN — SERTRALINE HYDROCHLORIDE 25 MG: 25 TABLET ORAL at 08:01

## 2025-01-30 RX ADMIN — AMOXICILLIN AND CLAVULANATE POTASSIUM 1 TABLET: 875; 125 TABLET, FILM COATED ORAL at 08:01

## 2025-01-30 RX ADMIN — FAMOTIDINE 40 MG: 20 TABLET ORAL at 08:01

## 2025-01-30 RX ADMIN — PANTOPRAZOLE SODIUM 40 MG: 40 TABLET, DELAYED RELEASE ORAL at 05:01

## 2025-01-30 RX ADMIN — ALBUTEROL SULFATE 2.5 MG: 2.5 SOLUTION RESPIRATORY (INHALATION) at 02:01

## 2025-01-30 RX ADMIN — PRENATAL VIT W/ FE FUMARATE-FA TAB 27-0.8 MG 1 TABLET: 27-0.8 TAB at 08:01

## 2025-01-30 RX ADMIN — BUDESONIDE 0.5 MG: 0.5 INHALANT RESPIRATORY (INHALATION) at 07:01

## 2025-01-30 RX ADMIN — ALBUTEROL SULFATE 2.5 MG: 2.5 SOLUTION RESPIRATORY (INHALATION) at 07:01

## 2025-01-30 RX ADMIN — MUPIROCIN: 20 OINTMENT TOPICAL at 08:01

## 2025-01-30 RX ADMIN — BETAMETHASONE SODIUM PHOSPHATE AND BETAMETHASONE ACETATE 12 MG: 3; 3 INJECTION, SUSPENSION INTRA-ARTICULAR; INTRALESIONAL; INTRAMUSCULAR at 08:01

## 2025-01-30 RX ADMIN — ENOXAPARIN SODIUM 40 MG: 40 INJECTION SUBCUTANEOUS at 04:01

## 2025-01-30 RX ADMIN — PANTOPRAZOLE SODIUM 40 MG: 40 TABLET, DELAYED RELEASE ORAL at 04:01

## 2025-01-30 NOTE — PROGRESS NOTES
[facility]  Otorhinolaryngology-Head & Neck Surgery  Progress Note    Subjective:     Post-Op Info:  Procedure(s) (LRB):  MICROLARYNGOSCOPY, SUSPENSION (N/A)  CREATION, TRACHEOSTOMY (N/A)   3 Days Post-Op  Hospital Day: 7     Interval History: Improved demeanor this AM after long discussion yesterday afternoon. Planning for multidisciplinary meeting today for more formal operative plans next week.    Medications:  Continuous Infusions:  Scheduled Meds:   albuterol sulfate  2.5 mg Nebulization Q6H WAKE    amoxicillin-clavulanate 875-125mg  1 tablet Oral Q12H    betamethasone acetate-betamethasone sodium phosphate  12 mg Intramuscular Daily    budesonide  0.5 mg Nebulization Q12H    cetirizine  10 mg Oral Daily    enoxparin  40 mg Subcutaneous Daily    famotidine  40 mg Oral QHS    montelukast  10 mg Oral QHS    mupirocin   Nasal BID    pantoprazole  40 mg Oral BID AC    polyethylene glycol  17 g Oral BID    prenatal vitamin  1 tablet Oral Daily    sertraline  25 mg Oral Daily     PRN Meds:  Current Facility-Administered Medications:     acetaminophen, 650 mg, Oral, Q4H PRN    albuterol sulfate, 2.5 mg, Nebulization, Q4H PRN    diphenhydrAMINE, 25 mg, Intravenous, Q6H PRN    hydrOXYzine HCL, 25 mg, Oral, TID PRN    magnesium oxide, 800 mg, Oral, PRN    magnesium oxide, 800 mg, Oral, PRN    ondansetron, 4 mg, Intravenous, Q8H PRN    potassium bicarbonate, 35 mEq, Oral, PRN    potassium bicarbonate, 50 mEq, Oral, PRN    potassium bicarbonate, 60 mEq, Oral, PRN    potassium, sodium phosphates, 2 packet, Oral, PRN    potassium, sodium phosphates, 2 packet, Oral, PRN    potassium, sodium phosphates, 2 packet, Oral, PRN    racepinephrine, 0.5 mL, Nebulization, Q4H PRN    sodium chloride 0.9%, 10 mL, Intravenous, PRN     Review of patient's allergies indicates:   Allergen Reactions    No known drug allergies      Objective:     Vital Signs (24h Range):  Temp:  [98.1 °F (36.7 °C)-98.5 °F (36.9 °C)] 98.2 °F (36.8  °C)  Pulse:  [] 93  Resp:  [13-31] 20  SpO2:  [97 %-100 %] 99 %  BP: (105-140)/(56-78) 105/56       Lines/Drains/Airways       Peripheral Intravenous Line  Duration                  Peripheral IV - Single Lumen 01/24/25 2255 18 G 1 3/4 in No Left;Anterior Forearm 5 days         Peripheral IV - Single Lumen 01/29/25 1613 20 G No Right;Lateral Wrist <1 day                     Physical Exam  Biphasic stridor, stable insp>pepe   NAD  Awake and alert  MMM, anterior tongue mobile, OP patent w/ midline uvula   Normal WOB, on room air       Assessment/Plan:     * Acquired subglottic stenosis  28 yo female 30 wks pregnant with grade 3 subglottic stenosis. Mild biphasic stridor noted on exam, no increased work of breathing. Patient saturating well on room air. Patient admitted to MICU for close airway monitoring.    Planning for multidisciplinary meeting today for more formal operative plans next week.    - Airway plan: Patient has significant narrowing of her subglottis on scope exam which would make intubation difficult should she decompensate.  Could potentially be intubated with Glidescope and a 5-0 ETT.    - Discussed contingency plan w MFM provider today - if significant decompensation airway would remain priority with ok  for tracheostomy if deemed appropriate  - Recommend immediate contact of anesthesia and ENT team should patient have worsening respiratory status.    - Recommend trach set at bedside   - Recommend 5-0 ETT at bedside   - Recommend continuing scheduled rac epi and nebulized steroids  - In coordination with MFM and OB Anesthesia, case deferred until approximately 32w gestational age  - MFM provide fetal monitoring during her procedure -- pending details after multidisciplinary discussion  - MFM and NICU will be available during her procedure for intervention by delivery for signs of significant fetal distress  - steroid dosing for fetal lung maturity to beginning 1/30  - ENT will continue to follow      Please page ENT resident on call with any questions or concerns.            Kt Montez MD  Otorhinolaryngology-Head & Neck Surgery  [facility] - Medical ICU

## 2025-01-30 NOTE — SUBJECTIVE & OBJECTIVE
Interval History: Improved demeanor this AM after long discussion yesterday afternoon. Planning for multidisciplinary meeting today for more formal operative plans next week.    Medications:  Continuous Infusions:  Scheduled Meds:   albuterol sulfate  2.5 mg Nebulization Q6H WAKE    amoxicillin-clavulanate 875-125mg  1 tablet Oral Q12H    betamethasone acetate-betamethasone sodium phosphate  12 mg Intramuscular Daily    budesonide  0.5 mg Nebulization Q12H    cetirizine  10 mg Oral Daily    enoxparin  40 mg Subcutaneous Daily    famotidine  40 mg Oral QHS    montelukast  10 mg Oral QHS    mupirocin   Nasal BID    pantoprazole  40 mg Oral BID AC    polyethylene glycol  17 g Oral BID    prenatal vitamin  1 tablet Oral Daily    sertraline  25 mg Oral Daily     PRN Meds:  Current Facility-Administered Medications:     acetaminophen, 650 mg, Oral, Q4H PRN    albuterol sulfate, 2.5 mg, Nebulization, Q4H PRN    diphenhydrAMINE, 25 mg, Intravenous, Q6H PRN    hydrOXYzine HCL, 25 mg, Oral, TID PRN    magnesium oxide, 800 mg, Oral, PRN    magnesium oxide, 800 mg, Oral, PRN    ondansetron, 4 mg, Intravenous, Q8H PRN    potassium bicarbonate, 35 mEq, Oral, PRN    potassium bicarbonate, 50 mEq, Oral, PRN    potassium bicarbonate, 60 mEq, Oral, PRN    potassium, sodium phosphates, 2 packet, Oral, PRN    potassium, sodium phosphates, 2 packet, Oral, PRN    potassium, sodium phosphates, 2 packet, Oral, PRN    racepinephrine, 0.5 mL, Nebulization, Q4H PRN    sodium chloride 0.9%, 10 mL, Intravenous, PRN     Review of patient's allergies indicates:   Allergen Reactions    No known drug allergies      Objective:     Vital Signs (24h Range):  Temp:  [98.1 °F (36.7 °C)-98.5 °F (36.9 °C)] 98.2 °F (36.8 °C)  Pulse:  [] 93  Resp:  [13-31] 20  SpO2:  [97 %-100 %] 99 %  BP: (105-140)/(56-78) 105/56       Lines/Drains/Airways       Peripheral Intravenous Line  Duration                  Peripheral IV - Single Lumen 01/24/25 2255 18 G 1  3/4 in No Left;Anterior Forearm 5 days         Peripheral IV - Single Lumen 01/29/25 1613 20 G No Right;Lateral Wrist <1 day                     Physical Exam  Biphasic stridor, stable insp>pepe   NAD  Awake and alert  MMM, anterior tongue mobile, OP patent w/ midline uvula   Normal WOB, on room air

## 2025-01-30 NOTE — ASSESSMENT & PLAN NOTE
28 yo female 30 wks pregnant with grade 3 subglottic stenosis. Mild biphasic stridor noted on exam, no increased work of breathing. Patient saturating well on room air. Patient admitted to MICU for close airway monitoring.    Planning for multidisciplinary meeting today for more formal operative plans next week.    - Airway plan: Patient has significant narrowing of her subglottis on scope exam which would make intubation difficult should she decompensate.  Could potentially be intubated with Glidescope and a 5-0 ETT.    - Discussed contingency plan w MFM provider today - if significant decompensation airway would remain priority with ok  for tracheostomy if deemed appropriate  - Recommend immediate contact of anesthesia and ENT team should patient have worsening respiratory status.    - Recommend trach set at bedside   - Recommend 5-0 ETT at bedside   - Recommend continuing scheduled rac epi and nebulized steroids  - In coordination with MFM and OB Anesthesia, case deferred until approximately 32w gestational age  - MFM provide fetal monitoring during her procedure -- pending details after multidisciplinary discussion  - MFM and NICU will be available during her procedure for intervention by delivery for signs of significant fetal distress  - steroid dosing for fetal lung maturity to beginning 1/30  - ENT will continue to follow     Please page ENT resident on call with any questions or concerns.

## 2025-01-30 NOTE — SUBJECTIVE & OBJECTIVE
Interval History/Significant Events: NAEO. Patient had a brief episode of worsening stridor yesterday that resolved with racemic epinephrine. Has remained stable afterwards. Continuing to monitor.     Review of Systems   Constitutional:  Negative for chills and fever.   Respiratory:  Positive for shortness of breath (none at rest; some with activity).    Cardiovascular:  Negative for chest pain and palpitations.   Gastrointestinal:  Negative for abdominal pain and nausea.   Genitourinary:  Negative for difficulty urinating.   Neurological:  Negative for dizziness and headaches.   Psychiatric/Behavioral:  Negative for confusion.      Objective:     Vital Signs (Most Recent):  Temp: 98.6 °F (37 °C) (01/30/25 1120)  Pulse: 106 (01/30/25 1200)  Resp: 15 (01/30/25 1200)  BP: 112/70 (01/30/25 1200)  SpO2: 98 % (01/30/25 1200) Vital Signs (24h Range):  Temp:  [98.1 °F (36.7 °C)-99.2 °F (37.3 °C)] 98.6 °F (37 °C)  Pulse:  [] 106  Resp:  [15-31] 15  SpO2:  [97 %-100 %] 98 %  BP: (105-140)/(56-78) 112/70   Weight: 73.5 kg (162 lb 0.6 oz)  Body mass index is 30.62 kg/m².      Intake/Output Summary (Last 24 hours) at 1/30/2025 1342  Last data filed at 1/30/2025 0710  Gross per 24 hour   Intake 200 ml   Output --   Net 200 ml          Physical Exam  Vitals and nursing note reviewed.   Constitutional:       General: She is not in acute distress.     Appearance: She is well-developed.   HENT:      Head: Normocephalic and atraumatic.      Comments: Mild inspiratory stridor similar to prior.  Eyes:      General: No scleral icterus.  Cardiovascular:      Rate and Rhythm: Normal rate and regular rhythm.      Pulses: Normal pulses.   Pulmonary:      Effort: Pulmonary effort is normal. No respiratory distress.   Abdominal:      General: Bowel sounds are normal. There is no distension.      Palpations: Abdomen is soft.      Tenderness: There is no abdominal tenderness.   Musculoskeletal:      Right lower leg: No edema.      Left  "lower leg: No edema.   Skin:     General: Skin is warm and dry.   Neurological:      Mental Status: She is alert and oriented to person, place, and time.            Vents:     Lines/Drains/Airways       Peripheral Intravenous Line  Duration                  Peripheral IV - Single Lumen 01/24/25 2255 18 G 1 3/4 in No Left;Anterior Forearm 5 days         Peripheral IV - Single Lumen 01/29/25 1613 20 G No Right;Lateral Wrist <1 day                  Significant Labs:    CBC/Anemia Profile:  No results for input(s): "WBC", "HGB", "HCT", "PLT", "MCV", "RDW", "IRON", "FERRITIN", "RETIC", "FOLATE", "[id number]", "OCCULTBLOOD" in the last 48 hours.     Chemistries:  No results for input(s): "NA", "K", "CL", "CO2", "BUN", "CREATININE", "CALCIUM", "ALBUMIN", "PROT", "BILITOT", "ALKPHOS", "ALT", "AST", "GLUCOSE", "MG", "PHOS" in the last 48 hours.    All pertinent labs within the past 24 hours have been reviewed.    Significant Imaging:  I have reviewed all pertinent imaging results/findings within the past 24 hours.  "

## 2025-01-30 NOTE — CARE UPDATE
PM Strip Note    NST: baseline 160, mod BTBV, + accels, - decels  [place]: quiet    Continue NST daily    Aidee Feldman MD  OB/GYN PGY-3

## 2025-01-30 NOTE — ASSESSMENT & PLAN NOTE
- OBGYN consulted, appreciate their recs  -NST being performed daily by OB  -MFM medicine following and will be ENT during her procdeure  -MFM recommending steroid course for fetal lung maturing one week prior to her procedure; Betamethasone scheduled for 2025; appreciate recs  -Gathering supplies to bedside in case of emergency

## 2025-01-30 NOTE — PROGRESS NOTES
[facility]  Critical Care Medicine  Progress Note    Patient Name: Gisell Villafuerte  MRN: [id number]  Admission Date: 2025  Hospital Length of Stay: 6 days  Code Status: Full Code  Attending Provider: Lyle Sue MD  Primary Care Provider: Yazan Jj MD   Principal Problem: Acquired subglottic stenosis    Subjective:     HPI:  29F A1 30wks with recently diagnosed subglottic stenosis, here for ENT procedure. History of asthma, LPRD, allergies, anxiety. Reportedly seen by ENT PA in  and diagnosed with LPRD; has had worsening stridor which became acute after flu A infx . Was admitted for asthma exacerbation, however failed conservative tx and was investigated further given stridor.      HDS on outside admission. Labs with mild leukocytosis iso steroid adminsitration outpatient. RIP negative, normal inflammatory markers. Ucx noted to have E coli; was treated with ceftriaxone.    Hospital/ICU Course:  Patient admitted for ENT evaluation of subglottic stenosis and potential balloon/laser procedure. Tentative plan for OR  with ENT. OBGYN also consulted and following given 30wks pregnant. She is comfortable on room air. OB following; performed NST which was reassuring. ENT and MFM are following and ENT will perform her procedure with MFM present. MFM recommending waiting till she is 32 weeks pregnant. She will need a steroid course one week prior to her procedure for fetal lung maturity. (Betamethasone scheduled for 2025). Asymptomatic UTI being treated with Augmentin. Patient had brief episode of worsening stridor that corrected with one dose of racemic epinephrine. Currently stable. Collecting supplies to be at bedside in case of emergency .     Interval History/Significant Events: NAEO. Patient had a brief episode of worsening stridor yesterday that resolved with racemic epinephrine. Has remained stable afterwards. Continuing to monitor.     Review of  Systems   Constitutional:  Negative for chills and fever.   Respiratory:  Positive for shortness of breath (none at rest; some with activity).    Cardiovascular:  Negative for chest pain and palpitations.   Gastrointestinal:  Negative for abdominal pain and nausea.   Genitourinary:  Negative for difficulty urinating.   Neurological:  Negative for dizziness and headaches.   Psychiatric/Behavioral:  Negative for confusion.      Objective:     Vital Signs (Most Recent):  Temp: 98.6 °F (37 °C) (01/30/25 1120)  Pulse: 106 (01/30/25 1200)  Resp: 15 (01/30/25 1200)  BP: 112/70 (01/30/25 1200)  SpO2: 98 % (01/30/25 1200) Vital Signs (24h Range):  Temp:  [98.1 °F (36.7 °C)-99.2 °F (37.3 °C)] 98.6 °F (37 °C)  Pulse:  [] 106  Resp:  [15-31] 15  SpO2:  [97 %-100 %] 98 %  BP: (105-140)/(56-78) 112/70   Weight: 73.5 kg (162 lb 0.6 oz)  Body mass index is 30.62 kg/m².      Intake/Output Summary (Last 24 hours) at 1/30/2025 1342  Last data filed at 1/30/2025 0710  Gross per 24 hour   Intake 200 ml   Output --   Net 200 ml          Physical Exam  Vitals and nursing note reviewed.   Constitutional:       General: She is not in acute distress.     Appearance: She is well-developed.   HENT:      Head: Normocephalic and atraumatic.      Comments: Mild inspiratory stridor similar to prior.  Eyes:      General: No scleral icterus.  Cardiovascular:      Rate and Rhythm: Normal rate and regular rhythm.      Pulses: Normal pulses.   Pulmonary:      Effort: Pulmonary effort is normal. No respiratory distress.   Abdominal:      General: Bowel sounds are normal. There is no distension.      Palpations: Abdomen is soft.      Tenderness: There is no abdominal tenderness.   Musculoskeletal:      Right lower leg: No edema.      Left lower leg: No edema.   Skin:     General: Skin is warm and dry.   Neurological:      Mental Status: She is alert and oriented to person, place, and time.            Vents:     Lines/Drains/Airways       Peripheral  "Intravenous Line  Duration                  Peripheral IV - Single Lumen 01/24/25 2255 18 G 1 3/4 in No Left;Anterior Forearm 5 days         Peripheral IV - Single Lumen 01/29/25 1613 20 G No Right;Lateral Wrist <1 day                  Significant Labs:    CBC/Anemia Profile:  No results for input(s): "WBC", "HGB", "HCT", "PLT", "MCV", "RDW", "IRON", "FERRITIN", "RETIC", "FOLATE", "[id number]", "OCCULTBLOOD" in the last 48 hours.     Chemistries:  No results for input(s): "NA", "K", "CL", "CO2", "BUN", "CREATININE", "CALCIUM", "ALBUMIN", "PROT", "BILITOT", "ALKPHOS", "ALT", "AST", "GLUCOSE", "MG", "PHOS" in the last 48 hours.    All pertinent labs within the past 24 hours have been reviewed.    Significant Imaging:  I have reviewed all pertinent imaging results/findings within the past 24 hours.    ABG  No results for input(s): "PH", "PO2", "PCO2", "HCO3", "BE" in the last 168 hours.  Assessment/Plan:     Psychiatric  Anxiety  Patient states she is very anxious about her upcoming ENT procedure and is requesting a short term course of medication for anxiety. Does not wish to start a long term treatment for anxiety as she belives her anxiety will resolve after her ENT procedure. Discussed options with MFM; Hydroxyzine will be given      Plan:  Hydroxyzine 25 mg TID PRN for anxiety (spaced at least 4 hours from diphenhydramine)    ENT  * Acquired subglottic stenosis  Thought to be acquired iso LPRD. ENT at [facility] transferred for higher procedural level of care; ENT here consulted. Reportedly both balloon dilatation and tracheostomy have been discussed with her.     ENT in conjunction with MFM are recommending waiting till she is 32 weeks pregnant or her ENT procedure. MFM will be with ENT during the procedure.    - reflux management with PPI BID and pepcid 40 mg nightly   - racemic epi q6H PRN for 24 hours  - ordered auto-immune labs  - TSH WNL  -MFM recommends a steroid course one week prior to her surgery " (betamethasone scheduled for 2025) for fetal lung maturity; appreciate recs      Pulmonary  Severe persistent asthma with acute exacerbation  No wheezing on exam; treating for asthma flare but likely stenosis is major pathology.    -Nebulized budesonide BID, with duonebs q6WA and PRN  -Montelukast nightly, diphenhydramine prn for allergies (space at least 4 hours from hydroxyzine)     Renal/  Acute cystitis  Asymptomatic UTI    Plan:  -Augmentin q12 for 5 days; first dose given 2025    Obstetric  30 weeks gestation of pregnancy  - OBGYN consulted, appreciate their recs  -NST being performed daily by OB  -MFM medicine following and will be ENT during her procdeure  -MFM recommending steroid course for fetal lung maturing one week prior to her procedure; Betamethasone scheduled for 2025; appreciate recs  -Gathering supplies to bedside in case of emergency        Critical Care Daily Checklist:    A: Awake: RASS Goal/Actual Goal:    Actual:     B: Spontaneous Breathing Trial Performed?     C: SAT & SBT Coordinated?  Not on ventilator                      D: Delirium: CAM-ICU     E: Early Mobility Performed? Yes   F: Feeding Goal:    Status:     Current Diet Order   Procedures    Diet Adult Regular      AS: Analgesia/Sedation No sedation; PRN Tylenol   T: Thromboembolic Prophylaxis Lovenox   H: HOB > 300 Yes   U: Stress Ulcer Prophylaxis (if needed) Famotidine nightly, Pantoprazole BID   G: Glucose Control None needed   B: Bowel Function Stool Occurrence: 1   I: Indwelling Catheter (Lines & Arnett) Necessity Peripheral IV only   D: De-escalation of Antimicrobials/Pharmacotherapies Continuing 5 day Augmentin course for asymptomatic UTI    Plan for the day/ETD Continuing to monitor    Code Status:  Family/Goals of Care: Full Code         Critical secondary to Patient has a condition that poses threat to life and bodily function: Aquired subglottic stenosis leading to SOB and stridor       Critical  care was time spent personally by me on the following activities: development of treatment plan with patient or surrogate and bedside caregivers, discussions with consultants, evaluation of patient's response to treatment, examination of patient, ordering and performing treatments and interventions, ordering and review of laboratory studies, ordering and review of radiographic studies, pulse oximetry, re-evaluation of patient's condition. This critical care time did not overlap with that of any other provider or involve time for any procedures.     Mali Pineda, DO  Critical Care Medicine  [facility]

## 2025-01-30 NOTE — PLAN OF CARE
MICU DAILY GOALS     Family/Goals of care/Code Status   Code Status: Full Code    24H Vital Sign Range  Temp:  [98.1 °F (36.7 °C)-99.2 °F (37.3 °C)]   Pulse:  []   Resp:  [15-35]   BP: (105-125)/(56-70)   SpO2:  [97 %-99 %]      Shift Events (include procedures and significant events)   No acute events throughout shift    AWAKE RASS: Goal -    Actual - RASS ([place] Agitation-Sedation Scale): alert and calm    Restraint necessity: Not necessary   BREATHE SBT: Not intubated    Coordinate A & B, analgesics/sedatives Pain: managed   SAT: Not intubated   Delirium CAM-ICU:     Early(intubated/ Progressive (non-intubated) Mobility MOVE Screen (INTUBATED ONLY): Not intubated    Activity: Activity Management: Ambulated to bathroom - L4   Feeding/Nutrition Diet order: Diet/Nutrition Received: regular,     Thrombus DVT prophylaxis: VTE Core Measure: Pharmacological prophylaxis initiated/maintained   HOB Elevation Head of Bed (HOB) Positioning: HOB at 60-90 degrees   Ulcer Prophylaxis GI: yes   Glucose control managed     Skin Skin assessment:     Sacrum intact/not altered? Yes  Heels intact/not altered? Yes  Surgical wound? No    CHECK ONE!   (no altered skin or altered skin) and sub boxes:  [] No Altered Skin Integrity Present    []Prevention Measures Documented    [x] Altered Skin Integrity Present or Discovered   [x] LDA present in EPIC, daily doc completed              [] LDA added if not in EPIC (describe wound).                    When describing wound, do not stage, use descriptive words only.    [] Wound Image Taken (required on admit,                   transfer/discharge and every Tuesday)    Wound Care Consulted? No   Bowel Function no issues    Indwelling Catheter Necessity            De-escalation Antibiotics Yes        VS and assessment per flow sheet, patient progressing towards goals as tolerated, plan of care reviewed with family, all concerns addressed, will continue to monitor.

## 2025-01-30 NOTE — PLAN OF CARE
MICU DAILY GOALS     Family/Goals of care/Code Status   Code Status: Full Code    24H Vital Sign Range  Temp:  [98.1 °F (36.7 °C)-98.5 °F (36.9 °C)]   Pulse:  []   Resp:  [13-31]   BP: (105-140)/(56-78)   SpO2:  [97 %-100 %]      Shift Events (include procedures and significant events)   No acute events throughout shift    AWAKE RASS: Goal -    Actual - RASS (Daugherty Agitation-Sedation Scale): alert and calm    Restraint necessity: Not necessary   BREATHE SBT: Not intubated    Coordinate A & B, analgesics/sedatives Pain: managed   SAT: Not intubated   Delirium CAM-ICU:     Early(intubated/ Progressive (non-intubated) Mobility MOVE Screen (INTUBATED ONLY): Not intubated    Activity: Activity Management: Rolling - L1, Arm raise - L1   Feeding/Nutrition Diet order: Diet/Nutrition Received: regular,     Thrombus DVT prophylaxis: VTE Core Measure: Pharmacological prophylaxis initiated/maintained   HOB Elevation Head of Bed (HOB) Positioning: HOB at 30-45 degrees   Ulcer Prophylaxis GI: yes   Glucose control managed     Skin Skin assessment:     Sacrum intact/not altered? Yes  Heels intact/not altered? Yes  Surgical wound? No    CHECK ONE!   (no altered skin or altered skin) and sub boxes:  [x] No Altered Skin Integrity Present    [x]Prevention Measures Documented    [] Altered Skin Integrity Present or Discovered   [] LDA present in EPIC, daily doc completed              [] LDA added if not in EPIC (describe wound).                    When describing wound, do not stage, use descriptive words only.    [] Wound Image Taken (required on admit,                   transfer/discharge and every Tuesday)    Wound Care Consulted? No   Bowel Function no issues    Indwelling Catheter Necessity            De-escalation Antibiotics Yes          Problem: Adult Inpatient Plan of Care  Goal: Plan of Care Review  Outcome: Progressing  Goal: Patient-Specific Goal (Individualized)  Outcome: Progressing  Goal: Absence of  Hospital-Acquired Illness or Injury  Outcome: Progressing  Goal: Optimal Comfort and Wellbeing  Outcome: Progressing  Goal: Readiness for Transition of Care  Outcome: Progressing     Problem:  Fall Injury Risk  Goal: Absence of Fall, Infant Drop and Related Injury  Outcome: Progressing     Problem: Infection  Goal: Absence of Infection Signs and Symptoms  Outcome: Progressing

## 2025-01-31 LAB
ALBUMIN SERPL BCP-MCNC: 3.1 G/DL (ref 3.5–5.2)
ALP SERPL-CCNC: 71 U/L (ref 40–150)
ALT SERPL W/O P-5'-P-CCNC: 10 U/L (ref 10–44)
ANION GAP SERPL CALC-SCNC: 10 MMOL/L (ref 8–16)
AST SERPL-CCNC: 8 U/L (ref 10–40)
BASOPHILS # BLD AUTO: 0.05 K/UL (ref 0–0.2)
BASOPHILS NFR BLD: 0.3 % (ref 0–1.9)
BILIRUB SERPL-MCNC: 0.7 MG/DL (ref 0.1–1)
BUN SERPL-MCNC: 9 MG/DL (ref 6–20)
CALCIUM SERPL-MCNC: 9 MG/DL (ref 8.7–10.5)
CHLORIDE SERPL-SCNC: 104 MMOL/L (ref 95–110)
CO2 SERPL-SCNC: 21 MMOL/L (ref 23–29)
CREAT SERPL-MCNC: 0.5 MG/DL (ref 0.5–1.4)
DIFFERENTIAL METHOD BLD: ABNORMAL
EOSINOPHIL # BLD AUTO: 0 K/UL (ref 0–0.5)
EOSINOPHIL NFR BLD: 0 % (ref 0–8)
ERYTHROCYTE [DISTWIDTH] IN BLOOD BY AUTOMATED COUNT: 12.8 % (ref 11.5–14.5)
EST. GFR  (NO RACE VARIABLE): >60 ML/MIN/1.73 M^2
GLUCOSE SERPL-MCNC: 98 MG/DL (ref 70–110)
HCT VFR BLD AUTO: 35.2 % (ref 37–48.5)
HGB BLD-MCNC: 11.8 G/DL (ref 12–16)
IMM GRANULOCYTES # BLD AUTO: 0.26 K/UL (ref 0–0.04)
IMM GRANULOCYTES NFR BLD AUTO: 1.5 % (ref 0–0.5)
LYMPHOCYTES # BLD AUTO: 0.9 K/UL (ref 1–4.8)
LYMPHOCYTES NFR BLD: 5.4 % (ref 18–48)
MAGNESIUM SERPL-MCNC: 1.8 MG/DL (ref 1.6–2.6)
MCH RBC QN AUTO: 28.9 PG (ref 27–31)
MCHC RBC AUTO-ENTMCNC: 33.5 G/DL (ref 32–36)
MCV RBC AUTO: 86 FL (ref 82–98)
MONOCYTES # BLD AUTO: 0.5 K/UL (ref 0.3–1)
MONOCYTES NFR BLD: 2.8 % (ref 4–15)
NEUTROPHILS # BLD AUTO: 15.5 K/UL (ref 1.8–7.7)
NEUTROPHILS NFR BLD: 90 % (ref 38–73)
NRBC BLD-RTO: 0 /100 WBC
PHOSPHATE SERPL-MCNC: 3.7 MG/DL (ref 2.7–4.5)
PLATELET # BLD AUTO: 213 K/UL (ref 150–450)
PMV BLD AUTO: 10.6 FL (ref 9.2–12.9)
POTASSIUM SERPL-SCNC: 3.8 MMOL/L (ref 3.5–5.1)
PROT SERPL-MCNC: 6.6 G/DL (ref 6–8.4)
RBC # BLD AUTO: 4.08 M/UL (ref 4–5.4)
SODIUM SERPL-SCNC: 135 MMOL/L (ref 136–145)
WBC # BLD AUTO: 17.26 K/UL (ref 3.9–12.7)

## 2025-01-31 PROCEDURE — 94640 AIRWAY INHALATION TREATMENT: CPT

## 2025-01-31 PROCEDURE — 85025 COMPLETE CBC W/AUTO DIFF WBC: CPT

## 2025-01-31 PROCEDURE — 94761 N-INVAS EAR/PLS OXIMETRY MLT: CPT

## 2025-01-31 PROCEDURE — 20000000 HC ICU ROOM

## 2025-01-31 PROCEDURE — 25000003 PHARM REV CODE 250: Performed by: STUDENT IN AN ORGANIZED HEALTH CARE EDUCATION/TRAINING PROGRAM

## 2025-01-31 PROCEDURE — 99291 CRITICAL CARE FIRST HOUR: CPT | Mod: ,,, | Performed by: INTERNAL MEDICINE

## 2025-01-31 PROCEDURE — 25000242 PHARM REV CODE 250 ALT 637 W/ HCPCS: Performed by: STUDENT IN AN ORGANIZED HEALTH CARE EDUCATION/TRAINING PROGRAM

## 2025-01-31 PROCEDURE — 83735 ASSAY OF MAGNESIUM: CPT

## 2025-01-31 PROCEDURE — 25000003 PHARM REV CODE 250: Performed by: INTERNAL MEDICINE

## 2025-01-31 PROCEDURE — 84100 ASSAY OF PHOSPHORUS: CPT

## 2025-01-31 PROCEDURE — 63600175 PHARM REV CODE 636 W HCPCS

## 2025-01-31 PROCEDURE — 80053 COMPREHEN METABOLIC PANEL: CPT

## 2025-01-31 PROCEDURE — 27000207 HC ISOLATION

## 2025-01-31 RX ADMIN — SERTRALINE HYDROCHLORIDE 25 MG: 25 TABLET ORAL at 08:01

## 2025-01-31 RX ADMIN — FAMOTIDINE 40 MG: 20 TABLET ORAL at 09:01

## 2025-01-31 RX ADMIN — BUDESONIDE 0.5 MG: 0.5 INHALANT RESPIRATORY (INHALATION) at 07:01

## 2025-01-31 RX ADMIN — ALBUTEROL SULFATE 2.5 MG: 2.5 SOLUTION RESPIRATORY (INHALATION) at 07:01

## 2025-01-31 RX ADMIN — BETAMETHASONE SODIUM PHOSPHATE AND BETAMETHASONE ACETATE 12 MG: 3; 3 INJECTION, SUSPENSION INTRA-ARTICULAR; INTRALESIONAL; INTRAMUSCULAR at 08:01

## 2025-01-31 RX ADMIN — POTASSIUM BICARBONATE 50 MEQ: 978 TABLET, EFFERVESCENT ORAL at 06:01

## 2025-01-31 RX ADMIN — Medication 800 MG: at 06:01

## 2025-01-31 RX ADMIN — ALBUTEROL SULFATE 2.5 MG: 2.5 SOLUTION RESPIRATORY (INHALATION) at 01:01

## 2025-01-31 RX ADMIN — PANTOPRAZOLE SODIUM 40 MG: 40 TABLET, DELAYED RELEASE ORAL at 04:01

## 2025-01-31 RX ADMIN — MUPIROCIN: 20 OINTMENT TOPICAL at 09:01

## 2025-01-31 RX ADMIN — MONTELUKAST 10 MG: 10 TABLET, FILM COATED ORAL at 09:01

## 2025-01-31 RX ADMIN — POLYETHYLENE GLYCOL 3350 17 G: 17 POWDER, FOR SOLUTION ORAL at 09:01

## 2025-01-31 RX ADMIN — AMOXICILLIN AND CLAVULANATE POTASSIUM 1 TABLET: 875; 125 TABLET, FILM COATED ORAL at 09:01

## 2025-01-31 RX ADMIN — BUDESONIDE 0.5 MG: 0.5 INHALANT RESPIRATORY (INHALATION) at 09:01

## 2025-01-31 RX ADMIN — MUPIROCIN: 20 OINTMENT TOPICAL at 08:01

## 2025-01-31 RX ADMIN — AMOXICILLIN AND CLAVULANATE POTASSIUM 1 TABLET: 875; 125 TABLET, FILM COATED ORAL at 08:01

## 2025-01-31 RX ADMIN — ENOXAPARIN SODIUM 40 MG: 40 INJECTION SUBCUTANEOUS at 04:01

## 2025-01-31 RX ADMIN — ALBUTEROL SULFATE 2.5 MG: 2.5 SOLUTION RESPIRATORY (INHALATION) at 09:01

## 2025-01-31 RX ADMIN — PANTOPRAZOLE SODIUM 40 MG: 40 TABLET, DELAYED RELEASE ORAL at 06:01

## 2025-01-31 RX ADMIN — PRENATAL VIT W/ FE FUMARATE-FA TAB 27-0.8 MG 1 TABLET: 27-0.8 TAB at 08:01

## 2025-01-31 RX ADMIN — CETIRIZINE HYDROCHLORIDE 10 MG: 10 TABLET, FILM COATED ORAL at 08:01

## 2025-01-31 NOTE — PROGRESS NOTES
[facility] ICU  Otorhinolaryngology-Head & Neck Surgery  Progress Note    Subjective:     Post-Op Info:  Procedure(s) (LRB):  MICROLARYNGOSCOPY, SUSPENSION (N/A)  CREATION, TRACHEOSTOMY (N/A)   4 Days Post-Op  Hospital Day: 8     Interval History: Overall in good spirits. Discussed plan for likely bedside scope later today w staff present to assess airway for surgical planning. Stridor slightly improved though likely from steroids started yesterday for fetal lung maturity    Medications:  Continuous Infusions:  Scheduled Meds:   albuterol sulfate  2.5 mg Nebulization Q6H WAKE    amoxicillin-clavulanate 875-125mg  1 tablet Oral Q12H    budesonide  0.5 mg Nebulization Q12H    cetirizine  10 mg Oral Daily    enoxparin  40 mg Subcutaneous Daily    famotidine  40 mg Oral QHS    montelukast  10 mg Oral QHS    mupirocin   Nasal BID    pantoprazole  40 mg Oral BID AC    polyethylene glycol  17 g Oral BID    prenatal vitamin  1 tablet Oral Daily    sertraline  25 mg Oral Daily     PRN Meds:  Current Facility-Administered Medications:     acetaminophen, 650 mg, Oral, Q4H PRN    albuterol sulfate, 2.5 mg, Nebulization, Q4H PRN    diphenhydrAMINE, 25 mg, Intravenous, Q6H PRN    hydrOXYzine HCL, 25 mg, Oral, TID PRN    magnesium oxide, 800 mg, Oral, PRN    magnesium oxide, 800 mg, Oral, PRN    ondansetron, 4 mg, Intravenous, Q8H PRN    potassium bicarbonate, 35 mEq, Oral, PRN    potassium bicarbonate, 50 mEq, Oral, PRN    potassium bicarbonate, 60 mEq, Oral, PRN    potassium, sodium phosphates, 2 packet, Oral, PRN    potassium, sodium phosphates, 2 packet, Oral, PRN    potassium, sodium phosphates, 2 packet, Oral, PRN    racepinephrine, 0.5 mL, Nebulization, Q4H PRN    sodium chloride 0.9%, 10 mL, Intravenous, PRN     Review of patient's allergies indicates:   Allergen Reactions    No known drug allergies      Objective:     Vital Signs (24h Range):  Temp:  [98.1 °F (36.7 °C)-99.2 °F (37.3 °C)] 98.1 °F (36.7 °C)  Pulse:   [] 93  Resp:  [15-36] 19  SpO2:  [97 %-99 %] 97 %  BP: (112-115)/(63-71) 115/71       Lines/Drains/Airways       Peripheral Intravenous Line  Duration                  Peripheral IV - Single Lumen 01/24/25 2255 18 G 1 3/4 in No Left;Anterior Forearm 6 days         Peripheral IV - Single Lumen 01/29/25 1613 20 G No Right;Lateral Wrist 1 day                     Physical Exam  Biphasic stridor, stable insp>pepe  - improved this AM  NAD  Awake and alert  MMM, anterior tongue mobile, OP patent w/ midline uvula   Neck soft  Normal WOB, on room air      Significant Labs:  CBC:   Recent Labs   Lab 01/31/25  0324   WBC 17.26*   RBC 4.08   HGB 11.8*   HCT 35.2*      MCV 86   MCH 28.9   MCHC 33.5     CMP:   Recent Labs   Lab 01/31/25 0324   GLU 98   CALCIUM 9.0   ALBUMIN 3.1*   PROT 6.6   *   K 3.8   CO2 21*      BUN 9   CREATININE 0.5   ALKPHOS 71   ALT 10   AST 8*   BILITOT 0.7         Assessment/Plan:     * Acquired subglottic stenosis  30 yo female 30 wks pregnant with grade 3 subglottic stenosis. Mild biphasic stridor noted on exam, no increased work of breathing. Patient saturating well on room air. Patient admitted to MICU for close airway monitoring.    Planning for multidisciplinary meeting(s) for more formal operative plans next week - anticipate likely next Thursday at [facility]    - will f/u bedside scope later today (no need to keep NPO)    - Airway plan: Patient has significant narrowing of her subglottis on scope exam which would make intubation difficult should she decompensate.  Could potentially be intubated with Glidescope and a 5-0 ETT.    - Discussed contingency plan w [facility] provider today - if significant decompensation airway would remain priority with ok  for tracheostomy if deemed appropriate  - Recommend immediate contact of anesthesia and ENT team should patient have worsening respiratory status.    - Recommend trach set at bedside   - Recommend 5-0 ETT at bedside   -  Recommend continuing scheduled rac epi and nebulized steroids  - In coordination with MFM and OB Anesthesia, case deferred until approximately 32w gestational age  - MFM provide fetal monitoring during her procedure -- pending details after multidisciplinary discussion  - MFM and NICU will be available during her procedure for intervention by delivery for signs of significant fetal distress  - steroid dosing for fetal lung maturity to started 1/30  - ENT will continue to follow     Please page ENT resident on call with any questions or concerns.            Kt Montez MD  Otorhinolaryngology-Head & Neck Surgery  [facility] [place] - Medical ICU

## 2025-01-31 NOTE — PLAN OF CARE
Recommendations  Continue regular diet- honor food preferences (no fish)  Continue Prenatal vitamins    Goals: Meet % een/epn by next RD f/u  Nutrition Goal Status: new  Communication of RD Recs: other (comment) (poc)

## 2025-01-31 NOTE — SUBJECTIVE & OBJECTIVE
Interval History/Significant Events: Currently stable. Trach kit at bedside.  kit mostly complete minus the bandage scissors. ENT to perform bedside scope later today to assess airway for surgical planning. BMZ given yesterday for fetal lung maturity.     Review of Systems   Constitutional:  Negative for chills and fever.   Respiratory:  Positive for shortness of breath (none at rest; some with activity).    Cardiovascular:  Negative for chest pain and palpitations.   Gastrointestinal:  Negative for abdominal pain and nausea.   Genitourinary:  Negative for difficulty urinating.   Neurological:  Negative for dizziness and headaches.   Psychiatric/Behavioral:  Negative for confusion.      Objective:     Vital Signs (Most Recent):  Temp: 98.7 °F (37.1 °C) (25 1147)  Pulse: 84 (25 1200)  Resp: (!) 21 (25 1200)  BP: 125/76 (25 0931)  SpO2: 97 % (25 1200) Vital Signs (24h Range):  Temp:  [97.7 °F (36.5 °C)-98.8 °F (37.1 °C)] 98.7 °F (37.1 °C)  Pulse:  [] 84  Resp:  [16-36] 21  SpO2:  [95 %-99 %] 97 %  BP: (113-125)/(63-76) 125/76   Weight: 73.5 kg (162 lb 0.6 oz)  Body mass index is 30.62 kg/m².      Intake/Output Summary (Last 24 hours) at 2025 1237  Last data filed at 2025 1101  Gross per 24 hour   Intake 877 ml   Output --   Net 877 ml          Physical Exam  Vitals and nursing note reviewed.   Constitutional:       General: She is not in acute distress.     Appearance: She is well-developed.   HENT:      Head: Normocephalic and atraumatic.      Comments: Mild inspiratory stridor similar to prior.  Eyes:      General: No scleral icterus.  Cardiovascular:      Rate and Rhythm: Normal rate and regular rhythm.      Pulses: Normal pulses.   Pulmonary:      Effort: Pulmonary effort is normal. No respiratory distress.   Abdominal:      General: Bowel sounds are normal. There is no distension.      Palpations: Abdomen is soft.      Tenderness: There is no abdominal  tenderness.   Musculoskeletal:      Right lower leg: No edema.      Left lower leg: No edema.   Skin:     General: Skin is warm and dry.   Neurological:      Mental Status: She is alert and oriented to person, place, and time.            Vents:     Lines/Drains/Airways       Peripheral Intravenous Line  Duration                  Peripheral IV - Single Lumen 01/24/25 2255 18 G 1 3/4 in No Left;Anterior Forearm 6 days         Peripheral IV - Single Lumen 01/29/25 1613 20 G No Right;Lateral Wrist 1 day                  Significant Labs:    CBC/Anemia Profile:  Recent Labs   Lab 01/31/25  0324   WBC 17.26*   HGB 11.8*   HCT 35.2*      MCV 86   RDW 12.8        Chemistries:  Recent Labs   Lab 01/31/25  0324   *   K 3.8      CO2 21*   BUN 9   CREATININE 0.5   CALCIUM 9.0   ALBUMIN 3.1*   PROT 6.6   BILITOT 0.7   ALKPHOS 71   ALT 10   AST 8*   MG 1.8   PHOS 3.7       All pertinent labs within the past 24 hours have been reviewed.    Significant Imaging:  I have reviewed all pertinent imaging results/findings within the past 24 hours.

## 2025-01-31 NOTE — CARE UPDATE
AM NST (0800)    Baseline 155bpm, mod estefany, +accels, -decels; R&R, AGA  No ctx    Continue NST QD

## 2025-01-31 NOTE — SUBJECTIVE & OBJECTIVE
Interval History/Significant Events: NAOE. Patient continuing to have mild cough with some feelings of congestion; no SOB or stridor. Gave guaifenesin PRN.     Review of Systems   Constitutional:  Negative for chills and fever.   Respiratory:  Positive for cough (occasional) and shortness of breath (none at rest; some with activity).    Cardiovascular:  Negative for chest pain and palpitations.   Gastrointestinal:  Negative for abdominal pain and nausea.   Genitourinary:  Negative for difficulty urinating.   Neurological:  Negative for dizziness and headaches.   Psychiatric/Behavioral:  Negative for confusion.      Objective:     Vital Signs (Most Recent):  Temp: 98.7 °F (37.1 °C) (01/31/25 1147)  Pulse: 84 (01/31/25 1200)  Resp: (!) 21 (01/31/25 1200)  BP: 125/76 (01/31/25 0931)  SpO2: 97 % (01/31/25 1200) Vital Signs (24h Range):  Temp:  [97.7 °F (36.5 °C)-98.8 °F (37.1 °C)] 98.7 °F (37.1 °C)  Pulse:  [] 84  Resp:  [16-36] 21  SpO2:  [95 %-99 %] 97 %  BP: (113-125)/(63-76) 125/76   Weight: 73.5 kg (162 lb 0.6 oz)  Body mass index is 30.62 kg/m².      Intake/Output Summary (Last 24 hours) at 1/31/2025 1256  Last data filed at 1/31/2025 1101  Gross per 24 hour   Intake 877 ml   Output --   Net 877 ml          Physical Exam  Vitals and nursing note reviewed.   Constitutional:       General: She is not in acute distress.     Appearance: She is well-developed.   HENT:      Head: Normocephalic and atraumatic.      Comments: Mild inspiratory stridor similar to prior.  Eyes:      General: No scleral icterus.  Cardiovascular:      Rate and Rhythm: Normal rate and regular rhythm.      Pulses: Normal pulses.   Pulmonary:      Effort: Pulmonary effort is normal. No respiratory distress.   Abdominal:      General: Bowel sounds are normal. There is no distension.      Palpations: Abdomen is soft.      Tenderness: There is no abdominal tenderness.   Musculoskeletal:      Right lower leg: No edema.      Left lower leg: No  edema.   Skin:     General: Skin is warm and dry.   Neurological:      Mental Status: She is alert and oriented to person, place, and time.            Vents:     Lines/Drains/Airways       Peripheral Intravenous Line  Duration                  Peripheral IV - Single Lumen 01/24/25 2255 18 G 1 3/4 in No Left;Anterior Forearm 6 days         Peripheral IV - Single Lumen 01/29/25 1613 20 G No Right;Lateral Wrist 1 day                  Significant Labs:    CBC/Anemia Profile:  Recent Labs   Lab 01/31/25  0324   WBC 17.26*   HGB 11.8*   HCT 35.2*      MCV 86   RDW 12.8        Chemistries:  Recent Labs   Lab 01/31/25  0324   *   K 3.8      CO2 21*   BUN 9   CREATININE 0.5   CALCIUM 9.0   ALBUMIN 3.1*   PROT 6.6   BILITOT 0.7   ALKPHOS 71   ALT 10   AST 8*   MG 1.8   PHOS 3.7       All pertinent labs within the past 24 hours have been reviewed.    Significant Imaging:  I have reviewed all pertinent imaging results/findings within the past 24 hours.

## 2025-01-31 NOTE — PROGRESS NOTES
"[facility] Medical ICU  Adult Nutrition  Progress Note    SUMMARY   Recommendations  Continue regular diet- honor food preferences (no fish)  Continue Prenatal vitamins    Goals: Meet % een/epn by next RD f/u  Nutrition Goal Status: new  Communication of RD Recs: other (comment) (poc)    Assessment and Plan    No nutrition diagnosis at this time    Reason for Assessment    Reason For Assessment: length of stay    Diagnosis: gastrointestinal disease (Acquired subglottic stenosis)  General Information Comments: 29 y.o. pregnant female (31w1d), PMHx: Hashimotos, asthma, LPRD -  recently diagnosed subglottic stenosis.     RD assessment for LOS x 7. Pt's 4 days post op Microlaryngoscopy & tracheostomy creation. Possible bedside scope today to assess airways for surgical plans. Steroids started yesterday for fetal lung maturity. Saw pt at bedside, endorses good appetite & % intake of meals since admission. States UBW unclear prior to pregnancy, no issues with baby's size. States she loss 20# during pregnancy but Mds not concerned at this time. RD following.     Nutrition Discharge Planning: General healthful diet w/ prenatal vitamins    Nutrition Related Social Determinants of Health: SDOH: Adequate food in home environment      Nutrition/Diet History    Spiritual, Cultural Beliefs, Anabaptist Practices, Values that Affect Care: no  Food Allergies: NKFA    Anthropometrics    Height: 5' 1" (154.9 cm)  Height (inches): 61 in  Height Method: Stated  Weight: 73.5 kg (162 lb 0.6 oz)  Weight (lb): 162.04 lb  Weight Method: Bed Scale  Ideal Body Weight (IBW), Female: 105 lb  % Ideal Body Weight, Female (lb): 152.44 %  BMI (Calculated): 30.6  BMI Grade: other (see comments) (pregnant)       Lab/Procedures/Meds    Pertinent Labs Reviewed: reviewed  Pertinent Labs Comments: Na: 135, AST: 8  Pertinent Medications Reviewed: reviewed  Pertinent Medications Comments: Abx, enoxaparin, famotidine, pantoprazole, polyethylene " glycol, pantoprazole, bowel reg, prenatal vitamin    Estimated/Assessed Needs    Weight Used For Calorie Calculations: 73.5 kg (162 lb 0.6 oz)  Energy Calorie Requirements (kcal): 2540-3526 (30-40 kcal/kg)  Energy Need Method: Kcal/kg  Protein Requirements:  (1.1-1.5 g/kg)  Weight Used For Protein Calculations: 73.5 kg (162 lb 0.6 oz)     Estimated Fluid Requirement Method: RDA Method  RDA Method (mL): 2205         Nutrition Prescription Ordered    Current Diet Order: regular    Evaluation of Received Nutrient/Fluid Intake    I/O: +3.4 L since admit  Comments: LBM 1/29  Tolerance: tolerating  % Intake of Estimated Energy Needs: 75 - 100 %  % Meal Intake: 75 - 100 %    Nutrition Risk    Level of Risk/Frequency of Follow-up: low - moderate (f/u 1-2x/week)     Monitor and Evaluation    Food and Nutrient Intake: energy intake, food and beverage intake  Food and Nutrient Adminstration: diet order  Knowledge/Beliefs/Attitudes: food and nutrition knowledge/skill, beliefs and attitudes  Physical Activity and Function: nutrition-related ADLs and IADLs  Anthropometric Measurements: height/length, weight, weight change, body mass index  Biochemical Data, Medical Tests and Procedures: electrolyte and renal panel, gastrointestinal profile, glucose/endocrine profile, inflammatory profile, lipid profile  Nutrition-Focused Physical Findings: overall appearance, extremities, muscles and bones, skin, head and eyes     Nutrition Follow-Up    RD Follow-up?: Yes

## 2025-01-31 NOTE — ASSESSMENT & PLAN NOTE
Thought to be acquired iso LPRD. ENT at [facility] transferred for higher procedural level of care; ENT here consulted. Reportedly both balloon dilatation and tracheostomy have been discussed with her.     ENT in conjunction with MFM are recommending waiting till she is 32 weeks pregnant or her ENT procedure. MFM will be with ENT during the procedure.    - reflux management with PPI BID and pepcid 40 mg nightly   - racemic epi q6H PRN for 24 hours  - ordered auto-immune labs  - TSH WNL  -Betamethasone given 1/30/2025 for fetal lung maturity  -ENT will scope at bedside 1/31/2025 to assess airway for surgical planning.

## 2025-01-31 NOTE — PROGRESS NOTES
[facility]  Critical Care Medicine  Progress Note    Patient Name: Gisell Villafuerte  MRN: [id number]  Admission Date: 2025  Hospital Length of Stay: 7 days  Code Status: Full Code  Attending Provider: Lyle Sue MD  Primary Care Provider: Yazan Jj MD   Principal Problem: Acquired subglottic stenosis    Subjective:     HPI:  29F A1 30wks with recently diagnosed subglottic stenosis, here for ENT procedure. History of asthma, LPRD, allergies, anxiety. Reportedly seen by ENT PA in  and diagnosed with LPRD; has had worsening stridor which became acute after flu A infx . Was admitted for asthma exacerbation, however failed conservative tx and was investigated further given stridor.      HDS on outside admission. Labs with mild leukocytosis iso steroid adminsitration outpatient. RIP negative, normal inflammatory markers. Ucx noted to have E coli; was treated with ceftriaxone.    Hospital/ICU Course:  Patient admitted for ENT evaluation of subglottic stenosis and potential balloon/laser procedure. Tentative plan for OR  with ENT. OBGYN also consulted and following given 30wks pregnant. She is comfortable on room air. OB following; performed NST which was reassuring. ENT and MFM are following and ENT will perform her procedure with MFM present. MFM recommending waiting till she is 32 weeks pregnant. She will need a steroid course one week prior to her procedure for fetal lung maturity. (Betamethasone scheduled for 2025). Asymptomatic UTI being treated with Augmentin. Patient had brief episode of worsening stridor that corrected with one dose of racemic epinephrine. Currently stable. Trach kit at bedside.  kit mostly complete minus the bandage scissors. ENT to perform bedside scope later today to assess airway for surgical planning.     Interval History/Significant Events: NAOE. ENT to do bedside scope to assess airway for surgical planning. Trach kit at  bedside.  kit mostly complete minus bandage sicissors.    Review of Systems   Constitutional:  Negative for chills and fever.   Respiratory:  Positive for shortness of breath (none at rest; some with activity).    Cardiovascular:  Negative for chest pain and palpitations.   Gastrointestinal:  Negative for abdominal pain and nausea.   Genitourinary:  Negative for difficulty urinating.   Neurological:  Negative for dizziness and headaches.   Psychiatric/Behavioral:  Negative for confusion.      Objective:     Vital Signs (Most Recent):  Temp: 98.7 °F (37.1 °C) (25 1147)  Pulse: 84 (25 1200)  Resp: (!) 21 (25 1200)  BP: 125/76 (25 0931)  SpO2: 97 % (25 1200) Vital Signs (24h Range):  Temp:  [97.7 °F (36.5 °C)-98.8 °F (37.1 °C)] 98.7 °F (37.1 °C)  Pulse:  [] 84  Resp:  [16-36] 21  SpO2:  [95 %-99 %] 97 %  BP: (113-125)/(63-76) 125/76   Weight: 73.5 kg (162 lb 0.6 oz)  Body mass index is 30.62 kg/m².      Intake/Output Summary (Last 24 hours) at 2025 1256  Last data filed at 2025 1101  Gross per 24 hour   Intake 877 ml   Output --   Net 877 ml          Physical Exam  Vitals and nursing note reviewed.   Constitutional:       General: She is not in acute distress.     Appearance: She is well-developed.   HENT:      Head: Normocephalic and atraumatic.      Comments: Mild inspiratory stridor similar to prior.  Eyes:      General: No scleral icterus.  Cardiovascular:      Rate and Rhythm: Normal rate and regular rhythm.      Pulses: Normal pulses.   Pulmonary:      Effort: Pulmonary effort is normal. No respiratory distress.   Abdominal:      General: Bowel sounds are normal. There is no distension.      Palpations: Abdomen is soft.      Tenderness: There is no abdominal tenderness.   Musculoskeletal:      Right lower leg: No edema.      Left lower leg: No edema.   Skin:     General: Skin is warm and dry.   Neurological:      Mental Status: She is alert and oriented to  "person, place, and time.            Vents:     Lines/Drains/Airways       Peripheral Intravenous Line  Duration                  Peripheral IV - Single Lumen 01/24/25 2255 18 G 1 3/4 in No Left;Anterior Forearm 6 days         Peripheral IV - Single Lumen 01/29/25 1613 20 G No Right;Lateral Wrist 1 day                  Significant Labs:    CBC/Anemia Profile:  Recent Labs   Lab 01/31/25  0324   WBC 17.26*   HGB 11.8*   HCT 35.2*      MCV 86   RDW 12.8        Chemistries:  Recent Labs   Lab 01/31/25  0324   *   K 3.8      CO2 21*   BUN 9   CREATININE 0.5   CALCIUM 9.0   ALBUMIN 3.1*   PROT 6.6   BILITOT 0.7   ALKPHOS 71   ALT 10   AST 8*   MG 1.8   PHOS 3.7       All pertinent labs within the past 24 hours have been reviewed.    Significant Imaging:  I have reviewed all pertinent imaging results/findings within the past 24 hours.    ABG  No results for input(s): "PH", "PO2", "PCO2", "HCO3", "BE" in the last 168 hours.  Assessment/Plan:     Psychiatric  Anxiety  Patient states she is very anxious about her upcoming ENT procedure and is requesting a short term course of medication for anxiety. Does not wish to start a long term treatment for anxiety as she belives her anxiety will resolve after her ENT procedure. Discussed options with MFM; Hydroxyzine will be given      Plan:  Hydroxyzine 25 mg TID PRN for anxiety (spaced at least 4 hours from diphenhydramine)    ENT  * Acquired subglottic stenosis  Thought to be acquired iso LPRD. ENT at [facility] transferred for higher procedural level of care; ENT here consulted. Reportedly both balloon dilatation and tracheostomy have been discussed with her.     ENT in conjunction with MFM are recommending waiting till she is 32 weeks pregnant or her ENT procedure. MFM will be with ENT during the procedure.    - reflux management with PPI BID and pepcid 40 mg nightly   - racemic epi q6H PRN for 24 hours  - ordered auto-immune labs  - TSH WNL  -Betamethasone " given 2025 for fetal lung maturity  -ENT will scope at bedside 2025 to assess airway for surgical planning.       Pulmonary  Severe persistent asthma with acute exacerbation  No wheezing on exam; treating for asthma flare but likely stenosis is major pathology.    -Nebulized budesonide BID, with duonebs q6WA and PRN  -Montelukast nightly, diphenhydramine prn for allergies (space at least 4 hours from hydroxyzine)     Renal/  Acute cystitis  Asymptomatic UTI    Plan:  -Augmentin q12 for 5 days; first dose given 2025    Obstetric  30 weeks gestation of pregnancy  - OBGYN consulted, appreciate their recs  -NST being performed daily by OB  -MFM medicine following and will be ENT during her procdeure  -MFM recommending steroid course for fetal lung maturing one week prior to her procedure; Betamethasone scheduled for 2025; appreciate recs  -Gathering supplies to bedside in case of emergency        Critical Care Daily Checklist:    A: Awake: RASS Goal/Actual Goal:    Actual:     B: Spontaneous Breathing Trial Performed?     C: SAT & SBT Coordinated?  Not on vent                      D: Delirium: CAM-ICU     E: Early Mobility Performed? Yes   F: Feeding Goal: Goals: Meet % een/epn by next RD f/u  Status: Nutrition Goal Status: new   Current Diet Order   Procedures    Diet Adult Regular      AS: Analgesia/Sedation Tylenol 650 q6 PRN   T: Thromboembolic Prophylaxis Lovenox 40   H: HOB > 300 Yes   U: Stress Ulcer Prophylaxis (if needed) Famotidine 40mg  nightly, Pantoprazole BID 40 mg   G: Glucose Control None; within acceptable range   B: Bowel Function Stool Occurrence: 1   I: Indwelling Catheter (Lines & Arnett) Necessity Peripheral IV only   D: De-escalation of Antimicrobials/Pharmacotherapies Continuing 5 d course of Augmentin for asymptomatic UTI    Plan for the day/ETD ENT to scope at bedside for airway surgical planning     Code Status:  Family/Goals of Care: Full Code          Critical secondary to Patient has a condition that poses threat to life and bodily function: Here with subglottic stenosis leading to SOB and stridor; in MICU for airway watch.       Critical care was time spent personally by me on the following activities: development of treatment plan with patient or surrogate and bedside caregivers, discussions with consultants, evaluation of patient's response to treatment, examination of patient, ordering and performing treatments and interventions, ordering and review of laboratory studies, ordering and review of radiographic studies, pulse oximetry, re-evaluation of patient's condition. This critical care time did not overlap with that of any other provider or involve time for any procedures.     Mali Pineda, DO  Critical Care Medicine  [facility] - Medical ICU

## 2025-01-31 NOTE — SUBJECTIVE & OBJECTIVE
Interval History: Overall in good spirits. Discussed plan for likely bedside scope later today w staff present to assess airway for surgical planning. Stridor slightly improved though likely from steroids started yesterday for fetal lung maturity    Medications:  Continuous Infusions:  Scheduled Meds:   albuterol sulfate  2.5 mg Nebulization Q6H WAKE    amoxicillin-clavulanate 875-125mg  1 tablet Oral Q12H    budesonide  0.5 mg Nebulization Q12H    cetirizine  10 mg Oral Daily    enoxparin  40 mg Subcutaneous Daily    famotidine  40 mg Oral QHS    montelukast  10 mg Oral QHS    mupirocin   Nasal BID    pantoprazole  40 mg Oral BID AC    polyethylene glycol  17 g Oral BID    prenatal vitamin  1 tablet Oral Daily    sertraline  25 mg Oral Daily     PRN Meds:  Current Facility-Administered Medications:     acetaminophen, 650 mg, Oral, Q4H PRN    albuterol sulfate, 2.5 mg, Nebulization, Q4H PRN    diphenhydrAMINE, 25 mg, Intravenous, Q6H PRN    hydrOXYzine HCL, 25 mg, Oral, TID PRN    magnesium oxide, 800 mg, Oral, PRN    magnesium oxide, 800 mg, Oral, PRN    ondansetron, 4 mg, Intravenous, Q8H PRN    potassium bicarbonate, 35 mEq, Oral, PRN    potassium bicarbonate, 50 mEq, Oral, PRN    potassium bicarbonate, 60 mEq, Oral, PRN    potassium, sodium phosphates, 2 packet, Oral, PRN    potassium, sodium phosphates, 2 packet, Oral, PRN    potassium, sodium phosphates, 2 packet, Oral, PRN    racepinephrine, 0.5 mL, Nebulization, Q4H PRN    sodium chloride 0.9%, 10 mL, Intravenous, PRN     Review of patient's allergies indicates:   Allergen Reactions    No known drug allergies      Objective:     Vital Signs (24h Range):  Temp:  [98.1 °F (36.7 °C)-99.2 °F (37.3 °C)] 98.1 °F (36.7 °C)  Pulse:  [] 93  Resp:  [15-36] 19  SpO2:  [97 %-99 %] 97 %  BP: (112-115)/(63-71) 115/71       Lines/Drains/Airways       Peripheral Intravenous Line  Duration                  Peripheral IV - Single Lumen 01/24/25 2255 18 G 1 3/4 in No  Left;Anterior Forearm 6 days         Peripheral IV - Single Lumen 01/29/25 1613 20 G No Right;Lateral Wrist 1 day                     Physical Exam  Biphasic stridor, stable insp>pepe  - improved this AM  NAD  Awake and alert  MMM, anterior tongue mobile, OP patent w/ midline uvula   Neck soft  Normal WOB, on room air      Significant Labs:  CBC:   Recent Labs   Lab 01/31/25  0324   WBC 17.26*   RBC 4.08   HGB 11.8*   HCT 35.2*      MCV 86   MCH 28.9   MCHC 33.5     CMP:   Recent Labs   Lab 01/31/25  0324   GLU 98   CALCIUM 9.0   ALBUMIN 3.1*   PROT 6.6   *   K 3.8   CO2 21*      BUN 9   CREATININE 0.5   ALKPHOS 71   ALT 10   AST 8*   BILITOT 0.7

## 2025-01-31 NOTE — ASSESSMENT & PLAN NOTE
28 yo female 30 wks pregnant with grade 3 subglottic stenosis. Mild biphasic stridor noted on exam, no increased work of breathing. Patient saturating well on room air. Patient admitted to MICU for close airway monitoring.    Planning for multidisciplinary meeting(s) for more formal operative plans next week - anticipate likely next Thursday at [facility]    - will f/u bedside scope later today (no need to keep NPO)    - Airway plan: Patient has significant narrowing of her subglottis on scope exam which would make intubation difficult should she decompensate.  Could potentially be intubated with Glidescope and a 5-0 ETT.    - Discussed contingency plan w MFM provider today - if significant decompensation airway would remain priority with ok  for tracheostomy if deemed appropriate  - Recommend immediate contact of anesthesia and ENT team should patient have worsening respiratory status.    - Recommend trach set at bedside   - Recommend 5-0 ETT at bedside   - Recommend continuing scheduled rac epi and nebulized steroids  - In coordination with MFM and OB Anesthesia, case deferred until approximately 32w gestational age  - MFM provide fetal monitoring during her procedure -- pending details after multidisciplinary discussion  - MFM and NICU will be available during her procedure for intervention by delivery for signs of significant fetal distress  - steroid dosing for fetal lung maturity to started 1/30  - ENT will continue to follow     Please page ENT resident on call with any questions or concerns.

## 2025-02-01 PROCEDURE — 94640 AIRWAY INHALATION TREATMENT: CPT

## 2025-02-01 PROCEDURE — 25000003 PHARM REV CODE 250: Performed by: STUDENT IN AN ORGANIZED HEALTH CARE EDUCATION/TRAINING PROGRAM

## 2025-02-01 PROCEDURE — 25000003 PHARM REV CODE 250

## 2025-02-01 PROCEDURE — 20000000 HC ICU ROOM

## 2025-02-01 PROCEDURE — 94761 N-INVAS EAR/PLS OXIMETRY MLT: CPT

## 2025-02-01 PROCEDURE — 94760 N-INVAS EAR/PLS OXIMETRY 1: CPT

## 2025-02-01 PROCEDURE — 25000003 PHARM REV CODE 250: Performed by: INTERNAL MEDICINE

## 2025-02-01 PROCEDURE — 99233 SBSQ HOSP IP/OBS HIGH 50: CPT | Mod: ,,, | Performed by: INTERNAL MEDICINE

## 2025-02-01 PROCEDURE — 25000242 PHARM REV CODE 250 ALT 637 W/ HCPCS: Performed by: STUDENT IN AN ORGANIZED HEALTH CARE EDUCATION/TRAINING PROGRAM

## 2025-02-01 PROCEDURE — 63600175 PHARM REV CODE 636 W HCPCS

## 2025-02-01 PROCEDURE — 27000207 HC ISOLATION

## 2025-02-01 RX ORDER — GUAIFENESIN 600 MG/1
600 TABLET, EXTENDED RELEASE ORAL 2 TIMES DAILY PRN
Status: DISCONTINUED | OUTPATIENT
Start: 2025-02-01 | End: 2025-02-07 | Stop reason: HOSPADM

## 2025-02-01 RX ADMIN — ALBUTEROL SULFATE 2.5 MG: 2.5 SOLUTION RESPIRATORY (INHALATION) at 07:02

## 2025-02-01 RX ADMIN — MONTELUKAST 10 MG: 10 TABLET, FILM COATED ORAL at 08:02

## 2025-02-01 RX ADMIN — AMOXICILLIN AND CLAVULANATE POTASSIUM 1 TABLET: 875; 125 TABLET, FILM COATED ORAL at 08:02

## 2025-02-01 RX ADMIN — GUAIFENESIN 600 MG: 600 TABLET, EXTENDED RELEASE ORAL at 03:02

## 2025-02-01 RX ADMIN — BUDESONIDE 0.5 MG: 0.5 INHALANT RESPIRATORY (INHALATION) at 08:02

## 2025-02-01 RX ADMIN — PRENATAL VIT W/ FE FUMARATE-FA TAB 27-0.8 MG 1 TABLET: 27-0.8 TAB at 08:02

## 2025-02-01 RX ADMIN — CETIRIZINE HYDROCHLORIDE 10 MG: 10 TABLET, FILM COATED ORAL at 08:02

## 2025-02-01 RX ADMIN — FAMOTIDINE 40 MG: 20 TABLET ORAL at 08:02

## 2025-02-01 RX ADMIN — POLYETHYLENE GLYCOL 3350 17 G: 17 POWDER, FOR SOLUTION ORAL at 08:02

## 2025-02-01 RX ADMIN — BUDESONIDE 0.5 MG: 0.5 INHALANT RESPIRATORY (INHALATION) at 07:02

## 2025-02-01 RX ADMIN — SERTRALINE HYDROCHLORIDE 25 MG: 25 TABLET ORAL at 08:02

## 2025-02-01 RX ADMIN — ALBUTEROL SULFATE 2.5 MG: 2.5 SOLUTION RESPIRATORY (INHALATION) at 08:02

## 2025-02-01 RX ADMIN — ENOXAPARIN SODIUM 40 MG: 40 INJECTION SUBCUTANEOUS at 05:02

## 2025-02-01 RX ADMIN — ALBUTEROL SULFATE 2.5 MG: 2.5 SOLUTION RESPIRATORY (INHALATION) at 02:02

## 2025-02-01 RX ADMIN — PANTOPRAZOLE SODIUM 40 MG: 40 TABLET, DELAYED RELEASE ORAL at 03:02

## 2025-02-01 RX ADMIN — PANTOPRAZOLE SODIUM 40 MG: 40 TABLET, DELAYED RELEASE ORAL at 05:02

## 2025-02-01 NOTE — PLAN OF CARE
"MICU DAILY GOALS     Family/Goals of care/Code Status   Code Status: Full Code    24H Vital Sign Range  Temp:  [97.7 °F (36.5 °C)-98.8 °F (37.1 °C)]   Pulse:  []   Resp:  [9-36]   BP: (106-133)/(63-76)   SpO2:  [95 %-98 %]      Shift Events (include procedures and significant events)   ENT at bedside for scope. Care discussion between ENT and patient. Patient stated "Does not want Trach". All questions answered and addressed at this time per Dr. Bautista.      AWAKE RASS: Goal -    Actual - RASS (Daugherty Agitation-Sedation Scale): alert and calm    Restraint necessity: Not necessary   BREATHE SBT: Not intubated    Coordinate A & B, analgesics/sedatives Pain: managed   SAT: Not intubated   Delirium CAM-ICU:     Early(intubated/ Progressive (non-intubated) Mobility MOVE Screen (INTUBATED ONLY): Not intubated    Activity: Activity Management: Ambulated to bathroom - L4   Feeding/Nutrition Diet order: Diet/Nutrition Received: regular,     Thrombus DVT prophylaxis: VTE Core Measure: Pharmacological prophylaxis initiated/maintained   HOB Elevation Head of Bed (HOB) Positioning: HOB at 30-45 degrees   Ulcer Prophylaxis GI: no   Glucose control managed     Skin Skin assessment:     Sacrum intact/not altered? Yes  Heels intact/not altered? Yes  Surgical wound? No    CHECK ONE!   (no altered skin or altered skin) and sub boxes:  [x] No Altered Skin Integrity Present    [x]Prevention Measures Documented    [] Altered Skin Integrity Present or Discovered   [] LDA present in EPIC, daily doc completed              [] LDA added if not in EPIC (describe wound).                    When describing wound, do not stage, use descriptive words only.    [] Wound Image Taken (required on admit,                   transfer/discharge and every Tuesday)    Wound Care Consulted? No   Bowel Function no issues    Indwelling Catheter Necessity          De-escalation Antibiotics No        VS and assessment per flow sheet, patient progressing " towards goals as tolerated, plan of care reviewed with family, all concerns addressed, will continue to monitor.

## 2025-02-01 NOTE — ASSESSMENT & PLAN NOTE
28 yo female 30 wks pregnant with grade III subglottic stenosis. Mild biphasic stridor noted on exam, no increased work of breathing. Patient saturating well on room air. Patient admitted to MICU for close airway monitoring.    Planning for multidisciplinary meeting(s) for more formal operative plans this upcoming week - anticipate likely next Thursday at [facility]    - Patient has strong preferences to avoid tracheostomy if possible but is willing to accept it if absolutely necessary              - Plan is still for proceeding to OR next week to improve airway, with the goal being endoscopic intervention  - Will re-convene with team on Monday for further planning/ccordination    - Airway plan: Patient has significant narrowing of her subglottis on scope exam which would make intubation difficult should she decompensate.  Could potentially be intubated with Glidescope and a 5-0 ETT.    - Discussed contingency plan w MFM provider today - if significant decompensation airway would remain priority with ok  for tracheostomy if deemed appropriate  - Recommend immediate contact of anesthesia and ENT team should patient have worsening respiratory status.    - Recommend trach set at bedside   - Recommend 5-0 ETT at bedside   - Recommend continuing scheduled rac epi and nebulized steroids  - In coordination with MFM and OB Anesthesia, case deferred until approximately 32w gestational age  - MFM provide fetal monitoring during her procedure -- pending details after multidisciplinary discussion  - MFM and NICU will be available during her procedure for intervention by delivery for signs of significant fetal distress  - steroid dosing for fetal lung maturity to started 1/30  - ENT will continue to follow     Please page ENT resident on call with any questions or concerns.

## 2025-02-01 NOTE — PLAN OF CARE
MICU DAILY GOALS     Family/Goals of care/Code Status   Code Status: Full Code    24H Vital Sign Range  Temp:  [97.7 °F (36.5 °C)-98.7 °F (37.1 °C)]   Pulse:  []   Resp:  [9-46]   BP: (106-133)/(65-76)   SpO2:  [95 %-100 %]      Shift Events (include procedures and significant events)    No overnight events this shift. OB/GYN provider arrived this PM for daily NST. See progress notes for details.     AWAKE RASS: Goal -    Actual - RASS (Daugherty Agitation-Sedation Scale): alert and calm    Restraint necessity: Not necessary   BREATHE SBT: Not intubated    Coordinate A & B, analgesics/sedatives Pain: managed   SAT: Not intubated   Delirium CAM-ICU:     Early(intubated/ Progressive (non-intubated) Mobility MOVE Screen (INTUBATED ONLY): Not intubated    Activity: Activity Management: Rolling - L1   Feeding/Nutrition Diet order: Diet/Nutrition Received: regular,     Thrombus DVT prophylaxis: VTE Core Measure: Pharmacological prophylaxis initiated/maintained   HOB Elevation Head of Bed (HOB) Positioning: HOB at 30-45 degrees   Ulcer Prophylaxis GI: yes   Glucose control managed     Skin Skin assessment:     Sacrum intact/not altered? Yes  Heels intact/not altered? Yes  Surgical wound? No    CHECK ONE!   (no altered skin or altered skin) and sub boxes:  [x] No Altered Skin Integrity Present    [x]Prevention Measures Documented    [] Altered Skin Integrity Present or Discovered   [] LDA present in EPIC, daily doc completed              [] LDA added if not in EPIC (describe wound).                    When describing wound, do not stage, use descriptive words only.    [] Wound Image Taken (required on admit,                   transfer/discharge and every Tuesday)    Wound Care Consulted? No   Bowel Function no issues    Indwelling Catheter Necessity            De-escalation Antibiotics No        Problem: Adult Inpatient Plan of Care  Goal: Plan of Care Review  Outcome: Progressing  Goal: Optimal Comfort and  Wellbeing  Outcome: Progressing  Goal: Readiness for Transition of Care  Outcome: Progressing     Problem: Infection  Goal: Absence of Infection Signs and Symptoms  Outcome: Progressing

## 2025-02-01 NOTE — PROGRESS NOTES
[facility] ICU  Otorhinolaryngology-Head & Neck Surgery  Progress Note    Subjective:     Post-Op Info:  Procedure(s) (LRB):  MICROLARYNGOSCOPY, SUSPENSION (N/A)  CREATION, TRACHEOSTOMY (N/A)   5 Days Post-Op  Hospital Day: 9     Interval History: Doing well this AM. Improved dyspnea since starting steroids.     Medications:  Continuous Infusions:  Scheduled Meds:   albuterol sulfate  2.5 mg Nebulization Q6H WAKE    amoxicillin-clavulanate 875-125mg  1 tablet Oral Q12H    budesonide  0.5 mg Nebulization Q12H    cetirizine  10 mg Oral Daily    enoxparin  40 mg Subcutaneous Daily    famotidine  40 mg Oral QHS    montelukast  10 mg Oral QHS    pantoprazole  40 mg Oral BID AC    polyethylene glycol  17 g Oral BID    prenatal vitamin  1 tablet Oral Daily    sertraline  25 mg Oral Daily     PRN Meds:  Current Facility-Administered Medications:     acetaminophen, 650 mg, Oral, Q4H PRN    diphenhydrAMINE, 25 mg, Intravenous, Q6H PRN    hydrOXYzine HCL, 25 mg, Oral, TID PRN    magnesium oxide, 800 mg, Oral, PRN    magnesium oxide, 800 mg, Oral, PRN    ondansetron, 4 mg, Intravenous, Q8H PRN    potassium bicarbonate, 35 mEq, Oral, PRN    potassium bicarbonate, 50 mEq, Oral, PRN    potassium bicarbonate, 60 mEq, Oral, PRN    potassium, sodium phosphates, 2 packet, Oral, PRN    potassium, sodium phosphates, 2 packet, Oral, PRN    potassium, sodium phosphates, 2 packet, Oral, PRN    racepinephrine, 0.5 mL, Nebulization, Q4H PRN    sodium chloride 0.9%, 10 mL, Intravenous, PRN     Review of patient's allergies indicates:   Allergen Reactions    No known drug allergies      Objective:     Vital Signs (24h Range):  Temp:  [97.7 °F (36.5 °C)-98.7 °F (37.1 °C)] 97.8 °F (36.6 °C)  Pulse:  [] 81  Resp:  [9-46] 16  SpO2:  [96 %-100 %] 100 %  BP: (103-133)/(57-79) 103/57       Lines/Drains/Airways       Peripheral Intravenous Line  Duration                  Peripheral IV - Single Lumen 01/29/25 1613 20 G No Right;Lateral  Wrist 2 days         Peripheral IV - Single Lumen 01/31/25 2354 22 G Right Antecubital <1 day                     Physical Exam  Resting in bed watching TV  Mild exp stridor with prolonged phonation  Comfortable on room air        Assessment/Plan:     * Acquired subglottic stenosis  30 yo female 30 wks pregnant with grade III subglottic stenosis. Mild biphasic stridor noted on exam, no increased work of breathing. Patient saturating well on room air. Patient admitted to MICU for close airway monitoring.    Planning for multidisciplinary meeting(s) for more formal operative plans this upcoming week - anticipate likely next Thursday at [facility]    - Patient has strong preferences to avoid tracheostomy if possible but is willing to accept it if absolutely necessary              - Plan is still for proceeding to OR next week to improve airway, with the goal being endoscopic intervention  - Will re-convene with team on Monday for further planning/ccordination    - Airway plan: Patient has significant narrowing of her subglottis on scope exam which would make intubation difficult should she decompensate.  Could potentially be intubated with Glidescope and a 5-0 ETT.    - Discussed contingency plan w MFM provider today - if significant decompensation airway would remain priority with ok  for tracheostomy if deemed appropriate  - Recommend immediate contact of anesthesia and ENT team should patient have worsening respiratory status.    - Recommend trach set at bedside   - Recommend 5-0 ETT at bedside   - In coordination with MFM and OB Anesthesia, case deferred until approximately 32w gestational age  - MFM provide fetal monitoring during her procedure -- pending details after multidisciplinary discussion  - MFM and NICU will be available during her procedure for intervention by delivery for signs of significant fetal distress  - steroid dosing for fetal lung maturity to started 1/30  - ENT will continue to follow      Please page ENT resident on call with any questions or concerns.            Kt Montez MD  Otorhinolaryngology-Head & Neck Surgery  [facility] - Medical ICU

## 2025-02-01 NOTE — SUBJECTIVE & OBJECTIVE
Interval History: Doing well this AM. Improved dyspnea since starting steroids.     Medications:  Continuous Infusions:  Scheduled Meds:   albuterol sulfate  2.5 mg Nebulization Q6H WAKE    amoxicillin-clavulanate 875-125mg  1 tablet Oral Q12H    budesonide  0.5 mg Nebulization Q12H    cetirizine  10 mg Oral Daily    enoxparin  40 mg Subcutaneous Daily    famotidine  40 mg Oral QHS    montelukast  10 mg Oral QHS    pantoprazole  40 mg Oral BID AC    polyethylene glycol  17 g Oral BID    prenatal vitamin  1 tablet Oral Daily    sertraline  25 mg Oral Daily     PRN Meds:  Current Facility-Administered Medications:     acetaminophen, 650 mg, Oral, Q4H PRN    diphenhydrAMINE, 25 mg, Intravenous, Q6H PRN    hydrOXYzine HCL, 25 mg, Oral, TID PRN    magnesium oxide, 800 mg, Oral, PRN    magnesium oxide, 800 mg, Oral, PRN    ondansetron, 4 mg, Intravenous, Q8H PRN    potassium bicarbonate, 35 mEq, Oral, PRN    potassium bicarbonate, 50 mEq, Oral, PRN    potassium bicarbonate, 60 mEq, Oral, PRN    potassium, sodium phosphates, 2 packet, Oral, PRN    potassium, sodium phosphates, 2 packet, Oral, PRN    potassium, sodium phosphates, 2 packet, Oral, PRN    racepinephrine, 0.5 mL, Nebulization, Q4H PRN    sodium chloride 0.9%, 10 mL, Intravenous, PRN     Review of patient's allergies indicates:   Allergen Reactions    No known drug allergies      Objective:     Vital Signs (24h Range):  Temp:  [97.7 °F (36.5 °C)-98.7 °F (37.1 °C)] 97.8 °F (36.6 °C)  Pulse:  [] 81  Resp:  [9-46] 16  SpO2:  [96 %-100 %] 100 %  BP: (103-133)/(57-79) 103/57       Lines/Drains/Airways       Peripheral Intravenous Line  Duration                  Peripheral IV - Single Lumen 01/29/25 1613 20 G No Right;Lateral Wrist 2 days         Peripheral IV - Single Lumen 01/31/25 2354 22 G Right Antecubital <1 day                     Physical Exam  Resting in bed watching TV  Mild exp stridor with prolonged phonation  Comfortable on room air

## 2025-02-01 NOTE — CARE UPDATE
PM NST     Baseline 155bpm, mod estefany, +accels, -decels; R&R, AGA  No ctx    Continue NST QD    Zofia Daniel MD  OB/GYN PGY-2

## 2025-02-01 NOTE — PROGRESS NOTES
[facility] ICU  Critical Care Medicine  Progress Note    Patient Name: Gisell Villafuerte  MRN: [id number]  Admission Date: 2025  Hospital Length of Stay: 8 days  Code Status: Full Code  Attending Provider: Lyle Sue MD  Primary Care Provider: Yazan Jj MD   Principal Problem: Acquired subglottic stenosis    Subjective:     HPI:  29F A1 30wks with recently diagnosed subglottic stenosis, here for ENT procedure. History of asthma, LPRD, allergies, anxiety. Reportedly seen by ENT PA in  and diagnosed with LPRD; has had worsening stridor which became acute after flu A infx . Was admitted for asthma exacerbation, however failed conservative tx and was investigated further given stridor.      HDS on outside admission. Labs with mild leukocytosis iso steroid adminsitration outpatient. RIP negative, normal inflammatory markers. Ucx noted to have E coli; was treated with ceftriaxone.    Hospital/ICU Course:  Patient admitted for ENT evaluation of subglottic stenosis and potential balloon/laser procedure. Tentative plan for OR  with ENT. OBGYN also consulted and following given 30wks pregnant. She is comfortable on room air. OB following; performed NST which was reassuring. ENT and MFM are following and ENT will perform her procedure with MFM present. MFM recommending waiting till she is 32 weeks pregnant. She will need a steroid course one week prior to her procedure for fetal lung maturity. (Betamethasone scheduled for 2025). Asymptomatic UTI being treated with Augmentin. Patient had brief episode of worsening stridor that corrected with one dose of racemic epinephrine. Currently stable. Trach kit at bedside.  kit mostly complete minus the bandage scissors. ENT will do further surgical planning this Monday.     Interval History/Significant Events: NAOE. Patient continuing to have mild cough with some feelings of congestion; no SOB or stridor. Gave guaifenesin PRN.  ROOM # 212-2     Living Situation (if not independent, order SW consult): At home in [facility] name: NA  : [place] [phone] or [place] [phone]    Activity level at baseline: ind with cane  Activity level on admit: Ax1 with walker      Patient registered to observation; given Patient Bill of Rights; given the opportunity to ask questions about observation status and their plan of care.  Patient has been oriented to the observation room, bathroom and call light is in place.    Discussed discharge goals and expectations with patient/family.                Review of Systems   Constitutional:  Negative for chills and fever.   Respiratory:  Positive for cough (occasional) and shortness of breath (none at rest; some with activity).    Cardiovascular:  Negative for chest pain and palpitations.   Gastrointestinal:  Negative for abdominal pain and nausea.   Genitourinary:  Negative for difficulty urinating.   Neurological:  Negative for dizziness and headaches.   Psychiatric/Behavioral:  Negative for confusion.      Objective:     Vital Signs (Most Recent):  Temp: 98.7 °F (37.1 °C) (01/31/25 1147)  Pulse: 84 (01/31/25 1200)  Resp: (!) 21 (01/31/25 1200)  BP: 125/76 (01/31/25 0931)  SpO2: 97 % (01/31/25 1200) Vital Signs (24h Range):  Temp:  [97.7 °F (36.5 °C)-98.8 °F (37.1 °C)] 98.7 °F (37.1 °C)  Pulse:  [] 84  Resp:  [16-36] 21  SpO2:  [95 %-99 %] 97 %  BP: (113-125)/(63-76) 125/76   Weight: 73.5 kg (162 lb 0.6 oz)  Body mass index is 30.62 kg/m².      Intake/Output Summary (Last 24 hours) at 1/31/2025 1256  Last data filed at 1/31/2025 1101  Gross per 24 hour   Intake 877 ml   Output --   Net 877 ml          Physical Exam  Vitals and nursing note reviewed.   Constitutional:       General: She is not in acute distress.     Appearance: She is well-developed.   HENT:      Head: Normocephalic and atraumatic.      Comments: Mild inspiratory stridor similar to prior.  Eyes:      General: No scleral icterus.  Cardiovascular:      Rate and Rhythm: Normal rate and regular rhythm.      Pulses: Normal pulses.   Pulmonary:      Effort: Pulmonary effort is normal. No respiratory distress.   Abdominal:      General: Bowel sounds are normal. There is no distension.      Palpations: Abdomen is soft.      Tenderness: There is no abdominal tenderness.   Musculoskeletal:      Right lower leg: No edema.      Left lower leg: No edema.   Skin:     General: Skin is warm and dry.   Neurological:      Mental Status: She is alert and oriented to person, place, and time.         "    Vents:     Lines/Drains/Airways       Peripheral Intravenous Line  Duration                  Peripheral IV - Single Lumen 01/24/25 2255 18 G 1 3/4 in No Left;Anterior Forearm 6 days         Peripheral IV - Single Lumen 01/29/25 1613 20 G No Right;Lateral Wrist 1 day                  Significant Labs:    CBC/Anemia Profile:  Recent Labs   Lab 01/31/25  0324   WBC 17.26*   HGB 11.8*   HCT 35.2*      MCV 86   RDW 12.8        Chemistries:  Recent Labs   Lab 01/31/25  0324   *   K 3.8      CO2 21*   BUN 9   CREATININE 0.5   CALCIUM 9.0   ALBUMIN 3.1*   PROT 6.6   BILITOT 0.7   ALKPHOS 71   ALT 10   AST 8*   MG 1.8   PHOS 3.7       All pertinent labs within the past 24 hours have been reviewed.    Significant Imaging:  I have reviewed all pertinent imaging results/findings within the past 24 hours.    ABG  No results for input(s): "PH", "PO2", "PCO2", "HCO3", "BE" in the last 168 hours.  Assessment/Plan:     Psychiatric  Anxiety  Patient states she is very anxious about her upcoming ENT procedure and is requesting a short term course of medication for anxiety. Does not wish to start a long term treatment for anxiety as she belives her anxiety will resolve after her ENT procedure. Discussed options with MFM; Hydroxyzine will be given      Plan:  Hydroxyzine 25 mg TID PRN for anxiety (spaced at least 4 hours from diphenhydramine)    ENT  * Acquired subglottic stenosis  Thought to be acquired iso LPRD. ENT at [facility] transferred for higher procedural level of care; ENT here consulted. Reportedly both balloon dilatation and tracheostomy have been discussed with her.     ENT in conjunction with MFM are recommending waiting till she is 32 weeks pregnant or her ENT procedure. MFM will be with ENT during the procedure.    - reflux management with PPI BID and pepcid 40 mg nightly   - racemic epi q6H PRN for 24 hours  - ordered auto-immune labs  - TSH WNL  -Betamethasone given 1/30/2025 for fetal lung " maturity  -ENT will scope at bedside 2025 to assess airway for surgical planning.       Pulmonary  Severe persistent asthma with acute exacerbation  No wheezing on exam; treating for asthma flare but likely stenosis is major pathology.    -Nebulized budesonide BID, with duonebs q6WA and PRN  -Montelukast nightly, diphenhydramine prn for allergies (space at least 4 hours from hydroxyzine)     Renal/  Acute cystitis  Asymptomatic UTI    Plan:  -Augmentin q12 for 5 days; first dose given 2025    Obstetric  30 weeks gestation of pregnancy  - OBGYN consulted, appreciate their recs  -NST being performed daily by OB  -MFM medicine following and will be ENT during her procdeure  -MFM recommending steroid course for fetal lung maturing one week prior to her procedure; Betamethasone scheduled for 2025; appreciate recs  -Gathering supplies to bedside in case of emergency        Critical Care Daily Checklist:    A: Awake: RASS Goal/Actual Goal:    Actual:     B: Spontaneous Breathing Trial Performed?     C: SAT & SBT Coordinated?  Not on vent                      D: Delirium: CAM-ICU     E: Early Mobility Performed? Yes   F: Feeding Goal: Goals: Meet % een/epn by next RD f/u  Status: Nutrition Goal Status: new   Current Diet Order   Procedures    Diet Adult Regular      AS: Analgesia/Sedation Tylenol 650 q6 PRN   T: Thromboembolic Prophylaxis Lovenox   H: HOB > 300 Yes   U: Stress Ulcer Prophylaxis (if needed) Famotidine 40mg nightly, Pantoprazole BID 40 mg    G: Glucose Control None; within acceptable range   B: Bowel Function Stool Occurrence: 1   I: Indwelling Catheter (Lines & Arnett) Necessity Peripheral IV    D: De-escalation of Antimicrobials/Pharmacotherapies Finishing 5 day Augmentin course    Plan for the day/ETD Continuing on airway watch; stable currently    Code Status:  Family/Goals of Care: Full Code         Critical secondary to Patient has a condition that poses threat to life and  bodily function: Here with subglottic stenosis leading to SOB and stridor; in MICU for airway watch.       Critical care was time spent personally by me on the following activities: development of treatment plan with patient or surrogate and bedside caregivers, discussions with consultants, evaluation of patient's response to treatment, examination of patient, ordering and performing treatments and interventions, ordering and review of laboratory studies, ordering and review of radiographic studies, pulse oximetry, re-evaluation of patient's condition. This critical care time did not overlap with that of any other provider or involve time for any procedures.     Mali Pineda, DO  Critical Care Medicine  [facility]

## 2025-02-02 PROCEDURE — 25000003 PHARM REV CODE 250: Performed by: INTERNAL MEDICINE

## 2025-02-02 PROCEDURE — 25000003 PHARM REV CODE 250

## 2025-02-02 PROCEDURE — 94760 N-INVAS EAR/PLS OXIMETRY 1: CPT

## 2025-02-02 PROCEDURE — 25000003 PHARM REV CODE 250: Performed by: STUDENT IN AN ORGANIZED HEALTH CARE EDUCATION/TRAINING PROGRAM

## 2025-02-02 PROCEDURE — 20000000 HC ICU ROOM

## 2025-02-02 PROCEDURE — 94761 N-INVAS EAR/PLS OXIMETRY MLT: CPT

## 2025-02-02 PROCEDURE — 99233 SBSQ HOSP IP/OBS HIGH 50: CPT | Mod: ,,, | Performed by: INTERNAL MEDICINE

## 2025-02-02 PROCEDURE — 25000242 PHARM REV CODE 250 ALT 637 W/ HCPCS: Performed by: STUDENT IN AN ORGANIZED HEALTH CARE EDUCATION/TRAINING PROGRAM

## 2025-02-02 PROCEDURE — 27000207 HC ISOLATION

## 2025-02-02 PROCEDURE — 94640 AIRWAY INHALATION TREATMENT: CPT

## 2025-02-02 PROCEDURE — 63600175 PHARM REV CODE 636 W HCPCS

## 2025-02-02 RX ADMIN — ENOXAPARIN SODIUM 40 MG: 40 INJECTION SUBCUTANEOUS at 04:02

## 2025-02-02 RX ADMIN — PANTOPRAZOLE SODIUM 40 MG: 40 TABLET, DELAYED RELEASE ORAL at 06:02

## 2025-02-02 RX ADMIN — PRENATAL VIT W/ FE FUMARATE-FA TAB 27-0.8 MG 1 TABLET: 27-0.8 TAB at 08:02

## 2025-02-02 RX ADMIN — ALBUTEROL SULFATE 2.5 MG: 2.5 SOLUTION RESPIRATORY (INHALATION) at 07:02

## 2025-02-02 RX ADMIN — BUDESONIDE 0.5 MG: 0.5 INHALANT RESPIRATORY (INHALATION) at 07:02

## 2025-02-02 RX ADMIN — GUAIFENESIN 600 MG: 600 TABLET, EXTENDED RELEASE ORAL at 09:02

## 2025-02-02 RX ADMIN — MONTELUKAST 10 MG: 10 TABLET, FILM COATED ORAL at 09:02

## 2025-02-02 RX ADMIN — POLYETHYLENE GLYCOL 3350 17 G: 17 POWDER, FOR SOLUTION ORAL at 08:02

## 2025-02-02 RX ADMIN — ALBUTEROL SULFATE 2.5 MG: 2.5 SOLUTION RESPIRATORY (INHALATION) at 02:02

## 2025-02-02 RX ADMIN — GUAIFENESIN 600 MG: 600 TABLET, EXTENDED RELEASE ORAL at 08:02

## 2025-02-02 RX ADMIN — PANTOPRAZOLE SODIUM 40 MG: 40 TABLET, DELAYED RELEASE ORAL at 04:02

## 2025-02-02 RX ADMIN — POLYETHYLENE GLYCOL 3350 17 G: 17 POWDER, FOR SOLUTION ORAL at 09:02

## 2025-02-02 RX ADMIN — CETIRIZINE HYDROCHLORIDE 10 MG: 10 TABLET, FILM COATED ORAL at 08:02

## 2025-02-02 RX ADMIN — SERTRALINE HYDROCHLORIDE 25 MG: 25 TABLET ORAL at 08:02

## 2025-02-02 RX ADMIN — FAMOTIDINE 40 MG: 20 TABLET ORAL at 09:02

## 2025-02-02 NOTE — ASSESSMENT & PLAN NOTE
Thought to be acquired iso LPRD. ENT at [facility] transferred for higher procedural level of care; ENT here consulted. Reportedly both balloon dilatation and tracheostomy have been discussed with her.     ENT in conjunction with MFM are recommending waiting till she is 32 weeks pregnant or her ENT procedure. MFM will be with ENT during the procedure.    - reflux management with PPI BID and pepcid 40 mg nightly   - racemic epi q6H PRN for 24 hours  - ordered auto-immune labs  - TSH WNL  -Betamethasone given 1/30/2025 for fetal lung maturity  - Plan is still for proceeding to OR next week to improve airway, with the goal being endoscopic intervention  - Plan for multi-speciality meeting on 2/3 for further planning/ccordination

## 2025-02-02 NOTE — PROGRESS NOTES
[facility]  Otorhinolaryngology-Head & Neck Surgery  Progress Note    Subjective:     Post-Op Info:  Procedure(s) (LRB):  MICROLARYNGOSCOPY, SUSPENSION (N/A)  CREATION, TRACHEOSTOMY (N/A)   6 Days Post-Op  Hospital Day: 10     Interval History: In good spirits, continues to note improved dyspnea w dual nebs, no rac epi needed past 24hr. Fetal monitoring results encouraging.    Medications:  Continuous Infusions:  Scheduled Meds:   albuterol sulfate  2.5 mg Nebulization Q6H WAKE    budesonide  0.5 mg Nebulization Q12H    cetirizine  10 mg Oral Daily    enoxparin  40 mg Subcutaneous Daily    famotidine  40 mg Oral QHS    montelukast  10 mg Oral QHS    pantoprazole  40 mg Oral BID AC    polyethylene glycol  17 g Oral BID    prenatal vitamin  1 tablet Oral Daily    sertraline  25 mg Oral Daily     PRN Meds:  Current Facility-Administered Medications:     acetaminophen, 650 mg, Oral, Q4H PRN    diphenhydrAMINE, 25 mg, Intravenous, Q6H PRN    guaiFENesin, 600 mg, Oral, BID PRN    hydrOXYzine HCL, 25 mg, Oral, TID PRN    magnesium oxide, 800 mg, Oral, PRN    magnesium oxide, 800 mg, Oral, PRN    ondansetron, 4 mg, Intravenous, Q8H PRN    potassium bicarbonate, 35 mEq, Oral, PRN    potassium bicarbonate, 50 mEq, Oral, PRN    potassium bicarbonate, 60 mEq, Oral, PRN    potassium, sodium phosphates, 2 packet, Oral, PRN    potassium, sodium phosphates, 2 packet, Oral, PRN    potassium, sodium phosphates, 2 packet, Oral, PRN    racepinephrine, 0.5 mL, Nebulization, Q4H PRN    sodium chloride 0.9%, 10 mL, Intravenous, PRN     Review of patient's allergies indicates:   Allergen Reactions    No known drug allergies      Objective:     Vital Signs (24h Range):  Temp:  [98.1 °F (36.7 °C)-98.5 °F (36.9 °C)] 98.1 °F (36.7 °C)  Pulse:  [] 87  Resp:  [16-37] 21  SpO2:  [94 %-100 %] 98 %  BP: (106-120)/(63-75) 110/68       Lines/Drains/Airways       Peripheral Intravenous Line  Duration                  Peripheral IV -  Single Lumen 01/29/25 1613 20 G No Right;Lateral Wrist 3 days         Peripheral IV - Single Lumen 01/31/25 2354 22 G Right Antecubital 1 day                     Physical Exam  Resting in bed watching TV  Mild exp stridor with prolonged phonation  Exp>insp stridor  Comfortable on room air          Assessment/Plan:     * Acquired subglottic stenosis  28 yo female 30 wks pregnant with grade III subglottic stenosis. Mild biphasic stridor noted on exam, no increased work of breathing. Patient saturating well on room air. Patient admitted to MICU for close airway monitoring.    Planning for multidisciplinary meeting(s) for more formal operative plans this upcoming week - anticipate likely next Thursday at [facility]    - Patient has strong preferences to avoid tracheostomy if possible but is willing to accept it if absolutely necessary              - Plan is still for proceeding to OR next week to improve airway, with the goal being endoscopic intervention  - Will re-convene with team on Monday for further planning/ccordination    - Airway plan: Patient has significant narrowing of her subglottis on scope exam which would make intubation difficult should she decompensate.  Could potentially be intubated with Glidescope and a 5-0 ETT.    - Discussed contingency plan w MFM provider today - if significant decompensation airway would remain priority with ok  for tracheostomy if deemed appropriate  - Recommend immediate contact of anesthesia and ENT team should patient have worsening respiratory status.    - Recommend trach set at bedside   - Recommend 5-0 ETT at bedside   - Continues with standing duo nebs budesonide and albuterol   - also has prn rac epi though not needed since 1/29  - In coordination with M and OB Anesthesia, case deferred until approximately 32w gestational age  - MFM provide fetal monitoring during her procedure -- pending details after multidisciplinary discussion  - MFM and NICU will be available  during her procedure for intervention by delivery for signs of significant fetal distress  - steroid dosing for fetal lung maturity to started 1/30  - ENT will continue to follow     Please page ENT resident on call with any questions or concerns.            Kt Montez MD  Otorhinolaryngology-Head & Neck Surgery  [facility] - Medical ICU

## 2025-02-02 NOTE — PLAN OF CARE
MICU DAILY GOALS     Family/Goals of care/Code Status   Code Status: Full Code    24H Vital Sign Range  Temp:  [97.8 °F (36.6 °C)-98.5 °F (36.9 °C)]   Pulse:  []   Resp:  [16-37]   BP: (103-124)/(57-79)   SpO2:  [94 %-100 %]      Shift Events (include procedures and significant events)    No overnight events. OB/GYN arrived at bedside to perform daily fetal monitoring. See progress notes for details.     AWAKE RASS: Goal -    Actual - RASS ([place]herty Agitation-Sedation Scale): alert and calm    Restraint necessity: Not necessary   BREATHE SBT: Not intubated    Coordinate A & B, analgesics/sedatives Pain: managed   SAT: Not intubated   Delirium CAM-ICU:     Early(intubated/ Progressive (non-intubated) Mobility MOVE Screen (INTUBATED ONLY): Not intubated    Activity: Activity Management: Ambulated -L4   Feeding/Nutrition Diet order: Diet/Nutrition Received: regular,     Thrombus DVT prophylaxis: VTE Core Measure: Pharmacological prophylaxis initiated/maintained   HOB Elevation Head of Bed (HOB) Positioning: HOB at 30-45 degrees   Ulcer Prophylaxis GI: yes   Glucose control managed     Skin Skin assessment:     Sacrum intact/not altered? Yes  Heels intact/not altered? Yes  Surgical wound? No    CHECK ONE!   (no altered skin or altered skin) and sub boxes:  [x] No Altered Skin Integrity Present    []Prevention Measures Documented    [] Altered Skin Integrity Present or Discovered   [] LDA present in EPIC, daily doc completed              [] LDA added if not in EPIC (describe wound).                    When describing wound, do not stage, use descriptive words only.    [] Wound Image Taken (required on admit,                   transfer/discharge and every Tuesday)    Wound Care Consulted? No   Bowel Function no issues    Indwelling Catheter Necessity            De-escalation Antibiotics No        Problem: Adult Inpatient Plan of Care  Goal: Plan of Care Review  Outcome: Progressing  Goal: Optimal Comfort and  Wellbeing  Outcome: Progressing  Goal: Readiness for Transition of Care  Outcome: Progressing     Problem:  Fall Injury Risk  Goal: Absence of Fall, Infant Drop and Related Injury  Outcome: Progressing     Problem: Infection  Goal: Absence of Infection Signs and Symptoms  Outcome: Progressing

## 2025-02-02 NOTE — CARE UPDATE
PM NST     Baseline 155bpm, mod estefany, +accels, -decels, Overall  reassuring   [place] with no evidence of contractions     Continue NST QD    Treva Carlos MD (Mary)   Obstetrics and Gynecology, PGY1

## 2025-02-02 NOTE — SUBJECTIVE & OBJECTIVE
Interval History/Significant Events: NAEON. Responding well to the rac epi. Complaining of occasional cough. HDS AF, no SOB.     Review of Systems   Constitutional:  Negative for chills and fever.   Respiratory:  Positive for cough (occasional) and shortness of breath (none at rest; some with activity).    Cardiovascular:  Negative for chest pain and palpitations.   Gastrointestinal:  Negative for abdominal pain and nausea.   Genitourinary:  Negative for difficulty urinating.   Neurological:  Negative for dizziness and headaches.   Psychiatric/Behavioral:  Negative for confusion.      Objective:     Vital Signs (Most Recent):  Temp: 98.2 °F (36.8 °C) (02/02/25 1108)  Pulse: 101 (02/02/25 1300)  Resp: 20 (02/02/25 1300)  BP: (!) 98/54 (02/02/25 1115)  SpO2: 99 % (02/02/25 1300) Vital Signs (24h Range):  Temp:  [98.1 °F (36.7 °C)-98.5 °F (36.9 °C)] 98.2 °F (36.8 °C)  Pulse:  [] 101  Resp:  [16-33] 20  SpO2:  [94 %-99 %] 99 %  BP: ()/(54-75) 98/54   Weight: 73.5 kg (162 lb 0.6 oz)  Body mass index is 30.62 kg/m².    No intake or output data in the 24 hours ending 02/02/25 1407       Physical Exam  Vitals and nursing note reviewed.   Constitutional:       General: She is not in acute distress.     Appearance: She is well-developed.   HENT:      Head: Normocephalic and atraumatic.      Comments: Mild inspiratory stridor similar to prior.  Eyes:      General: No scleral icterus.  Cardiovascular:      Rate and Rhythm: Normal rate and regular rhythm.      Pulses: Normal pulses.   Pulmonary:      Effort: Pulmonary effort is normal. No respiratory distress.   Abdominal:      General: Bowel sounds are normal. There is no distension.      Palpations: Abdomen is soft.      Tenderness: There is no abdominal tenderness.   Musculoskeletal:      Right lower leg: No edema.      Left lower leg: No edema.   Skin:     General: Skin is warm and dry.   Neurological:      Mental Status: She is alert and oriented to person,  place, and time.        Vents:     Lines/Drains/Airways       Peripheral Intravenous Line  Duration                  Peripheral IV - Single Lumen 01/29/25 1613 20 G No Right;Lateral Wrist 3 days         Peripheral IV - Single Lumen 01/31/25 2354 22 G Right Antecubital 1 day                  Significant Labs:    All pertinent labs within the past 24 hours have been reviewed.    Significant Imaging:  I have reviewed all pertinent imaging results/findings within the past 24 hours.

## 2025-02-02 NOTE — ASSESSMENT & PLAN NOTE
Patient states she is very anxious about her upcoming ENT procedure and is requesting a short term course of medication for anxiety. Does not wish to start a long term treatment for anxiety as she belives her anxiety will resolve after her ENT procedure. Discussed options with MFM; Hydroxyzine will be given      Plan:  Hydroxyzine 25 mg TID PRN for anxiety (spaced at least 4 hours from diphenhydramine)  Continue sertraline 25 mg and increase to 50 mg after 7 days per MFM.

## 2025-02-02 NOTE — PROGRESS NOTES
[facility] - [facility]  Critical Care Medicine  Progress Note    Patient Name: Gisell Villafuerte  MRN: [id number]  Admission Date: 2025  Hospital Length of Stay: 9 days  Code Status: Full Code  Attending Provider: Damián Nelson MD  Primary Care Provider: Yazan Jj MD   Principal Problem: Acquired subglottic stenosis    Subjective:     HPI:  29F A1 30wks with recently diagnosed subglottic stenosis, here for ENT procedure. History of asthma, LPRD, allergies, anxiety. Reportedly seen by ENT PA in  and diagnosed with LPRD; has had worsening stridor which became acute after flu A infx . Was admitted for asthma exacerbation, however failed conservative tx and was investigated further given stridor.      HDS on outside admission. Labs with mild leukocytosis iso steroid adminsitration outpatient. RIP negative, normal inflammatory markers. Ucx noted to have E coli; was treated with ceftriaxone.    Hospital/ICU Course:  Patient admitted for ENT evaluation of subglottic stenosis and potential balloon/laser procedure. Tentative plan for OR  with ENT. OBGYN also consulted and following given 30wks pregnant. She is comfortable on room air. OB following; performed NST which was reassuring. ENT and MFM are following and ENT will perform her procedure with MFM present. MFM recommending waiting till she is 32 weeks pregnant. She will need a steroid course one week prior to her procedure for fetal lung maturity. (Betamethasone scheduled for 2025). Asymptomatic UTI being treated with Augmentin. Patient had brief episode of worsening stridor that corrected with one dose of racemic epinephrine. Currently stable. Trach kit at bedside.  kit mostly complete minus the bandage scissors. ENT will do further surgical planning this Monday.      Interval History/Significant Events: NAEON. Responding well to the rac epi. Complaining of occasional cough. HDS AF, no SOB.     Review of Systems    Constitutional:  Negative for chills and fever.   Respiratory:  Positive for cough (occasional) and shortness of breath (none at rest; some with activity).    Cardiovascular:  Negative for chest pain and palpitations.   Gastrointestinal:  Negative for abdominal pain and nausea.   Genitourinary:  Negative for difficulty urinating.   Neurological:  Negative for dizziness and headaches.   Psychiatric/Behavioral:  Negative for confusion.      Objective:     Vital Signs (Most Recent):  Temp: 98.2 °F (36.8 °C) (02/02/25 1108)  Pulse: 101 (02/02/25 1300)  Resp: 20 (02/02/25 1300)  BP: (!) 98/54 (02/02/25 1115)  SpO2: 99 % (02/02/25 1300) Vital Signs (24h Range):  Temp:  [98.1 °F (36.7 °C)-98.5 °F (36.9 °C)] 98.2 °F (36.8 °C)  Pulse:  [] 101  Resp:  [16-33] 20  SpO2:  [94 %-99 %] 99 %  BP: ()/(54-75) 98/54   Weight: 73.5 kg (162 lb 0.6 oz)  Body mass index is 30.62 kg/m².    No intake or output data in the 24 hours ending 02/02/25 1407       Physical Exam  Vitals and nursing note reviewed.   Constitutional:       General: She is not in acute distress.     Appearance: She is well-developed.   HENT:      Head: Normocephalic and atraumatic.      Comments: Mild inspiratory stridor similar to prior.  Eyes:      General: No scleral icterus.  Cardiovascular:      Rate and Rhythm: Normal rate and regular rhythm.      Pulses: Normal pulses.   Pulmonary:      Effort: Pulmonary effort is normal. No respiratory distress.   Abdominal:      General: Bowel sounds are normal. There is no distension.      Palpations: Abdomen is soft.      Tenderness: There is no abdominal tenderness.   Musculoskeletal:      Right lower leg: No edema.      Left lower leg: No edema.   Skin:     General: Skin is warm and dry.   Neurological:      Mental Status: She is alert and oriented to person, place, and time.        Vents:     Lines/Drains/Airways       Peripheral Intravenous Line  Duration                  Peripheral IV - Single Lumen  "01/29/25 1613 20 G No Right;Lateral Wrist 3 days         Peripheral IV - Single Lumen 01/31/25 2354 22 G Right Antecubital 1 day                  Significant Labs:    All pertinent labs within the past 24 hours have been reviewed.    Significant Imaging:  I have reviewed all pertinent imaging results/findings within the past 24 hours.    ABG  No results for input(s): "PH", "PO2", "PCO2", "HCO3", "BE" in the last 168 hours.  Assessment/Plan:     Psychiatric  Anxiety  Patient states she is very anxious about her upcoming ENT procedure and is requesting a short term course of medication for anxiety. Does not wish to start a long term treatment for anxiety as she belives her anxiety will resolve after her ENT procedure. Discussed options with MFM; Hydroxyzine will be given      Plan:  Hydroxyzine 25 mg TID PRN for anxiety (spaced at least 4 hours from diphenhydramine)  Continue sertraline 25 mg and increase to 50 mg after 7 days per MFM.     ENT  * Acquired subglottic stenosis  Thought to be acquired iso LPRD. ENT at [facility] transferred for higher procedural level of care; ENT here consulted. Reportedly both balloon dilatation and tracheostomy have been discussed with her.     ENT in conjunction with MF are recommending waiting till she is 32 weeks pregnant or her ENT procedure. MFM will be with ENT during the procedure.    - reflux management with PPI BID and pepcid 40 mg nightly   - racemic epi q6H PRN for 24 hours  - ordered auto-immune labs  - TSH WNL  -Betamethasone given 1/30/2025 for fetal lung maturity  - Plan is still for proceeding to OR next week to improve airway, with the goal being endoscopic intervention  - Plan for multi-speciality meeting on 2/3 for further planning/ccordination      Pulmonary  Severe persistent asthma with acute exacerbation  No wheezing on exam; treating for asthma flare but likely stenosis is major pathology.    -Nebulized budesonide BID, with duonebs q6WA and PRN  -Montelukast " nightly, diphenhydramine prn for allergies (space at least 4 hours from hydroxyzine)     Renal/  Acute cystitis  Asymptomatic UTI    Plan:  -Augmentin q12 for 5 days; first dose given 2025    Obstetric  30 weeks gestation of pregnancy  - OBGYN consulted, appreciate their recs  -NST being performed daily by OB  -MFM medicine following and will be ENT during her procdeure  -MFM recommending steroid course for fetal lung maturing one week prior to her procedure; Betamethasone scheduled for 2025; appreciate recs  -Gathering supplies to bedside in case of emergency        Critical Care Daily Checklist:    A: Awake: RASS Goal/Actual Goal:    Actual:     B: Spontaneous Breathing Trial Performed?     C:     D:     E: Early Mobility Performed? Yes   F: Feeding Goal: Goals: Meet % een/epn by next RD f/u  Status: Nutrition Goal Status: new   Current Diet Order   Procedures    Diet Adult Regular      AS: Analgesia/Sedation none   T: Thromboembolic Prophylaxis none   H: HOB > 300 Yes   U: Stress Ulcer Prophylaxis (if needed) PPI 40mg   G: Glucose Control wnl   B: Bowel Function Stool Occurrence: 1   I: Indwelling Catheter (Lines & Arnett) Necessity none   D: De-escalation of Antimicrobials/Pharmacotherapies none    Plan for the day/ETD ctm    Code Status:  Family/Goals of Care: Full Code       Critical secondary to Patient has a condition that poses threat to life and bodily function: airway watch     Critical care was time spent personally by me on the following activities: development of treatment plan with patient or surrogate and bedside caregivers, discussions with consultants, evaluation of patient's response to treatment, examination of patient, ordering and performing treatments and interventions, ordering and review of laboratory studies, ordering and review of radiographic studies, pulse oximetry, re-evaluation of patient's condition. This critical care time did not overlap with that of any  other provider or involve time for any procedures.     Checo Zuleta MD  Critical Care Medicine  [facility] - Medical ICU

## 2025-02-02 NOTE — SUBJECTIVE & OBJECTIVE
Interval History: In good spirits, continues to note improved dyspnea w dual nebs, no rac epi needed past 24hr. Fetal monitoring results encouraging.    Medications:  Continuous Infusions:  Scheduled Meds:   albuterol sulfate  2.5 mg Nebulization Q6H WAKE    budesonide  0.5 mg Nebulization Q12H    cetirizine  10 mg Oral Daily    enoxparin  40 mg Subcutaneous Daily    famotidine  40 mg Oral QHS    montelukast  10 mg Oral QHS    pantoprazole  40 mg Oral BID AC    polyethylene glycol  17 g Oral BID    prenatal vitamin  1 tablet Oral Daily    sertraline  25 mg Oral Daily     PRN Meds:  Current Facility-Administered Medications:     acetaminophen, 650 mg, Oral, Q4H PRN    diphenhydrAMINE, 25 mg, Intravenous, Q6H PRN    guaiFENesin, 600 mg, Oral, BID PRN    hydrOXYzine HCL, 25 mg, Oral, TID PRN    magnesium oxide, 800 mg, Oral, PRN    magnesium oxide, 800 mg, Oral, PRN    ondansetron, 4 mg, Intravenous, Q8H PRN    potassium bicarbonate, 35 mEq, Oral, PRN    potassium bicarbonate, 50 mEq, Oral, PRN    potassium bicarbonate, 60 mEq, Oral, PRN    potassium, sodium phosphates, 2 packet, Oral, PRN    potassium, sodium phosphates, 2 packet, Oral, PRN    potassium, sodium phosphates, 2 packet, Oral, PRN    racepinephrine, 0.5 mL, Nebulization, Q4H PRN    sodium chloride 0.9%, 10 mL, Intravenous, PRN     Review of patient's allergies indicates:   Allergen Reactions    No known drug allergies      Objective:     Vital Signs (24h Range):  Temp:  [98.1 °F (36.7 °C)-98.5 °F (36.9 °C)] 98.1 °F (36.7 °C)  Pulse:  [] 87  Resp:  [16-37] 21  SpO2:  [94 %-100 %] 98 %  BP: (106-120)/(63-75) 110/68       Lines/Drains/Airways       Peripheral Intravenous Line  Duration                  Peripheral IV - Single Lumen 01/29/25 1613 20 G No Right;Lateral Wrist 3 days         Peripheral IV - Single Lumen 01/31/25 2354 22 G Right Antecubital 1 day                     Physical Exam  Resting in bed watching TV  Mild exp stridor with prolonged  phonation  Exp>insp stridor  Comfortable on room air

## 2025-02-02 NOTE — ASSESSMENT & PLAN NOTE
28 yo female 30 wks pregnant with grade III subglottic stenosis. Mild biphasic stridor noted on exam, no increased work of breathing. Patient saturating well on room air. Patient admitted to MICU for close airway monitoring.    Planning for multidisciplinary meeting(s) for more formal operative plans this upcoming week - anticipate likely next Thursday at [facility]    - Patient has strong preferences to avoid tracheostomy if possible but is willing to accept it if absolutely necessary              - Plan is still for proceeding to OR next week to improve airway, with the goal being endoscopic intervention  - Will re-convene with team on Monday for further planning/ccordination    - Airway plan: Patient has significant narrowing of her subglottis on scope exam which would make intubation difficult should she decompensate.  Could potentially be intubated with Glidescope and a 5-0 ETT.    - Discussed contingency plan w M provider today - if significant decompensation airway would remain priority with ok  for tracheostomy if deemed appropriate  - Recommend immediate contact of anesthesia and ENT team should patient have worsening respiratory status.    - Recommend trach set at bedside   - Recommend 5-0 ETT at bedside   - Continues with standing duo nebs budesonide and albuterol   - also has prn rac epi though not needed since 1/29  - In coordination with MFM and OB Anesthesia, case deferred until approximately 32w gestational age  - MFM provide fetal monitoring during her procedure -- pending details after multidisciplinary discussion  - MFM and NICU will be available during her procedure for intervention by delivery for signs of significant fetal distress  - steroid dosing for fetal lung maturity to started 1/30  - ENT will continue to follow     Please page ENT resident on call with any questions or concerns.

## 2025-02-03 LAB
ALBUMIN SERPL BCP-MCNC: 2.9 G/DL (ref 3.5–5.2)
ALP SERPL-CCNC: 62 U/L (ref 40–150)
ALT SERPL W/O P-5'-P-CCNC: 9 U/L (ref 10–44)
ANION GAP SERPL CALC-SCNC: 10 MMOL/L (ref 8–16)
AST SERPL-CCNC: 9 U/L (ref 10–40)
BASOPHILS # BLD AUTO: 0.06 K/UL (ref 0–0.2)
BASOPHILS NFR BLD: 0.5 % (ref 0–1.9)
BILIRUB SERPL-MCNC: 0.3 MG/DL (ref 0.1–1)
BUN SERPL-MCNC: 10 MG/DL (ref 6–20)
CALCIUM SERPL-MCNC: 8.4 MG/DL (ref 8.7–10.5)
CHLORIDE SERPL-SCNC: 107 MMOL/L (ref 95–110)
CO2 SERPL-SCNC: 19 MMOL/L (ref 23–29)
CREAT SERPL-MCNC: 0.5 MG/DL (ref 0.5–1.4)
DIFFERENTIAL METHOD BLD: ABNORMAL
EOSINOPHIL # BLD AUTO: 0 K/UL (ref 0–0.5)
EOSINOPHIL NFR BLD: 0.2 % (ref 0–8)
ERYTHROCYTE [DISTWIDTH] IN BLOOD BY AUTOMATED COUNT: 12.9 % (ref 11.5–14.5)
EST. GFR  (NO RACE VARIABLE): >60 ML/MIN/1.73 M^2
GLUCOSE SERPL-MCNC: 85 MG/DL (ref 70–110)
HCT VFR BLD AUTO: 33 % (ref 37–48.5)
HGB BLD-MCNC: 10.9 G/DL (ref 12–16)
IMM GRANULOCYTES # BLD AUTO: 0.23 K/UL (ref 0–0.04)
IMM GRANULOCYTES NFR BLD AUTO: 2 % (ref 0–0.5)
LYMPHOCYTES # BLD AUTO: 2.6 K/UL (ref 1–4.8)
LYMPHOCYTES NFR BLD: 22.5 % (ref 18–48)
MAGNESIUM SERPL-MCNC: 1.7 MG/DL (ref 1.6–2.6)
MCH RBC QN AUTO: 28.8 PG (ref 27–31)
MCHC RBC AUTO-ENTMCNC: 33 G/DL (ref 32–36)
MCV RBC AUTO: 87 FL (ref 82–98)
MONOCYTES # BLD AUTO: 0.9 K/UL (ref 0.3–1)
MONOCYTES NFR BLD: 8 % (ref 4–15)
NEUTROPHILS # BLD AUTO: 7.7 K/UL (ref 1.8–7.7)
NEUTROPHILS NFR BLD: 66.8 % (ref 38–73)
NRBC BLD-RTO: 0 /100 WBC
PHOSPHATE SERPL-MCNC: 2.9 MG/DL (ref 2.7–4.5)
PLATELET # BLD AUTO: 222 K/UL (ref 150–450)
PMV BLD AUTO: 10.1 FL (ref 9.2–12.9)
POTASSIUM SERPL-SCNC: 3.6 MMOL/L (ref 3.5–5.1)
PROT SERPL-MCNC: 5.8 G/DL (ref 6–8.4)
RBC # BLD AUTO: 3.79 M/UL (ref 4–5.4)
SODIUM SERPL-SCNC: 136 MMOL/L (ref 136–145)
WBC # BLD AUTO: 11.46 K/UL (ref 3.9–12.7)

## 2025-02-03 PROCEDURE — 25000003 PHARM REV CODE 250: Performed by: STUDENT IN AN ORGANIZED HEALTH CARE EDUCATION/TRAINING PROGRAM

## 2025-02-03 PROCEDURE — 99232 SBSQ HOSP IP/OBS MODERATE 35: CPT | Mod: ,,, | Performed by: INTERNAL MEDICINE

## 2025-02-03 PROCEDURE — 63600175 PHARM REV CODE 636 W HCPCS

## 2025-02-03 PROCEDURE — 80053 COMPREHEN METABOLIC PANEL: CPT

## 2025-02-03 PROCEDURE — 20000000 HC ICU ROOM

## 2025-02-03 PROCEDURE — 83735 ASSAY OF MAGNESIUM: CPT

## 2025-02-03 PROCEDURE — 25000003 PHARM REV CODE 250

## 2025-02-03 PROCEDURE — 25000003 PHARM REV CODE 250: Performed by: INTERNAL MEDICINE

## 2025-02-03 PROCEDURE — 85025 COMPLETE CBC W/AUTO DIFF WBC: CPT

## 2025-02-03 PROCEDURE — 99900035 HC TECH TIME PER 15 MIN (STAT)

## 2025-02-03 PROCEDURE — 94640 AIRWAY INHALATION TREATMENT: CPT

## 2025-02-03 PROCEDURE — 94761 N-INVAS EAR/PLS OXIMETRY MLT: CPT

## 2025-02-03 PROCEDURE — 25000242 PHARM REV CODE 250 ALT 637 W/ HCPCS: Performed by: STUDENT IN AN ORGANIZED HEALTH CARE EDUCATION/TRAINING PROGRAM

## 2025-02-03 PROCEDURE — 27000207 HC ISOLATION

## 2025-02-03 PROCEDURE — 84100 ASSAY OF PHOSPHORUS: CPT

## 2025-02-03 RX ADMIN — GUAIFENESIN 600 MG: 600 TABLET, EXTENDED RELEASE ORAL at 09:02

## 2025-02-03 RX ADMIN — BUDESONIDE 0.5 MG: 0.5 INHALANT RESPIRATORY (INHALATION) at 07:02

## 2025-02-03 RX ADMIN — Medication 800 MG: at 09:02

## 2025-02-03 RX ADMIN — POLYETHYLENE GLYCOL 3350 17 G: 17 POWDER, FOR SOLUTION ORAL at 09:02

## 2025-02-03 RX ADMIN — PANTOPRAZOLE SODIUM 40 MG: 40 TABLET, DELAYED RELEASE ORAL at 06:02

## 2025-02-03 RX ADMIN — PRENATAL VIT W/ FE FUMARATE-FA TAB 27-0.8 MG 1 TABLET: 27-0.8 TAB at 08:02

## 2025-02-03 RX ADMIN — ENOXAPARIN SODIUM 40 MG: 40 INJECTION SUBCUTANEOUS at 04:02

## 2025-02-03 RX ADMIN — MONTELUKAST 10 MG: 10 TABLET, FILM COATED ORAL at 09:02

## 2025-02-03 RX ADMIN — ALBUTEROL SULFATE 2.5 MG: 2.5 SOLUTION RESPIRATORY (INHALATION) at 02:02

## 2025-02-03 RX ADMIN — FAMOTIDINE 40 MG: 20 TABLET ORAL at 09:02

## 2025-02-03 RX ADMIN — CETIRIZINE HYDROCHLORIDE 10 MG: 10 TABLET, FILM COATED ORAL at 08:02

## 2025-02-03 RX ADMIN — PANTOPRAZOLE SODIUM 40 MG: 40 TABLET, DELAYED RELEASE ORAL at 04:02

## 2025-02-03 RX ADMIN — ALBUTEROL SULFATE 2.5 MG: 2.5 SOLUTION RESPIRATORY (INHALATION) at 07:02

## 2025-02-03 RX ADMIN — Medication 800 MG: at 06:02

## 2025-02-03 RX ADMIN — GUAIFENESIN 600 MG: 600 TABLET, EXTENDED RELEASE ORAL at 08:02

## 2025-02-03 RX ADMIN — SERTRALINE HYDROCHLORIDE 25 MG: 25 TABLET ORAL at 08:02

## 2025-02-03 RX ADMIN — POLYETHYLENE GLYCOL 3350 17 G: 17 POWDER, FOR SOLUTION ORAL at 08:02

## 2025-02-03 RX ADMIN — POTASSIUM BICARBONATE 50 MEQ: 978 TABLET, EFFERVESCENT ORAL at 06:02

## 2025-02-03 NOTE — CARE UPDATE
AM NST      mod estefany +accels +single decel 130s > prolonged monitoring for 30 additional minutes remained RR   [place] quiet     Annelise Diaz MD  PGY 4  Obstetrics and Gynecology     I have reviewed the notes, assessments, and/or procedures performed by Dr. Diaz, I concur with her/his documentation of Gisell Villafuerte.    I reviewed the chart, the tracings, and discuss the plan of care with the team.  I met with the patient and her mother was on the phone at the time.  We discussed the rationale for the plan of care and delaying the procedure to 32 weeks as well as the rationale for a  corticosteroids.  I explained that we would have an MFM team, a labor and delivery team, and a NICU team available at the time of the procedure in case she develops any prolong and profound desaturation.  I explained that we would plan on continuous fetal monitoring during the procedure as we would use that to guide whether any intervention is warranted.  I explained that we would only plan on intervening and delivery if there was profound bradycardia or prolonged bradycardia that did not respond to maternal resuscitative efforts.  We discussed risks of prematurity at 32 weeks and explained that this represents a reasonable balance between maternal and fetal risks.    I provided an opportunity for questions.    Gerard Leiva Jr., MD  Maternal Fetal Medicine

## 2025-02-03 NOTE — PROGRESS NOTES
[facility] ICU  Otorhinolaryngology-Head & Neck Surgery  Progress Note    Subjective:     Post-Op Info:  Procedure(s) (LRB):  MICROLARYNGOSCOPY, SUSPENSION (N/A)  CREATION, TRACHEOSTOMY (N/A)   7 Days Post-Op  Hospital Day: 11     Interval History:   Doing well. Continues to report improved breathing since getting steroids last week for fetal lung maturity.     Medications:  Continuous Infusions:  Scheduled Meds:   albuterol sulfate  2.5 mg Nebulization Q6H WAKE    budesonide  0.5 mg Nebulization Q12H    cetirizine  10 mg Oral Daily    enoxparin  40 mg Subcutaneous Daily    famotidine  40 mg Oral QHS    montelukast  10 mg Oral QHS    pantoprazole  40 mg Oral BID AC    polyethylene glycol  17 g Oral BID    prenatal vitamin  1 tablet Oral Daily    sertraline  25 mg Oral Daily     PRN Meds:  Current Facility-Administered Medications:     acetaminophen, 650 mg, Oral, Q4H PRN    diphenhydrAMINE, 25 mg, Intravenous, Q6H PRN    guaiFENesin, 600 mg, Oral, BID PRN    hydrOXYzine HCL, 25 mg, Oral, TID PRN    magnesium oxide, 800 mg, Oral, PRN    magnesium oxide, 800 mg, Oral, PRN    ondansetron, 4 mg, Intravenous, Q8H PRN    potassium bicarbonate, 35 mEq, Oral, PRN    potassium bicarbonate, 50 mEq, Oral, PRN    potassium bicarbonate, 60 mEq, Oral, PRN    potassium, sodium phosphates, 2 packet, Oral, PRN    potassium, sodium phosphates, 2 packet, Oral, PRN    potassium, sodium phosphates, 2 packet, Oral, PRN    racepinephrine, 0.5 mL, Nebulization, Q4H PRN    sodium chloride 0.9%, 10 mL, Intravenous, PRN     Review of patient's allergies indicates:   Allergen Reactions    No known drug allergies      Objective:     Vital Signs (24h Range):  Temp:  [98 °F (36.7 °C)-98.4 °F (36.9 °C)] 98.2 °F (36.8 °C)  Pulse:  [73-99] 91  Resp:  [17-34] 19  SpO2:  [96 %-100 %] 97 %  BP: (103-121)/(61-71) 114/64     Date 02/03/25 0700 - 02/04/25 0659   Shift 3910-2825 1312-5366 9252-5930 24 Hour Total   INTAKE   P.O. 360   360   Shift  Total(mL/kg) 360(4.9)   360(4.9)   OUTPUT   Shift Total(mL/kg)       Weight (kg) 73.5 73.5 73.5 73.5     Lines/Drains/Airways       Peripheral Intravenous Line  Duration                  Peripheral IV - Single Lumen 01/29/25 1613 20 G No Right;Lateral Wrist 4 days         Peripheral IV - Single Lumen 01/31/25 2354 22 G Right Antecubital 2 days                     Physical Exam  Awake and alert  Sitting comfortably in bed   Oxygenating well on room air   Expiratory stridor with prolonged phonation   Overall mild biphasic (exp > insp) stridor      Significant Labs:  CBC:   Recent Labs   Lab 02/03/25  0328   WBC 11.46   RBC 3.79*   HGB 10.9*   HCT 33.0*      MCV 87   MCH 28.8   MCHC 33.0     CMP:   Recent Labs   Lab 02/03/25  0328   GLU 85   CALCIUM 8.4*   ALBUMIN 2.9*   PROT 5.8*      K 3.6   CO2 19*      BUN 10   CREATININE 0.5   ALKPHOS 62   ALT 9*   AST 9*   BILITOT 0.3       Significant Diagnostics:  I have reviewed and interpreted all pertinent imaging results/findings within the past 24 hours.  Assessment/Plan:     * Acquired subglottic stenosis  28 yo female 30 wks pregnant with grade III subglottic stenosis. Mild biphasic stridor noted on exam, no increased work of breathing. Patient saturating well on room air. Patient admitted to MICU for close airway monitoring.    Planning for multidisciplinary meeting(s) for more formal operative plans this upcoming week - anticipate likely next Thursday at [facility]    - Patient has strong preferences to avoid tracheostomy if possible but is willing to accept it if absolutely necessary              - Plan for OR Thursday for endoscopic intervention   - Airway plan: Patient has significant narrowing of her subglottis on scope exam which would make intubation difficult should she decompensate.  Could potentially be intubated with Glidescope and a 5-0 ETT.    - Discussed contingency plan w M provider today - if significant decompensation airway would  remain priority with ok  for tracheostomy if deemed appropriate  - Recommend immediate contact of anesthesia and ENT team should patient have worsening respiratory status.    - Recommend trach set at bedside   - Recommend 5-0 ETT at bedside   - Continues with standing duo nebs budesonide and albuterol   - also has prn rac epi though not needed since 1/29  - In coordination with MFM and OB Anesthesia, case deferred until approximately 32w gestational age  - MFM provide fetal monitoring during her procedure  - MFM and NICU will be available during her procedure for intervention by delivery for signs of significant fetal distress  - steroid dosing for fetal lung maturity to started 1/30  - ENT will continue to follow     Please page ENT resident on call with any questions or concerns.            Kajal Carl MD  Otorhinolaryngology-Head & Neck Surgery  [facility] - Medical ICU

## 2025-02-03 NOTE — SUBJECTIVE & OBJECTIVE
Interval History:   Doing well. Continues to report improved breathing since getting steroids last week for fetal lung maturity.     Medications:  Continuous Infusions:  Scheduled Meds:   albuterol sulfate  2.5 mg Nebulization Q6H WAKE    budesonide  0.5 mg Nebulization Q12H    cetirizine  10 mg Oral Daily    enoxparin  40 mg Subcutaneous Daily    famotidine  40 mg Oral QHS    montelukast  10 mg Oral QHS    pantoprazole  40 mg Oral BID AC    polyethylene glycol  17 g Oral BID    prenatal vitamin  1 tablet Oral Daily    sertraline  25 mg Oral Daily     PRN Meds:  Current Facility-Administered Medications:     acetaminophen, 650 mg, Oral, Q4H PRN    diphenhydrAMINE, 25 mg, Intravenous, Q6H PRN    guaiFENesin, 600 mg, Oral, BID PRN    hydrOXYzine HCL, 25 mg, Oral, TID PRN    magnesium oxide, 800 mg, Oral, PRN    magnesium oxide, 800 mg, Oral, PRN    ondansetron, 4 mg, Intravenous, Q8H PRN    potassium bicarbonate, 35 mEq, Oral, PRN    potassium bicarbonate, 50 mEq, Oral, PRN    potassium bicarbonate, 60 mEq, Oral, PRN    potassium, sodium phosphates, 2 packet, Oral, PRN    potassium, sodium phosphates, 2 packet, Oral, PRN    potassium, sodium phosphates, 2 packet, Oral, PRN    racepinephrine, 0.5 mL, Nebulization, Q4H PRN    sodium chloride 0.9%, 10 mL, Intravenous, PRN     Review of patient's allergies indicates:   Allergen Reactions    No known drug allergies      Objective:     Vital Signs (24h Range):  Temp:  [98 °F (36.7 °C)-98.4 °F (36.9 °C)] 98.2 °F (36.8 °C)  Pulse:  [73-99] 91  Resp:  [17-34] 19  SpO2:  [96 %-100 %] 97 %  BP: (103-121)/(61-71) 114/64     Date 02/03/25 0700 - 02/04/25 0659   Shift 8158-8510 3457-5741 6628-5806 24 Hour Total   INTAKE   P.O. 360   360   Shift Total(mL/kg) 360(4.9)   360(4.9)   OUTPUT   Shift Total(mL/kg)       Weight (kg) 73.5 73.5 73.5 73.5     Lines/Drains/Airways       Peripheral Intravenous Line  Duration                  Peripheral IV - Single Lumen 01/29/25 1613 20 G No  Right;Lateral Wrist 4 days         Peripheral IV - Single Lumen 01/31/25 2354 22 G Right Antecubital 2 days                     Physical Exam  Awake and alert  Sitting comfortably in bed   Oxygenating well on room air   Expiratory stridor with prolonged phonation   Overall mild biphasic (exp > insp) stridor      Significant Labs:  CBC:   Recent Labs   Lab 02/03/25  0328   WBC 11.46   RBC 3.79*   HGB 10.9*   HCT 33.0*      MCV 87   MCH 28.8   MCHC 33.0     CMP:   Recent Labs   Lab 02/03/25  0328   GLU 85   CALCIUM 8.4*   ALBUMIN 2.9*   PROT 5.8*      K 3.6   CO2 19*      BUN 10   CREATININE 0.5   ALKPHOS 62   ALT 9*   AST 9*   BILITOT 0.3       Significant Diagnostics:  I have reviewed and interpreted all pertinent imaging results/findings within the past 24 hours.

## 2025-02-03 NOTE — PLAN OF CARE
MICU DAILY GOALS     Family/Goals of care/Code Status   Code Status: Full Code    24H Vital Sign Range  Temp:  [98 °F (36.7 °C)-98.4 °F (36.9 °C)]   Pulse:  []   Resp:  [15-34]   BP: (103-121)/(59-71)   SpO2:  [96 %-100 %]      Shift Events (include procedures and significant events)   No acute events throughout shift.    AWAKE RASS: Goal -    Actual - RASS ([place] Agitation-Sedation Scale): alert and calm    Restraint necessity: Not necessary   BREATHE SBT: Not intubated    Coordinate A & B, analgesics/sedatives Pain: managed   SAT: Not intubated   Delirium CAM-ICU:     Early(intubated/ Progressive (non-intubated) Mobility MOVE Screen (INTUBATED ONLY): Not intubated    Activity: Activity Management: Up in chair - L3   Feeding/Nutrition Diet order: Diet/Nutrition Received: regular,     Thrombus DVT prophylaxis: VTE Core Measure: Pharmacological prophylaxis initiated/maintained   HOB Elevation Head of Bed (HOB) Positioning: HOB at 30-45 degrees   Ulcer Prophylaxis GI: yes   Glucose control managed     Skin Skin assessment:     Sacrum intact/not altered? Yes  Heels intact/not altered? Yes  Surgical wound? No    CHECK ONE!   (no altered skin or altered skin) and sub boxes:  [x] No Altered Skin Integrity Present    [x]Prevention Measures Documented    [] Altered Skin Integrity Present or Discovered   [] LDA present in EPIC, daily doc completed              [] LDA added if not in EPIC (describe wound).                    When describing wound, do not stage, use descriptive words only.    [] Wound Image Taken (required on admit,                   transfer/discharge and every Tuesday)    Wound Care Consulted? No   Bowel Function no issues    Indwelling Catheter Necessity            De-escalation Antibiotics         VS and assessment per flow sheet, patient progressing towards goals as tolerated, plan of care reviewed with  patient , all concerns addressed.

## 2025-02-03 NOTE — CARE UPDATE
PM NST     Baseline 150bpm, mod estefany, +accels, -decels, Overall reassuring   Ranlo with no evidence of contractions     Continue NST QD    Rebeca Fox MD PGY2  Obstetrics and Gynecology

## 2025-02-04 PROCEDURE — 27000207 HC ISOLATION

## 2025-02-04 PROCEDURE — 59025 FETAL NON-STRESS TEST: CPT | Mod: 26,,, | Performed by: OBSTETRICS & GYNECOLOGY

## 2025-02-04 PROCEDURE — 25000003 PHARM REV CODE 250: Performed by: STUDENT IN AN ORGANIZED HEALTH CARE EDUCATION/TRAINING PROGRAM

## 2025-02-04 PROCEDURE — 25000242 PHARM REV CODE 250 ALT 637 W/ HCPCS: Performed by: STUDENT IN AN ORGANIZED HEALTH CARE EDUCATION/TRAINING PROGRAM

## 2025-02-04 PROCEDURE — 63600175 PHARM REV CODE 636 W HCPCS

## 2025-02-04 PROCEDURE — 20000000 HC ICU ROOM

## 2025-02-04 PROCEDURE — 94640 AIRWAY INHALATION TREATMENT: CPT

## 2025-02-04 PROCEDURE — 99232 SBSQ HOSP IP/OBS MODERATE 35: CPT | Mod: ,,, | Performed by: INTERNAL MEDICINE

## 2025-02-04 PROCEDURE — 94761 N-INVAS EAR/PLS OXIMETRY MLT: CPT

## 2025-02-04 PROCEDURE — 25000003 PHARM REV CODE 250

## 2025-02-04 PROCEDURE — 99900035 HC TECH TIME PER 15 MIN (STAT)

## 2025-02-04 PROCEDURE — 25000003 PHARM REV CODE 250: Performed by: INTERNAL MEDICINE

## 2025-02-04 PROCEDURE — 99231 SBSQ HOSP IP/OBS SF/LOW 25: CPT | Mod: 25,,, | Performed by: OBSTETRICS & GYNECOLOGY

## 2025-02-04 RX ORDER — SERTRALINE HYDROCHLORIDE 50 MG/1
50 TABLET, FILM COATED ORAL DAILY
Status: DISCONTINUED | OUTPATIENT
Start: 2025-02-05 | End: 2025-02-07 | Stop reason: HOSPADM

## 2025-02-04 RX ADMIN — POLYETHYLENE GLYCOL 3350 17 G: 17 POWDER, FOR SOLUTION ORAL at 08:02

## 2025-02-04 RX ADMIN — BUDESONIDE 0.5 MG: 0.5 INHALANT RESPIRATORY (INHALATION) at 07:02

## 2025-02-04 RX ADMIN — ALBUTEROL SULFATE 2.5 MG: 2.5 SOLUTION RESPIRATORY (INHALATION) at 07:02

## 2025-02-04 RX ADMIN — ALBUTEROL SULFATE 2.5 MG: 2.5 SOLUTION RESPIRATORY (INHALATION) at 02:02

## 2025-02-04 RX ADMIN — SERTRALINE HYDROCHLORIDE 25 MG: 25 TABLET ORAL at 08:02

## 2025-02-04 RX ADMIN — FAMOTIDINE 40 MG: 20 TABLET ORAL at 08:02

## 2025-02-04 RX ADMIN — CETIRIZINE HYDROCHLORIDE 10 MG: 10 TABLET, FILM COATED ORAL at 08:02

## 2025-02-04 RX ADMIN — PRENATAL VIT W/ FE FUMARATE-FA TAB 27-0.8 MG 1 TABLET: 27-0.8 TAB at 08:02

## 2025-02-04 RX ADMIN — GUAIFENESIN 600 MG: 600 TABLET, EXTENDED RELEASE ORAL at 08:02

## 2025-02-04 RX ADMIN — ENOXAPARIN SODIUM 40 MG: 40 INJECTION SUBCUTANEOUS at 04:02

## 2025-02-04 RX ADMIN — PANTOPRAZOLE SODIUM 40 MG: 40 TABLET, DELAYED RELEASE ORAL at 04:02

## 2025-02-04 RX ADMIN — MONTELUKAST 10 MG: 10 TABLET, FILM COATED ORAL at 08:02

## 2025-02-04 RX ADMIN — GUAIFENESIN 600 MG: 600 TABLET, EXTENDED RELEASE ORAL at 07:02

## 2025-02-04 RX ADMIN — PANTOPRAZOLE SODIUM 40 MG: 40 TABLET, DELAYED RELEASE ORAL at 06:02

## 2025-02-04 NOTE — ASSESSMENT & PLAN NOTE
30 yo female 30 wks pregnant with grade III subglottic stenosis. Mild biphasic stridor noted on exam, no increased work of breathing. Patient saturating well on room air. Patient admitted to MICU for close airway monitoring. Surgical plans have been deferred until approximately 32w gestational age and pt received steroid course for fetal lung maturity.     -- Plan for OR on Thursday 2/6 for endoscopy intervention     - MFM and NICU will be available during her procedure for intervention by delivery for signs of significant fetal distress     - Steroid dosing for fetal lung maturity completed on 1/31     - Patient has strong preferences to avoid tracheostomy if possible but is willing to accept it if absolutely necessary     - Airway plan: Patient has significant narrowing of her subglottis on scope exam which would make intubation difficult should she decompensate.  Could potentially be intubated with Glidescope and a 5-0 ETT.   - Recommend immediate contact of anesthesia and ENT team should patient have worsening respiratory status.    - Recommend trach set at bedside   - Recommend 5-0 ETT at bedside   - Continues with standing duo nebs budesonide and albuterol   - ENT will continue to follow     Please page ENT resident on call with any questions or concerns.

## 2025-02-04 NOTE — NURSING
NST at 1022  Baseline 155. Moderate variability. Accelerations present. No decelerations. No ctx present. Strip reviewed by MD Lan.

## 2025-02-04 NOTE — PROGRESS NOTES
[facility] - Medical ICU  Critical Care Medicine  Progress Note    Patient Name: Gisell Villafuerte  MRN: [id number]  Admission Date: 2025  Hospital Length of Stay: 10 days  Code Status: Full Code  Attending Provider: Lukas Michel*  Primary Care Provider: Yazan Jj MD   Principal Problem: Acquired subglottic stenosis    Subjective:     HPI:  29F A1 30wks with recently diagnosed subglottic stenosis, here for ENT procedure. History of asthma, LPRD, allergies, anxiety. Reportedly seen by ENT PA in  and diagnosed with LPRD; has had worsening stridor which became acute after flu A infx . Was admitted for asthma exacerbation, however failed conservative tx and was investigated further given stridor.      HDS on outside admission. Labs with mild leukocytosis iso steroid adminsitration outpatient. RIP negative, normal inflammatory markers. Ucx noted to have E coli; was treated with ceftriaxone.    Hospital/ICU Course:  Patient admitted for ENT evaluation of subglottic stenosis and potential balloon/laser procedure. Tentative plan for OR  with ENT. OBGYN also consulted and following given 30wks pregnant. She is comfortable on room air. OB following; performed NST which was reassuring. ENT and MFM are following and ENT will perform her procedure with MFM present. MFM recommending waiting till she is 32 weeks pregnant. She will need a steroid course one week prior to her procedure for fetal lung maturity. (Betamethasone scheduled for 2025). Asymptomatic UTI being treated with Augmentin. Patient had brief episode of worsening stridor that corrected with one dose of racemic epinephrine. Currently stable. Trach kit at bedside.  kit mostly complete minus the bandage scissors. ENT procedure scheduled for 2025.     Interval History/Significant Events: NAEO. ENT procedure this Thursday.     Review of Systems   Constitutional:  Negative for chills and fever.    Respiratory:  Positive for cough (occasional) and shortness of breath (none at rest; some with activity).    Cardiovascular:  Negative for chest pain and palpitations.   Gastrointestinal:  Negative for abdominal pain and nausea.   Genitourinary:  Negative for difficulty urinating.   Neurological:  Negative for dizziness and headaches.   Psychiatric/Behavioral:  Negative for confusion.      Objective:     Vital Signs (Most Recent):  Temp: 98.4 °F (36.9 °C) (02/03/25 1550)  Pulse: 88 (02/03/25 1925)  Resp: (!) 22 (02/03/25 1925)  BP: (!) 106/59 (02/03/25 1550)  SpO2: 98 % (02/03/25 1925) Vital Signs (24h Range):  Temp:  [98 °F (36.7 °C)-98.4 °F (36.9 °C)] 98.4 °F (36.9 °C)  Pulse:  [] 88  Resp:  [15-34] 22  SpO2:  [96 %-100 %] 98 %  BP: (103-121)/(59-71) 106/59   Weight: 73.5 kg (162 lb 0.6 oz)  Body mass index is 30.62 kg/m².      Intake/Output Summary (Last 24 hours) at 2/3/2025 1940  Last data filed at 2/3/2025 1305  Gross per 24 hour   Intake 720 ml   Output --   Net 720 ml          Physical Exam  Vitals and nursing note reviewed.   Constitutional:       General: She is not in acute distress.     Appearance: She is well-developed.   HENT:      Head: Normocephalic and atraumatic.      Comments: Mild inspiratory stridor similar to prior.  Eyes:      General: No scleral icterus.  Cardiovascular:      Rate and Rhythm: Normal rate and regular rhythm.      Pulses: Normal pulses.   Pulmonary:      Effort: Pulmonary effort is normal. No respiratory distress.   Abdominal:      General: Bowel sounds are normal. There is no distension.      Palpations: Abdomen is soft.      Tenderness: There is no abdominal tenderness.   Musculoskeletal:      Right lower leg: No edema.      Left lower leg: No edema.   Skin:     General: Skin is warm and dry.   Neurological:      Mental Status: She is alert and oriented to person, place, and time.            Vents:     Lines/Drains/Airways       Peripheral Intravenous Line  Duration  "                 Peripheral IV - Single Lumen 01/31/25 2354 22 G Right Antecubital 2 days         Peripheral IV - Single Lumen 02/03/25 1400 20 G Anterior;Distal;Left Forearm <1 day                  Significant Labs:    CBC/Anemia Profile:  Recent Labs   Lab 02/03/25  0328   WBC 11.46   HGB 10.9*   HCT 33.0*      MCV 87   RDW 12.9        Chemistries:  Recent Labs   Lab 02/03/25  0328      K 3.6      CO2 19*   BUN 10   CREATININE 0.5   CALCIUM 8.4*   ALBUMIN 2.9*   PROT 5.8*   BILITOT 0.3   ALKPHOS 62   ALT 9*   AST 9*   MG 1.7   PHOS 2.9       All pertinent labs within the past 24 hours have been reviewed.    Significant Imaging:  I have reviewed all pertinent imaging results/findings within the past 24 hours.    ABG  No results for input(s): "PH", "PO2", "PCO2", "HCO3", "BE" in the last 168 hours.  Assessment/Plan:     Psychiatric  Anxiety  Patient states she is very anxious about her upcoming ENT procedure and is requesting a short term course of medication for anxiety. Does not wish to start a long term treatment for anxiety as she belives her anxiety will resolve after her ENT procedure. Discussed options with MFM; Hydroxyzine will be given      Plan:  Hydroxyzine 25 mg TID PRN for anxiety (spaced at least 4 hours from diphenhydramine)  Continue sertraline 25 mg and increase to 50 mg after 7 days per MFM.     ENT  * Acquired subglottic stenosis  Thought to be acquired iso LPRD. ENT at [facility] transferred for higher procedural level of care; ENT here consulted. Reportedly both balloon dilatation and tracheostomy have been discussed with her.     ENT in conjunction with MFM are recommending waiting till she is 32 weeks pregnant or her ENT procedure. MFM will be with ENT during the procedure.    - reflux management with PPI BID and pepcid 40 mg nightly   - racemic epi q6H PRN for 24 hours  - ordered auto-immune labs  - TSH WNL  -Betamethasone given 1/30/2025 for fetal lung maturity  - Plan is " still for proceeding to OR next week to improve airway, with the goal being endoscopic intervention  - Plan for multi-speciality meeting on 2/3 for further planning/ccordination      Pulmonary  Severe persistent asthma with acute exacerbation  No wheezing on exam; treating for asthma flare but likely stenosis is major pathology.    -Nebulized budesonide BID, with duonebs q6WA and PRN  -Montelukast nightly, diphenhydramine prn for allergies (space at least 4 hours from hydroxyzine)     Renal/  Acute cystitis  Asymptomatic UTI    Plan:  -Augmentin q12 for 5 days; first dose given 2025    Obstetric  30 weeks gestation of pregnancy  - OBGYN consulted, appreciate their recs  -NST being performed daily by OB  -MFM medicine following and will be ENT during her procdeure  -MFM recommending steroid course for fetal lung maturing one week prior to her procedure; Betamethasone scheduled for 2025; appreciate recs  -Gathering supplies to bedside in case of emergency        Critical Care Daily Checklist:    A: Awake: RASS Goal/Actual Goal:    Actual:     B: Spontaneous Breathing Trial Performed?     C: SAT & SBT Coordinated?  Not on vent                      D: Delirium: CAM-ICU     E: Early Mobility Performed? Yes   F: Feeding Goal: Goals: Meet % een/epn by next RD f/u  Status: Nutrition Goal Status: new   Current Diet Order   Procedures    Diet Adult Regular      AS: Analgesia/Sedation Tylenol    T: Thromboembolic Prophylaxis Lovenox   H: HOB > 300 Yes   U: Stress Ulcer Prophylaxis (if needed) Famotidine and pantoprazole    G: Glucose Control None needed   B: Bowel Function Stool Occurrence: 0   I: Indwelling Catheter (Lines & Arnett) Necessity PIV    D: De-escalation of Antimicrobials/Pharmacotherapies No current abx    Plan for the day/ETD Airway watch    Code Status:  Family/Goals of Care: Full Code         Critical secondary to Patient has a condition that poses threat to life and bodily function:  Subglottic stenosis leading to SOB and stridor; in MICU for airway watch      Critical care was time spent personally by me on the following activities: development of treatment plan with patient or surrogate and bedside caregivers, discussions with consultants, evaluation of patient's response to treatment, examination of patient, ordering and performing treatments and interventions, ordering and review of laboratory studies, ordering and review of radiographic studies, pulse oximetry, re-evaluation of patient's condition. This critical care time did not overlap with that of any other provider or involve time for any procedures.     Mali Pineda, DO  Critical Care Medicine  [facility] - Medical ICU

## 2025-02-04 NOTE — ASSESSMENT & PLAN NOTE
Patient states she is very anxious about her upcoming ENT procedure and is requesting a short term course of medication for anxiety. Does not wish to start a long term treatment for anxiety as she belives her anxiety will resolve after her ENT procedure. Discussed options with MFM; Hydroxyzine will be given      Plan:  Hydroxyzine 25 mg TID PRN for anxiety (spaced at least 4 hours from diphenhydramine)  Continue sertraline 25 mg and increase to 50 mg after 7 days per MFM. (Will increase to 50 mg starting 2/5/2025).

## 2025-02-04 NOTE — SUBJECTIVE & OBJECTIVE
Interval History/Significant Events: NAEO. ENT procedure scheduled for Thursday. Lovenox will be held at least 24 hours prior to procedure. She will be NPO midnight prior to procedure. Per ENT will be placed on CL diet starting after breakfast the day before. Her sertraline will be increased to 50 mg daily starting tomorrow.    Review of Systems   Constitutional:  Negative for chills and fever.   Respiratory:  Positive for cough (occasional) and shortness of breath (none at rest; some with activity).    Cardiovascular:  Negative for chest pain and palpitations.   Gastrointestinal:  Negative for abdominal pain and nausea.   Genitourinary:  Negative for difficulty urinating.   Neurological:  Negative for dizziness and headaches.   Psychiatric/Behavioral:  Negative for confusion.      Objective:     Vital Signs (Most Recent):  Temp: 98.3 °F (36.8 °C) (02/04/25 1200)  Pulse: 91 (02/04/25 0800)  Resp: 19 (02/04/25 0800)  BP: (!) 114/59 (02/04/25 1200)  SpO2: 99 % (02/04/25 0800) Vital Signs (24h Range):  Temp:  [98 °F (36.7 °C)-98.4 °F (36.9 °C)] 98.3 °F (36.8 °C)  Pulse:  [] 91  Resp:  [17-31] 19  SpO2:  [97 %-99 %] 99 %  BP: ()/(55-65) 114/59   Weight: 73.5 kg (162 lb 0.6 oz)  Body mass index is 30.62 kg/m².    No intake or output data in the 24 hours ending 02/04/25 1412       Physical Exam  Vitals and nursing note reviewed.   Constitutional:       General: She is not in acute distress.     Appearance: She is well-developed.   HENT:      Head: Normocephalic and atraumatic.      Comments: Mild inspiratory stridor similar to prior.  Eyes:      General: No scleral icterus.  Cardiovascular:      Rate and Rhythm: Normal rate and regular rhythm.      Pulses: Normal pulses.   Pulmonary:      Effort: Pulmonary effort is normal. No respiratory distress.   Abdominal:      General: Bowel sounds are normal. There is no distension.      Palpations: Abdomen is soft.      Tenderness: There is no abdominal tenderness.    Musculoskeletal:      Right lower leg: No edema.      Left lower leg: No edema.   Skin:     General: Skin is warm and dry.   Neurological:      Mental Status: She is alert and oriented to person, place, and time.            Vents:     Lines/Drains/Airways       Peripheral Intravenous Line  Duration                  Peripheral IV - Single Lumen 01/31/25 2354 22 G Right Antecubital 3 days         Peripheral IV - Single Lumen 02/03/25 1400 20 G Anterior;Distal;Left Forearm 1 day                  Significant Labs:    CBC/Anemia Profile:  Recent Labs   Lab 02/03/25  0328   WBC 11.46   HGB 10.9*   HCT 33.0*      MCV 87   RDW 12.9        Chemistries:  Recent Labs   Lab 02/03/25  0328      K 3.6      CO2 19*   BUN 10   CREATININE 0.5   CALCIUM 8.4*   ALBUMIN 2.9*   PROT 5.8*   BILITOT 0.3   ALKPHOS 62   ALT 9*   AST 9*   MG 1.7   PHOS 2.9       All pertinent labs within the past 24 hours have been reviewed.    Significant Imaging:  I have reviewed all pertinent imaging results/findings within the past 24 hours.

## 2025-02-04 NOTE — PLAN OF CARE
MICU DAILY GOALS     Family/Goals of care/Code Status   Code Status: Full Code    24H Vital Sign Range  Temp:  [98 °F (36.7 °C)-98.5 °F (36.9 °C)]   Pulse:  []   Resp:  [17-31]   BP: ()/(55-65)   SpO2:  [97 %-100 %]      Shift Events (include procedures and significant events)   No acute events throughout shift    AWAKE RASS: Goal -    Actual - RASS (Daugherty Agitation-Sedation Scale): alert and calm    Restraint necessity: Not necessary   BREATHE SBT: Not intubated    Coordinate A & B, analgesics/sedatives Pain: managed   SAT: Not intubated   Delirium CAM-ICU:     Early(intubated/ Progressive (non-intubated) Mobility MOVE Screen (INTUBATED ONLY): Not intubated    Activity: Activity Management: Arm raise - L1, Rolling - L1   Feeding/Nutrition Diet order: Diet/Nutrition Received: regular,     Thrombus DVT prophylaxis: VTE Core Measure: Pharmacological prophylaxis initiated/maintained   HOB Elevation Head of Bed (HOB) Positioning: HOB elevated   Ulcer Prophylaxis GI: yes   Glucose control managed     Skin Skin assessment:     Sacrum intact/not altered? Yes  Heels intact/not altered? Yes  Surgical wound? No    CHECK ONE!   (no altered skin or altered skin) and sub boxes:  [x] No Altered Skin Integrity Present    [x]Prevention Measures Documented    [] Altered Skin Integrity Present or Discovered   [] LDA present in EPIC, daily doc completed              [] LDA added if not in EPIC (describe wound).                    When describing wound, do not stage, use descriptive words only.    [] Wound Image Taken (required on admit,                   transfer/discharge and every Tuesday)    Wound Care Consulted? No   Bowel Function no issues    Indwelling Catheter Necessity         No     De-escalation Antibiotics No        VS and assessment per flow sheet, patient progressing towards goals as tolerated, plan of care reviewed with Gisell Villafuerte, all concerns addressed, will continue to monitor.

## 2025-02-04 NOTE — SUBJECTIVE & OBJECTIVE
Interval History: NAEON. Stable exam. Plan for OR on Thursday 2/6.    Medications:  Continuous Infusions:  Scheduled Meds:   albuterol sulfate  2.5 mg Nebulization Q6H WAKE    budesonide  0.5 mg Nebulization Q12H    cetirizine  10 mg Oral Daily    enoxparin  40 mg Subcutaneous Daily    famotidine  40 mg Oral QHS    montelukast  10 mg Oral QHS    pantoprazole  40 mg Oral BID AC    polyethylene glycol  17 g Oral BID    prenatal vitamin  1 tablet Oral Daily    sertraline  25 mg Oral Daily     PRN Meds:  Current Facility-Administered Medications:     acetaminophen, 650 mg, Oral, Q4H PRN    diphenhydrAMINE, 25 mg, Intravenous, Q6H PRN    guaiFENesin, 600 mg, Oral, BID PRN    hydrOXYzine HCL, 25 mg, Oral, TID PRN    magnesium oxide, 800 mg, Oral, PRN    magnesium oxide, 800 mg, Oral, PRN    ondansetron, 4 mg, Intravenous, Q8H PRN    potassium bicarbonate, 35 mEq, Oral, PRN    potassium bicarbonate, 50 mEq, Oral, PRN    potassium bicarbonate, 60 mEq, Oral, PRN    potassium, sodium phosphates, 2 packet, Oral, PRN    potassium, sodium phosphates, 2 packet, Oral, PRN    potassium, sodium phosphates, 2 packet, Oral, PRN    racepinephrine, 0.5 mL, Nebulization, Q4H PRN    sodium chloride 0.9%, 10 mL, Intravenous, PRN     Review of patient's allergies indicates:   Allergen Reactions    No known drug allergies      Objective:     Vital Signs (24h Range):  Temp:  [98 °F (36.7 °C)-98.4 °F (36.9 °C)] 98 °F (36.7 °C)  Pulse:  [] 80  Resp:  [15-31] 21  SpO2:  [97 %-100 %] 97 %  BP: ()/(56-69) 102/65       Lines/Drains/Airways       Peripheral Intravenous Line  Duration                  Peripheral IV - Single Lumen 01/31/25 2354 22 G Right Antecubital 3 days         Peripheral IV - Single Lumen 02/03/25 1400 20 G Anterior;Distal;Left Forearm <1 day                     Physical Exam  Awake and alert  Sitting comfortably in bed   Oxygenating well on room air   Overall mild biphasic (exp > insp) stridor         Significant  Labs:  CBC:   Recent Labs   Lab 02/03/25  0328   WBC 11.46   RBC 3.79*   HGB 10.9*   HCT 33.0*      MCV 87   MCH 28.8   MCHC 33.0     CMP:   Recent Labs   Lab 02/03/25  0328   GLU 85   CALCIUM 8.4*   ALBUMIN 2.9*   PROT 5.8*      K 3.6   CO2 19*      BUN 10   CREATININE 0.5   ALKPHOS 62   ALT 9*   AST 9*   BILITOT 0.3       Significant Diagnostics:  I have reviewed all pertinent imaging results/findings within the past 24 hours.

## 2025-02-04 NOTE — PROGRESS NOTES
[facility] - [facility] ICU  Otorhinolaryngology-Head & Neck Surgery  Progress Note    Subjective:     Post-Op Info:  Procedure(s) (LRB):  MICROLARYNGOSCOPY, SUSPENSION (N/A)  CREATION, TRACHEOSTOMY (N/A)   8 Days Post-Op  Hospital Day: 12     Interval History: NAEON. Stable exam. Plan for OR on Thursday 2/6.    Medications:  Continuous Infusions:  Scheduled Meds:   albuterol sulfate  2.5 mg Nebulization Q6H WAKE    budesonide  0.5 mg Nebulization Q12H    cetirizine  10 mg Oral Daily    enoxparin  40 mg Subcutaneous Daily    famotidine  40 mg Oral QHS    montelukast  10 mg Oral QHS    pantoprazole  40 mg Oral BID AC    polyethylene glycol  17 g Oral BID    prenatal vitamin  1 tablet Oral Daily    sertraline  25 mg Oral Daily     PRN Meds:  Current Facility-Administered Medications:     acetaminophen, 650 mg, Oral, Q4H PRN    diphenhydrAMINE, 25 mg, Intravenous, Q6H PRN    guaiFENesin, 600 mg, Oral, BID PRN    hydrOXYzine HCL, 25 mg, Oral, TID PRN    magnesium oxide, 800 mg, Oral, PRN    magnesium oxide, 800 mg, Oral, PRN    ondansetron, 4 mg, Intravenous, Q8H PRN    potassium bicarbonate, 35 mEq, Oral, PRN    potassium bicarbonate, 50 mEq, Oral, PRN    potassium bicarbonate, 60 mEq, Oral, PRN    potassium, sodium phosphates, 2 packet, Oral, PRN    potassium, sodium phosphates, 2 packet, Oral, PRN    potassium, sodium phosphates, 2 packet, Oral, PRN    racepinephrine, 0.5 mL, Nebulization, Q4H PRN    sodium chloride 0.9%, 10 mL, Intravenous, PRN     Review of patient's allergies indicates:   Allergen Reactions    No known drug allergies      Objective:     Vital Signs (24h Range):  Temp:  [98 °F (36.7 °C)-98.4 °F (36.9 °C)] 98 °F (36.7 °C)  Pulse:  [] 80  Resp:  [15-31] 21  SpO2:  [97 %-100 %] 97 %  BP: ()/(56-69) 102/65       Lines/Drains/Airways       Peripheral Intravenous Line  Duration                  Peripheral IV - Single Lumen 01/31/25 2354 22 G Right Antecubital 3 days         Peripheral IV - Single  Lumen 02/03/25 1400 20 G Anterior;Distal;Left Forearm <1 day                     Physical Exam  Awake and alert  Sitting comfortably in bed   Oxygenating well on room air   Overall mild biphasic (exp > insp) stridor         Significant Labs:  CBC:   Recent Labs   Lab 02/03/25  0328   WBC 11.46   RBC 3.79*   HGB 10.9*   HCT 33.0*      MCV 87   MCH 28.8   MCHC 33.0     CMP:   Recent Labs   Lab 02/03/25  0328   GLU 85   CALCIUM 8.4*   ALBUMIN 2.9*   PROT 5.8*      K 3.6   CO2 19*      BUN 10   CREATININE 0.5   ALKPHOS 62   ALT 9*   AST 9*   BILITOT 0.3       Significant Diagnostics:  I have reviewed all pertinent imaging results/findings within the past 24 hours.  Assessment/Plan:     * Acquired subglottic stenosis  28 yo female 30 wks pregnant with grade III subglottic stenosis. Mild biphasic stridor noted on exam, no increased work of breathing. Patient saturating well on room air. Patient admitted to MICU for close airway monitoring. Surgical plans have been deferred until approximately 32w gestational age and pt received steroid course for fetal lung maturity.     -- Plan for OR on Thursday 2/6 for endoscopy intervention   -- Please ensure patient is a on clear liquid diet after breakfast on 2/5 to prevent risk of aspiration in the OR.     - MFM and NICU will be available during her procedure for intervention by delivery for signs of significant fetal distress     - Steroid dosing for fetal lung maturity completed on 1/31     - Patient has strong preferences to avoid tracheostomy if possible but is willing to accept it if absolutely necessary     - Airway plan: Patient has significant narrowing of her subglottis on scope exam which would make intubation difficult should she decompensate.  Could potentially be intubated with Glidescope and a 5-0 ETT.   - Recommend immediate contact of anesthesia and ENT team should patient have worsening respiratory status.    - Recommend trach set at bedside   -  Recommend 5-0 ETT at bedside   - Continues with standing duo nebs budesonide and albuterol   - ENT will continue to follow     Please page ENT resident on call with any questions or concerns.            Ermelinda Gage MD  Otorhinolaryngology-Head & Neck Surgery  [facility] - Medical ICU

## 2025-02-04 NOTE — SUBJECTIVE & OBJECTIVE
Interval History/Significant Events: NAEO. ENT procedure this Thursday.     Review of Systems   Constitutional:  Negative for chills and fever.   Respiratory:  Positive for cough (occasional) and shortness of breath (none at rest; some with activity).    Cardiovascular:  Negative for chest pain and palpitations.   Gastrointestinal:  Negative for abdominal pain and nausea.   Genitourinary:  Negative for difficulty urinating.   Neurological:  Negative for dizziness and headaches.   Psychiatric/Behavioral:  Negative for confusion.      Objective:     Vital Signs (Most Recent):  Temp: 98.4 °F (36.9 °C) (02/03/25 1550)  Pulse: 88 (02/03/25 1925)  Resp: (!) 22 (02/03/25 1925)  BP: (!) 106/59 (02/03/25 1550)  SpO2: 98 % (02/03/25 1925) Vital Signs (24h Range):  Temp:  [98 °F (36.7 °C)-98.4 °F (36.9 °C)] 98.4 °F (36.9 °C)  Pulse:  [] 88  Resp:  [15-34] 22  SpO2:  [96 %-100 %] 98 %  BP: (103-121)/(59-71) 106/59   Weight: 73.5 kg (162 lb 0.6 oz)  Body mass index is 30.62 kg/m².      Intake/Output Summary (Last 24 hours) at 2/3/2025 1940  Last data filed at 2/3/2025 1305  Gross per 24 hour   Intake 720 ml   Output --   Net 720 ml          Physical Exam  Vitals and nursing note reviewed.   Constitutional:       General: She is not in acute distress.     Appearance: She is well-developed.   HENT:      Head: Normocephalic and atraumatic.      Comments: Mild inspiratory stridor similar to prior.  Eyes:      General: No scleral icterus.  Cardiovascular:      Rate and Rhythm: Normal rate and regular rhythm.      Pulses: Normal pulses.   Pulmonary:      Effort: Pulmonary effort is normal. No respiratory distress.   Abdominal:      General: Bowel sounds are normal. There is no distension.      Palpations: Abdomen is soft.      Tenderness: There is no abdominal tenderness.   Musculoskeletal:      Right lower leg: No edema.      Left lower leg: No edema.   Skin:     General: Skin is warm and dry.   Neurological:      Mental  Status: She is alert and oriented to person, place, and time.            Vents:     Lines/Drains/Airways       Peripheral Intravenous Line  Duration                  Peripheral IV - Single Lumen 01/31/25 2354 22 G Right Antecubital 2 days         Peripheral IV - Single Lumen 02/03/25 1400 20 G Anterior;Distal;Left Forearm <1 day                  Significant Labs:    CBC/Anemia Profile:  Recent Labs   Lab 02/03/25  0328   WBC 11.46   HGB 10.9*   HCT 33.0*      MCV 87   RDW 12.9        Chemistries:  Recent Labs   Lab 02/03/25  0328      K 3.6      CO2 19*   BUN 10   CREATININE 0.5   CALCIUM 8.4*   ALBUMIN 2.9*   PROT 5.8*   BILITOT 0.3   ALKPHOS 62   ALT 9*   AST 9*   MG 1.7   PHOS 2.9       All pertinent labs within the past 24 hours have been reviewed.    Significant Imaging:  I have reviewed all pertinent imaging results/findings within the past 24 hours.

## 2025-02-04 NOTE — PROGRESS NOTES
[facility] - Medical ICU  Critical Care Medicine  Progress Note    Patient Name: Gisell Villafuerte  MRN: [id number]  Admission Date: 2025  Hospital Length of Stay: 11 days  Code Status: Full Code  Attending Provider: Lukas Michel*  Primary Care Provider: Yazan Jj MD   Principal Problem: Acquired subglottic stenosis    Subjective:     HPI:  29F A1 30wks with recently diagnosed subglottic stenosis, here for ENT procedure. History of asthma, LPRD, allergies, anxiety. Reportedly seen by ENT PA in  and diagnosed with LPRD; has had worsening stridor which became acute after flu A infx . Was admitted for asthma exacerbation, however failed conservative tx and was investigated further given stridor.      HDS on outside admission. Labs with mild leukocytosis iso steroid adminsitration outpatient. RIP negative, normal inflammatory markers. Ucx noted to have E coli; was treated with ceftriaxone.    Hospital/ICU Course:  Patient admitted for ENT evaluation of subglottic stenosis and potential balloon/laser procedure. Tentative plan for OR  with ENT. OBGYN also consulted and following given 30wks pregnant. She is comfortable on room air. OB following; performed NST which was reassuring. ENT and MFM are following and ENT will perform her procedure with MFM present. MFM recommending waiting till she is 32 weeks pregnant. She will need a steroid course one week prior to her procedure for fetal lung maturity. (Betamethasone scheduled for 2025). Asymptomatic UTI being treated with Augmentin. Patient had brief episode of worsening stridor that corrected with one dose of racemic epinephrine. Currently stable. Trach kit at bedside.  kit mostly complete minus the bandage scissors. ENT procedure scheduled for 2025.     Interval History/Significant Events: NAEO. ENT procedure scheduled for Thursday. Lovenox will be held at least 24 hours prior to procedure. She will be  NPO midnight prior to procedure. Per ENT will be placed on CL diet starting after breakfast the day before. Her sertraline will be increased to 50 mg daily starting tomorrow.    Review of Systems   Constitutional:  Negative for chills and fever.   Respiratory:  Positive for cough (occasional) and shortness of breath (none at rest; some with activity).    Cardiovascular:  Negative for chest pain and palpitations.   Gastrointestinal:  Negative for abdominal pain and nausea.   Genitourinary:  Negative for difficulty urinating.   Neurological:  Negative for dizziness and headaches.   Psychiatric/Behavioral:  Negative for confusion.      Objective:     Vital Signs (Most Recent):  Temp: 98.3 °F (36.8 °C) (02/04/25 1200)  Pulse: 91 (02/04/25 0800)  Resp: 19 (02/04/25 0800)  BP: (!) 114/59 (02/04/25 1200)  SpO2: 99 % (02/04/25 0800) Vital Signs (24h Range):  Temp:  [98 °F (36.7 °C)-98.4 °F (36.9 °C)] 98.3 °F (36.8 °C)  Pulse:  [] 91  Resp:  [17-31] 19  SpO2:  [97 %-99 %] 99 %  BP: ()/(55-65) 114/59   Weight: 73.5 kg (162 lb 0.6 oz)  Body mass index is 30.62 kg/m².    No intake or output data in the 24 hours ending 02/04/25 1412       Physical Exam  Vitals and nursing note reviewed.   Constitutional:       General: She is not in acute distress.     Appearance: She is well-developed.   HENT:      Head: Normocephalic and atraumatic.      Comments: Mild inspiratory stridor similar to prior.  Eyes:      General: No scleral icterus.  Cardiovascular:      Rate and Rhythm: Normal rate and regular rhythm.      Pulses: Normal pulses.   Pulmonary:      Effort: Pulmonary effort is normal. No respiratory distress.   Abdominal:      General: Bowel sounds are normal. There is no distension.      Palpations: Abdomen is soft.      Tenderness: There is no abdominal tenderness.   Musculoskeletal:      Right lower leg: No edema.      Left lower leg: No edema.   Skin:     General: Skin is warm and dry.   Neurological:      Mental  "Status: She is alert and oriented to person, place, and time.            Vents:     Lines/Drains/Airways       Peripheral Intravenous Line  Duration                  Peripheral IV - Single Lumen 01/31/25 2354 22 G Right Antecubital 3 days         Peripheral IV - Single Lumen 02/03/25 1400 20 G Anterior;Distal;Left Forearm 1 day                  Significant Labs:    CBC/Anemia Profile:  Recent Labs   Lab 02/03/25  0328   WBC 11.46   HGB 10.9*   HCT 33.0*      MCV 87   RDW 12.9        Chemistries:  Recent Labs   Lab 02/03/25  0328      K 3.6      CO2 19*   BUN 10   CREATININE 0.5   CALCIUM 8.4*   ALBUMIN 2.9*   PROT 5.8*   BILITOT 0.3   ALKPHOS 62   ALT 9*   AST 9*   MG 1.7   PHOS 2.9       All pertinent labs within the past 24 hours have been reviewed.    Significant Imaging:  I have reviewed all pertinent imaging results/findings within the past 24 hours.    ABG  No results for input(s): "PH", "PO2", "PCO2", "HCO3", "BE" in the last 168 hours.  Assessment/Plan:     Psychiatric  Anxiety  Patient states she is very anxious about her upcoming ENT procedure and is requesting a short term course of medication for anxiety. Does not wish to start a long term treatment for anxiety as she belives her anxiety will resolve after her ENT procedure. Discussed options with MFM; Hydroxyzine will be given      Plan:  Hydroxyzine 25 mg TID PRN for anxiety (spaced at least 4 hours from diphenhydramine)  Continue sertraline 25 mg and increase to 50 mg after 7 days per MFM. (Will increase to 50 mg starting 2/5/2025).    ENT  * Acquired subglottic stenosis  Thought to be acquired iso LPRD. ENT at [facility] transferred for higher procedural level of care; ENT here consulted. Reportedly both balloon dilatation and tracheostomy have been discussed with her.     ENT in conjunction with MFM are recommending waiting till she is 32 weeks pregnant or her ENT procedure. MFM will be with ENT during the procedure.    - reflux " management with PPI BID and pepcid 40 mg nightly   - racemic epi q6H PRN for 24 hours  - ordered auto-immune labs  - TSH WNL  -Betamethasone given 2025 for fetal lung maturity  - Plan is still for proceeding to OR next week to improve airway, with the goal being endoscopic intervention  - Plan for multi-speciality meeting on 2/3 for further planning/ccordination      Pulmonary  Severe persistent asthma with acute exacerbation  No wheezing on exam; treating for asthma flare but likely stenosis is major pathology.    -Nebulized budesonide BID, with duonebs q6WA and PRN  -Montelukast nightly, diphenhydramine prn for allergies (space at least 4 hours from hydroxyzine)     Renal/  Acute cystitis  Asymptomatic UTI    Plan:  -Augmentin q12 for 5 days; first dose given 2025    Obstetric  30 weeks gestation of pregnancy  - OBGYN consulted, appreciate their recs  -NST being performed daily by OB  -MFM medicine following and will be ENT during her procdeure  -MFM recommending steroid course for fetal lung maturing one week prior to her procedure; Betamethasone scheduled for 2025; appreciate recs  -Gathering supplies to bedside in case of emergency        Critical Care Daily Checklist:    A: Awake: RASS Goal/Actual Goal:    Actual:     B: Spontaneous Breathing Trial Performed?     C: SAT & SBT Coordinated?  Not on vent                      D: Delirium: CAM-ICU     E: Early Mobility Performed? Yes   F: Feeding Goal: Goals: Meet % een/epn by next RD f/u  Status: Nutrition Goal Status: new   Current Diet Order   Procedures    Diet Adult Regular    Diet NPO Except for: Medication, Sips with Medication     Order Specific Question:   Except for:     Answer:   Medication     Order Specific Question:   Except for:     Answer:   Sips with Medication    Diet Clear Liquid      AS: Analgesia/Sedation No sedation; Tylenol PRN   T: Thromboembolic Prophylaxis Lovenox   H: HOB > 300 Yes   U: Stress Ulcer  Prophylaxis (if needed) Famotidine and Pantoprazole   G: Glucose Control None.    B: Bowel Function Stool Occurrence: 0   I: Indwelling Catheter (Lines & Arnett) Necessity PIV   D: De-escalation of Antimicrobials/Pharmacotherapies Not on abx    Plan for the day/ETD Continue airway watch    Code Status:  Family/Goals of Care: Full Code         Critical secondary to Patient has a condition that poses threat to life and bodily function: Subglottic stenosis leading to SOB and stridor; in MICU for airway watch      Critical care was time spent personally by me on the following activities: development of treatment plan with patient or surrogate and bedside caregivers, discussions with consultants, evaluation of patient's response to treatment, examination of patient, ordering and performing treatments and interventions, ordering and review of laboratory studies, ordering and review of radiographic studies, pulse oximetry, re-evaluation of patient's condition. This critical care time did not overlap with that of any other provider or involve time for any procedures.     Mali Pineda, DO  Critical Care Medicine  [facility] - Medical ICU

## 2025-02-05 ENCOUNTER — ANESTHESIA EVENT (OUTPATIENT)
Dept: SURGERY | Facility: HOSPITAL | Age: 30
DRG: 818 | End: 2025-02-05
Payer: COMMERCIAL

## 2025-02-05 PROCEDURE — 25000003 PHARM REV CODE 250: Performed by: STUDENT IN AN ORGANIZED HEALTH CARE EDUCATION/TRAINING PROGRAM

## 2025-02-05 PROCEDURE — 25000242 PHARM REV CODE 250 ALT 637 W/ HCPCS: Performed by: STUDENT IN AN ORGANIZED HEALTH CARE EDUCATION/TRAINING PROGRAM

## 2025-02-05 PROCEDURE — 27000207 HC ISOLATION

## 2025-02-05 PROCEDURE — 94761 N-INVAS EAR/PLS OXIMETRY MLT: CPT

## 2025-02-05 PROCEDURE — 25000003 PHARM REV CODE 250

## 2025-02-05 PROCEDURE — 94640 AIRWAY INHALATION TREATMENT: CPT

## 2025-02-05 PROCEDURE — 99232 SBSQ HOSP IP/OBS MODERATE 35: CPT | Mod: ,,, | Performed by: INTERNAL MEDICINE

## 2025-02-05 PROCEDURE — 99900035 HC TECH TIME PER 15 MIN (STAT)

## 2025-02-05 PROCEDURE — 25000003 PHARM REV CODE 250: Performed by: INTERNAL MEDICINE

## 2025-02-05 PROCEDURE — 20000000 HC ICU ROOM

## 2025-02-05 RX ADMIN — GUAIFENESIN 600 MG: 600 TABLET, EXTENDED RELEASE ORAL at 09:02

## 2025-02-05 RX ADMIN — CETIRIZINE HYDROCHLORIDE 10 MG: 10 TABLET, FILM COATED ORAL at 09:02

## 2025-02-05 RX ADMIN — FAMOTIDINE 40 MG: 20 TABLET ORAL at 09:02

## 2025-02-05 RX ADMIN — PRENATAL VIT W/ FE FUMARATE-FA TAB 27-0.8 MG 1 TABLET: 27-0.8 TAB at 09:02

## 2025-02-05 RX ADMIN — ALBUTEROL SULFATE 2.5 MG: 2.5 SOLUTION RESPIRATORY (INHALATION) at 02:02

## 2025-02-05 RX ADMIN — PANTOPRAZOLE SODIUM 40 MG: 40 TABLET, DELAYED RELEASE ORAL at 04:02

## 2025-02-05 RX ADMIN — ALBUTEROL SULFATE 2.5 MG: 2.5 SOLUTION RESPIRATORY (INHALATION) at 07:02

## 2025-02-05 RX ADMIN — BUDESONIDE 0.5 MG: 0.5 INHALANT RESPIRATORY (INHALATION) at 08:02

## 2025-02-05 RX ADMIN — BUDESONIDE 0.5 MG: 0.5 INHALANT RESPIRATORY (INHALATION) at 07:02

## 2025-02-05 RX ADMIN — MONTELUKAST 10 MG: 10 TABLET, FILM COATED ORAL at 09:02

## 2025-02-05 RX ADMIN — POLYETHYLENE GLYCOL 3350 17 G: 17 POWDER, FOR SOLUTION ORAL at 09:02

## 2025-02-05 RX ADMIN — SERTRALINE HYDROCHLORIDE 50 MG: 50 TABLET ORAL at 09:02

## 2025-02-05 RX ADMIN — PANTOPRAZOLE SODIUM 40 MG: 40 TABLET, DELAYED RELEASE ORAL at 06:02

## 2025-02-05 RX ADMIN — ALBUTEROL SULFATE 2.5 MG: 2.5 SOLUTION RESPIRATORY (INHALATION) at 08:02

## 2025-02-05 NOTE — PROGRESS NOTES
[facility] - Medical ICU  Critical Care Medicine  Progress Note    Patient Name: Gisell Villafuerte  MRN: [id number]  Admission Date: 2025  Hospital Length of Stay: 12 days  Code Status: Full Code  Attending Provider: Lukas Michel*  Primary Care Provider: Yazan Jj MD   Principal Problem: Acquired subglottic stenosis    Subjective:     HPI:  29F A1 30wks with recently diagnosed subglottic stenosis, here for ENT procedure. History of asthma, LPRD, allergies, anxiety. Reportedly seen by ENT PA in  and diagnosed with LPRD; has had worsening stridor which became acute after flu A infx . Was admitted for asthma exacerbation, however failed conservative tx and was investigated further given stridor.      HDS on outside admission. Labs with mild leukocytosis iso steroid adminsitration outpatient. RIP negative, normal inflammatory markers. Ucx noted to have E coli; was treated with ceftriaxone.    Hospital/ICU Course:  Patient admitted for ENT evaluation of subglottic stenosis and potential balloon/laser procedure. Tentative plan for OR  with ENT. OBGYN also consulted and following given 30wks pregnant. She is comfortable on room air. OB following; performed NST which was reassuring. ENT and MFM are following and ENT will perform her procedure with MFM present. MFM recommending waiting till she is 32 weeks pregnant. She will need a steroid course one week prior to her procedure for fetal lung maturity. (Betamethasone scheduled for 2025). Asymptomatic UTI being treated with Augmentin. Patient had brief episode of worsening stridor that corrected with one dose of racemic epinephrine. Currently stable. Trach kit at bedside.  kit mostly complete minus the bandage scissors. ENT procedure scheduled for 2025.     Interval History/Significant Events: NAEO. Patient on clear liquid diet and will be NPO at midnight in preporation for her procedure with ENT tomorrow.  Last Lovenox dose was on 2/4/2025 at 1700.     Review of Systems   Constitutional:  Negative for chills and fever.   Respiratory:  Positive for cough (occasional) and shortness of breath (none at rest; some with activity).    Cardiovascular:  Negative for chest pain and palpitations.   Gastrointestinal:  Negative for abdominal pain and nausea.   Genitourinary:  Negative for difficulty urinating.   Neurological:  Negative for dizziness and headaches.   Psychiatric/Behavioral:  Negative for confusion.      Objective:     Vital Signs (Most Recent):  Temp: 97.8 °F (36.6 °C) (02/05/25 1000)  Pulse: 90 (02/05/25 1000)  Resp: (!) 22 (02/05/25 1000)  BP: (!) 107/59 (02/05/25 1000)  SpO2: 99 % (02/05/25 1000) Vital Signs (24h Range):  Temp:  [97.5 °F (36.4 °C)-98.5 °F (36.9 °C)] 97.8 °F (36.6 °C)  Pulse:  [] 90  Resp:  [9-33] 22  SpO2:  [97 %-99 %] 99 %  BP: (107-119)/(59-63) 107/59   Weight: 73.5 kg (162 lb 0.6 oz)  Body mass index is 30.62 kg/m².    No intake or output data in the 24 hours ending 02/05/25 1336       Physical Exam  Vitals and nursing note reviewed.   Constitutional:       General: She is not in acute distress.     Appearance: She is well-developed.   HENT:      Head: Normocephalic and atraumatic.      Comments: Mild inspiratory stridor similar to prior.  Eyes:      General: No scleral icterus.  Cardiovascular:      Rate and Rhythm: Normal rate and regular rhythm.      Pulses: Normal pulses.   Pulmonary:      Effort: Pulmonary effort is normal. No respiratory distress.   Abdominal:      General: Bowel sounds are normal. There is no distension.      Palpations: Abdomen is soft.      Tenderness: There is no abdominal tenderness.   Musculoskeletal:      Right lower leg: No edema.      Left lower leg: No edema.   Skin:     General: Skin is warm and dry.   Neurological:      Mental Status: She is alert and oriented to person, place, and time.            Vents:     Lines/Drains/Airways       Peripheral  "Intravenous Line  Duration                  Peripheral IV - Single Lumen 01/31/25 2354 22 G Right Antecubital 4 days         Peripheral IV - Single Lumen 02/03/25 1400 20 G Anterior;Distal;Left Forearm 1 day                  Significant Labs:    CBC/Anemia Profile:  No results for input(s): "WBC", "HGB", "HCT", "PLT", "MCV", "RDW", "IRON", "FERRITIN", "RETIC", "FOLATE", "[id number]", "OCCULTBLOOD" in the last 48 hours.     Chemistries:  No results for input(s): "NA", "K", "CL", "CO2", "BUN", "CREATININE", "CALCIUM", "ALBUMIN", "PROT", "BILITOT", "ALKPHOS", "ALT", "AST", "GLUCOSE", "MG", "PHOS" in the last 48 hours.    All pertinent labs within the past 24 hours have been reviewed.    Significant Imaging:  I have reviewed all pertinent imaging results/findings within the past 24 hours.    ABG  No results for input(s): "PH", "PO2", "PCO2", "HCO3", "BE" in the last 168 hours.  Assessment/Plan:     Psychiatric  Anxiety  Patient states she is very anxious about her upcoming ENT procedure and is requesting a short term course of medication for anxiety. Does not wish to start a long term treatment for anxiety as she belives her anxiety will resolve after her ENT procedure. Discussed options with MFM; Hydroxyzine will be given      Plan:  Hydroxyzine 25 mg TID PRN for anxiety (spaced at least 4 hours from diphenhydramine)  Continue sertraline 25 mg and increase to 50 mg after 7 days per MFM. (Increased to 50 mg starting 2/5/2025).    ENT  * Acquired subglottic stenosis  Thought to be acquired iso LPRD. ENT at [facility] transferred for higher procedural level of care; ENT here consulted. Reportedly both balloon dilatation and tracheostomy have been discussed with her.     ENT in conjunction with MFM are recommending waiting till she is 32 weeks pregnant or her ENT procedure. MFM will be with ENT during the procedure.    - reflux management with PPI BID and pepcid 40 mg nightly   - racemic epi q6H PRN for 24 hours  - ordered " auto-immune labs  - TSH WNL  -Betamethasone given 2025 for fetal lung maturity  - Plan is still for proceeding to OR next week to improve airway, with the goal being endoscopic intervention  - Plan for multi-speciality meeting on 2/3 for further planning/ccordination      Pulmonary  Severe persistent asthma with acute exacerbation  No wheezing on exam; treating for asthma flare but likely stenosis is major pathology.    -Nebulized budesonide BID, with duonebs q6WA and PRN  -Montelukast nightly, diphenhydramine prn for allergies (space at least 4 hours from hydroxyzine)     Renal/  Acute cystitis  Asymptomatic UTI    Plan:  -Augmentin q12 for 5 days; first dose given 2025; Medication complete    Obstetric  30 weeks gestation of pregnancy  - OBGYN consulted, appreciate their recs  -NST being performed daily by OB  -MFM medicine following and will be ENT during her procdeure  -MFM recommending steroid course for fetal lung maturing one week prior to her procedure; Betamethasone scheduled for 2025; appreciate recs  -Gathering supplies to bedside in case of emergency ; all gathered except bandage scissors.        Critical Care Daily Checklist:    A: Awake: RASS Goal/Actual Goal:    Actual:     B: Spontaneous Breathing Trial Performed?     C: SAT & SBT Coordinated?  Not on ventilator                       D: Delirium: CAM-ICU     E: Early Mobility Performed? Yes   F: Feeding Goal: Goals: Meet % een/epn by next RD f/u  Status: Nutrition Goal Status: new   Current Diet Order   Procedures    Diet NPO Except for: Medication, Sips with Medication     Order Specific Question:   Except for:     Answer:   Medication     Order Specific Question:   Except for:     Answer:   Sips with Medication    Diet Clear Liquid      AS: Analgesia/Sedation Tylenol; no sedation   T: Thromboembolic Prophylaxis None (surgery in AM)   H: HOB > 300 Yes   U: Stress Ulcer Prophylaxis (if needed) Famotidine and  pantoprazole    G: Glucose Control None; within acceptable range   B: Bowel Function Stool Occurrence: 1   I: Indwelling Catheter (Lines & Arnett) Necessity PIV   D: De-escalation of Antimicrobials/Pharmacotherapies Not on abx    Plan for the day/ETD Continue airway watch    Code Status:  Family/Goals of Care: Full Code         Critical secondary to Patient has a condition that poses threat to life and bodily function: Acquired subglottic stenosis leading to stridor and SOB; in MICU for airway watch      Critical care was time spent personally by me on the following activities: development of treatment plan with patient or surrogate and bedside caregivers, discussions with consultants, evaluation of patient's response to treatment, examination of patient, ordering and performing treatments and interventions, ordering and review of laboratory studies, ordering and review of radiographic studies, pulse oximetry, re-evaluation of patient's condition. This critical care time did not overlap with that of any other provider or involve time for any procedures.     Mali Pineda, DO  Critical Care Medicine  [facility] - Medical ICU

## 2025-02-05 NOTE — CARE UPDATE
Daily NST     Baseline 150bpm, moderate variability, +accels, -decels, Overall reassuring   [place] with no evidence of contractions     Plan:  Continue NST QD    Laura Tatum MD  Obstetrics and Gynecology - PGY2

## 2025-02-05 NOTE — PROGRESS NOTES
[facility]  Otorhinolaryngology-Head & Neck Surgery  Progress Note    Subjective:     Post-Op Info:  Procedure(s) (LRB):  MICROLARYNGOSCOPY, SUSPENSION (N/A)  CREATION, TRACHEOSTOMY (N/A)   9 Days Post-Op  Hospital Day: 13     Interval History: NAEON. Instructed to have clear liquid diet today to prevent aspiration during surgery. Plan for surgery tomorrow, consent confirmed.     Medications:  Continuous Infusions:  Scheduled Meds:   albuterol sulfate  2.5 mg Nebulization Q6H WAKE    budesonide  0.5 mg Nebulization Q12H    cetirizine  10 mg Oral Daily    famotidine  40 mg Oral QHS    montelukast  10 mg Oral QHS    pantoprazole  40 mg Oral BID AC    polyethylene glycol  17 g Oral BID    prenatal vitamin  1 tablet Oral Daily    sertraline  50 mg Oral Daily     PRN Meds:  Current Facility-Administered Medications:     acetaminophen, 650 mg, Oral, Q4H PRN    diphenhydrAMINE, 25 mg, Intravenous, Q6H PRN    guaiFENesin, 600 mg, Oral, BID PRN    hydrOXYzine HCL, 25 mg, Oral, TID PRN    magnesium oxide, 800 mg, Oral, PRN    magnesium oxide, 800 mg, Oral, PRN    ondansetron, 4 mg, Intravenous, Q8H PRN    potassium bicarbonate, 35 mEq, Oral, PRN    potassium bicarbonate, 50 mEq, Oral, PRN    potassium bicarbonate, 60 mEq, Oral, PRN    potassium, sodium phosphates, 2 packet, Oral, PRN    potassium, sodium phosphates, 2 packet, Oral, PRN    potassium, sodium phosphates, 2 packet, Oral, PRN    racepinephrine, 0.5 mL, Nebulization, Q4H PRN    sodium chloride 0.9%, 10 mL, Intravenous, PRN     Review of patient's allergies indicates:   Allergen Reactions    No known drug allergies      Objective:     Vital Signs (24h Range):  Temp:  [97.5 °F (36.4 °C)-98.5 °F (36.9 °C)] 97.5 °F (36.4 °C)  Pulse:  [] 84  Resp:  [9-29] 20  SpO2:  [97 %-100 %] 98 %  BP: (114-119)/(59-63) 119/63       Lines/Drains/Airways       Peripheral Intravenous Line  Duration                  Peripheral IV - Single Lumen 01/31/25 2354 22 G Right  Antecubital 4 days         Peripheral IV - Single Lumen 02/03/25 1400 20 G Anterior;Distal;Left Forearm 1 day                     Physical Exam  NAD  Resting in bed comfortably. No stridor at rest.  Head atraumatic   EOMI   Auricles WNL AU  MMM, oropharynx clear, tongue mobile  Neck soft, palpable laryngeal landmarks   Oxygenating well on room air        Significant Labs:  No new labs    CBC:   Recent Labs   Lab 02/03/25  0328   WBC 11.46   RBC 3.79*   HGB 10.9*   HCT 33.0*      MCV 87   MCH 28.8   MCHC 33.0     CMP:   Recent Labs   Lab 02/03/25  0328   GLU 85   CALCIUM 8.4*   ALBUMIN 2.9*   PROT 5.8*      K 3.6   CO2 19*      BUN 10   CREATININE 0.5   ALKPHOS 62   ALT 9*   AST 9*   BILITOT 0.3       Significant Diagnostics:  I have reviewed all pertinent imaging results/findings within the past 24 hours.  Assessment/Plan:     * Acquired subglottic stenosis  28 yo female 30 wks pregnant with grade III subglottic stenosis. Mild biphasic stridor noted on exam, no increased work of breathing. Patient saturating well on room air. Patient admitted to MICU for close airway monitoring. Surgical plans have been deferred until approximately 32w gestational age and pt received steroid course for fetal lung maturity.     -- Plan for OR on Thursday 2/6 for endoscopy intervention      - Please ensure patient is a on clear liquid diet today to prevent risk of aspiration in the OR.     - MFM and NICU will be available during her procedure for intervention by delivery for signs of significant fetal distress     - Steroid dosing for fetal lung maturity completed on 1/31     - Patient has strong preferences to avoid tracheostomy if possible but is willing to accept it if absolutely necessary     - Airway plan: Patient has significant narrowing of her subglottis on scope exam which would make intubation difficult should she decompensate.  Could potentially be intubated with Glidescope and a 5-0 ETT.   - Recommend  immediate contact of anesthesia and ENT team should patient have worsening respiratory status.    - Recommend trach set at bedside   - Recommend 5-0 ETT at bedside   - Continues with standing duo nebs budesonide and albuterol   - ENT will continue to follow     Please page ENT resident on call with any questions or concerns.        Ermelinda Gage MD  Otorhinolaryngology-Head & Neck Surgery  [facility]

## 2025-02-05 NOTE — ASSESSMENT & PLAN NOTE
Asymptomatic UTI    Plan:  -Augmentin q12 for 5 days; first dose given 1/28/2025; Medication complete

## 2025-02-05 NOTE — SUBJECTIVE & OBJECTIVE
Interval History: NAEON. Instructed to have clear liquid diet today to prevent aspiration during surgery. Plan for surgery tomorrow, consent confirmed.     Medications:  Continuous Infusions:  Scheduled Meds:   albuterol sulfate  2.5 mg Nebulization Q6H WAKE    budesonide  0.5 mg Nebulization Q12H    cetirizine  10 mg Oral Daily    famotidine  40 mg Oral QHS    montelukast  10 mg Oral QHS    pantoprazole  40 mg Oral BID AC    polyethylene glycol  17 g Oral BID    prenatal vitamin  1 tablet Oral Daily    sertraline  50 mg Oral Daily     PRN Meds:  Current Facility-Administered Medications:     acetaminophen, 650 mg, Oral, Q4H PRN    diphenhydrAMINE, 25 mg, Intravenous, Q6H PRN    guaiFENesin, 600 mg, Oral, BID PRN    hydrOXYzine HCL, 25 mg, Oral, TID PRN    magnesium oxide, 800 mg, Oral, PRN    magnesium oxide, 800 mg, Oral, PRN    ondansetron, 4 mg, Intravenous, Q8H PRN    potassium bicarbonate, 35 mEq, Oral, PRN    potassium bicarbonate, 50 mEq, Oral, PRN    potassium bicarbonate, 60 mEq, Oral, PRN    potassium, sodium phosphates, 2 packet, Oral, PRN    potassium, sodium phosphates, 2 packet, Oral, PRN    potassium, sodium phosphates, 2 packet, Oral, PRN    racepinephrine, 0.5 mL, Nebulization, Q4H PRN    sodium chloride 0.9%, 10 mL, Intravenous, PRN     Review of patient's allergies indicates:   Allergen Reactions    No known drug allergies      Objective:     Vital Signs (24h Range):  Temp:  [97.5 °F (36.4 °C)-98.5 °F (36.9 °C)] 97.5 °F (36.4 °C)  Pulse:  [] 84  Resp:  [9-29] 20  SpO2:  [97 %-100 %] 98 %  BP: (114-119)/(59-63) 119/63       Lines/Drains/Airways       Peripheral Intravenous Line  Duration                  Peripheral IV - Single Lumen 01/31/25 2354 22 G Right Antecubital 4 days         Peripheral IV - Single Lumen 02/03/25 1400 20 G Anterior;Distal;Left Forearm 1 day                     Physical Exam  NAD  Resting in bed comfortably. No stridor at rest.  Head atraumatic   EOMI   Auricles WNL  AU  MMM, oropharynx clear, tongue mobile  Neck soft, palpable laryngeal landmarks   Oxygenating well on room air        Significant Labs:  No new labs    CBC:   Recent Labs   Lab 02/03/25  0328   WBC 11.46   RBC 3.79*   HGB 10.9*   HCT 33.0*      MCV 87   MCH 28.8   MCHC 33.0     CMP:   Recent Labs   Lab 02/03/25  0328   GLU 85   CALCIUM 8.4*   ALBUMIN 2.9*   PROT 5.8*      K 3.6   CO2 19*      BUN 10   CREATININE 0.5   ALKPHOS 62   ALT 9*   AST 9*   BILITOT 0.3       Significant Diagnostics:  I have reviewed all pertinent imaging results/findings within the past 24 hours.

## 2025-02-05 NOTE — ANESTHESIA PREPROCEDURE EVALUATION
[facility]  Anesthesia Pre-Operative Evaluation         Patient Name: Gisell Villafuerte  YOB: 1995  MRN: [id number]    SUBJECTIVE:     Pre-operative evaluation for Procedure(s) (LRB):  LARYNGOSCOPY, DIRECT, WITH BRONCHOSCOPY (N/A)  *TRACHEOTOMY* INACTIVE (N/A)     2025    Gisell Villafuerte is a 29 y.o. female w/ a significant PMHx of A1 31wks, LPRD, asthma, with recently diagnosed subglottic stenosis.    Patient now presents for the above procedure(s).      LDA:        Peripheral IV - Single Lumen 25 2354 22 G Right Antecubital (Active)   Site Assessment Clean;Dry;Intact;No redness;No swelling 25 0300   Line Securement Device Secured with sutureless device 25 1901   Extremity Assessment Distal to IV No abnormal discoloration;No redness;No swelling;No warmth 25 0300   Line Status Flushed;Saline locked 25 0300   Dressing Status Clean;Dry 25 0300   Dressing Intervention Integrity maintained 25 0300   Dressing Change Due 25 0300   Site Change Due 25 190   Reason Not Rotated Not due 25 0300   Number of days: 4            Peripheral IV - Single Lumen 25 1400 20 G Anterior;Distal;Left Forearm (Active)   Site Assessment Clean;Dry;Intact;No redness;No swelling 25 0300   Line Securement Device Secured with sutureless device 25 1901   Extremity Assessment Distal to IV No abnormal discoloration;No redness;No swelling;No warmth 25 0300   Line Status Flushed;Saline locked 25 0300   Dressing Status Clean;Dry;Intact 25 0300   Dressing Intervention Integrity maintained 25 0300   Dressing Change Due 25 0300   Site Change Due 25 1901   Reason Not Rotated Not due 25 0300   Number of days: 1       Prev airway: None documented.    Drips: None documented.      Patient Active Problem List   Diagnosis    Contraception    DUB  (dysfunctional uterine bleeding)    Obesity, Class II, BMI 35-39.9, with comorbidity    Missed menses    Missed     Dyspnea on exertion    27 weeks gestation of pregnancy    Severe persistent asthma with acute exacerbation    Previous  section    Tachycardia    Anxiety    Acquired subglottic stenosis    30 weeks gestation of pregnancy    Acute cystitis       Review of patient's allergies indicates:   Allergen Reactions    No known drug allergies        Current Inpatient Medications:   albuterol sulfate  2.5 mg Nebulization Q6H WAKE    budesonide  0.5 mg Nebulization Q12H    cetirizine  10 mg Oral Daily    famotidine  40 mg Oral QHS    montelukast  10 mg Oral QHS    pantoprazole  40 mg Oral BID AC    polyethylene glycol  17 g Oral BID    prenatal vitamin  1 tablet Oral Daily    sertraline  50 mg Oral Daily       No current facility-administered medications on file prior to encounter.     Current Outpatient Medications on File Prior to Encounter   Medication Sig Dispense Refill    albuterol (PROVENTIL/VENTOLIN HFA) 90 mcg/actuation inhaler Inhale 1-2 puffs into the lungs every 6 (six) hours as needed for Wheezing or Shortness of Breath (chest tightness). Rescue 18 g 6    albuterol sulfate 2.5 mg/0.5 mL Nebu Take 2.5 mg by nebulization every 6 (six) hours as needed (shortness of breath, wheezing, or chest tightness.). Rescue 1 each 0    cetirizine (ZYRTEC) 10 MG tablet Take 1 tablet (10 mg total) by mouth every evening. 30 tablet 0    famotidine (PEPCID) 20 MG tablet Take 1 tablet (20 mg total) by mouth 2 (two) times daily. 180 tablet 0    fluticasone propion-salmeterol 115-21 mcg/dose (ADVAIR HFA) 115-21 mcg/actuation HFAA inhaler Inhale 2 puffs into the lungs 2 (two) times a day. Controller 12 g 0    fluticasone propionate (FLONASE) 50 mcg/actuation nasal spray 2 sprays (100 mcg total) by Each Nostril route once daily. 18.2 mL 0    montelukast (SINGULAIR) 5 MG chewable tablet Take 2 tablets (10 mg  total) by mouth every evening. 60 tablet 0    prenatal vit/iron fum/folic ac (PRENATAL 1+1 ORAL) Take 1 tablet by mouth once daily.      albuterol (VENTOLIN HFA) 90 mcg/actuation inhaler Inhale 2 puffs into the lungs every 6 (six) hours as needed for Wheezing. Rescue 18 g 0    EPINEPHrine (EPIPEN 2-BELEN) 0.3 mg/0.3 mL AtIn Inject 0.3 mLs (0.3 mg total) into the muscle once. for 1 dose 0.3 mL 3    ondansetron (ZOFRAN-ODT) 4 MG TbDL Take 1 tablet (4 mg total) by mouth every 6 (six) hours as needed (nausea/vomiting). 12 tablet 0       Past Surgical History:   Procedure Laterality Date     SECTION N/A 2022    Procedure:  SECTION;  Surgeon: Víctor Arriaga MD;  Location: [facility] L&D;  Service: OB/GYN;  Laterality: N/A;    DILATION AND CURETTAGE OF UTERUS N/A 2021    Procedure: DILATION AND CURETTAGE, UTERUS;  Surgeon: Víctor Arriaga MD;  Location: [facility];  Service: OB/GYN;  Laterality: N/A;    MICROLARYNGOSCOPY, SUSPENSION N/A 2025    Procedure: MICROLARYNGOSCOPY, SUSPENSION;  Surgeon: Onesimo Seay MD;  Location: [address];  Service: ENT;  Laterality: N/A;  Will need CO2 laser, balloon dilators, OB anesthesia    TRACHEOSTOMY N/A 2025    Procedure: CREATION, TRACHEOSTOMY;  Surgeon: Onesimo Seay MD;  Location: [facility] OR [address] [place] [address];  Service: ENT;  Laterality: N/A;       Social History     Socioeconomic History    Marital status: Single   Tobacco Use    Smoking status: Never     Passive exposure: Never    Smokeless tobacco: Never   Substance and Sexual Activity    Alcohol use: Not Currently    Drug use: No    Sexual activity: Yes     Partners: Male     Birth control/protection: None     Social Drivers of Health     Financial Resource Strain: Low Risk  (2025)    Overall Financial Resource Strain (CARDIA)     Difficulty of Paying Living Expenses: Not hard at all   Recent Concern: Financial Resource Strain - Medium Risk (2025)    Overall Financial Resource  Strain (CARDIA)     Difficulty of Paying Living Expenses: Somewhat hard   Food Insecurity: No Food Insecurity (1/21/2025)    Hunger Vital Sign     Worried About Running Out of Food in the Last Year: Never true     Ran Out of Food in the Last Year: Never true   Transportation Needs: No Transportation Needs (1/21/2025)    TRANSPORTATION NEEDS     Transportation : No   Physical Activity: Sufficiently Active (1/21/2025)    Exercise Vital Sign     Days of Exercise per Week: 6 days     Minutes of Exercise per Session: 150+ min   Stress: No Stress Concern Present (1/21/2025)    [facility] [employer] [facility] - Occupational Stress Questionnaire     Feeling of Stress : Not at all   Housing Stability: Low Risk  (1/21/2025)    Housing Stability Vital Sign     Unable to Pay for Housing in the Last Year: No     Homeless in the Last Year: No       OBJECTIVE:     Vital Signs Range (Last 24H):  Temp:  [36.4 °C (97.5 °F)-36.9 °C (98.5 °F)]   Pulse:  []   Resp:  [9-29]   BP: (114-119)/(59-63)   SpO2:  [97 %-99 %]       Significant Labs:  Lab Results   Component Value Date    WBC 11.46 02/03/2025    HGB 10.9 (L) 02/03/2025    HCT 33.0 (L) 02/03/2025     02/03/2025    CHOL 179 01/05/2024    TRIG 97 01/05/2024    HDL 39 (L) 01/05/2024    ALT 9 (L) 02/03/2025    AST 9 (L) 02/03/2025     02/03/2025    K 3.6 02/03/2025     02/03/2025    CREATININE 0.5 02/03/2025    BUN 10 02/03/2025    CO2 19 (L) 02/03/2025    TSH 0.720 01/25/2025    INR 0.9 01/25/2025    GLUF 90 07/01/2010    HGBA1C 4.8 01/05/2024       Diagnostic Studies: No relevant studies.    EKG:   Results for orders placed or performed during the hospital encounter of 01/05/25   EKG 12-lead    Collection Time: 01/05/25  1:46 AM   Result Value Ref Range    QRS Duration 76 ms    [facility] QTC Calculation 450 ms    Narrative    Test Reason : R00.0,    Vent. Rate : 115 BPM     Atrial Rate : 115 BPM     P-R Int : 138 ms          QRS Dur :  76 ms      QT Int :  326 ms       P-R-T Axes :  67  36   4 degrees    QTcB Int : 450 ms    Sinus tachycardia  Nonspecific ST abnormality  Abnormal ECG  Confirmed by Anand Randolph ([id number]) on 1/7/2025 2:49:00 PM    Referred By: AAAREFERRAL SELF           Confirmed By: Anand Randolph       2D ECHO:  TTE:  No results found for this or any previous visit.    SUJATHA:  No results found for this or any previous visit.    ASSESSMENT/PLAN:          Pre-op Assessment    I have reviewed the Patient Summary Reports.     I have reviewed the Nursing Notes. I have reviewed the NPO Status.   I have reviewed the Medications.     Review of Systems  Anesthesia Hx:  No problems with previous Anesthesia                Social:  Non-Smoker       EENT/Dental:   Subglottic stenosis with stridor and IBARRA          Cardiovascular:     Hypertension            IBARRA                              Pulmonary:    Asthma severe                   Hepatic/GI:     GERD                Musculoskeletal:  Musculoskeletal Normal                OB/GYN/PEDS:  31 weeks pregnant           Neurological:  Neurology Normal                                      Endocrine:     Hashimoto's        Psych:   anxiety                 Physical Exam  General: Well nourished, Cooperative and Alert    Airway:  Mallampati: II   Mouth Opening: Small, but > 3cm  TM Distance: Normal  Tongue: Normal  Neck ROM: Normal ROM    Dental:  Intact    Chest/Lungs:  Clear to auscultation, Normal Respiratory Rate    Heart:  Rate: Normal  Rhythm: Regular Rhythm  Sounds: Normal        Anesthesia Plan  Type of Anesthesia, risks & benefits discussed:    Anesthesia Type: Gen ETT  Intra-op Monitoring Plan: Standard ASA Monitors  Post Op Pain Control Plan: multimodal analgesia and IV/PO Opioids PRN  Induction:  IV  Airway Plan: Direct, Post-Induction  Informed Consent: Informed consent signed with the Patient and all parties understand the risks and agree with anesthesia plan.  All questions answered.   ASA Score: 3  Day of Surgery  Review of History & Physical: H&P Update referred to the surgeon/provider.    Ready For Surgery From Anesthesia Perspective.     .

## 2025-02-05 NOTE — SUBJECTIVE & OBJECTIVE
Interval History/Significant Events: NAEO. Patient on clear liquid diet and will be NPO at midnight in preporation for her procedure with ENT tomorrow. Last Lovenox dose was on 2/4/2025 at 1700.     Review of Systems   Constitutional:  Negative for chills and fever.   Respiratory:  Positive for cough (occasional) and shortness of breath (none at rest; some with activity).    Cardiovascular:  Negative for chest pain and palpitations.   Gastrointestinal:  Negative for abdominal pain and nausea.   Genitourinary:  Negative for difficulty urinating.   Neurological:  Negative for dizziness and headaches.   Psychiatric/Behavioral:  Negative for confusion.      Objective:     Vital Signs (Most Recent):  Temp: 97.8 °F (36.6 °C) (02/05/25 1000)  Pulse: 90 (02/05/25 1000)  Resp: (!) 22 (02/05/25 1000)  BP: (!) 107/59 (02/05/25 1000)  SpO2: 99 % (02/05/25 1000) Vital Signs (24h Range):  Temp:  [97.5 °F (36.4 °C)-98.5 °F (36.9 °C)] 97.8 °F (36.6 °C)  Pulse:  [] 90  Resp:  [9-33] 22  SpO2:  [97 %-99 %] 99 %  BP: (107-119)/(59-63) 107/59   Weight: 73.5 kg (162 lb 0.6 oz)  Body mass index is 30.62 kg/m².    No intake or output data in the 24 hours ending 02/05/25 1336       Physical Exam  Vitals and nursing note reviewed.   Constitutional:       General: She is not in acute distress.     Appearance: She is well-developed.   HENT:      Head: Normocephalic and atraumatic.      Comments: Mild inspiratory stridor similar to prior.  Eyes:      General: No scleral icterus.  Cardiovascular:      Rate and Rhythm: Normal rate and regular rhythm.      Pulses: Normal pulses.   Pulmonary:      Effort: Pulmonary effort is normal. No respiratory distress.   Abdominal:      General: Bowel sounds are normal. There is no distension.      Palpations: Abdomen is soft.      Tenderness: There is no abdominal tenderness.   Musculoskeletal:      Right lower leg: No edema.      Left lower leg: No edema.   Skin:     General: Skin is warm and dry.  "  Neurological:      Mental Status: She is alert and oriented to person, place, and time.            Vents:     Lines/Drains/Airways       Peripheral Intravenous Line  Duration                  Peripheral IV - Single Lumen 01/31/25 2354 22 G Right Antecubital 4 days         Peripheral IV - Single Lumen 02/03/25 1400 20 G Anterior;Distal;Left Forearm 1 day                  Significant Labs:    CBC/Anemia Profile:  No results for input(s): "WBC", "HGB", "HCT", "PLT", "MCV", "RDW", "IRON", "FERRITIN", "RETIC", "FOLATE", "[id number]", "OCCULTBLOOD" in the last 48 hours.     Chemistries:  No results for input(s): "NA", "K", "CL", "CO2", "BUN", "CREATININE", "CALCIUM", "ALBUMIN", "PROT", "BILITOT", "ALKPHOS", "ALT", "AST", "GLUCOSE", "MG", "PHOS" in the last 48 hours.    All pertinent labs within the past 24 hours have been reviewed.    Significant Imaging:  I have reviewed all pertinent imaging results/findings within the past 24 hours.  "

## 2025-02-05 NOTE — ASSESSMENT & PLAN NOTE
30 yo female 30 wks pregnant with grade III subglottic stenosis. Mild biphasic stridor noted on exam, no increased work of breathing. Patient saturating well on room air. Patient admitted to MICU for close airway monitoring. Surgical plans have been deferred until approximately 32w gestational age and pt received steroid course for fetal lung maturity.     -- Plan for OR on Thursday 2/6 for endoscopy intervention      - Please ensure patient is a on clear liquid diet today to prevent risk of aspiration in the OR.     - MFM and NICU will be available during her procedure for intervention by delivery for signs of significant fetal distress     - Steroid dosing for fetal lung maturity completed on 1/31     - Patient has strong preferences to avoid tracheostomy if possible but is willing to accept it if absolutely necessary     - Airway plan: Patient has significant narrowing of her subglottis on scope exam which would make intubation difficult should she decompensate.  Could potentially be intubated with Glidescope and a 5-0 ETT.   - Recommend immediate contact of anesthesia and ENT team should patient have worsening respiratory status.    - Recommend trach set at bedside   - Recommend 5-0 ETT at bedside   - Continues with standing duo nebs budesonide and albuterol   - ENT will continue to follow     Please page ENT resident on call with any questions or concerns.

## 2025-02-05 NOTE — ASSESSMENT & PLAN NOTE
Patient states she is very anxious about her upcoming ENT procedure and is requesting a short term course of medication for anxiety. Does not wish to start a long term treatment for anxiety as she belives her anxiety will resolve after her ENT procedure. Discussed options with MFM; Hydroxyzine will be given      Plan:  Hydroxyzine 25 mg TID PRN for anxiety (spaced at least 4 hours from diphenhydramine)  Continue sertraline 25 mg and increase to 50 mg after 7 days per MFM. (Increased to 50 mg starting 2/5/2025).

## 2025-02-05 NOTE — PLAN OF CARE
[facility]  Discharge Reassessment    Primary Care Provider: Yazan Jj MD    Expected Discharge Date: 2/7/2025    Reassessment (most recent)       Discharge Reassessment - 02/05/25 1553          Discharge Reassessment    Assessment Type Discharge Planning Reassessment     Did the patient's condition or plan change since previous assessment? No     Discharge Plan discussed with: Patient;Spouse/sig other     Name(s) and Number(s) Harry Be, s/o cp# [phone]     Communicated JOVANY with patient/caregiver Yes     Discharge Plan A Home with family;Home Health     Discharge Plan B Hospital Transfer   [facility]    DME Needed Upon Discharge  none     Transition of Care Barriers None     Why the patient remains in the hospital Requires continued medical care        Post-Acute Status    Post-Acute Authorization Home Health     Home Health Status Pending medical clearance/testing     Coverage [employer] All Out of State     Discharge Delays Procedure Scheduling (IR, OR, Labs, Echo, Cath, Echo, EEG)   Patient schedule to have a Subglottis (2/6/2025).                  Patient is schedule to have a Subglottis on 2/6/2025 once she is 32 weeks pregnant.      Patient not stable for discharge at this time.  SW will continue to follow patient's progress to discharge from MICU.  CM dept remains available for any family concerns or needs.    Discharge Plan A and Plan B have been determined by review of patient's clinical status, future medical and therapeutic needs, and coverage/benefits for post-acute care in coordination with multidisciplinary team members.    Zeny Figueroa, KATHI  [facility]  [phone]

## 2025-02-05 NOTE — ASSESSMENT & PLAN NOTE
- OBGYN consulted, appreciate their recs  -NST being performed daily by OB  -MFM medicine following and will be ENT during her procdeure  -MFM recommending steroid course for fetal lung maturing one week prior to her procedure; Betamethasone scheduled for 2025; appreciate recs  -Gathering supplies to bedside in case of emergency ; all gathered except bandage scissors.

## 2025-02-06 ENCOUNTER — ANESTHESIA (OUTPATIENT)
Dept: SURGERY | Facility: HOSPITAL | Age: 30
DRG: 818 | End: 2025-02-06
Payer: COMMERCIAL

## 2025-02-06 ENCOUNTER — PATIENT MESSAGE (OUTPATIENT)
Dept: OTHER | Facility: OTHER | Age: 30
End: 2025-02-06
Payer: COMMERCIAL

## 2025-02-06 DIAGNOSIS — O35.9XX0 SUSPECTED FETAL ANOMALY, ANTEPARTUM, SINGLE OR UNSPECIFIED FETUS: Primary | ICD-10-CM

## 2025-02-06 DIAGNOSIS — Z36.89 ENCOUNTER FOR FETAL ANATOMIC SURVEY: ICD-10-CM

## 2025-02-06 LAB
ABO + RH BLD: NORMAL
ALBUMIN SERPL BCP-MCNC: 2.9 G/DL (ref 3.5–5.2)
ALP SERPL-CCNC: 82 U/L (ref 40–150)
ALT SERPL W/O P-5'-P-CCNC: 7 U/L (ref 10–44)
ANION GAP SERPL CALC-SCNC: 10 MMOL/L (ref 8–16)
AST SERPL-CCNC: 10 U/L (ref 10–40)
BASOPHILS # BLD AUTO: 0.03 K/UL (ref 0–0.2)
BASOPHILS NFR BLD: 0.3 % (ref 0–1.9)
BILIRUB SERPL-MCNC: 0.7 MG/DL (ref 0.1–1)
BLD GP AB SCN CELLS X3 SERPL QL: NORMAL
BUN SERPL-MCNC: 7 MG/DL (ref 6–20)
CALCIUM SERPL-MCNC: 8.5 MG/DL (ref 8.7–10.5)
CHLORIDE SERPL-SCNC: 107 MMOL/L (ref 95–110)
CO2 SERPL-SCNC: 20 MMOL/L (ref 23–29)
CREAT SERPL-MCNC: 0.4 MG/DL (ref 0.5–1.4)
DIFFERENTIAL METHOD BLD: ABNORMAL
EOSINOPHIL # BLD AUTO: 0.1 K/UL (ref 0–0.5)
EOSINOPHIL NFR BLD: 1.3 % (ref 0–8)
ERYTHROCYTE [DISTWIDTH] IN BLOOD BY AUTOMATED COUNT: 13.5 % (ref 11.5–14.5)
EST. GFR  (NO RACE VARIABLE): >60 ML/MIN/1.73 M^2
GLUCOSE SERPL-MCNC: 66 MG/DL (ref 70–110)
HCT VFR BLD AUTO: 33 % (ref 37–48.5)
HGB BLD-MCNC: 11.2 G/DL (ref 12–16)
IMM GRANULOCYTES # BLD AUTO: 0.19 K/UL (ref 0–0.04)
IMM GRANULOCYTES NFR BLD AUTO: 1.8 % (ref 0–0.5)
LYMPHOCYTES # BLD AUTO: 1.6 K/UL (ref 1–4.8)
LYMPHOCYTES NFR BLD: 15 % (ref 18–48)
MAGNESIUM SERPL-MCNC: 1.7 MG/DL (ref 1.6–2.6)
MCH RBC QN AUTO: 29.2 PG (ref 27–31)
MCHC RBC AUTO-ENTMCNC: 33.9 G/DL (ref 32–36)
MCV RBC AUTO: 86 FL (ref 82–98)
MONOCYTES # BLD AUTO: 0.6 K/UL (ref 0.3–1)
MONOCYTES NFR BLD: 5.9 % (ref 4–15)
NEUTROPHILS # BLD AUTO: 7.9 K/UL (ref 1.8–7.7)
NEUTROPHILS NFR BLD: 75.7 % (ref 38–73)
NRBC BLD-RTO: 0 /100 WBC
PHOSPHATE SERPL-MCNC: 3.7 MG/DL (ref 2.7–4.5)
PLATELET # BLD AUTO: 198 K/UL (ref 150–450)
PMV BLD AUTO: 10.7 FL (ref 9.2–12.9)
POCT GLUCOSE: 76 MG/DL (ref 70–110)
POTASSIUM SERPL-SCNC: 3.8 MMOL/L (ref 3.5–5.1)
PROT SERPL-MCNC: 6.1 G/DL (ref 6–8.4)
RBC # BLD AUTO: 3.84 M/UL (ref 4–5.4)
SODIUM SERPL-SCNC: 137 MMOL/L (ref 136–145)
SPECIMEN OUTDATE: NORMAL
WBC # BLD AUTO: 10.39 K/UL (ref 3.9–12.7)

## 2025-02-06 PROCEDURE — 83735 ASSAY OF MAGNESIUM: CPT

## 2025-02-06 PROCEDURE — 27201423 OPTIME MED/SURG SUP & DEVICES STERILE SUPPLY: Performed by: OTOLARYNGOLOGY

## 2025-02-06 PROCEDURE — 25000003 PHARM REV CODE 250

## 2025-02-06 PROCEDURE — C1726 CATH, BAL DIL, NON-VASCULAR: HCPCS | Performed by: OTOLARYNGOLOGY

## 2025-02-06 PROCEDURE — 85025 COMPLETE CBC W/AUTO DIFF WBC: CPT

## 2025-02-06 PROCEDURE — 86901 BLOOD TYPING SEROLOGIC RH(D): CPT | Performed by: INTERNAL MEDICINE

## 2025-02-06 PROCEDURE — 37000009 HC ANESTHESIA EA ADD 15 MINS: Performed by: OTOLARYNGOLOGY

## 2025-02-06 PROCEDURE — 36000708 HC OR TIME LEV III 1ST 15 MIN: Performed by: OTOLARYNGOLOGY

## 2025-02-06 PROCEDURE — 80053 COMPREHEN METABOLIC PANEL: CPT

## 2025-02-06 PROCEDURE — 20000000 HC ICU ROOM

## 2025-02-06 PROCEDURE — 25000003 PHARM REV CODE 250: Performed by: STUDENT IN AN ORGANIZED HEALTH CARE EDUCATION/TRAINING PROGRAM

## 2025-02-06 PROCEDURE — 25000003 PHARM REV CODE 250: Performed by: INTERNAL MEDICINE

## 2025-02-06 PROCEDURE — 99233 SBSQ HOSP IP/OBS HIGH 50: CPT | Mod: ,,, | Performed by: INTERNAL MEDICINE

## 2025-02-06 PROCEDURE — 88342 IMHCHEM/IMCYTCHM 1ST ANTB: CPT | Performed by: STUDENT IN AN ORGANIZED HEALTH CARE EDUCATION/TRAINING PROGRAM

## 2025-02-06 PROCEDURE — 0CBS8ZX EXCISION OF LARYNX, VIA NATURAL OR ARTIFICIAL OPENING ENDOSCOPIC, DIAGNOSTIC: ICD-10-PCS | Performed by: OTOLARYNGOLOGY

## 2025-02-06 PROCEDURE — 0C7S8ZZ DILATION OF LARYNX, VIA NATURAL OR ARTIFICIAL OPENING ENDOSCOPIC: ICD-10-PCS | Performed by: OTOLARYNGOLOGY

## 2025-02-06 PROCEDURE — 88342 IMHCHEM/IMCYTCHM 1ST ANTB: CPT | Mod: 26,,, | Performed by: STUDENT IN AN ORGANIZED HEALTH CARE EDUCATION/TRAINING PROGRAM

## 2025-02-06 PROCEDURE — 94640 AIRWAY INHALATION TREATMENT: CPT

## 2025-02-06 PROCEDURE — D9220A PRA ANESTHESIA: Mod: ,,, | Performed by: ANESTHESIOLOGY

## 2025-02-06 PROCEDURE — 36000709 HC OR TIME LEV III EA ADD 15 MIN: Performed by: OTOLARYNGOLOGY

## 2025-02-06 PROCEDURE — 27000207 HC ISOLATION

## 2025-02-06 PROCEDURE — 99900035 HC TECH TIME PER 15 MIN (STAT)

## 2025-02-06 PROCEDURE — 84100 ASSAY OF PHOSPHORUS: CPT

## 2025-02-06 PROCEDURE — 63600175 PHARM REV CODE 636 W HCPCS

## 2025-02-06 PROCEDURE — 94761 N-INVAS EAR/PLS OXIMETRY MLT: CPT

## 2025-02-06 PROCEDURE — 27202783 HC SCOPE, STORZ DISPOSABLE: Performed by: ANESTHESIOLOGY

## 2025-02-06 PROCEDURE — 37000008 HC ANESTHESIA 1ST 15 MINUTES: Performed by: OTOLARYNGOLOGY

## 2025-02-06 PROCEDURE — 25000242 PHARM REV CODE 250 ALT 637 W/ HCPCS: Performed by: STUDENT IN AN ORGANIZED HEALTH CARE EDUCATION/TRAINING PROGRAM

## 2025-02-06 PROCEDURE — 88305 TISSUE EXAM BY PATHOLOGIST: CPT | Mod: 26,,, | Performed by: STUDENT IN AN ORGANIZED HEALTH CARE EDUCATION/TRAINING PROGRAM

## 2025-02-06 PROCEDURE — 88305 TISSUE EXAM BY PATHOLOGIST: CPT | Performed by: STUDENT IN AN ORGANIZED HEALTH CARE EDUCATION/TRAINING PROGRAM

## 2025-02-06 PROCEDURE — 0CNS8ZZ RELEASE LARYNX, VIA NATURAL OR ARTIFICIAL OPENING ENDOSCOPIC: ICD-10-PCS | Performed by: OTOLARYNGOLOGY

## 2025-02-06 RX ORDER — SUCCINYLCHOLINE CHLORIDE 20 MG/ML
INJECTION INTRAMUSCULAR; INTRAVENOUS
Status: DISCONTINUED | OUTPATIENT
Start: 2025-02-06 | End: 2025-02-06

## 2025-02-06 RX ORDER — FAMOTIDINE 20 MG/1
20 TABLET, FILM COATED ORAL ONCE
Status: COMPLETED | OUTPATIENT
Start: 2025-02-06 | End: 2025-02-06

## 2025-02-06 RX ORDER — DEXMEDETOMIDINE HYDROCHLORIDE 100 UG/ML
INJECTION, SOLUTION INTRAVENOUS
Status: DISCONTINUED | OUTPATIENT
Start: 2025-02-06 | End: 2025-02-06

## 2025-02-06 RX ORDER — MAGNESIUM SULFATE HEPTAHYDRATE 40 MG/ML
2 INJECTION, SOLUTION INTRAVENOUS ONCE
Status: DISCONTINUED | OUTPATIENT
Start: 2025-02-06 | End: 2025-02-06

## 2025-02-06 RX ORDER — PROPOFOL 10 MG/ML
VIAL (ML) INTRAVENOUS
Status: DISCONTINUED | OUTPATIENT
Start: 2025-02-06 | End: 2025-02-06

## 2025-02-06 RX ORDER — FENTANYL CITRATE 50 UG/ML
INJECTION, SOLUTION INTRAMUSCULAR; INTRAVENOUS
Status: DISCONTINUED | OUTPATIENT
Start: 2025-02-06 | End: 2025-02-06

## 2025-02-06 RX ORDER — MAGNESIUM SULFATE HEPTAHYDRATE 40 MG/ML
2 INJECTION, SOLUTION INTRAVENOUS ONCE
Status: COMPLETED | OUTPATIENT
Start: 2025-02-06 | End: 2025-02-06

## 2025-02-06 RX ORDER — MIDAZOLAM HYDROCHLORIDE 1 MG/ML
INJECTION INTRAMUSCULAR; INTRAVENOUS
Status: DISCONTINUED | OUTPATIENT
Start: 2025-02-06 | End: 2025-02-06

## 2025-02-06 RX ORDER — ROCURONIUM BROMIDE 10 MG/ML
INJECTION, SOLUTION INTRAVENOUS
Status: DISCONTINUED | OUTPATIENT
Start: 2025-02-06 | End: 2025-02-06

## 2025-02-06 RX ORDER — SODIUM CITRATE AND CITRIC ACID MONOHYDRATE 334; 500 MG/5ML; MG/5ML
30 SOLUTION ORAL ONCE
Status: COMPLETED | OUTPATIENT
Start: 2025-02-06 | End: 2025-02-06

## 2025-02-06 RX ORDER — LIDOCAINE HYDROCHLORIDE 20 MG/ML
INJECTION, SOLUTION EPIDURAL; INFILTRATION; INTRACAUDAL; PERINEURAL
Status: DISCONTINUED | OUTPATIENT
Start: 2025-02-06 | End: 2025-02-06

## 2025-02-06 RX ORDER — ONDANSETRON HYDROCHLORIDE 2 MG/ML
INJECTION, SOLUTION INTRAVENOUS
Status: DISCONTINUED | OUTPATIENT
Start: 2025-02-06 | End: 2025-02-06

## 2025-02-06 RX ADMIN — ALBUTEROL SULFATE 2.5 MG: 2.5 SOLUTION RESPIRATORY (INHALATION) at 03:02

## 2025-02-06 RX ADMIN — ONDANSETRON 4 MG: 2 INJECTION INTRAMUSCULAR; INTRAVENOUS at 10:02

## 2025-02-06 RX ADMIN — ALBUTEROL SULFATE 2.5 MG: 2.5 SOLUTION RESPIRATORY (INHALATION) at 08:02

## 2025-02-06 RX ADMIN — MIDAZOLAM HYDROCHLORIDE 1 MG: 2 INJECTION, SOLUTION INTRAMUSCULAR; INTRAVENOUS at 09:02

## 2025-02-06 RX ADMIN — FAMOTIDINE 40 MG: 20 TABLET ORAL at 08:02

## 2025-02-06 RX ADMIN — FENTANYL CITRATE 100 MCG: 50 INJECTION INTRAMUSCULAR; INTRAVENOUS at 09:02

## 2025-02-06 RX ADMIN — BUDESONIDE 0.5 MG: 0.5 INHALANT RESPIRATORY (INHALATION) at 07:02

## 2025-02-06 RX ADMIN — ALBUTEROL SULFATE 2.5 MG: 2.5 SOLUTION RESPIRATORY (INHALATION) at 07:02

## 2025-02-06 RX ADMIN — PANTOPRAZOLE SODIUM 40 MG: 40 TABLET, DELAYED RELEASE ORAL at 06:02

## 2025-02-06 RX ADMIN — DEXMEDETOMIDINE 4 MCG: 100 INJECTION, SOLUTION, CONCENTRATE INTRAVENOUS at 09:02

## 2025-02-06 RX ADMIN — FAMOTIDINE 20 MG: 20 TABLET, FILM COATED ORAL at 07:02

## 2025-02-06 RX ADMIN — ROCURONIUM BROMIDE 25 MG: 10 INJECTION INTRAVENOUS at 09:02

## 2025-02-06 RX ADMIN — PROPOFOL 200 MG: 10 INJECTION, EMULSION INTRAVENOUS at 09:02

## 2025-02-06 RX ADMIN — PRENATAL VIT W/ FE FUMARATE-FA TAB 27-0.8 MG 1 TABLET: 27-0.8 TAB at 02:02

## 2025-02-06 RX ADMIN — SUGAMMADEX 200 MG: 100 INJECTION, SOLUTION INTRAVENOUS at 10:02

## 2025-02-06 RX ADMIN — SODIUM CHLORIDE, SODIUM GLUCONATE, SODIUM ACETATE, POTASSIUM CHLORIDE, MAGNESIUM CHLORIDE, SODIUM PHOSPHATE, DIBASIC, AND POTASSIUM PHOSPHATE: .53; .5; .37; .037; .03; .012; .00082 INJECTION, SOLUTION INTRAVENOUS at 09:02

## 2025-02-06 RX ADMIN — MAGNESIUM SULFATE HEPTAHYDRATE 2 G: 40 INJECTION, SOLUTION INTRAVENOUS at 08:02

## 2025-02-06 RX ADMIN — PANTOPRAZOLE SODIUM 40 MG: 40 TABLET, DELAYED RELEASE ORAL at 02:02

## 2025-02-06 RX ADMIN — CETIRIZINE HYDROCHLORIDE 10 MG: 10 TABLET, FILM COATED ORAL at 02:02

## 2025-02-06 RX ADMIN — PROPOFOL 100 MCG/KG/MIN: 10 INJECTION, EMULSION INTRAVENOUS at 09:02

## 2025-02-06 RX ADMIN — MONTELUKAST 10 MG: 10 TABLET, FILM COATED ORAL at 08:02

## 2025-02-06 RX ADMIN — LIDOCAINE HYDROCHLORIDE 100 MG: 20 INJECTION, SOLUTION EPIDURAL; INFILTRATION; INTRACAUDAL; PERINEURAL at 09:02

## 2025-02-06 RX ADMIN — BUDESONIDE 0.5 MG: 0.5 INHALANT RESPIRATORY (INHALATION) at 08:02

## 2025-02-06 RX ADMIN — SERTRALINE HYDROCHLORIDE 50 MG: 50 TABLET ORAL at 02:02

## 2025-02-06 RX ADMIN — SUCCINYLCHOLINE CHLORIDE 200 MG: 20 INJECTION, SOLUTION INTRAMUSCULAR; INTRAVENOUS; PARENTERAL at 09:02

## 2025-02-06 RX ADMIN — SODIUM CITRATE AND CITRIC ACID MONOHYDRATE 30 ML: 500; 334 SOLUTION ORAL at 08:02

## 2025-02-06 NOTE — NURSING
Patient returned to room by OR staff on monitor and with Oxygen. Patient AAOX4. Family at bedside. L&D RN at bedside.

## 2025-02-06 NOTE — SUBJECTIVE & OBJECTIVE
Interval History: NAEON. Long discussion w patient yesterday. Plan for OR today, patient agrees and questions answered.     Medications:  Continuous Infusions:  Scheduled Meds:   albuterol sulfate  2.5 mg Nebulization Q6H WAKE    budesonide  0.5 mg Nebulization Q12H    cetirizine  10 mg Oral Daily    famotidine  40 mg Oral QHS    magnesium sulfate 2 g IVPB  2 g Intravenous Once    montelukast  10 mg Oral QHS    pantoprazole  40 mg Oral BID AC    polyethylene glycol  17 g Oral BID    prenatal vitamin  1 tablet Oral Daily    sertraline  50 mg Oral Daily    sodium citrate-citric acid 500-334 mg/5 ml  30 mL Oral Once     PRN Meds:  Current Facility-Administered Medications:     acetaminophen, 650 mg, Oral, Q4H PRN    diphenhydrAMINE, 25 mg, Intravenous, Q6H PRN    guaiFENesin, 600 mg, Oral, BID PRN    hydrOXYzine HCL, 25 mg, Oral, TID PRN    magnesium oxide, 800 mg, Oral, PRN    magnesium oxide, 800 mg, Oral, PRN    ondansetron, 4 mg, Intravenous, Q8H PRN    potassium bicarbonate, 35 mEq, Oral, PRN    potassium bicarbonate, 50 mEq, Oral, PRN    potassium bicarbonate, 60 mEq, Oral, PRN    potassium, sodium phosphates, 2 packet, Oral, PRN    potassium, sodium phosphates, 2 packet, Oral, PRN    potassium, sodium phosphates, 2 packet, Oral, PRN    racepinephrine, 0.5 mL, Nebulization, Q4H PRN    sodium chloride 0.9%, 10 mL, Intravenous, PRN     Review of patient's allergies indicates:   Allergen Reactions    No known drug allergies      Objective:     Vital Signs (24h Range):  Temp:  [97.7 °F (36.5 °C)-98.2 °F (36.8 °C)] 97.7 °F (36.5 °C)  Pulse:  [] 100  Resp:  [16-54] 26  SpO2:  [97 %-100 %] 98 %  BP: ()/(53-86) 121/75       Lines/Drains/Airways       Peripheral Intravenous Line  Duration                  Peripheral IV - Single Lumen 01/31/25 2354 22 G Right Antecubital 5 days         Peripheral IV - Single Lumen 02/03/25 1400 20 G Anterior;Distal;Left Forearm 2 days                     Physical Exam    NAD  Resting in bed comfortably. No stridor at rest.  Head atraumatic   EOMI   Auricles WNL AU  MMM, oropharynx clear, tongue mobile  Neck soft, palpable laryngeal landmarks   Oxygenating well on room air     Significant Labs:  CBC:   Recent Labs   Lab 02/06/25  0605   WBC 10.39   RBC 3.84*   HGB 11.2*   HCT 33.0*      MCV 86   MCH 29.2   MCHC 33.9     CMP:   Recent Labs   Lab 02/06/25  0605   GLU 66*   CALCIUM 8.5*   ALBUMIN 2.9*   PROT 6.1      K 3.8   CO2 20*      BUN 7   CREATININE 0.4*   ALKPHOS 82   ALT 7*   AST 10   BILITOT 0.7       Significant Diagnostics:  I have reviewed all pertinent imaging results/findings within the past 24 hours.

## 2025-02-06 NOTE — PROGRESS NOTES
[facility] ICU  Otorhinolaryngology-Head & Neck Surgery  Progress Note    Subjective:     Post-Op Info:  Procedure(s) (LRB):  LARYNGOSCOPY, DIRECT, WITH BRONCHOSCOPY (N/A)  *TRACHEOTOMY* INACTIVE (N/A)   Day of Surgery  Hospital Day: 14     Interval History: NAEON. Long discussion w patient yesterday. Plan for OR today, patient agrees and questions answered.     Medications:  Continuous Infusions:  Scheduled Meds:   albuterol sulfate  2.5 mg Nebulization Q6H WAKE    budesonide  0.5 mg Nebulization Q12H    cetirizine  10 mg Oral Daily    famotidine  40 mg Oral QHS    magnesium sulfate 2 g IVPB  2 g Intravenous Once    montelukast  10 mg Oral QHS    pantoprazole  40 mg Oral BID AC    polyethylene glycol  17 g Oral BID    prenatal vitamin  1 tablet Oral Daily    sertraline  50 mg Oral Daily    sodium citrate-citric acid 500-334 mg/5 ml  30 mL Oral Once     PRN Meds:  Current Facility-Administered Medications:     acetaminophen, 650 mg, Oral, Q4H PRN    diphenhydrAMINE, 25 mg, Intravenous, Q6H PRN    guaiFENesin, 600 mg, Oral, BID PRN    hydrOXYzine HCL, 25 mg, Oral, TID PRN    magnesium oxide, 800 mg, Oral, PRN    magnesium oxide, 800 mg, Oral, PRN    ondansetron, 4 mg, Intravenous, Q8H PRN    potassium bicarbonate, 35 mEq, Oral, PRN    potassium bicarbonate, 50 mEq, Oral, PRN    potassium bicarbonate, 60 mEq, Oral, PRN    potassium, sodium phosphates, 2 packet, Oral, PRN    potassium, sodium phosphates, 2 packet, Oral, PRN    potassium, sodium phosphates, 2 packet, Oral, PRN    racepinephrine, 0.5 mL, Nebulization, Q4H PRN    sodium chloride 0.9%, 10 mL, Intravenous, PRN     Review of patient's allergies indicates:   Allergen Reactions    No known drug allergies      Objective:     Vital Signs (24h Range):  Temp:  [97.7 °F (36.5 °C)-98.2 °F (36.8 °C)] 97.7 °F (36.5 °C)  Pulse:  [] 100  Resp:  [16-54] 26  SpO2:  [97 %-100 %] 98 %  BP: ()/(53-86) 121/75       Lines/Drains/Airways       Peripheral  Intravenous Line  Duration                  Peripheral IV - Single Lumen 01/31/25 2354 22 G Right Antecubital 5 days         Peripheral IV - Single Lumen 02/03/25 1400 20 G Anterior;Distal;Left Forearm 2 days                     Physical Exam   NAD  Resting in bed comfortably. No stridor at rest.  Head atraumatic   EOMI   Auricles WNL AU  MMM, oropharynx clear, tongue mobile  Neck soft, palpable laryngeal landmarks   Oxygenating well on room air     Significant Labs:  CBC:   Recent Labs   Lab 02/06/25  0605   WBC 10.39   RBC 3.84*   HGB 11.2*   HCT 33.0*      MCV 86   MCH 29.2   MCHC 33.9     CMP:   Recent Labs   Lab 02/06/25  0605   GLU 66*   CALCIUM 8.5*   ALBUMIN 2.9*   PROT 6.1      K 3.8   CO2 20*      BUN 7   CREATININE 0.4*   ALKPHOS 82   ALT 7*   AST 10   BILITOT 0.7       Significant Diagnostics:  I have reviewed all pertinent imaging results/findings within the past 24 hours.  Assessment/Plan:     * Acquired subglottic stenosis  30 yo female 30 wks pregnant with grade III subglottic stenosis. Mild biphasic stridor noted on exam, no increased work of breathing. Patient saturating well on room air. Patient admitted to MICU for close airway monitoring. Surgical plans have been deferred until approximately 32w gestational age and pt received steroid course for fetal lung maturity.     -- Plan for OR today for endoscopy intervention. Further recommendations to follow post op. Anticipate post op observation in ICU following surgery.      - MFM and NICU will be available during her procedure for intervention by delivery for signs of significant fetal distress     - Steroid dosing for fetal lung maturity completed on 1/31     - Patient has strong preferences to avoid tracheostomy if possible but is willing to accept it if absolutely necessary     - Airway plan: Patient has significant narrowing of her subglottis on scope exam which would make intubation difficult should she decompensate.  Could  potentially be intubated with Glidescope and a 5-0 ETT.   - Recommend immediate contact of anesthesia and ENT team should patient have worsening respiratory status.    - Recommend trach set at bedside   - Recommend 5-0 ETT at bedside   - Continues with standing duo nebs budesonide and albuterol   - ENT will continue to follow     Please page ENT resident on call with any questions or concerns.        Ermelinda Gage MD  Otorhinolaryngology-Head & Neck Surgery  [facility]

## 2025-02-06 NOTE — BRIEF OP NOTE
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 EBL = 5cc.         Specimens:   Specimen (24h ago, onward)       Start     Ordered    02/06/25 1002  Specimen to Pathology, Surgery ENT  Once        Comments: Pre-op Diagnosis: Subglottic stenosis [J38.6]Procedure(s):LARYNGOSCOPY, DIRECT, WITH BRONCHOSCOPY*TRACHEOTOMY* INACTIVE Number of specimens: 1Name of specimens: 1. Subglottis-perm     References:    Click here for ordering Quick Tip   Question Answer Comment   Procedure Type: ENT    Release to patient Immediate        02/06/25 1001                    [id number]

## 2025-02-06 NOTE — OP NOTE
DATE OF SERVICE:  02/06/2025    PRE-OPERATIVE DIAGNOSIS:  Idiopathic subglottic stenosis    POST-OPERATIVE DIAGNOSIS:  Idiopathic subglottic stenosis    PROCEDURE(S):  Suspension microlaryngoscopy with biopsy, debridement of stenosis, balloon dilation 14 mm    SURGEON: Vance Bautista M.D.    ASSISTANT:  Ermelinda Gage M.D.    ANESTHESIA: General.    BLOOD LOSS: Less than 5 mL.    SPECIMENS:  Subglottis    COMPLICATIONS: None.    FINDINGS:   Granular advanced (60-70% obstructive) grade 2 subglottic stenosis beginning approximately 1 cm below true vocal folds for a length of approximately 1 cm  Favorable response to intervention  Fetal monitoring stable throughout case  Patient's SpO2 greater than 92% for duration of procedure  Intubated easily with a size 5 endotracheal tube at the initiation of the case  Intubated easily with a size 6 endotracheal tube on completion of the case    CONDITION: Stable.    INDICATIONS FOR PROCEDURE:   This is a lady with newly diagnosed subglottic stenosis, suspected to be idiopathic.  She happens to be pregnant, today at 32 weeks gestational age.  We have had extensive discussions with the patient and her family, as well as the Maternal-Fetal Medicine team in obstetrics team.  Management options include continued observation with no intervention versus airway directed intervention.  It is not felt that expectant management is prudent for the patient or for her pregnancy.  As such, we have set her up for airway intervention.  Options we discussed included elective tracheostomy versus endoscopic intervention.  The patient is strongly opposed to elective tracheostomy, but would consider it if it is absolutely necessary.  After formulating a definitive plan with the anesthesia team and a Maternal-Fetal Medicine team, she presents to the operating room today for endoscopic airway intervention, the extent of which is to be dictated by intraoperative findings but which might include dilation,  incision/excision, tracheostomy.  We discussed with her in detail risks and benefits thereof.  We gave her the opportunity to ask questions, and we answered all them to her satisfaction.  She agreed to move forward with the procedure.  Informed consent was obtained.    PROCEDURE IN DETAIL:   The patient was brought directly to the operating room from the intensive care unit.  She was placed in the supine, left side down, rotated position.  She was preoxygenated, connected to monitor, and connected to fetal monitoring with the NICU team and Maternal-Fetal Medicine team in the room.    Final time-out was performed for verification purposes.  General endotracheal anesthesia was obtained using a 5-0 endotracheal tube and rapid sequence intubation using the Ossoff-Pilling laryngoscope.  Adequate chest rise and CO2 return were noted.  The gastric contents were emptied using a [place] sump orogastric tube.  The patient was suspended from the [place].   During a period of apnea, the endotracheal tube was removed and magnified laryngeal endoscopy was carried out using a 0 degree Moreno vida telescope connected to a video monitor. Findings were as noted above.   Next, attention was turned to performing surgical intervention as indicated.   A 14 mm balloon airway dilation catheter was positioned across the stenotic segment.  Was slowly inflated to its maximal working pressure of 10 atmospheres for duration of 30 seconds.  The balloon was deflated and was removed.  Magnified laryngeal endoscopy was again performed with a 0 degree Moreno vida telescope connected to a video monitor.  Significant improvement in the caliber of the airway was noted, yet there was a persistent ridge of obstruction in the left posterior quadrant of the airway.  Some destabilized granulation tissue was debrided using cup forceps.  This was passed off the field for permanent pathologic analysis.  The patient was again intubated using a size 5 endotracheal  tube.  Ventilation temporarily resumed.  During another period of apnea, the endotracheal tube was removed.  A laryngeal sickle blade was used to make a relaxing incision through the obstructive ridge in the left posterior quadrant of the airway.  This was followed by another dilation to 14 mm as outlined above.  Magnified laryngeal endoscopy was again performed with a 0 degree Moreno vida telescope connected to a video monitor.  Further improvement in the caliber of the airway was noted, with the disordered segment flush with the native tracheal wall.  With this, the procedure was brought to completion.   The airway was suctioned.  The patient was intubated with a size 6 endotracheal tube.  The cuff was inflated, and adequate chest rise and CO2 return noted.  The laryngoscope was removed.  The gastric contents were emptied once again using a [place] sump orogastric tube.  The patient was turned back over to the anesthesiology team for awakening and extubation, which were uneventful. The patient was escorted to the intensive care unit in good condition. The patient and fetus tolerated the procedure well without complications. All needle, sponge, and instrument counts were correct at the completion of the case.    ATTESTATION:  As the attending of record, I was present and participated in all portions of this procedure.

## 2025-02-06 NOTE — ANESTHESIA PROCEDURE NOTES
Intubation    Date/Time: 2/6/2025 10:00 AM    Performed by: Vance Bautista MD  Authorized by: Soni Steele MD    Intubation:     Induction:  Rapid sequence induction    Intubated:  Postinduction    Mask Ventilation:  Easy mask    Attempts:  1    Attempted By:  ENT    Method of Intubation:  Direct    Laryngeal View Grade: Grade I - full view of cords      Difficult Airway Encountered?: No      Complications:  None    Airway Device:  Oral endotracheal tube    Airway Device Size:  6.0    Style/Cuff Inflation:  Cuffed (inflated to minimal occlusive pressure)    Placement Verified By:  Revisualization with laryngoscopy and Capnometry    Complicating Factors:  None    Findings Post-Intubation:  BS equal bilateral and atraumatic/condition of teeth unchanged  Notes:      Initially intubated with 5.5 oral ETT under direct laryngoscopy without issue. After dilation, re-intubated with 6.0 oral ETT without issue.

## 2025-02-06 NOTE — TRANSFER OF CARE
"Anesthesia Transfer of Care Note    Patient: Gisell Villafuerte    Procedure(s) Performed: Procedure(s) (LRB):  LARYNGOSCOPY, DIRECT, WITH BRONCHOSCOPY and dilation (N/A)    Patient location: ICU    Anesthesia Type: general    Transport from OR: Transported from OR on 6-10 L/min O2 by face mask with adequate spontaneous ventilation    Post pain: adequate analgesia    Post assessment: no apparent anesthetic complications    Post vital signs: stable    Level of consciousness: awake and alert    Nausea/Vomiting: no nausea/vomiting    Complications: none    Transfer of care protocol was followed      Last vitals: Visit Vitals  /75 (BP Location: Left arm, Patient Position: Lying)   Pulse 104   Temp 36.5 °C (97.7 °F) (Axillary)   Resp (!) 25   Ht 5' 1" (1.549 m)   Wt 73.5 kg (162 lb 0.6 oz)   LMP 06/30/2024 (Approximate)   SpO2 98%   Breastfeeding No   BMI 30.62 kg/m²     "

## 2025-02-06 NOTE — PLAN OF CARE
MICU DAILY GOALS     Family/Goals of care/Code Status   Code Status: Full Code    24H Vital Sign Range  Temp:  [97.7 °F (36.5 °C)-98.1 °F (36.7 °C)]   Pulse:  []   Resp:  [16-32]   BP: ()/(53-86)   SpO2:  [93 %-100 %]      Shift Events (include procedures and significant events)   Patient went to OR and recovered in room. Returned to room air, VS stable , AAOx4, and eating regular diet.    AWAKE RASS: Goal -    Actual - RASS (Daugherty Agitation-Sedation Scale): alert and calm    Restraint necessity: Not necessary   BREATHE SBT: Not intubated    Coordinate A & B, analgesics/sedatives Pain: managed   SAT: Not intubated   Delirium CAM-ICU:     Early(intubated/ Progressive (non-intubated) Mobility MOVE Screen (INTUBATED ONLY): Not intubated    Activity: Activity Management: Ambulated to bathroom - L4   Feeding/Nutrition Diet order: Diet/Nutrition Received: NPO,     Thrombus DVT prophylaxis: VTE Core Measure: Pharmacological prophylaxis initiated/maintained   HOB Elevation Head of Bed (HOB) Positioning: HOB elevated   Ulcer Prophylaxis GI: yes   Glucose control managed     Skin Skin assessment:     Sacrum intact/not altered? Yes  Heels intact/not altered? Yes  Surgical wound? No    CHECK ONE!   (no altered skin or altered skin) and sub boxes:  [] No Altered Skin Integrity Present    []Prevention Measures Documented    [] Altered Skin Integrity Present or Discovered   [] LDA present in EPIC, daily doc completed              [] LDA added if not in EPIC (describe wound).                    When describing wound, do not stage, use descriptive words only.    [] Wound Image Taken (required on admit,                   transfer/discharge and every Tuesday)    Wound Care Consulted? No   Bowel Function no issues    Indwelling Catheter Necessity            De-escalation Antibiotics No        VS and assessment per flow sheet, patient progressing towards goals as tolerated, plan of care reviewed with  patient and family ,   concerns addressed, will continue to monitor.\

## 2025-02-06 NOTE — ANESTHESIA POSTPROCEDURE EVALUATION
Anesthesia Post Evaluation    Patient: Gisell Villafuerte    Procedure(s) Performed: Procedure(s) (LRB):  LARYNGOSCOPY, DIRECT, WITH BRONCHOSCOPY and dilation (N/A)    Final Anesthesia Type: general      Patient location during evaluation: ICU  Patient participation: Yes- Able to Participate  Level of consciousness: awake and alert  Post-procedure vital signs: reviewed and stable  Pain management: adequate  Airway patency: patent    PONV status at discharge: No PONV  Anesthetic complications: no      Cardiovascular status: stable  Respiratory status: unassisted, spontaneous ventilation and face mask  Hydration status: euvolemic  Follow-up not needed.              Vitals Value Taken Time   /58 02/06/25 1402   Temp 36.7 °C (98.1 °F) 02/06/25 1100   Pulse 85 02/06/25 1421   Resp 22 02/06/25 1421   SpO2 92 % 02/06/25 1421   Vitals shown include unfiled device data.      No case tracking events are documented in the log.      Pain/Lona Score: No data recorded

## 2025-02-06 NOTE — PLAN OF CARE
Recommendations  When appropriate Continue regular diet- honor food preferences (no fish)  Continue Prenatal vitamins     Goals: Meet % een/epn by next RD f/u  Nutrition Goal Status: goal not met  Communication of RD Recs: other (comment) (poc)

## 2025-02-06 NOTE — ASSESSMENT & PLAN NOTE
28 yo female 30 wks pregnant with grade III subglottic stenosis. Mild biphasic stridor noted on exam, no increased work of breathing. Patient saturating well on room air. Patient admitted to MICU for close airway monitoring. Surgical plans have been deferred until approximately 32w gestational age and pt received steroid course for fetal lung maturity.     -- Plan for OR today for endoscopy intervention. Further recommendations to follow post op. Anticipate post op observation in ICU following surgery.      - MFM and NICU will be available during her procedure for intervention by delivery for signs of significant fetal distress     - Steroid dosing for fetal lung maturity completed on 1/31     - Patient has strong preferences to avoid tracheostomy if possible but is willing to accept it if absolutely necessary     - Airway plan: Patient has significant narrowing of her subglottis on scope exam which would make intubation difficult should she decompensate.  Could potentially be intubated with Glidescope and a 5-0 ETT.   - Recommend immediate contact of anesthesia and ENT team should patient have worsening respiratory status.    - Recommend trach set at bedside   - Recommend 5-0 ETT at bedside   - Continues with standing duo nebs budesonide and albuterol   - ENT will continue to follow     Please page ENT resident on call with any questions or concerns.

## 2025-02-06 NOTE — PROGRESS NOTES
"[facility]  Adult Nutrition  Progress Note    SUMMARY   Recommendations  When appropriate Continue regular diet- honor food preferences (no fish)  Continue Prenatal vitamins     Goals: Meet % een/epn by next RD f/u  Nutrition Goal Status: goal not met  Communication of RD Recs: other (comment) (poc)    Assessment and Plan    Nutrition Problem  Inadequate (suboptimal) protein-energy intake    Related to (etiology):   Decreased ability to consume sufficient energy/nutrients secondary to (Npo for procedure)     Signs and Symptoms (as evidenced by):   CLD yesterday, NPO for procedure today    Interventions/Recommendations (treatment strategy):  Collaboration with other providers    Nutrition Diagnosis Status:   New     Reason for Assessment    Reason For Assessment: RD follow-up  Diagnosis: gastrointestinal disease (Acquired subglottic stenosis)  General Information Comments: RD follow up. Pt on CLD yesterday, now NPO. Today underwent laryngoscopy w/ bronchoscopy. Previous with  intake of regular diet. No updated wt for over 1 week. RD following.  Nutrition Discharge Planning: General healthful diet w/ prenatal vitamins    Nutrition Related Social Determinants of Health: SDOH: Adequate food in home environment      Nutrition/Diet History    Spiritual, Cultural Beliefs, Rastafarian Practices, Values that Affect Care: no  Food Allergies: NKFA    Anthropometrics    Height: 5' 1" (154.9 cm)  Height (inches): 61 in  Height Method: Stated  Weight: 73.5 kg (162 lb 0.6 oz)  Weight (lb): 162.04 lb  Weight Method: Bed Scale  Ideal Body Weight (IBW), Female: 105 lb  % Ideal Body Weight, Female (lb): 152.44 %  BMI (Calculated): 30.6  BMI Grade: other (see comments) (pregnant)       Lab/Procedures/Meds    Pertinent Labs Reviewed: reviewed  Pertinent Labs Comments: Creatinine: 0.4, Glu: 66, AST: 7  Pertinent Medications Reviewed: reviewed  Pertinent Medications Comments: Famotidine, pantoprazole, bowel reg, " prenatal    Estimated/Assessed Needs    Weight Used For Calorie Calculations: 73.5 kg (162 lb 0.6 oz)  Energy Calorie Requirements (kcal): 2330-7673 (30-40 kcal/kg)  Energy Need Method: Kcal/kg  Protein Requirements:  (1.1-1.5 g/kg)  Weight Used For Protein Calculations: 73.5 kg (162 lb 0.6 oz)     Estimated Fluid Requirement Method: RDA Method  RDA Method (mL): 2205         Nutrition Prescription Ordered    Current Diet Order: NPO    Evaluation of Received Nutrient/Fluid Intake    I/O: +5.7 L since admit  Comments: LBM 2/4  Tolerance: tolerating  % Intake of Estimated Energy Needs: 0 - 25 %  % Meal Intake: NPO    Nutrition Risk    Level of Risk/Frequency of Follow-up: low - moderate (f/u 1-2x/week)     Monitor and Evaluation    Food and Nutrient Intake: energy intake, food and beverage intake  Food and Nutrient Adminstration: diet order  Knowledge/Beliefs/Attitudes: food and nutrition knowledge/skill, beliefs and attitudes  Physical Activity and Function: nutrition-related ADLs and IADLs  Anthropometric Measurements: height/length, weight, weight change, body mass index  Biochemical Data, Medical Tests and Procedures: electrolyte and renal panel, gastrointestinal profile, glucose/endocrine profile, inflammatory profile, lipid profile  Nutrition-Focused Physical Findings: overall appearance, extremities, muscles and bones, skin, head and eyes     Nutrition Follow-Up    RD Follow-up?: Yes

## 2025-02-07 VITALS
RESPIRATION RATE: 28 BRPM | SYSTOLIC BLOOD PRESSURE: 109 MMHG | OXYGEN SATURATION: 98 % | BODY MASS INDEX: 30.6 KG/M2 | HEART RATE: 90 BPM | DIASTOLIC BLOOD PRESSURE: 66 MMHG | WEIGHT: 162.06 LBS | TEMPERATURE: 98 F | HEIGHT: 61 IN

## 2025-02-07 PROCEDURE — 25000242 PHARM REV CODE 250 ALT 637 W/ HCPCS: Performed by: STUDENT IN AN ORGANIZED HEALTH CARE EDUCATION/TRAINING PROGRAM

## 2025-02-07 PROCEDURE — 99232 SBSQ HOSP IP/OBS MODERATE 35: CPT | Mod: ,,, | Performed by: INTERNAL MEDICINE

## 2025-02-07 PROCEDURE — 94640 AIRWAY INHALATION TREATMENT: CPT

## 2025-02-07 PROCEDURE — 25000003 PHARM REV CODE 250: Performed by: INTERNAL MEDICINE

## 2025-02-07 PROCEDURE — 25000003 PHARM REV CODE 250

## 2025-02-07 PROCEDURE — 25000003 PHARM REV CODE 250: Performed by: STUDENT IN AN ORGANIZED HEALTH CARE EDUCATION/TRAINING PROGRAM

## 2025-02-07 PROCEDURE — 94761 N-INVAS EAR/PLS OXIMETRY MLT: CPT

## 2025-02-07 RX ORDER — PANTOPRAZOLE SODIUM 40 MG/1
40 TABLET, DELAYED RELEASE ORAL DAILY
Qty: 30 TABLET | Refills: 0 | Status: SHIPPED | OUTPATIENT
Start: 2025-02-07 | End: 2025-03-09

## 2025-02-07 RX ORDER — PANTOPRAZOLE SODIUM 40 MG/1
40 TABLET, DELAYED RELEASE ORAL
Qty: 180 TABLET | Refills: 3 | Status: SHIPPED | OUTPATIENT
Start: 2025-02-07 | End: 2025-02-07

## 2025-02-07 RX ORDER — SERTRALINE HYDROCHLORIDE 50 MG/1
50 TABLET, FILM COATED ORAL DAILY
Qty: 30 TABLET | Refills: 0 | Status: SHIPPED | OUTPATIENT
Start: 2025-02-08 | End: 2025-03-10

## 2025-02-07 RX ADMIN — SERTRALINE HYDROCHLORIDE 50 MG: 50 TABLET ORAL at 07:02

## 2025-02-07 RX ADMIN — CETIRIZINE HYDROCHLORIDE 10 MG: 10 TABLET, FILM COATED ORAL at 07:02

## 2025-02-07 RX ADMIN — ALBUTEROL SULFATE 2.5 MG: 2.5 SOLUTION RESPIRATORY (INHALATION) at 07:02

## 2025-02-07 RX ADMIN — PRENATAL VIT W/ FE FUMARATE-FA TAB 27-0.8 MG 1 TABLET: 27-0.8 TAB at 07:02

## 2025-02-07 RX ADMIN — PANTOPRAZOLE SODIUM 40 MG: 40 TABLET, DELAYED RELEASE ORAL at 06:02

## 2025-02-07 RX ADMIN — BUDESONIDE 0.5 MG: 0.5 INHALANT RESPIRATORY (INHALATION) at 07:02

## 2025-02-07 NOTE — PLAN OF CARE
[facility]  Discharge Final Note    Primary Care Provider: Yazan Jj MD    Expected Discharge Date: 2/7/2025    Final Discharge Note (most recent)       Final Note - 02/07/25 1648          Final Note    Assessment Type Final Discharge Note     Anticipated Discharge Disposition Home or Self Care     What phone number can be called within the next 1-3 days to see how you are doing after discharge? [phone]     Hospital Resources/Appts/Education Provided Provided patient/caregiver with written discharge plan information        Post-Acute Status    Coverage BCBS All Out of State     Discharge Delays None known at this time                   Future Appointments   Date Time Provider Department Center   2/11/2025  9:00 AM Cyndee Camp MD [facility] [place]   2/11/2025  9:00 AM [facility] [place]   2/14/2025 11:20 AM Víctor Arriaga MD [facility]   2/21/2025  8:00 AM Vance Bautista MD [facility]       Patient discharged home with medical appointments scheduled.  Significant other available for transportation.  Information given to patient per medical staff for f/u.    Zeny Figueroa LMSW  [facility]  [phone]            Important Message from Medicare

## 2025-02-07 NOTE — PROGRESS NOTES
[facility]  Critical Care Medicine  Progress Note    Patient Name: Gisell Villafuerte  MRN: [id number]  Admission Date: 2025  Hospital Length of Stay: 13 days  Code Status: Full Code  Attending Provider: Lukas Michel*  Primary Care Provider: Yazan Jj MD   Principal Problem: Acquired subglottic stenosis    Subjective:     HPI:  29F A1 30wks with recently diagnosed subglottic stenosis, here for ENT procedure. History of asthma, LPRD, allergies, anxiety. Reportedly seen by ENT PA in  and diagnosed with LPRD; has had worsening stridor which became acute after flu A infx . Was admitted for asthma exacerbation, however failed conservative tx and was investigated further given stridor.      HDS on outside admission. Labs with mild leukocytosis iso steroid adminsitration outpatient. RIP negative, normal inflammatory markers. Ucx noted to have E coli; was treated with ceftriaxone.    Hospital/ICU Course:  Patient admitted for ENT evaluation of subglottic stenosis and potential balloon/laser procedure. Tentative plan for OR  with ENT. OBGYN also consulted and following given 30wks pregnant. She is comfortable on room air. OB following; performed NST which was reassuring. ENT and MFM are following and ENT will perform her procedure with MFM present. MFM recommending waiting till she is 32 weeks pregnant. She will need a steroid course one week prior to her procedure for fetal lung maturity. (Betamethasone scheduled for 2025). Asymptomatic UTI being treated with Augmentin. Patient had brief episode of worsening stridor that corrected with one dose of racemic epinephrine. Currently stable. Trach kit at bedside.  kit mostly complete minus the bandage scissors. ENT dilation of subglottic stenosis successful (per formed 2025).     Interval History/Significant Events: NAEO. Successful endoscopic dilation performed today. Watching her one more night; likely  discharge in AM once ENT sees her.     Review of Systems   Constitutional:  Negative for chills and fever.   Respiratory:  Negative for shortness of breath.    Cardiovascular:  Negative for chest pain and palpitations.   Gastrointestinal:  Negative for abdominal pain and nausea.   Genitourinary:  Negative for difficulty urinating.   Neurological:  Negative for dizziness and headaches.   Psychiatric/Behavioral:  Negative for confusion.      Objective:     Vital Signs (Most Recent):  Temp: 98 °F (36.7 °C) (02/06/25 1500)  Pulse: 99 (02/06/25 1800)  Resp: (!) 26 (02/06/25 1800)  BP: 105/62 (02/06/25 1800)  SpO2: (!) 94 % (02/06/25 1800) Vital Signs (24h Range):  Temp:  [97.7 °F (36.5 °C)-98.1 °F (36.7 °C)] 98 °F (36.7 °C)  Pulse:  [] 99  Resp:  [16-32] 26  SpO2:  [93 %-100 %] 94 %  BP: ()/(53-86) 105/62   Weight: 73.5 kg (162 lb 0.6 oz)  Body mass index is 30.62 kg/m².      Intake/Output Summary (Last 24 hours) at 2/6/2025 1838  Last data filed at 2/6/2025 1700  Gross per 24 hour   Intake 1100 ml   Output --   Net 1100 ml          Physical Exam  Vitals and nursing note reviewed.   Constitutional:       General: She is not in acute distress.     Appearance: She is well-developed.   HENT:      Head: Normocephalic and atraumatic.   Eyes:      General: No scleral icterus.  Cardiovascular:      Rate and Rhythm: Normal rate and regular rhythm.      Pulses: Normal pulses.   Pulmonary:      Effort: Pulmonary effort is normal. No respiratory distress.      Breath sounds: Normal breath sounds.   Abdominal:      General: Bowel sounds are normal. There is no distension.      Palpations: Abdomen is soft.      Tenderness: There is no abdominal tenderness.   Musculoskeletal:      Right lower leg: No edema.      Left lower leg: No edema.   Skin:     General: Skin is warm and dry.   Neurological:      Mental Status: She is alert and oriented to person, place, and time.            Vents:     Lines/Drains/Airways        "Peripheral Intravenous Line  Duration                  Peripheral IV - Single Lumen 01/31/25 2354 22 G Right Antecubital 5 days         Peripheral IV - Single Lumen 02/03/25 1400 20 G Anterior;Distal;Left Forearm 3 days                  Significant Labs:    CBC/Anemia Profile:  Recent Labs   Lab 02/06/25  0605   WBC 10.39   HGB 11.2*   HCT 33.0*      MCV 86   RDW 13.5        Chemistries:  Recent Labs   Lab 02/06/25  0605      K 3.8      CO2 20*   BUN 7   CREATININE 0.4*   CALCIUM 8.5*   ALBUMIN 2.9*   PROT 6.1   BILITOT 0.7   ALKPHOS 82   ALT 7*   AST 10   MG 1.7   PHOS 3.7       All pertinent labs within the past 24 hours have been reviewed.    Significant Imaging:  I have reviewed all pertinent imaging results/findings within the past 24 hours.    ABG  No results for input(s): "PH", "PO2", "PCO2", "HCO3", "BE" in the last 168 hours.  Assessment/Plan:     Psychiatric  Anxiety  Patient states she is very anxious about her upcoming ENT procedure and is requesting a short term course of medication for anxiety. Does not wish to start a long term treatment for anxiety as she belives her anxiety will resolve after her ENT procedure. Discussed options with MFM; Hydroxyzine will be given      Plan:  Hydroxyzine 25 mg TID PRN for anxiety (spaced at least 4 hours from diphenhydramine)  Continue sertraline 25 mg and increase to 50 mg after 7 days per MFM. (Increased to 50 mg starting 2/5/2025).    ENT  * Acquired subglottic stenosis  Thought to be acquired iso LPRD. ENT at [facility] transferred for higher procedural level of care; ENT here consulted. Reportedly both balloon dilatation and tracheostomy have been discussed with her.     ENT in conjunction with [facility] are recommending waiting till she is 32 weeks pregnant or her ENT procedure. MFM will be with ENT during the procedure.    Successful dilation completed on 2/6/2025. Patient reports no SOB after procedure and her lungs sounds are clear bilaterally. "     - reflux management with PPI BID and pepcid 40 mg nightly   - racemic epi q6H PRN for 24 hours  - ordered auto-immune labs  - TSH WNL  -Betamethasone given 2025 for fetal lung maturity        Pulmonary  Severe persistent asthma with acute exacerbation  No wheezing on exam; treating for asthma flare but likely stenosis is major pathology.    -Nebulized budesonide BID, with duonebs q6WA and PRN  -Montelukast nightly, diphenhydramine prn for allergies (space at least 4 hours from hydroxyzine)     Renal/  Acute cystitis  Asymptomatic UTI    Plan:  -Augmentin q12 for 5 days; first dose given 2025; Medication complete    Obstetric  30 weeks gestation of pregnancy  - OBGYN consulted, appreciate their recs  -NST being performed daily by OB  -MFM medicine following and will be ENT during her procdeure  -MFM recommending steroid course for fetal lung maturing one week prior to her procedure; Betamethasone scheduled for 2025; appreciate recs  -Gathering supplies to bedside in case of emergency ; all gathered except bandage scissors.        Critical Care Daily Checklist:    A: Awake: RASS Goal/Actual Goal:    Actual:     B: Spontaneous Breathing Trial Performed?     C: SAT & SBT Coordinated?  Not on vent                      D: Delirium: CAM-ICU     E: Early Mobility Performed? Yes   F: Feeding Goal: Goals: Meet % een/epn by next RD f/u  Status: Nutrition Goal Status: goal not met   Current Diet Order   Procedures    Diet Adult Regular      AS: Analgesia/Sedation Tylenol    T: Thromboembolic Prophylaxis None'; post-op   H: HOB > 300 Yes   U: Stress Ulcer Prophylaxis (if needed) Famotidine and pantoprazole    G: Glucose Control None; in acceptable range   B: Bowel Function Stool Occurrence: 1   I: Indwelling Catheter (Lines & Arnett) Necessity PIV   D: De-escalation of Antimicrobials/Pharmacotherapies No abx    Plan for the day/ETD Monitor post-op    Code Status:  Family/Goals of Care: Full  Code         Critical secondary to Patient has a condition that poses threat to life and bodily function: Initially admitted with subglottic stenosis leading to stridor and SOB; now post op (dilation by ENT) with symptom relief       Critical care was time spent personally by me on the following activities: development of treatment plan with patient or surrogate and bedside caregivers, discussions with consultants, evaluation of patient's response to treatment, examination of patient, ordering and performing treatments and interventions, ordering and review of laboratory studies, ordering and review of radiographic studies, pulse oximetry, re-evaluation of patient's condition. This critical care time did not overlap with that of any other provider or involve time for any procedures.     Mali Pineda, DO  Critical Care Medicine  [facility] - Medical ICU

## 2025-02-07 NOTE — ASSESSMENT & PLAN NOTE
28 yo female 30 wks pregnant with grade III subglottic stenosis. Mild biphasic stridor noted on exam, no increased work of breathing. Patient saturating well on room air. Patient admitted to MICU for close airway monitoring. Surgical plans have been deferred until approximately 32w gestational age and pt received steroid course for fetal lung maturity.     She is s/p balloon dilation on 2/6 for her subglottic stenosis and doing well postoperatively.     - Okay for discharge from ENT standpoint  - Will arrange follow up with Dr. Bautista outpatient  - Post op discharge instructions added to discharge tab    Please page ENT resident on call with any questions or concerns.

## 2025-02-07 NOTE — PROGRESS NOTES
[facility]  Critical Care Medicine  Progress Note    Patient Name: Gisell Villafuerte  MRN: [id number]  Admission Date: 2025  Hospital Length of Stay: 14 days  Code Status: Prior  Attending Provider: No att. providers found  Primary Care Provider: Yazan Jj MD   Principal Problem: Acquired subglottic stenosis    Subjective:     HPI:  29F A1 30wks with recently diagnosed subglottic stenosis, here for ENT procedure. History of asthma, LPRD, allergies, anxiety. Reportedly seen by ENT PA in  and diagnosed with LPRD; has had worsening stridor which became acute after flu A infx . Was admitted for asthma exacerbation, however failed conservative tx and was investigated further given stridor.      HDS on outside admission. Labs with mild leukocytosis iso steroid adminsitration outpatient. RIP negative, normal inflammatory markers. Ucx noted to have E coli; was treated with ceftriaxone.    Hospital/ICU Course:  Patient admitted for ENT evaluation of subglottic stenosis and potential balloon/laser procedure. Tentative plan for OR  with ENT. OBGYN also consulted and following given 30wks pregnant. She is comfortable on room air. OB following; performed NST which was reassuring. ENT and MFM are following and ENT will perform her procedure with MFM present. MFM recommending waiting till she is 32 weeks pregnant. She will need a steroid course one week prior to her procedure for fetal lung maturity. (Betamethasone scheduled for 2025). Asymptomatic UTI being treated with Augmentin. Patient had brief episode of worsening stridor that corrected with one dose of racemic epinephrine. Currently stable. Trach kit at bedside.  kit mostly complete minus the bandage scissors. ENT dilation of subglottic stenosis successful (per formed 2025).     Interval History/Significant Events: NAEO. Patient doing well. Will discharge today. Will discharge with PPI. Instructed her to take  till she sees ENT.    Review of Systems   Constitutional:  Negative for chills and fever.   Respiratory:  Negative for shortness of breath.    Cardiovascular:  Negative for chest pain and palpitations.   Gastrointestinal:  Negative for abdominal pain and nausea.   Genitourinary:  Negative for difficulty urinating.   Neurological:  Negative for dizziness and headaches.   Psychiatric/Behavioral:  Negative for confusion.      Objective:     Vital Signs (Most Recent):  Temp: 98.1 °F (36.7 °C) (02/07/25 0700)  Pulse: 90 (02/07/25 0800)  Resp: (!) 28 (02/07/25 0800)  BP: 109/66 (02/07/25 0800)  SpO2: 98 % (02/07/25 0800) Vital Signs (24h Range):  Temp:  [98 °F (36.7 °C)-98.1 °F (36.7 °C)] 98.1 °F (36.7 °C)  Pulse:  [] 90  Resp:  [17-28] 28  SpO2:  [93 %-98 %] 98 %  BP: ()/(55-68) 109/66   Weight: 73.5 kg (162 lb 0.6 oz)  Body mass index is 30.62 kg/m².      Intake/Output Summary (Last 24 hours) at 2/7/2025 1355  Last data filed at 2/7/2025 0807  Gross per 24 hour   Intake 508.23 ml   Output --   Net 508.23 ml          Physical Exam  Vitals and nursing note reviewed.   Constitutional:       General: She is not in acute distress.     Appearance: She is well-developed.   HENT:      Head: Normocephalic and atraumatic.   Eyes:      General: No scleral icterus.  Cardiovascular:      Rate and Rhythm: Normal rate and regular rhythm.      Pulses: Normal pulses.   Pulmonary:      Effort: Pulmonary effort is normal. No respiratory distress.      Breath sounds: Normal breath sounds.   Abdominal:      General: Bowel sounds are normal. There is no distension.      Palpations: Abdomen is soft.      Tenderness: There is no abdominal tenderness.   Musculoskeletal:      Right lower leg: No edema.      Left lower leg: No edema.   Skin:     General: Skin is warm and dry.   Neurological:      Mental Status: She is alert and oriented to person, place, and time.            Vents:     Lines/Drains/Airways       None              "    Significant Labs:    CBC/Anemia Profile:  Recent Labs   Lab 02/06/25  0605   WBC 10.39   HGB 11.2*   HCT 33.0*      MCV 86   RDW 13.5        Chemistries:  Recent Labs   Lab 02/06/25  0605      K 3.8      CO2 20*   BUN 7   CREATININE 0.4*   CALCIUM 8.5*   ALBUMIN 2.9*   PROT 6.1   BILITOT 0.7   ALKPHOS 82   ALT 7*   AST 10   MG 1.7   PHOS 3.7       All pertinent labs within the past 24 hours have been reviewed.    Significant Imaging:  I have reviewed all pertinent imaging results/findings within the past 24 hours.    ABG  No results for input(s): "PH", "PO2", "PCO2", "HCO3", "BE" in the last 168 hours.  Assessment/Plan:     Psychiatric  Anxiety  Patient states she is very anxious about her upcoming ENT procedure and is requesting a short term course of medication for anxiety. Does not wish to start a long term treatment for anxiety as she belives her anxiety will resolve after her ENT procedure. Discussed options with MFM; Hydroxyzine will be given      Plan:  Hydroxyzine 25 mg TID PRN for anxiety (spaced at least 4 hours from diphenhydramine)  Continue sertraline 25 mg and increase to 50 mg after 7 days per MFM. (Increased to 50 mg starting 2/5/2025).    ENT  * Acquired subglottic stenosis  Thought to be acquired iso LPRD. ENT at [facility] transferred for higher procedural level of care; ENT here consulted. Reportedly both balloon dilatation and tracheostomy have been discussed with her.     ENT in conjunction with MFM are recommending waiting till she is 32 weeks pregnant or her ENT procedure. MFM will be with ENT during the procedure.    Successful dilation completed on 2/6/2025. Patient reports no SOB after procedure and her lungs sounds are clear bilaterally.     - reflux management with PPI BID and pepcid 40 mg nightly   - racemic epi q6H PRN for 24 hours  - ordered auto-immune labs  - TSH WNL  -Betamethasone given 1/30/2025 for fetal lung maturity    Patient will discharge home with " pantoprazole for acid suppression and will follow up with ENT outpatient.         Pulmonary  Severe persistent asthma with acute exacerbation  No wheezing on exam; treating for asthma flare but likely stenosis is major pathology.    -Nebulized budesonide BID, with duonebs q6WA and PRN  -Montelukast nightly, diphenhydramine prn for allergies (space at least 4 hours from hydroxyzine)     Renal/  Acute cystitis  Asymptomatic UTI    Plan:  -Augmentin q12 for 5 days; first dose given 2025; Medication complete    Obstetric  30 weeks gestation of pregnancy  - OBGYN consulted, appreciate their recs  -NST being performed daily by OB  -MFM medicine following and will be ENT during her procdeure  -MFM recommending steroid course for fetal lung maturing one week prior to her procedure; Betamethasone scheduled for 2025; appreciate recs  -Gathering supplies to bedside in case of emergency ; all gathered except bandage scissors.        Critical Care Daily Checklist:    A: Awake: RASS Goal/Actual Goal:    Actual:     B: Spontaneous Breathing Trial Performed?     C: SAT & SBT Coordinated?  Not on vent                      D: Delirium: CAM-ICU     E: Early Mobility Performed? Yes   F: Feeding Goal: Goals: Meet % een/epn by next RD f/u  Status: Nutrition Goal Status: goal not met   Current Diet Order   No orders of the defined types were placed in this encounter.      AS: Analgesia/Sedation Tylenol   T: Thromboembolic Prophylaxis None; discharging today; able to ambulate   H: HOB > 300 Yes   U: Stress Ulcer Prophylaxis (if needed) Pantoprazole and famotidine (discharging with pantoprazole)   G: Glucose Control None; in range   B: Bowel Function Stool Occurrence: 1   I: Indwelling Catheter (Lines & Arnett) Necessity PIV   D: De-escalation of Antimicrobials/Pharmacotherapies No abx    Plan for the day/ETD Discharge    Code Status:  Family/Goals of Care: Prior         Critical secondary to Patient has a  condition that poses threat to life and bodily function: initially admitted for airway watch in setting of acquired subglottic stenosis; post dilation by ENT. Doing well; discharging today.       Critical care was time spent personally by me on the following activities: development of treatment plan with patient or surrogate and bedside caregivers, discussions with consultants, evaluation of patient's response to treatment, examination of patient, ordering and performing treatments and interventions, ordering and review of laboratory studies, ordering and review of radiographic studies, pulse oximetry, re-evaluation of patient's condition. This critical care time did not overlap with that of any other provider or involve time for any procedures.     Mali Pineda, DO  Critical Care Medicine  [facility] - [facility] ICU

## 2025-02-07 NOTE — ASSESSMENT & PLAN NOTE
Thought to be acquired iso LPRD. ENT at [facility] transferred for higher procedural level of care; ENT here consulted. Reportedly both balloon dilatation and tracheostomy have been discussed with her.     ENT in conjunction with M are recommending waiting till she is 32 weeks pregnant or her ENT procedure. MFM will be with ENT during the procedure.    Successful dilation completed on 2/6/2025. Patient reports no SOB after procedure and her lungs sounds are clear bilaterally.     - reflux management with PPI BID and pepcid 40 mg nightly   - racemic epi q6H PRN for 24 hours  - ordered auto-immune labs  - TSH WNL  -Betamethasone given 1/30/2025 for fetal lung maturity    Patient will discharge home with pantoprazole for acid suppression and will follow up with ENT outpatient.

## 2025-02-07 NOTE — SUBJECTIVE & OBJECTIVE
Interval History: BRENT. POD1 from balloon dilation for subglottic stenosis with improvement of breathing. Tolerating PO. Pain controlled.     Medications:  Continuous Infusions:  Scheduled Meds:   albuterol sulfate  2.5 mg Nebulization Q6H WAKE    budesonide  0.5 mg Nebulization Q12H    cetirizine  10 mg Oral Daily    famotidine  40 mg Oral QHS    montelukast  10 mg Oral QHS    pantoprazole  40 mg Oral BID AC    polyethylene glycol  17 g Oral BID    prenatal vitamin  1 tablet Oral Daily    sertraline  50 mg Oral Daily     PRN Meds:  Current Facility-Administered Medications:     acetaminophen, 650 mg, Oral, Q4H PRN    diphenhydrAMINE, 25 mg, Intravenous, Q6H PRN    guaiFENesin, 600 mg, Oral, BID PRN    hydrOXYzine HCL, 25 mg, Oral, TID PRN    magnesium oxide, 800 mg, Oral, PRN    magnesium oxide, 800 mg, Oral, PRN    ondansetron, 4 mg, Intravenous, Q8H PRN    potassium bicarbonate, 35 mEq, Oral, PRN    potassium bicarbonate, 50 mEq, Oral, PRN    potassium bicarbonate, 60 mEq, Oral, PRN    potassium, sodium phosphates, 2 packet, Oral, PRN    potassium, sodium phosphates, 2 packet, Oral, PRN    potassium, sodium phosphates, 2 packet, Oral, PRN    racepinephrine, 0.5 mL, Nebulization, Q4H PRN    sodium chloride 0.9%, 10 mL, Intravenous, PRN     Review of patient's allergies indicates:   Allergen Reactions    No known drug allergies      Objective:     Vital Signs (24h Range):  Temp:  [98 °F (36.7 °C)-98.1 °F (36.7 °C)] 98 °F (36.7 °C)  Pulse:  [] 90  Resp:  [17-28] 28  SpO2:  [93 %-98 %] 98 %  BP: ()/(55-74) 128/68     Date 02/07/25 0700 - 02/08/25 0659   Shift 7184-9550 8296-2818 6471-6817 24 Hour Total   INTAKE   I.V.(mL/kg) 28.2(0.4)   28.2(0.4)   Shift Total(mL/kg) 28.2(0.4)   28.2(0.4)   OUTPUT   Shift Total(mL/kg)       Weight (kg) 73.5 73.5 73.5 73.5     Lines/Drains/Airways       Peripheral Intravenous Line  Duration                  Peripheral IV - Single Lumen 01/31/25 2354 22 G Right Antecubital  6 days         Peripheral IV - Single Lumen 02/03/25 1400 20 G Anterior;Distal;Left Forearm 3 days                     Physical Exam   NAD  Resting in bed comfortably. No stridor with deep breathing.   Head atraumatic   EOMI   Auricles WNL AU  MMM, oropharynx clear, tongue mobile  Neck soft  Oxygenating well on room air     Significant Labs:  CBC:   Recent Labs   Lab 02/06/25  0605   WBC 10.39   RBC 3.84*   HGB 11.2*   HCT 33.0*      MCV 86   MCH 29.2   MCHC 33.9     CMP:   Recent Labs   Lab 02/06/25  0605   GLU 66*   CALCIUM 8.5*   ALBUMIN 2.9*   PROT 6.1      K 3.8   CO2 20*      BUN 7   CREATININE 0.4*   ALKPHOS 82   ALT 7*   AST 10   BILITOT 0.7       Significant Diagnostics:  I have reviewed and interpreted all pertinent imaging results/findings within the past 24 hours.

## 2025-02-07 NOTE — CARE UPDATE
Daily NST      mod estefany + accels - decels overall RR   [place] quiet     Annelise Diaz MD  PGY 4  Obstetrics and Gynecology

## 2025-02-07 NOTE — SUBJECTIVE & OBJECTIVE
Interval History/Significant Events: NAEO. Patient doing well. Will discharge today. Will discharge with PPI. Instructed her to take till she sees ENT.    Review of Systems   Constitutional:  Negative for chills and fever.   Respiratory:  Negative for shortness of breath.    Cardiovascular:  Negative for chest pain and palpitations.   Gastrointestinal:  Negative for abdominal pain and nausea.   Genitourinary:  Negative for difficulty urinating.   Neurological:  Negative for dizziness and headaches.   Psychiatric/Behavioral:  Negative for confusion.      Objective:     Vital Signs (Most Recent):  Temp: 98.1 °F (36.7 °C) (02/07/25 0700)  Pulse: 90 (02/07/25 0800)  Resp: (!) 28 (02/07/25 0800)  BP: 109/66 (02/07/25 0800)  SpO2: 98 % (02/07/25 0800) Vital Signs (24h Range):  Temp:  [98 °F (36.7 °C)-98.1 °F (36.7 °C)] 98.1 °F (36.7 °C)  Pulse:  [] 90  Resp:  [17-28] 28  SpO2:  [93 %-98 %] 98 %  BP: ()/(55-68) 109/66   Weight: 73.5 kg (162 lb 0.6 oz)  Body mass index is 30.62 kg/m².      Intake/Output Summary (Last 24 hours) at 2/7/2025 1355  Last data filed at 2/7/2025 0807  Gross per 24 hour   Intake 508.23 ml   Output --   Net 508.23 ml          Physical Exam  Vitals and nursing note reviewed.   Constitutional:       General: She is not in acute distress.     Appearance: She is well-developed.   HENT:      Head: Normocephalic and atraumatic.   Eyes:      General: No scleral icterus.  Cardiovascular:      Rate and Rhythm: Normal rate and regular rhythm.      Pulses: Normal pulses.   Pulmonary:      Effort: Pulmonary effort is normal. No respiratory distress.      Breath sounds: Normal breath sounds.   Abdominal:      General: Bowel sounds are normal. There is no distension.      Palpations: Abdomen is soft.      Tenderness: There is no abdominal tenderness.   Musculoskeletal:      Right lower leg: No edema.      Left lower leg: No edema.   Skin:     General: Skin is warm and dry.   Neurological:      Mental  Status: She is alert and oriented to person, place, and time.            Vents:     Lines/Drains/Airways       None                 Significant Labs:    CBC/Anemia Profile:  Recent Labs   Lab 02/06/25  0605   WBC 10.39   HGB 11.2*   HCT 33.0*      MCV 86   RDW 13.5        Chemistries:  Recent Labs   Lab 02/06/25  0605      K 3.8      CO2 20*   BUN 7   CREATININE 0.4*   CALCIUM 8.5*   ALBUMIN 2.9*   PROT 6.1   BILITOT 0.7   ALKPHOS 82   ALT 7*   AST 10   MG 1.7   PHOS 3.7       All pertinent labs within the past 24 hours have been reviewed.    Significant Imaging:  I have reviewed all pertinent imaging results/findings within the past 24 hours.

## 2025-02-07 NOTE — PROGRESS NOTES
[facility] ICU  Otorhinolaryngology-Head & Neck Surgery  Progress Note    Subjective:     Post-Op Info:  Procedure(s) (LRB):  LARYNGOSCOPY, DIRECT, WITH BRONCHOSCOPY and dilation (N/A)   1 Day Post-Op  Hospital Day: 15     Interval History: ISAIASON. POD1 from balloon dilation for subglottic stenosis with improvement of breathing. Tolerating PO. Pain controlled.     Medications:  Continuous Infusions:  Scheduled Meds:   albuterol sulfate  2.5 mg Nebulization Q6H WAKE    budesonide  0.5 mg Nebulization Q12H    cetirizine  10 mg Oral Daily    famotidine  40 mg Oral QHS    montelukast  10 mg Oral QHS    pantoprazole  40 mg Oral BID AC    polyethylene glycol  17 g Oral BID    prenatal vitamin  1 tablet Oral Daily    sertraline  50 mg Oral Daily     PRN Meds:  Current Facility-Administered Medications:     acetaminophen, 650 mg, Oral, Q4H PRN    diphenhydrAMINE, 25 mg, Intravenous, Q6H PRN    guaiFENesin, 600 mg, Oral, BID PRN    hydrOXYzine HCL, 25 mg, Oral, TID PRN    magnesium oxide, 800 mg, Oral, PRN    magnesium oxide, 800 mg, Oral, PRN    ondansetron, 4 mg, Intravenous, Q8H PRN    potassium bicarbonate, 35 mEq, Oral, PRN    potassium bicarbonate, 50 mEq, Oral, PRN    potassium bicarbonate, 60 mEq, Oral, PRN    potassium, sodium phosphates, 2 packet, Oral, PRN    potassium, sodium phosphates, 2 packet, Oral, PRN    potassium, sodium phosphates, 2 packet, Oral, PRN    racepinephrine, 0.5 mL, Nebulization, Q4H PRN    sodium chloride 0.9%, 10 mL, Intravenous, PRN     Review of patient's allergies indicates:   Allergen Reactions    No known drug allergies      Objective:     Vital Signs (24h Range):  Temp:  [98 °F (36.7 °C)-98.1 °F (36.7 °C)] 98 °F (36.7 °C)  Pulse:  [] 90  Resp:  [17-28] 28  SpO2:  [93 %-98 %] 98 %  BP: ()/(55-74) 128/68     Date 02/07/25 0700 - 02/08/25 0659   Shift 7749-2499 5340-2119 6184-9597 24 Hour Total   INTAKE   I.V.(mL/kg) 28.2(0.4)   28.2(0.4)   Shift Total(mL/kg) 28.2(0.4)    28.2(0.4)   OUTPUT   Shift Total(mL/kg)       Weight (kg) 73.5 73.5 73.5 73.5     Lines/Drains/Airways       Peripheral Intravenous Line  Duration                  Peripheral IV - Single Lumen 01/31/25 2354 22 G Right Antecubital 6 days         Peripheral IV - Single Lumen 02/03/25 1400 20 G Anterior;Distal;Left Forearm 3 days                     Physical Exam   NAD  Resting in bed comfortably. No stridor with deep breathing.   Head atraumatic   EOMI   Auricles WNL AU  MMM, oropharynx clear, tongue mobile  Neck soft  Oxygenating well on room air     Significant Labs:  CBC:   Recent Labs   Lab 02/06/25  0605   WBC 10.39   RBC 3.84*   HGB 11.2*   HCT 33.0*      MCV 86   MCH 29.2   MCHC 33.9     CMP:   Recent Labs   Lab 02/06/25  0605   GLU 66*   CALCIUM 8.5*   ALBUMIN 2.9*   PROT 6.1      K 3.8   CO2 20*      BUN 7   CREATININE 0.4*   ALKPHOS 82   ALT 7*   AST 10   BILITOT 0.7       Significant Diagnostics:  I have reviewed and interpreted all pertinent imaging results/findings within the past 24 hours.  Assessment/Plan:     * Acquired subglottic stenosis  28 yo female 30 wks pregnant with grade III subglottic stenosis. Mild biphasic stridor noted on exam, no increased work of breathing. Patient saturating well on room air. Patient admitted to MICU for close airway monitoring. Surgical plans have been deferred until approximately 32w gestational age and pt received steroid course for fetal lung maturity.     She is s/p balloon dilation on 2/6 for her subglottic stenosis and doing well postoperatively.     - Okay for discharge from ENT standpoint  - Will arrange follow up with Dr. Bautista outpatient  - Post op discharge instructions added to discharge tab    Please page ENT resident on call with any questions or concerns.        Ermelinda Gage MD  Otorhinolaryngology-Head & Neck Surgery  [facility]

## 2025-02-07 NOTE — SUBJECTIVE & OBJECTIVE
Interval History/Significant Events: NAEO. Successful endoscopic dilation performed today. Watching her one more night; likely discharge in AM once ENT sees her.     Review of Systems   Constitutional:  Negative for chills and fever.   Respiratory:  Negative for shortness of breath.    Cardiovascular:  Negative for chest pain and palpitations.   Gastrointestinal:  Negative for abdominal pain and nausea.   Genitourinary:  Negative for difficulty urinating.   Neurological:  Negative for dizziness and headaches.   Psychiatric/Behavioral:  Negative for confusion.      Objective:     Vital Signs (Most Recent):  Temp: 98 °F (36.7 °C) (02/06/25 1500)  Pulse: 99 (02/06/25 1800)  Resp: (!) 26 (02/06/25 1800)  BP: 105/62 (02/06/25 1800)  SpO2: (!) 94 % (02/06/25 1800) Vital Signs (24h Range):  Temp:  [97.7 °F (36.5 °C)-98.1 °F (36.7 °C)] 98 °F (36.7 °C)  Pulse:  [] 99  Resp:  [16-32] 26  SpO2:  [93 %-100 %] 94 %  BP: ()/(53-86) 105/62   Weight: 73.5 kg (162 lb 0.6 oz)  Body mass index is 30.62 kg/m².      Intake/Output Summary (Last 24 hours) at 2/6/2025 1838  Last data filed at 2/6/2025 1700  Gross per 24 hour   Intake 1100 ml   Output --   Net 1100 ml          Physical Exam  Vitals and nursing note reviewed.   Constitutional:       General: She is not in acute distress.     Appearance: She is well-developed.   HENT:      Head: Normocephalic and atraumatic.   Eyes:      General: No scleral icterus.  Cardiovascular:      Rate and Rhythm: Normal rate and regular rhythm.      Pulses: Normal pulses.   Pulmonary:      Effort: Pulmonary effort is normal. No respiratory distress.      Breath sounds: Normal breath sounds.   Abdominal:      General: Bowel sounds are normal. There is no distension.      Palpations: Abdomen is soft.      Tenderness: There is no abdominal tenderness.   Musculoskeletal:      Right lower leg: No edema.      Left lower leg: No edema.   Skin:     General: Skin is warm and dry.   Neurological:       Mental Status: She is alert and oriented to person, place, and time.            Vents:     Lines/Drains/Airways       Peripheral Intravenous Line  Duration                  Peripheral IV - Single Lumen 01/31/25 2354 22 G Right Antecubital 5 days         Peripheral IV - Single Lumen 02/03/25 1400 20 G Anterior;Distal;Left Forearm 3 days                  Significant Labs:    CBC/Anemia Profile:  Recent Labs   Lab 02/06/25  0605   WBC 10.39   HGB 11.2*   HCT 33.0*      MCV 86   RDW 13.5        Chemistries:  Recent Labs   Lab 02/06/25  0605      K 3.8      CO2 20*   BUN 7   CREATININE 0.4*   CALCIUM 8.5*   ALBUMIN 2.9*   PROT 6.1   BILITOT 0.7   ALKPHOS 82   ALT 7*   AST 10   MG 1.7   PHOS 3.7       All pertinent labs within the past 24 hours have been reviewed.    Significant Imaging:  I have reviewed all pertinent imaging results/findings within the past 24 hours.

## 2025-02-07 NOTE — DISCHARGE INSTRUCTIONS
MICROLARYNGOSCOPY    Description  Laryngoscopy is a procedure in which the surgeon closely examines the larynx (voice box). The key instrument for laryngoscopy is the laryngoscope, which is a hollow metal tube inserted into the mouth. Microlaryngoscopy entails--at minimum--a magnified examination of the larynx using a microscope and/or telescopes. This is performed in the operating room. This allows the surgeon to achieve a diagnosis and also to perform precise treatment for problems on the vocal folds (vocal cords) or other parts of the larynx. Additional interventions may be performed in conjunction with microlaryngoscopy. Common interventions include but are not limited to  Biopsy  Removal of abnormal tissue (polyps, cysts, nodules, leukoplakia)  Resection of cancer  Laser treatment of abnormal tissue (papilloma, leukoplakia)  Vocal fold injection augmentation  Injection of medication (steroid, Avastin)      What to expect during the procedure  Microlaryngoscopy is performed in the operating room while you are asleep under general anesthesia. In most instances, you can expect to be under general anesthesia for approximately 1 to 1 ½ hours.  Nevertheless, the duration of the procedure varies depending on the indication for surgery, intraoperative findings, and other patient-specific/anatomic issues. Our primary concerns are your safety and comfort. Our secondary goal is to provide you with the best possible surgical treatment for your problem. Your surgery will take as long as necessary to accomplish these goals.    What to expect afterwards  The laryngoscope is inserted through the mouth and presses on the tongue. The most common postoperative issues patients encounter are related to the laryngoscope being positioned in the mouth. The extent of these issues is related to patient-specific anatomic issues, the indications for surgery, and the duration of the procedure.    Pain. We will do everything we  can to make sure you are as comfortable as possible. Most patients experience very little discomfort after this surgery. Nevertheless, you should expect some soreness in the mouth and throat. Because the ear and the throat share the same sensory nerve, you may also experience some discomfort in the ear. The discomfort is usually worst for the first 48-72 hours and usually resolves within a week. You will be prescribed pain medication, but most patients are sufficiently comfortable with plain Tylenol as needed.    Laceration. Some patients may notice a small cut in the tongue or in another part of the mouth or throat. This may result in a minor about of blood-tinged saliva for the first 24 hours. This usually heals on its own over the course of a week.    Other tongue problems. As the scope presses down on the tongue, the taste buds get compressed. In addition, sometimes the nerves to the tongue also get compressed. As a result, some patients notice a disturbance in taste, numbness of the tongue, or (even more rarely) weakness of the tongue after surgery. Although sometimes it may take several weeks to months for the problem to completely go away, the tongue problems are temporary in almost every instance.    Tooth problems. Reinforced mouth guards are placed on the teeth to protect them during the surgery and we give a great deal of care and attention to minimizing any pressure on the teeth. However, a chipped or cracked tooth, loss of a tooth, and/or other tooth irregularities are rare but well-recognized complications of laryngoscopy.    Jaw problems. You may experience some pain in the jaw joints (in front of the ears). Even less frequent would be dislocation of the joint. This usually occurs in patients who already have jaw problems.    Instructions: after the procedure  Please take the prescribed pain medication as directed. If you are still having discomfort after the prescription runs out, or if you would  rather not take a prescription narcotic pain medicine, you may instead take plain acetaminophen (Tylenol) as directed on the packaging.  Please call our office at [phone] if  You have a temperature above 101°F  You develop Increasing pain not relieved by medications  You have any other questions or concerns  Please go immediately to the nearest emergency room if you are experiencing  Shortness of breath  Difficulty breathing  Severe bleeding

## 2025-02-07 NOTE — DISCHARGE SUMMARY
[facility]  Critical Care Medicine  Discharge Summary      Patient Name: Gisell Villafuerte  MRN: [id number]  Admission Date: 2025  Hospital Length of Stay: 14 days  Discharge Date and Time:  2025 2:04 PM  Attending Physician: Kitty att. providers found   Discharging Provider: Mali Pineda DO  Primary Care Provider: Yazan Jj MD  Reason for Admission: Acquired Subglottic stenosis leading to stridor and SOB; admitted to MICU for airway watch.    HPI:   29F A1 30wks with recently diagnosed subglottic stenosis, here for ENT procedure. History of asthma, LPRD, allergies, anxiety. Reportedly seen by ENT PA in  and diagnosed with LPRD; has had worsening stridor which became acute after flu A infx . Was admitted for asthma exacerbation, however failed conservative tx and was investigated further given stridor.      HDS on outside admission. Labs with mild leukocytosis iso steroid adminsitration outpatient. RIP negative, normal inflammatory markers. Ucx noted to have E coli; was treated with ceftriaxone.    Procedure(s) (LRB):  LARYNGOSCOPY, DIRECT, WITH BRONCHOSCOPY and dilation (N/A)    Indwelling Lines/Drains at Time of Discharge:   Lines/Drains/Airways       None                 Hospital Course:   Patient admitted for ENT evaluation of subglottic stenosis and potential balloon/laser procedure. Tentative plan for OR  with ENT. OBGYN also consulted and following given 30wks pregnant. She is comfortable on room air. OB following; performed NST which was reassuring. ENT and MFM are following and ENT will perform her procedure with MFM present. MFM recommending waiting till she is 32 weeks pregnant. She will need a steroid course one week prior to her procedure for fetal lung maturity. (Betamethasone scheduled for 2025). Asymptomatic UTI being treated with Augmentin. Patient had brief episode of worsening stridor that corrected with one dose of racemic epinephrine.  Currently stable. Trach kit at bedside.  kit mostly complete minus the bandage scissors. ENT dilation of subglottic stenosis successful (per formed 2025).     Consults (From admission, onward)          Status Ordering Provider     Inpatient consult to ENT  Once        Provider:  (Not yet assigned)    Completed MALLORIE ADS     Inpatient consult to Obstetrics  Once        Provider:  (Not yet assigned)    Completed BRISSA SULTANA          Significant Labs:  All pertinent labs within the past 24 hours have been reviewed.    Significant Imaging:  I have reviewed all pertinent imaging results/findings within the past 24 hours.    Pending Diagnostic Studies:       Procedure Component Value Units Date/Time    Specimen to Pathology, Surgery ENT [[id number]] Collected: 25 1001    Order Status: Sent Lab Status: In process Updated: 25 1131    Specimen: Tissue           Final Active Diagnoses:    Diagnosis Date Noted POA    PRINCIPAL PROBLEM:  Acquired subglottic stenosis [J38.6] 2025 Yes    30 weeks gestation of pregnancy [Z3A.30] 2025 Not Applicable    Acute cystitis [N30.00] 2025 Yes    Anxiety [F41.9] 2025 Yes    Severe persistent asthma with acute exacerbation [J45.51] 2025 Yes      Problems Resolved During this Admission:     No new Assessment & Plan notes have been filed under this hospital service since the last note was generated.  Service: Critical Care Medicine    Discharged Condition: stable    Disposition: Home or Self Care      Patient Instructions:   No discharge procedures on file.  Medications:  Reconciled Home Medications:      Medication List        START taking these medications      pantoprazole 40 MG tablet  Commonly known as: PROTONIX  Take 1 tablet (40 mg total) by mouth once daily.     sertraline 50 MG tablet  Commonly known as: ZOLOFT  Take 1 tablet (50 mg total) by mouth once daily.  Start taking on: 2025             CONTINUE taking these medications      * albuterol 90 mcg/actuation inhaler  Commonly known as: VENTOLIN HFA  Inhale 2 puffs into the lungs every 6 (six) hours as needed for Wheezing. Rescue     * albuterol sulfate 2.5 mg/0.5 mL Nebu  Take 2.5 mg by nebulization every 6 (six) hours as needed (shortness of breath, wheezing, or chest tightness.). Rescue     * albuterol 90 mcg/actuation inhaler  Commonly known as: PROVENTIL/VENTOLIN HFA  Inhale 1-2 puffs into the lungs every 6 (six) hours as needed for Wheezing or Shortness of Breath (chest tightness). Rescue     cetirizine 10 MG tablet  Commonly known as: ZYRTEC  Take 1 tablet (10 mg total) by mouth every evening.     famotidine 20 MG tablet  Commonly known as: PEPCID  Take 1 tablet (20 mg total) by mouth 2 (two) times daily.     fluticasone propion-salmeterol 115-21 mcg/dose 115-21 mcg/actuation Hfaa inhaler  Commonly known as: ADVAIR HFA  Inhale 2 puffs into the lungs 2 (two) times a day. Controller     fluticasone propionate 50 mcg/actuation nasal spray  Commonly known as: FLONASE  2 sprays (100 mcg total) by Each Nostril route once daily.     montelukast 5 MG chewable tablet  Commonly known as: SINGULAIR  Take 2 tablets (10 mg total) by mouth every evening.     ondansetron 4 MG Tbdl  Commonly known as: ZOFRAN-ODT  Take 1 tablet (4 mg total) by mouth every 6 (six) hours as needed (nausea/vomiting).     PRENATAL 1+1 ORAL  Take 1 tablet by mouth once daily.           * This list has 3 medication(s) that are the same as other medications prescribed for you. Read the directions carefully, and ask your doctor or other care provider to review them with you.                STOP taking these medications      EPINEPHrine 0.3 mg/0.3 mL Atin  Commonly known as: EPIPEN 2-BELEN     nitrofurantoin (macrocrystal-monohydrate) 100 MG capsule  Commonly known as: MACROBID     predniSONE 20 MG tablet  Commonly known as: DELTASONE               Mali Pineda, DO  Critical Care  Medicine  [facility]

## 2025-02-07 NOTE — ASSESSMENT & PLAN NOTE
Thought to be acquired iso LPRD. ENT at [facility] transferred for higher procedural level of care; ENT here consulted. Reportedly both balloon dilatation and tracheostomy have been discussed with her.     ENT in conjunction with MFM are recommending waiting till she is 32 weeks pregnant or her ENT procedure. MFM will be with ENT during the procedure.    Successful dilation completed on 2/6/2025. Patient reports no SOB after procedure and her lungs sounds are clear bilaterally.     - reflux management with PPI BID and pepcid 40 mg nightly   - racemic epi q6H PRN for 24 hours  - ordered auto-immune labs  - TSH WNL  -Betamethasone given 1/30/2025 for fetal lung maturity

## 2025-02-10 ENCOUNTER — PATIENT OUTREACH (OUTPATIENT)
Dept: ADMINISTRATIVE | Facility: CLINIC | Age: 30
End: 2025-02-10
Payer: COMMERCIAL

## 2025-02-11 ENCOUNTER — PROCEDURE VISIT (OUTPATIENT)
Dept: MATERNAL FETAL MEDICINE | Facility: CLINIC | Age: 30
End: 2025-02-11
Payer: COMMERCIAL

## 2025-02-11 ENCOUNTER — OFFICE VISIT (OUTPATIENT)
Dept: MATERNAL FETAL MEDICINE | Facility: CLINIC | Age: 30
End: 2025-02-11
Payer: COMMERCIAL

## 2025-02-11 VITALS
HEIGHT: 61 IN | WEIGHT: 169.19 LBS | HEART RATE: 95 BPM | SYSTOLIC BLOOD PRESSURE: 131 MMHG | DIASTOLIC BLOOD PRESSURE: 68 MMHG | BODY MASS INDEX: 31.94 KG/M2 | OXYGEN SATURATION: 97 %

## 2025-02-11 DIAGNOSIS — J38.6 ACQUIRED SUBGLOTTIC STENOSIS: ICD-10-CM

## 2025-02-11 DIAGNOSIS — J45.909 ASTHMA AFFECTING PREGNANCY IN THIRD TRIMESTER: Primary | ICD-10-CM

## 2025-02-11 DIAGNOSIS — J38.6 ACQUIRED SUBGLOTTIC STENOSIS: Primary | ICD-10-CM

## 2025-02-11 DIAGNOSIS — J45.909 ASTHMA AFFECTING PREGNANCY IN THIRD TRIMESTER: ICD-10-CM

## 2025-02-11 DIAGNOSIS — Z3A.34 34 WEEKS GESTATION OF PREGNANCY: ICD-10-CM

## 2025-02-11 DIAGNOSIS — J45.51 SEVERE PERSISTENT ASTHMA WITH ACUTE EXACERBATION: ICD-10-CM

## 2025-02-11 DIAGNOSIS — Z36.89 ENCOUNTER FOR FETAL ANATOMIC SURVEY: ICD-10-CM

## 2025-02-11 DIAGNOSIS — O99.513 ASTHMA AFFECTING PREGNANCY IN THIRD TRIMESTER: ICD-10-CM

## 2025-02-11 DIAGNOSIS — O35.9XX0 SUSPECTED FETAL ANOMALY, ANTEPARTUM, SINGLE OR UNSPECIFIED FETUS: ICD-10-CM

## 2025-02-11 DIAGNOSIS — O28.5 ABNORMAL CHROMOSOMAL AND GENETIC FINDING ON ANTENATAL SCREENING MOTHER: ICD-10-CM

## 2025-02-11 DIAGNOSIS — O35.EXX0 RENAL AGENESIS OF FETUS AFFECTING ANTEPARTUM CARE OF MOTHER, SINGLE OR UNSPECIFIED FETUS: Primary | ICD-10-CM

## 2025-02-11 DIAGNOSIS — O35.EXX0 RENAL AGENESIS OF FETUS AFFECTING ANTEPARTUM CARE OF MOTHER, SINGLE OR UNSPECIFIED FETUS: ICD-10-CM

## 2025-02-11 DIAGNOSIS — O99.513 ASTHMA AFFECTING PREGNANCY IN THIRD TRIMESTER: Primary | ICD-10-CM

## 2025-02-11 DIAGNOSIS — F41.9 ANXIETY: Chronic | ICD-10-CM

## 2025-02-11 PROCEDURE — 1159F MED LIST DOCD IN RCRD: CPT | Mod: CPTII,S$GLB,, | Performed by: STUDENT IN AN ORGANIZED HEALTH CARE EDUCATION/TRAINING PROGRAM

## 2025-02-11 PROCEDURE — 1160F RVW MEDS BY RX/DR IN RCRD: CPT | Mod: CPTII,S$GLB,, | Performed by: STUDENT IN AN ORGANIZED HEALTH CARE EDUCATION/TRAINING PROGRAM

## 2025-02-11 PROCEDURE — 99999 PR PBB SHADOW E&M-EST. PATIENT-LVL III: CPT | Mod: PBBFAC,,, | Performed by: STUDENT IN AN ORGANIZED HEALTH CARE EDUCATION/TRAINING PROGRAM

## 2025-02-11 PROCEDURE — 3008F BODY MASS INDEX DOCD: CPT | Mod: CPTII,S$GLB,, | Performed by: STUDENT IN AN ORGANIZED HEALTH CARE EDUCATION/TRAINING PROGRAM

## 2025-02-11 PROCEDURE — 3075F SYST BP GE 130 - 139MM HG: CPT | Mod: CPTII,S$GLB,, | Performed by: STUDENT IN AN ORGANIZED HEALTH CARE EDUCATION/TRAINING PROGRAM

## 2025-02-11 PROCEDURE — 76811 OB US DETAILED SNGL FETUS: CPT | Mod: S$GLB,,, | Performed by: STUDENT IN AN ORGANIZED HEALTH CARE EDUCATION/TRAINING PROGRAM

## 2025-02-11 PROCEDURE — 1111F DSCHRG MED/CURRENT MED MERGE: CPT | Mod: CPTII,S$GLB,, | Performed by: STUDENT IN AN ORGANIZED HEALTH CARE EDUCATION/TRAINING PROGRAM

## 2025-02-11 PROCEDURE — 99215 OFFICE O/P EST HI 40 MIN: CPT | Mod: S$GLB,,, | Performed by: STUDENT IN AN ORGANIZED HEALTH CARE EDUCATION/TRAINING PROGRAM

## 2025-02-11 PROCEDURE — 3078F DIAST BP <80 MM HG: CPT | Mod: CPTII,S$GLB,, | Performed by: STUDENT IN AN ORGANIZED HEALTH CARE EDUCATION/TRAINING PROGRAM

## 2025-02-11 NOTE — ASSESSMENT & PLAN NOTE
Patient recently admitted and followed by our MFM at [facility]. 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  She reports no shortness of breath, wheezing, difficulty breathing post procedure and overall feels much improved.     Recommendations:  Continue Reflux management per ENT/IM with pantoprazole  She has a follow up on 2/21. I will see back after that  Recommend Anesthesia consultation prior to delivery at [facility]( Primary OB to arrange)  Received one course of betamethasone during hospitalization 1/31

## 2025-02-11 NOTE — ASSESSMENT & PLAN NOTE
Asthma  She has severe persistent asthma and follows closely with Pulmonology. She was admitted and missed her appointment with Dr. Alas. We will have her reschedule. She has been hospitalized multiple times with asthma complaints this pregnancy.  Severe persistent: (continual symptoms, nocturnal symptoms frequently; PEFR/FEV1 <= 60%)    Asthma is the most common condition affecting the lungs during pregnancy, occurring in between 4-8 % of pregnant women. During pregnancy, asthma worsens in about one-third of women, improves in one-third, and remains stable in one-third. The risk of poorly controlled asthma is much greater than the risk of taking medications to control symptoms. Asthma exacerbations are frequently seen in the second trimester, and close monitoring is indicated. Patients should avoid exposure to specific allergens and triggers. Moderate persistent and severe persistent asthma are associated with  delivery,  delivery, preeclampsia, fetal growth restriction, and maternal morbidity.     Her symptoms are markedly improved. She has not needed her rescue inhaler since surgery and is now only on Advair BID.   Recommendations:  Follow up with Dr. Alas.   Continue current medications: Advair BID, Albuterol PRN   Home peak flow monitoring (if moderate to severe persistent)  Avoid triggers  Growth ultrasounds every 4-6 weeks, starting at 26-28 weeks for women with moderate persistent or severe persistent asthma  Antepartum testing at 32 for women with moderate persistent or severe persistent asthma- scheduled with [facility]  Administer flu vaccination, when available; consider pneumovax if other comorbidities  Recommend COVID vaccination  Avoid prostaglandins such as E2 and F2-alpha (carboprost/Hemabate)

## 2025-02-11 NOTE — ASSESSMENT & PLAN NOTE
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 Ultrasound findings were reviewed. I also discussed that differential includes ectopic kidney and pelvic kidney as well as renal hypoplasia and I am unable to rule these diagnoses out at this time.    I reassured the patient that the contralateral kidney appears normal without evidence of urinary tract dilation. There is normal amniotic fluid which is also reassuring.  Patient was counseled regarding the likely incidental finding. Patient was counseled regarding the  possible sequelae in which infants are likely asymptomatic.    If further abnormalities are seen, a prenatal consultation with Pediatric Urology may be considered.      Of note, I did discuss her cell free DNA testing with her. She reports she was called by a genetic counselor from the company and was told her result was due to her genetic makeup and that the baby was fine.   She does not want to know the sex of the baby. I counseled her that I had additional information about the diagnosis of renal agenesis that may pertain to her no call sex chromosome aneuploidy result( please see attached cell free result). She opted against disclosure of this information until after delivery and does not desire further testing at this time.      Recommendations  Serial growth US for continued evaluation  Notify Pediatrics of right renal agenesis and cell free DNA result  If no other concerns during pregnancy,  abdominal US recommended

## 2025-02-11 NOTE — PROGRESS NOTES
MATERNAL-FETAL MEDICINE   CONSULT NOTE    Provider requesting consultation: Dr. Arriaga  SUBJECTIVE:   Ms. Gisell Villafuerte is a 29 y.o.  female with IUP at 32w5d who is seen in consultation by MFM for evaluation and management of:  Problem   Acquired Subglottic Stenosis   Severe Persistent Asthma With Acute Exacerbation   Anxiety   Renal Agenesis of Fetus Affecting Antepartum Care of Mother   Abnormal Chromosomal and Genetic Finding On  Screening Mother        Medication List with Changes/Refills   Current Medications    ALBUTEROL (PROVENTIL/VENTOLIN HFA) 90 MCG/ACTUATION INHALER    Inhale 1-2 puffs into the lungs every 6 (six) hours as needed for Wheezing or Shortness of Breath (chest tightness). Rescue    ALBUTEROL (VENTOLIN HFA) 90 MCG/ACTUATION INHALER    Inhale 2 puffs into the lungs every 6 (six) hours as needed for Wheezing. Rescue    ALBUTEROL SULFATE 2.5 MG/0.5 ML NEBU    Take 2.5 mg by nebulization every 6 (six) hours as needed (shortness of breath, wheezing, or chest tightness.). Rescue    CETIRIZINE (ZYRTEC) 10 MG TABLET    Take 1 tablet (10 mg total) by mouth every evening.    FAMOTIDINE (PEPCID) 20 MG TABLET    Take 1 tablet (20 mg total) by mouth 2 (two) times daily.    FLUTICASONE PROPION-SALMETEROL 115-21 MCG/DOSE (ADVAIR HFA) 115-21 MCG/ACTUATION HFAA INHALER    Inhale 2 puffs into the lungs 2 (two) times a day. Controller    FLUTICASONE PROPIONATE (FLONASE) 50 MCG/ACTUATION NASAL SPRAY    2 sprays (100 mcg total) by Each Nostril route once daily.    ONDANSETRON (ZOFRAN-ODT) 4 MG TBDL    Take 1 tablet (4 mg total) by mouth every 6 (six) hours as needed (nausea/vomiting).    PANTOPRAZOLE (PROTONIX) 40 MG TABLET    Take 1 tablet (40 mg total) by mouth once daily.    PRENATAL VIT/IRON FUM/FOLIC AC (PRENATAL 1+1 ORAL)    Take 1 tablet by mouth once daily.    SERTRALINE (ZOLOFT) 50 MG TABLET    Take 1 tablet (50 mg total) by mouth once daily.     Review of patient's allergies indicates:  "  Allergen Reactions    No known drug allergies      OB History    Para Term  AB Living   3 1 1   1 1   SAB IAB Ectopic Multiple Live Births   1     0 1      # Outcome Date GA Lbr Donn/2nd Weight Sex Type Anes PTL Lv   3 Current            2 Term 22 39w6d  3.841 kg (8 lb 7.5 oz) M CS-LTranv EPI N SOSA   1 SAB              Past Medical History:   Diagnosis Date    Anemia     s/p transfusion from menstral bleeding    Anxiety     Asthma     "athletic" asthma    Blood transfusion     Hashimoto's disease     HTN (hypertension)      Past Surgical History:   Procedure Laterality Date     SECTION N/A 2022    Procedure:  SECTION;  Surgeon: Víctor Arriaga MD;  Location: [facility] L&D;  Service: OB/GYN;  Laterality: N/A;    DILATION AND CURETTAGE OF UTERUS N/A 2021    Procedure: DILATION AND CURETTAGE, UTERUS;  Surgeon: Víctor Arriaga MD;  Location: [facility] OR;  Service: OB/GYN;  Laterality: N/A;    DIRECT LARYNGOBRONCHOSCOPY N/A 2025    Procedure: LARYNGOSCOPY, DIRECT, WITH BRONCHOSCOPY and dilation;  Surgeon: Vance Bautista MD;  Location: [facility] OR [address] [place] [address];  Service: ENT;  Laterality: N/A;  Jet Vent, CO2 laser, airway balloons, sickle blade,  [employer] rep confirmed on 2/3 for  @11am by IR.  Conf# [id number].  [employer] notified by TA on  of order change  2nd case    Conf #  [id number]    MICROLARYNGOSCOPY, SUSPENSION N/A 2025    Procedure: MICROLARYNGOSCOPY, SUSPENSION;  Surgeon: Onesimo Seay MD;  [facility];  Service: ENT;  Laterality: N/A;  Will need CO2 laser, balloon dilators, OB anesthesia    TRACHEOSTOMY N/A 2025    Procedure: CREATION, TRACHEOSTOMY;  Surgeon: Onesimo Seay MD;  [facility];  Service: ENT;  Laterality: N/A;     Family history: negative for birth defects, recurrent miscarriages, chromosomal abnormalities.   Social History     Tobacco Use    Smoking status: Never     Passive exposure: Never    " "Smokeless tobacco: Never   Substance Use Topics    Alcohol use: Not Currently    Drug use: No     Review of patient's allergies indicates:   Allergen Reactions    No known drug allergies      Objective:   /68   Pulse 95   Ht 5' 1" (1.549 m)   Wt 76.8 kg (169 lb 3.3 oz)   LMP 2024 (Approximate)   SpO2 97%   BMI 31.97 kg/m²   Ultrasound performed. See viewpoint for full ultrasound report.  A detailed fetal anatomic ultrasound examination was performed for the following high risk indication: suspected right renal agenesis.   Suspected right renal agenesis identified today. The contralateral kidney appears normal however the  right renal fossa is empty. Although differential includes right renal hypoplasia and ectopic kidney, there is no evidence of right renal artery or renal tissue in alternate location on our exam. Fetal imaging is incomplete today due to advanced gestational age.   A follow-up study will be scheduled to complete the fetal anatomic survey.   Fetal size today is consistent with established gestational age.   Placental location is anterior without evidence of previa.   The BPP score is reassuring at 8/8, and the MVP is normal.   ASSESSMENT/PLAN:     29 y.o.  female with IUP at 32w5d     Acquired subglottic stenosis  Patient recently admitted and followed by our MFM at [facility]. 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  She reports no shortness of breath, wheezing, difficulty breathing post procedure and overall feels much improved.     Recommendations:  Continue Reflux management per ENT/IM with pantoprazole  She has a follow up on . I will see back after that  Recommend Anesthesia consultation prior to delivery at [facility]( Primary OB to arrange)  Received one course of betamethasone during hospitalization          Severe persistent asthma with acute " exacerbation  Asthma  She has severe persistent asthma and follows closely with Pulmonology. She was admitted and missed her appointment with Dr. Alas. We will have her reschedule. She has been hospitalized multiple times with asthma complaints this pregnancy.  Severe persistent: (continual symptoms, nocturnal symptoms frequently; PEFR/FEV1 <= 60%)    Asthma is the most common condition affecting the lungs during pregnancy, occurring in between 4-8 % of pregnant women. During pregnancy, asthma worsens in about one-third of women, improves in one-third, and remains stable in one-third. The risk of poorly controlled asthma is much greater than the risk of taking medications to control symptoms. Asthma exacerbations are frequently seen in the second trimester, and close monitoring is indicated. Patients should avoid exposure to specific allergens and triggers. Moderate persistent and severe persistent asthma are associated with  delivery,  delivery, preeclampsia, fetal growth restriction, and maternal morbidity.     Her symptoms are markedly improved. She has not needed her rescue inhaler since surgery and is now only on Advair BID.   Recommendations:  Follow up with Dr. Alas.   Continue current medications: Advair BID, Albuterol PRN   Home peak flow monitoring (if moderate to severe persistent)  Avoid triggers  Growth ultrasounds every 4-6 weeks, starting at 26-28 weeks for women with moderate persistent or severe persistent asthma  Antepartum testing at 32 for women with moderate persistent or severe persistent asthma- scheduled with [facility]  Administer flu vaccination, when available; consider pneumovax if other comorbidities  Recommend COVID vaccination  Avoid prostaglandins such as E2 and F2-alpha (carboprost/Hemabate)      Anxiety  On sertraline 50 mg daily    Renal agenesis of fetus affecting antepartum care of mother  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 Ultrasound  findings were reviewed. I also discussed that differential includes ectopic kidney and pelvic kidney as well as renal hypoplasia and I am unable to rule these diagnoses out at this time.    I reassured the patient that the contralateral kidney appears normal without evidence of urinary tract dilation. There is normal amniotic fluid which is also reassuring.  Patient was counseled regarding the likely incidental finding. Patient was counseled regarding the  possible sequelae in which infants are likely asymptomatic.    If further abnormalities are seen, a prenatal consultation with Pediatric Urology may be considered.      Of note, I did discuss her cell free DNA testing with her. She reports she was called by a genetic counselor from the company and was told her result was due to her genetic makeup and that the baby was fine.   She does not want to know the sex of the baby. I counseled her that I had additional information about the diagnosis of renal agenesis that may pertain to her no call sex chromosome aneuploidy result( please see attached cell free result). She opted against disclosure of this information until after delivery and does not desire further testing at this time.      Recommendations  Serial growth US for continued evaluation  Notify Pediatrics of right renal agenesis and cell free DNA result  If no other concerns during pregnancy,  abdominal US recommended      Abnormal chromosomal and genetic finding on  screening mother  See renal agenesis      FOLLOW UP:   F/u in 4 weeks for US/MFM visit      Cyndee Camp  Maternal-Fetal Medicine    Electronically Signed by Cyndee Camp 2025

## 2025-02-12 LAB
FINAL PATHOLOGIC DIAGNOSIS: NORMAL
GROSS: NORMAL
Lab: NORMAL
MICROSCOPIC EXAM: NORMAL

## 2025-02-14 ENCOUNTER — PATIENT MESSAGE (OUTPATIENT)
Dept: OBSTETRICS AND GYNECOLOGY | Facility: CLINIC | Age: 30
End: 2025-02-14

## 2025-02-14 ENCOUNTER — ROUTINE PRENATAL (OUTPATIENT)
Dept: OBSTETRICS AND GYNECOLOGY | Facility: CLINIC | Age: 30
End: 2025-02-14
Payer: COMMERCIAL

## 2025-02-14 VITALS
WEIGHT: 174.19 LBS | BODY MASS INDEX: 32.91 KG/M2 | SYSTOLIC BLOOD PRESSURE: 120 MMHG | DIASTOLIC BLOOD PRESSURE: 62 MMHG

## 2025-02-14 DIAGNOSIS — Z3A.33 33 WEEKS GESTATION OF PREGNANCY: Primary | ICD-10-CM

## 2025-02-14 DIAGNOSIS — J45.909 ASTHMA, UNSPECIFIED ASTHMA SEVERITY, UNSPECIFIED WHETHER COMPLICATED, UNSPECIFIED WHETHER PERSISTENT: ICD-10-CM

## 2025-02-14 LAB
BILIRUBIN, UA POC OHS: NEGATIVE
BLOOD, UA POC OHS: ABNORMAL
CLARITY, UA POC OHS: CLEAR
COLOR, UA POC OHS: YELLOW
GLUCOSE, UA POC OHS: NEGATIVE
KETONES, UA POC OHS: NEGATIVE
LEUKOCYTES, UA POC OHS: ABNORMAL
NITRITE, UA POC OHS: NEGATIVE
PH, UA POC OHS: 7
PROTEIN, UA POC OHS: NEGATIVE
SPECIFIC GRAVITY, UA POC OHS: 1.02
UROBILINOGEN, UA POC OHS: 0.2

## 2025-02-14 PROCEDURE — 99999 PR PBB SHADOW E&M-EST. PATIENT-LVL III: CPT | Mod: PBBFAC,,, | Performed by: OBSTETRICS & GYNECOLOGY

## 2025-02-14 NOTE — PROGRESS NOTES
The patient presents with routine OB complaints.   Reports: Good fetal movements reported, No Bleeding or pains    2025  29 y.o. 33w1d Estimated Date of Delivery: 4/3/25, dating reviewed.   OB History    Para Term  AB Living   3 1 1   1 1   SAB IAB Ectopic Multiple Live Births   1     0 1      # Outcome Date GA Lbr Donn/2nd Weight Sex Type Anes PTL Lv   3 Current            2 Term 22 39w6d  3.841 kg (8 lb 7.5 oz) M CS-LTranv EPI N SOSA   1 SAB                Prenatal labs reviewed and updated today        Review of Systems:  General ROS: negative for headache or visual changes  Breast ROS: negative for breast lumps  Gastrointestinal ROS: negative for constipation, diarrhea or nausea/vomiting  Musculoskeletal ROS: negative for pain in joints or swelling in face or hands.   Neurological ROS: negative for - headaches, numbness/tingling or visual changes      Physical Exam:  /62   Wt 79 kg (174 lb 2.6 oz)   LMP 2024 (Approximate)   BMI 32.91 kg/m²   Urine Dip: Pending  Fundal Height:34cm  Fetal Heart Tones: 155bpm  Constitutional: She is oriented to person, place, and time. She appears well-developed and well-nourished. No distress. Normal weight   Cardiovascular: Normal rate.    Pulmonary/Chest: Effort normal. No respiratory distress  Abdominal: Soft, gravid, nontender. No rebound and no guarding.     Genitourinary: Deferred    Musculoskeletal: Normal range of motion, Minimal peripheral edema.   Neurological: She is alert and oriented to person, place, and time. Coordination normal.   Skin: Skin is warm and dry. She is not diaphoretic.  Psychiatric: She has a normal mood and affect.        Assessment:  29 y.o., at 33w1d Gestation   Patient Active Problem List   Diagnosis    Contraception    DUB (dysfunctional uterine bleeding)    Obesity, Class II, BMI 35-39.9, with comorbidity    Missed menses    Missed     Dyspnea on exertion    27 weeks gestation of pregnancy    Severe  persistent asthma with acute exacerbation    Previous  section    Tachycardia    Anxiety    Acquired subglottic stenosis    30 weeks gestation of pregnancy    Acute cystitis    Renal agenesis of fetus affecting antepartum care of mother    Abnormal chromosomal and genetic finding on  screening mother     Current Outpatient Medications on File Prior to Visit   Medication Sig Dispense Refill    albuterol (PROVENTIL/VENTOLIN HFA) 90 mcg/actuation inhaler Inhale 1-2 puffs into the lungs every 6 (six) hours as needed for Wheezing or Shortness of Breath (chest tightness). Rescue 18 g 6    albuterol (VENTOLIN HFA) 90 mcg/actuation inhaler Inhale 2 puffs into the lungs every 6 (six) hours as needed for Wheezing. Rescue 18 g 0    albuterol sulfate 2.5 mg/0.5 mL Nebu Take 2.5 mg by nebulization every 6 (six) hours as needed (shortness of breath, wheezing, or chest tightness.). Rescue 1 each 0    fluticasone propion-salmeterol 115-21 mcg/dose (ADVAIR HFA) 115-21 mcg/actuation HFAA inhaler Inhale 2 puffs into the lungs 2 (two) times a day. Controller 12 g 0    pantoprazole (PROTONIX) 40 MG tablet Take 1 tablet (40 mg total) by mouth once daily. 30 tablet 0    prenatal vit/iron fum/folic ac (PRENATAL 1+1 ORAL) Take 1 tablet by mouth once daily.      cetirizine (ZYRTEC) 10 MG tablet Take 1 tablet (10 mg total) by mouth every evening. (Patient not taking: Reported on 2025) 30 tablet 0    famotidine (PEPCID) 20 MG tablet Take 1 tablet (20 mg total) by mouth 2 (two) times daily. (Patient not taking: Reported on 2025) 180 tablet 0    fluticasone propionate (FLONASE) 50 mcg/actuation nasal spray 2 sprays (100 mcg total) by Each Nostril route once daily. (Patient not taking: Reported on 2025) 18.2 mL 0    ondansetron (ZOFRAN-ODT) 4 MG TbDL Take 1 tablet (4 mg total) by mouth every 6 (six) hours as needed (nausea/vomiting). (Patient not taking: Reported on 2025) 12 tablet 0    sertraline (ZOLOFT) 50  MG tablet Take 1 tablet (50 mg total) by mouth once daily. (Patient not taking: Reported on 2/14/2025) 30 tablet 0     No current facility-administered medications on file prior to visit.       Plan:   Labs today: none  Orders today: none  MEds today: none  Procedures Today: none  Follow up 2 Weeks, bleeding/pain precautions ,  kick counts, labor precautions

## 2025-02-18 ENCOUNTER — PROCEDURE VISIT (OUTPATIENT)
Dept: MATERNAL FETAL MEDICINE | Facility: CLINIC | Age: 30
End: 2025-02-18
Payer: COMMERCIAL

## 2025-02-18 DIAGNOSIS — O35.EXX0 RENAL AGENESIS OF FETUS AFFECTING ANTEPARTUM CARE OF MOTHER, SINGLE OR UNSPECIFIED FETUS: ICD-10-CM

## 2025-02-18 DIAGNOSIS — O99.513 ASTHMA AFFECTING PREGNANCY IN THIRD TRIMESTER: ICD-10-CM

## 2025-02-18 DIAGNOSIS — J45.909 ASTHMA AFFECTING PREGNANCY IN THIRD TRIMESTER: ICD-10-CM

## 2025-02-23 ENCOUNTER — PATIENT MESSAGE (OUTPATIENT)
Dept: OBSTETRICS AND GYNECOLOGY | Facility: CLINIC | Age: 30
End: 2025-02-23
Payer: COMMERCIAL

## 2025-02-26 ENCOUNTER — PROCEDURE VISIT (OUTPATIENT)
Dept: MATERNAL FETAL MEDICINE | Facility: CLINIC | Age: 30
End: 2025-02-26
Payer: COMMERCIAL

## 2025-02-26 DIAGNOSIS — O35.EXX0 RENAL AGENESIS OF FETUS AFFECTING ANTEPARTUM CARE OF MOTHER, SINGLE OR UNSPECIFIED FETUS: ICD-10-CM

## 2025-02-26 DIAGNOSIS — J45.909 ASTHMA AFFECTING PREGNANCY IN THIRD TRIMESTER: ICD-10-CM

## 2025-02-26 DIAGNOSIS — O99.513 ASTHMA AFFECTING PREGNANCY IN THIRD TRIMESTER: ICD-10-CM

## 2025-02-26 PROCEDURE — 76819 FETAL BIOPHYS PROFIL W/O NST: CPT | Mod: S$GLB,,, | Performed by: STUDENT IN AN ORGANIZED HEALTH CARE EDUCATION/TRAINING PROGRAM

## 2025-02-27 ENCOUNTER — PATIENT MESSAGE (OUTPATIENT)
Dept: OTHER | Facility: OTHER | Age: 30
End: 2025-02-27
Payer: COMMERCIAL

## 2025-03-03 ENCOUNTER — HOSPITAL ENCOUNTER (OUTPATIENT)
Dept: RADIOLOGY | Facility: HOSPITAL | Age: 30
Discharge: HOME | End: 2025-03-03
Attending: OBSTETRICS & GYNECOLOGY
Payer: COMMERCIAL

## 2025-03-03 ENCOUNTER — ROUTINE PRENATAL (OUTPATIENT)
Dept: OBSTETRICS AND GYNECOLOGY | Facility: CLINIC | Age: 30
End: 2025-03-03
Payer: COMMERCIAL

## 2025-03-03 VITALS
SYSTOLIC BLOOD PRESSURE: 120 MMHG | BODY MASS INDEX: 33.66 KG/M2 | DIASTOLIC BLOOD PRESSURE: 68 MMHG | WEIGHT: 178.13 LBS

## 2025-03-03 DIAGNOSIS — Z3A.35 35 WEEKS GESTATION OF PREGNANCY: Primary | ICD-10-CM

## 2025-03-03 DIAGNOSIS — Z3A.34 34 WEEKS GESTATION OF PREGNANCY: ICD-10-CM

## 2025-03-03 PROCEDURE — 76819 FETAL BIOPHYS PROFIL W/O NST: CPT | Mod: 26,,, | Performed by: RADIOLOGY

## 2025-03-03 PROCEDURE — 76815 OB US LIMITED FETUS(S): CPT | Mod: 26,,, | Performed by: RADIOLOGY

## 2025-03-03 PROCEDURE — 99999 PR PBB SHADOW E&M-EST. PATIENT-LVL III: CPT | Mod: PBBFAC,,, | Performed by: OBSTETRICS & GYNECOLOGY

## 2025-03-03 PROCEDURE — 87653 STREP B DNA AMP PROBE: CPT | Performed by: OBSTETRICS & GYNECOLOGY

## 2025-03-03 PROCEDURE — 90715 TDAP VACCINE 7 YRS/> IM: CPT | Mod: S$GLB,,, | Performed by: OBSTETRICS & GYNECOLOGY

## 2025-03-03 PROCEDURE — 90678 RSV VACC PREF BIVALENT IM: CPT | Mod: S$GLB,,, | Performed by: OBSTETRICS & GYNECOLOGY

## 2025-03-03 PROCEDURE — 76819 FETAL BIOPHYS PROFIL W/O NST: CPT | Mod: TC,PN

## 2025-03-03 PROCEDURE — 59025 FETAL NON-STRESS TEST: CPT | Mod: S$GLB,,, | Performed by: OBSTETRICS & GYNECOLOGY

## 2025-03-03 PROCEDURE — 90472 IMMUNIZATION ADMIN EACH ADD: CPT | Mod: S$GLB,,, | Performed by: OBSTETRICS & GYNECOLOGY

## 2025-03-03 PROCEDURE — 90471 IMMUNIZATION ADMIN: CPT | Mod: S$GLB,,, | Performed by: OBSTETRICS & GYNECOLOGY

## 2025-03-03 PROCEDURE — 0502F SUBSEQUENT PRENATAL CARE: CPT | Mod: CPTII,S$GLB,, | Performed by: OBSTETRICS & GYNECOLOGY

## 2025-03-03 NOTE — PROGRESS NOTES
The patient presents with routine OB complaints .   Reports: Good fetal movements reported, No Bleeding or pains    3/3/2025  29 y.o. 35w4d Estimated Date of Delivery: 4/3/25, dating reviewed.   OB History    Para Term  AB Living   3 1 1  1 1   SAB IAB Ectopic Multiple Live Births   1   0 1      # Outcome Date GA Lbr Donn/2nd Weight Sex Type Anes PTL Lv   3 Current            2 Term 22 39w6d  3.841 kg (8 lb 7.5 oz) M CS-LTranv EPI N SOSA   1 SAB                Prenatal labs reviewed and updated today    Review of Systems:  General ROS: negative for headache or visual changes  Breast ROS: negative for breast lumps  Gastrointestinal ROS: negative for  constipation, diarrhea or nausea/vomiting  Musculoskeletal ROS: negative for pain in joints or swelling in face or hands.   Neurological ROS: negative for - headaches, numbness/tingling or visual changes      Physical Exam:  /68   Wt 80.8 kg (178 lb 2.1 oz)   LMP 2024 (Approximate)   BMI 33.66 kg/m²   Urine Dip: Pending  Fundal Height:cm  Fetal Heart Tones: 145bpm  Constitutional: She is oriented to person, place, and time. She appears well-developed and well-nourished. No distress. Normal weight   Cardiovascular: Normal rate.    Pulmonary/Chest: Effort normal. No respiratory distress  Abdominal: Soft, gravid, nontender. No rebound and no guarding.     Genitourinary: closed    Musculoskeletal: Normal range of motion, Minimal peripheral edema.   Neurological: She is alert and oriented to person, place, and time. Coordination normal.   Skin: Skin is warm and dry. She is not diaphoretic.  Psychiatric: She has a normal mood and affect.        Assessment:  29 y.o., at 35w4d Gestation   Problem List[1]  Medications Ordered Prior to Encounter[2]    Plan:   Labs today: none  Orders today: none  MEds today: tdap, SR vaccine  Procedures Today: NST   Follow up 1 Weeks, bleeding/pain precautions ,  kick counts, labor precautions        [1]   Patient  Active Problem List  Diagnosis    Contraception    DUB (dysfunctional uterine bleeding)    Obesity, Class II, BMI 35-39.9, with comorbidity    Missed menses    Missed     Dyspnea on exertion    27 weeks gestation of pregnancy    Severe persistent asthma with acute exacerbation    Previous  section    Tachycardia    Anxiety    Acquired subglottic stenosis    30 weeks gestation of pregnancy    Acute cystitis    Renal agenesis of fetus affecting antepartum care of mother    Abnormal chromosomal and genetic finding on  screening mother   [2]   Current Outpatient Medications on File Prior to Visit   Medication Sig Dispense Refill    albuterol (PROVENTIL/VENTOLIN HFA) 90 mcg/actuation inhaler Inhale 1-2 puffs into the lungs every 6 (six) hours as needed for Wheezing or Shortness of Breath (chest tightness). Rescue 18 g 6    albuterol (VENTOLIN HFA) 90 mcg/actuation inhaler Inhale 2 puffs into the lungs every 6 (six) hours as needed for Wheezing. Rescue 18 g 0    albuterol sulfate 2.5 mg/0.5 mL Nebu Take 2.5 mg by nebulization every 6 (six) hours as needed (shortness of breath, wheezing, or chest tightness.). Rescue 1 each 0    cetirizine (ZYRTEC) 10 MG tablet Take 1 tablet (10 mg total) by mouth every evening. 30 tablet 0    pantoprazole (PROTONIX) 40 MG tablet Take 1 tablet (40 mg total) by mouth once daily. 30 tablet 0    prenatal vit/iron fum/folic ac (PRENATAL 1+1 ORAL) Take 1 tablet by mouth once daily.      famotidine (PEPCID) 20 MG tablet Take 1 tablet (20 mg total) by mouth 2 (two) times daily. (Patient not taking: Reported on 2025) 180 tablet 0    fluticasone propion-salmeterol 115-21 mcg/dose (ADVAIR HFA) 115-21 mcg/actuation HFAA inhaler Inhale 2 puffs into the lungs 2 (two) times a day. Controller (Patient not taking: Reported on 3/3/2025) 12 g 0    fluticasone propionate (FLONASE) 50 mcg/actuation nasal spray 2 sprays (100 mcg total) by Each Nostril route once daily. (Patient not  taking: Reported on 2/11/2025) 18.2 mL 0    ondansetron (ZOFRAN-ODT) 4 MG TbDL Take 1 tablet (4 mg total) by mouth every 6 (six) hours as needed (nausea/vomiting). (Patient not taking: Reported on 2/11/2025) 12 tablet 0    sertraline (ZOLOFT) 50 MG tablet Take 1 tablet (50 mg total) by mouth once daily. (Patient not taking: Reported on 3/3/2025) 30 tablet 0     No current facility-administered medications on file prior to visit.

## 2025-03-03 NOTE — PROCEDURES
Procedures    FETAL SURVEILLANCE TESTING SUMMARY    INDICATIONS:  Asthma / upper airway disease    Fetal doppler heart rate tracing obtained in the usual fashion with the patient in the left supine position.    Duration of monitorinmin    OBJECTIVE RESULTS:  Baseline fetal heart rate: 140bpm    Fetal heart variability: moderate  Fetal Heart Rate decelerations: none  Fetal acoustic stimulator: no  Fetal Heart Rate accelerations: yes  Fetal Non-stress Test: reactive    Fetal surveillance: reassuring        Víctor Arriaga M.D., FACOG

## 2025-03-04 ENCOUNTER — PATIENT MESSAGE (OUTPATIENT)
Dept: OBSTETRICS AND GYNECOLOGY | Facility: CLINIC | Age: 30
End: 2025-03-04
Payer: COMMERCIAL

## 2025-03-05 ENCOUNTER — PATIENT MESSAGE (OUTPATIENT)
Dept: OBSTETRICS AND GYNECOLOGY | Facility: CLINIC | Age: 30
End: 2025-03-05
Payer: COMMERCIAL

## 2025-03-05 LAB — GROUP B STREPTOCOCCUS, PCR: NEGATIVE

## 2025-03-12 ENCOUNTER — PROCEDURE VISIT (OUTPATIENT)
Dept: MATERNAL FETAL MEDICINE | Facility: CLINIC | Age: 30
End: 2025-03-12
Payer: COMMERCIAL

## 2025-03-12 ENCOUNTER — OFFICE VISIT (OUTPATIENT)
Dept: MATERNAL FETAL MEDICINE | Facility: CLINIC | Age: 30
End: 2025-03-12
Payer: COMMERCIAL

## 2025-03-12 ENCOUNTER — PATIENT MESSAGE (OUTPATIENT)
Dept: OBSTETRICS AND GYNECOLOGY | Facility: CLINIC | Age: 30
End: 2025-03-12
Payer: COMMERCIAL

## 2025-03-12 VITALS
DIASTOLIC BLOOD PRESSURE: 51 MMHG | HEART RATE: 89 BPM | BODY MASS INDEX: 36.17 KG/M2 | WEIGHT: 191.56 LBS | SYSTOLIC BLOOD PRESSURE: 107 MMHG | HEIGHT: 61 IN | OXYGEN SATURATION: 99 %

## 2025-03-12 DIAGNOSIS — F41.9 ANXIETY: Chronic | ICD-10-CM

## 2025-03-12 DIAGNOSIS — J38.6 ACQUIRED SUBGLOTTIC STENOSIS: Primary | ICD-10-CM

## 2025-03-12 DIAGNOSIS — J45.909 ASTHMA AFFECTING PREGNANCY IN THIRD TRIMESTER: ICD-10-CM

## 2025-03-12 DIAGNOSIS — O99.513 ASTHMA AFFECTING PREGNANCY IN THIRD TRIMESTER: ICD-10-CM

## 2025-03-12 DIAGNOSIS — O28.5 ABNORMAL CHROMOSOMAL AND GENETIC FINDING ON ANTENATAL SCREENING MOTHER: ICD-10-CM

## 2025-03-12 DIAGNOSIS — O35.EXX0 RENAL AGENESIS OF FETUS AFFECTING ANTEPARTUM CARE OF MOTHER, SINGLE OR UNSPECIFIED FETUS: ICD-10-CM

## 2025-03-12 DIAGNOSIS — J45.51 SEVERE PERSISTENT ASTHMA WITH ACUTE EXACERBATION: ICD-10-CM

## 2025-03-12 DIAGNOSIS — J38.6 ACQUIRED SUBGLOTTIC STENOSIS: ICD-10-CM

## 2025-03-12 PROCEDURE — 76816 OB US FOLLOW-UP PER FETUS: CPT | Mod: S$GLB,,, | Performed by: STUDENT IN AN ORGANIZED HEALTH CARE EDUCATION/TRAINING PROGRAM

## 2025-03-12 PROCEDURE — 3074F SYST BP LT 130 MM HG: CPT | Mod: CPTII,S$GLB,, | Performed by: STUDENT IN AN ORGANIZED HEALTH CARE EDUCATION/TRAINING PROGRAM

## 2025-03-12 PROCEDURE — 3008F BODY MASS INDEX DOCD: CPT | Mod: CPTII,S$GLB,, | Performed by: STUDENT IN AN ORGANIZED HEALTH CARE EDUCATION/TRAINING PROGRAM

## 2025-03-12 PROCEDURE — 76819 FETAL BIOPHYS PROFIL W/O NST: CPT | Mod: S$GLB,,, | Performed by: STUDENT IN AN ORGANIZED HEALTH CARE EDUCATION/TRAINING PROGRAM

## 2025-03-12 PROCEDURE — 3078F DIAST BP <80 MM HG: CPT | Mod: CPTII,S$GLB,, | Performed by: STUDENT IN AN ORGANIZED HEALTH CARE EDUCATION/TRAINING PROGRAM

## 2025-03-12 PROCEDURE — 99214 OFFICE O/P EST MOD 30 MIN: CPT | Mod: S$GLB,,, | Performed by: STUDENT IN AN ORGANIZED HEALTH CARE EDUCATION/TRAINING PROGRAM

## 2025-03-12 PROCEDURE — 99999 PR PBB SHADOW E&M-EST. PATIENT-LVL III: CPT | Mod: PBBFAC,,, | Performed by: STUDENT IN AN ORGANIZED HEALTH CARE EDUCATION/TRAINING PROGRAM

## 2025-03-12 PROCEDURE — 1159F MED LIST DOCD IN RCRD: CPT | Mod: CPTII,S$GLB,, | Performed by: STUDENT IN AN ORGANIZED HEALTH CARE EDUCATION/TRAINING PROGRAM

## 2025-03-12 NOTE — ASSESSMENT & PLAN NOTE
Asthma  See prior notes  Plan to deescalate medications once delivered  Recommendations:  Continue Follow up with Dr. Alas.   Continue current medications: Advair BID, Albuterol PRN   Avoid triggers  Antepartum testing at 32 for women with moderate persistent or severe persistent asthma- scheduled with [facility]  Administer flu vaccination, when available; consider pneumovax if other comorbidities  Recommend COVID vaccination  Avoid prostaglandins such as E2 and F2-alpha (carboprost/Hemabate)

## 2025-03-12 NOTE — ASSESSMENT & PLAN NOTE
Patient recently admitted and followed by our MFM at [facility]. 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  She reports no shortness of breath, wheezing, difficulty breathing post procedure and overall feels much improved.   O2 99%    Recommendations:  Continue Reflux management per ENT/IM with pantoprazole  Recommend Anesthesia consultation prior to delivery at [facility]( Primary OB to arrange)  Received one course of betamethasone during hospitalization 1/31

## 2025-03-12 NOTE — PROGRESS NOTES
"Maternal Fetal Medicine Follow up  SUBJECTIVE:     Gisell Villafuerte is a 29 y.o.  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   No changes to medical, surgical, family, social, or obstetric history.      Review of patient's allergies indicates:   Allergen Reactions    No known drug allergies      Care team members:  Dr. Arriaga - Primary OB  OBJECTIVE:   Blood Pressure: BP (!) 107/51   Pulse 89   Ht 5' 1" (1.549 m)   Wt 86.9 kg (191 lb 9.3 oz)   LMP 2024 (Approximate)   SpO2 99%   BMI 36.20 kg/m²   Ultrasound performed. See viewpoint for full ultrasound report.  Known right renal agenesis.  No other fetal structural malformations are identified today; however, the fetal anatomic survey remains incomplete, as noted in the 'fetal anatomy' section of this report.  If further ultrasound exams are planned for other indications, will attempt completion of the anatomic survey at that time. No other routine f/u exams to attempt completion of the anatomic survey will be scheduled by M.  Fetal size is AGA, and the AFV is normal.   BP   ASSESSMENT/PLAN:     29 y.o.  female with IUP at 36w6d presents for [facility] follow up.    Acquired subglottic stenosis  Patient recently admitted and followed by our MFM at [facility]. 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  She reports no shortness of breath, wheezing, difficulty breathing post procedure and overall feels much improved.   O2 99%    Recommendations:  Continue Reflux management per ENT/IM with pantoprazole  Recommend Anesthesia consultation prior to delivery " at [facility]( Primary OB to arrange)  Received one course of betamethasone during hospitalization          Anxiety  On sertraline 50 mg daily    Severe persistent asthma with acute exacerbation  Asthma  See prior notes  Plan to deescalate medications once delivered  Recommendations:  Continue Follow up with Dr. Alas.   Continue current medications: Advair BID, Albuterol PRN   Avoid triggers  Antepartum testing at 32 for women with moderate persistent or severe persistent asthma- scheduled with MFM  Administer flu vaccination, when available; consider pneumovax if other comorbidities  Recommend COVID vaccination  Avoid prostaglandins such as E2 and F2-alpha (carboprost/Hemabate)      Abnormal chromosomal and genetic finding on  screening mother  See renal agenesis    Renal agenesis of fetus affecting antepartum care of mother  Renal agenesis  Patient declined discussion of cell free DNA results as she desires gender to be a surprise- see prior note        Recommendations  Notify Pediatrics of right renal agenesis and cell free DNA result        FOLLOW UP:   Continue weekly fetal surveillance until delivery  Cyndee Camp  Maternal-Fetal Medicine    Electronically Signed by Cyndee Camp 2025

## 2025-03-12 NOTE — ASSESSMENT & PLAN NOTE
Renal agenesis  Patient declined discussion of cell free DNA results as she desires gender to be a surprise- see prior note        Recommendations  Notify Pediatrics of right renal agenesis and cell free DNA result

## 2025-03-17 ENCOUNTER — ROUTINE PRENATAL (OUTPATIENT)
Dept: OBSTETRICS AND GYNECOLOGY | Facility: CLINIC | Age: 30
End: 2025-03-17
Payer: COMMERCIAL

## 2025-03-17 ENCOUNTER — OFFICE VISIT (OUTPATIENT)
Dept: OTOLARYNGOLOGY | Facility: CLINIC | Age: 30
End: 2025-03-17
Payer: COMMERCIAL

## 2025-03-17 VITALS
SYSTOLIC BLOOD PRESSURE: 118 MMHG | DIASTOLIC BLOOD PRESSURE: 70 MMHG | WEIGHT: 183.88 LBS | BODY MASS INDEX: 34.74 KG/M2

## 2025-03-17 VITALS — DIASTOLIC BLOOD PRESSURE: 79 MMHG | HEART RATE: 82 BPM | SYSTOLIC BLOOD PRESSURE: 123 MMHG

## 2025-03-17 DIAGNOSIS — J38.6 SUBGLOTTIC STENOSIS: Primary | ICD-10-CM

## 2025-03-17 DIAGNOSIS — J39.8 TRACHEAL STENOSIS: ICD-10-CM

## 2025-03-17 DIAGNOSIS — Z3A.37 37 WEEKS GESTATION OF PREGNANCY: Primary | ICD-10-CM

## 2025-03-17 PROCEDURE — 1159F MED LIST DOCD IN RCRD: CPT | Mod: CPTII,S$GLB,, | Performed by: OTOLARYNGOLOGY

## 2025-03-17 PROCEDURE — 1160F RVW MEDS BY RX/DR IN RCRD: CPT | Mod: CPTII,S$GLB,, | Performed by: OTOLARYNGOLOGY

## 2025-03-17 PROCEDURE — 99999 PR PBB SHADOW E&M-EST. PATIENT-LVL III: CPT | Mod: PBBFAC,,, | Performed by: OTOLARYNGOLOGY

## 2025-03-17 PROCEDURE — 99213 OFFICE O/P EST LOW 20 MIN: CPT | Mod: 25,S$GLB,, | Performed by: OTOLARYNGOLOGY

## 2025-03-17 PROCEDURE — 31575 DIAGNOSTIC LARYNGOSCOPY: CPT | Mod: S$GLB,,, | Performed by: OTOLARYNGOLOGY

## 2025-03-17 PROCEDURE — 3078F DIAST BP <80 MM HG: CPT | Mod: CPTII,S$GLB,, | Performed by: OTOLARYNGOLOGY

## 2025-03-17 PROCEDURE — 3074F SYST BP LT 130 MM HG: CPT | Mod: CPTII,S$GLB,, | Performed by: OTOLARYNGOLOGY

## 2025-03-17 PROCEDURE — 99999 PR PBB SHADOW E&M-EST. PATIENT-LVL III: CPT | Mod: PBBFAC,,, | Performed by: OBSTETRICS & GYNECOLOGY

## 2025-03-17 NOTE — PROCEDURES
Procedures    FETAL SURVEILLANCE TESTING SUMMARY    INDICATIONS:  Fetal right renal agenesis    Fetal doppler heart rate tracing obtained in the usual fashion with the patient in the left supine position.    Duration of monitorinmin    OBJECTIVE RESULTS:  Baseline fetal heart rate: 140bpm    Fetal heart variability: moderate  Fetal Heart Rate decelerations: none  Fetal acoustic stimulator: no  Fetal Heart Rate accelerations: yes  Fetal Non-stress Test: reactive    Fetal surveillance: reassuring        Víctor Arriaga M.D., FACOG

## 2025-03-17 NOTE — PROGRESS NOTES
[facility]  Department of Otorhinolaryngology and Communication Sciences    Subjective:      Gisell Villafuerte is a 29 y.o. female who presents for follow-up. She has idiopathic laryngotracheal stenosis.    Treatment history:  2025: SML, debridement of stenosis, dilation 14 mm    She is doing very well since the above procedure.  She denies any dyspnea or noisy breathing.  Her voice is normal.  She is very pleased with her progress and current status.    She is planning for scheduled  on  at [facility].    The patient's medications, allergies, past medical, surgical, social and family histories were reviewed and updated as appropriate.    A detailed review of systems was obtained with pertinent positives as per the above HPI, and otherwise negative.      Objective:     VS reviewed  Constitutional: comfortable, well dressed  Psychiatric: appropriate affect  Respiratory: comfortably breathing, symmetric chest rise, no stridor  Voice: normal for age and gender.  Head: normocephalic  Eyes: conjunctivae and sclerae clear  Indirect laryngoscopy is limited due to gag    Procedure  Flexible Laryngoscopy (00467): Laryngoscopy is indicated for assessment of upper aerodigestive structure and function. This was carried out transnasally with a distal chip videoendoscope. After verbal consent was obtained, the patient was positioned and the nose was topically decongested with 1% phenylephrine and topically anesthetized with 4% lidocaine. The endoscope was passed through the most patent nasal cavity and positioned to image the nasopharynx, larynx, and hypopharynx in detail. The following features were examined: nasopharyngeal, laryngeal, hypopharyngeal masses; velopharyngeal strength, closure, and symmetry of motion; vocal fold range and symmetry of motion; laryngeal mucosal edema, erythema, inflammation, and hydration; salivary pooling; and gross laryngeal sensation. The equipment  was removed. The patient tolerated the procedure well without complication. All findings were normal except:  - faint grade 1 subglottic stenosis with slight fibrinous exudate    Representative image:          Assessment:     Gisell Villafuerte is a 29 y.o. 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  She is progressing well following endoscopic treatment 02/06/2025.     Plan:     I demonstrated her examination to her on the video monitor.  Reassurance was provided.    I counseled her that her airway, in general, is very near normal caliber.  Nevertheless, should she require intubation--either electively or in the emergency setting--I would recommend use of no larger than a size 6 endotracheal tube.  I recommended that she communicate this to her OBGYN and MFM team.  I will copy my note to them as well.    She will follow up with me  in 1 month, or sooner if needed .    All questions were answered, and the patient is in agreement with the plan.    Vance Bautista M.D.  [facility]  Department of Otorhinolaryngology and Communication Sciences

## 2025-03-17 NOTE — PROGRESS NOTES
The patient presents with routine OB complaints .   Reports: Good fetal movements reported, No Bleeding or pains    3/17/2025  29 y.o. 37w4d Estimated Date of Delivery: 4/3/25, dating reviewed.   OB History    Para Term  AB Living   3 1 1  1 1   SAB IAB Ectopic Multiple Live Births   1   0 1      # Outcome Date GA Lbr Donn/2nd Weight Sex Type Anes PTL Lv   3 Current            2 Term 22 39w6d  3.841 kg (8 lb 7.5 oz) M CS-LTranv EPI N SOSA   1 SAB                Prenatal labs reviewed and updated today    Review of Systems:  General ROS: negative for headache or visual changes  Breast ROS: negative for breast lumps  Gastrointestinal ROS: negative for constipation, diarrhea or nausea/vomiting  Musculoskeletal ROS: negative for pain in joints or swelling in face or hands.   Neurological ROS: negative for - headaches, numbness/tingling or visual changes      Physical Exam:  /70   Wt 83.4 kg (183 lb 13.8 oz)   LMP 2024 (Approximate)   BMI 34.74 kg/m²   Urine Dip: Pending  Fundal Height:35cm  Fetal Heart Tones: 151bpm  Constitutional: She is oriented to person, place, and time. She appears well-developed and well-nourished. No distress. Normal weight   Cardiovascular: Normal rate.    Pulmonary/Chest: Effort normal. No respiratory distress  Abdominal: Soft, gravid, nontender. No rebound and no guarding.     Genitourinary: Deferred    Musculoskeletal: Normal range of motion, Minimal peripheral edema.   Neurological: She is alert and oriented to person, place, and time. Coordination normal.   Skin: Skin is warm and dry. She is not diaphoretic.  Psychiatric: She has a normal mood and affect        Assessment:  29 y.o., at 37w4d Gestation   Prior c/section- for repeat   idiopathic laryngotracheal stenosis.     Plan:   Labs today: none  Orders today: none  MEds today: none  Procedures Today: NST weekly  Follow up 1 Weeks, bleeding/pain precautions,  kick counts, labor precautions

## 2025-03-17 NOTE — LETTER
March 17, 2025        Víctor Arriaga MD  [id number] [address] [address]             [facility]  [address]  [facility] [address]  Phone: [phone]  Fax: [phone]   Patient: Gisell Villafuerte   MR Number: [id number]   YOB: 1995   Date of Visit: 3/17/2025       Dear Dr. Arriaga:    Thank you for referring Gisell Villafuerte to me for evaluation. Below are the relevant portions of my assessment and plan of care.            If you have questions, please do not hesitate to call me. I look forward to following Gisell along with you.    Sincerely,      Vance Bautista MD           CC  No Recipients

## 2025-03-20 ENCOUNTER — PATIENT MESSAGE (OUTPATIENT)
Dept: OBSTETRICS AND GYNECOLOGY | Facility: CLINIC | Age: 30
End: 2025-03-20
Payer: COMMERCIAL

## 2025-03-26 ENCOUNTER — ROUTINE PRENATAL (OUTPATIENT)
Dept: OBSTETRICS AND GYNECOLOGY | Facility: CLINIC | Age: 30
End: 2025-03-26
Payer: COMMERCIAL

## 2025-03-26 VITALS — SYSTOLIC BLOOD PRESSURE: 120 MMHG | WEIGHT: 183.63 LBS | DIASTOLIC BLOOD PRESSURE: 72 MMHG | BODY MASS INDEX: 34.7 KG/M2

## 2025-03-26 DIAGNOSIS — Z3A.38 38 WEEKS GESTATION OF PREGNANCY: Primary | ICD-10-CM

## 2025-03-26 PROCEDURE — 0502F SUBSEQUENT PRENATAL CARE: CPT | Mod: CPTII,S$GLB,, | Performed by: OBSTETRICS & GYNECOLOGY

## 2025-03-26 PROCEDURE — 99999 PR PBB SHADOW E&M-EST. PATIENT-LVL III: CPT | Mod: PBBFAC,,, | Performed by: OBSTETRICS & GYNECOLOGY

## 2025-03-26 NOTE — PROGRESS NOTES
"    Admission History and Physical:     3/26/2025      History of present Illness:     Gisell Villafuerte is a 29 y.o.  female   who presents 3/26/2025 at 38w6d. Estimated Date of Delivery: 4/3/25. The patient presents for repeat c/section, baby with right renal agenesis, no call gender on cfDNA, maternal  Idiopathic laryngotracheal stenosis- ENT available for c/section if need intubation. Rec Max size 6 ET tube. PLANNING SPINAL ANESTHESIA.  Tdap and RSV given 3/3/25    Counseled on Risks, Benefits and Alternatives to repeat c/section with BTL, discused with patient in detail, all questions answered and patient agreed to proceed.          Review of patient's allergies indicates:   Allergen Reactions    No known drug allergies        Patient Active Problem List   Diagnosis    Contraception    DUB (dysfunctional uterine bleeding)    Obesity, Class II, BMI 35-39.9, with comorbidity    Missed menses    Missed     Dyspnea on exertion    27 weeks gestation of pregnancy    Severe persistent asthma with acute exacerbation    Previous  section    Tachycardia    Anxiety    Acquired subglottic stenosis    30 weeks gestation of pregnancy    Acute cystitis    Renal agenesis of fetus affecting antepartum care of mother    Abnormal chromosomal and genetic finding on  screening mother       OB History    Para Term  AB Living   3 1 1  1 1   SAB IAB Ectopic Multiple Live Births   1   0 1      # Outcome Date GA Lbr Donn/2nd Weight Sex Type Anes PTL Lv   3 Current            2 Term 22 39w6d  3.841 kg (8 lb 7.5 oz) M CS-LTranv EPI N SOSA   1 SAB                Past Medical History:   Diagnosis Date    Anemia     s/p transfusion from menstral bleeding    Anxiety     Asthma     "athletic" asthma    Blood transfusion     Hashimoto's disease     HTN (hypertension)        Past Surgical History:   Procedure Laterality Date     SECTION N/A 2022    Procedure:  " SECTION;  Surgeon: Víctor Arriaga MD;  Location: [facility] L&D;  Service: OB/GYN;  Laterality: N/A;    DILATION AND CURETTAGE OF UTERUS N/A 6/24/2021    Procedure: DILATION AND CURETTAGE, UTERUS;  Surgeon: Víctor Arriaga MD;  Location: [facility] OR;  Service: OB/GYN;  Laterality: N/A;    DIRECT LARYNGOBRONCHOSCOPY N/A 2/6/2025    Procedure: LARYNGOSCOPY, DIRECT, WITH BRONCHOSCOPY and dilation;  Surgeon: Vance Bautista MD;  Location: [facility] OR [address] [place] [address];  Service: ENT;  Laterality: N/A;  Jet Vent, CO2 laser, airway balloons, sickle blade,  [employer] rep confirmed on 2/3 for 2/6 @11am by IR.  Conf# [id number].  [employer] notified by TA on 2/5 of order change  2nd case    Conf #  [id number]    MICROLARYNGOSCOPY, SUSPENSION N/A 1/27/2025    Procedure: MICROLARYNGOSCOPY, SUSPENSION;  Surgeon: Onesimo Seay MD;  Location: [facility] OR [address] [place] [address];  Service: ENT;  Laterality: N/A;  Will need CO2 laser, balloon dilators, OB anesthesia    TRACHEOSTOMY N/A 1/27/2025    Procedure: CREATION, TRACHEOSTOMY;  Surgeon: Onesimo Seay MD;  Location: [facility] OR [address] [place] [address];  Service: ENT;  Laterality: N/A;       Medications Ordered Prior to Encounter[1]    Social History[2]    Family History   Problem Relation Name Age of Onset    Thyroid disease Mother      Hashimoto's thyroiditis Mother      Diabetes Father      Breast cancer Neg Hx      Ovarian cancer Neg Hx      Melanoma Neg Hx      Psoriasis Neg Hx      Lupus Neg Hx      Eczema Neg Hx           Review of Systems:  General ROS: negative for - chills, fever, headache or visual changes  Breast ROS: negative for breast lumps  Respiratory ROS: no cough, shortness of breath, or wheezing  Cardiovascular ROS: no chest pain or dyspnea on exertion  Gastrointestinal ROS: negative for - change in bowel habits, constipation, diarrhea or nausea/vomiting  Musculoskeletal ROS: negative for - muscular weakness, pain in joints or swelling in face or hands.   Neurological ROS: negative for - headaches,  numbness/tingling or visual changes    Physical Exam:  Vital Signs: stable, /72   Wt 83.3 kg (183 lb 10.3 oz)   LMP 2024 (Approximate)   BMI 34.70 kg/m²    NST: Pending on admission  Constitutional: She is oriented to person, place, and time. She appears well-developed and well-nourished. No distress.   HENT:   Head: Normocephalic and atraumatic.   Eyes: Conjunctivae and EOM are normal. No scleral icterus.   Neck: Normal range of motion. Neck supple. No tracheal deviation present.   Cardiovascular: Normal rate.    Pulmonary/Chest: Effort normal. No respiratory distress. She exhibits no tenderness.  Breasts: deferred  Abdominal: Soft, gravid, nontender. There is no rebound and no guarding. EFW: 8 #  Genitourinary:    External rectal exam shows no thrombosed external hemorrhoids.    Pelvic exam was performed with patient supine.   There is no rash, lesion or injury on the right labia.   There is no rash, lesion or injury on the left labia.   No bleeding and no signs of injury around the vaginal introitus, urethra is without lesions.  Bimanual exam:   Clinical pelvimetry is adequate, average intraspinous diameter, gynecoid.   Cervix is : closed, Adequate pelvimetry, vertex presentation.   Musculoskeletal: Normal range of motion. Minimal peripheral edema.   Lymphadenopathy: No inguinal adenopathy present.   Neurological: She is alert and oriented to person, place, and time. Coordination normal.   Skin: Skin is warm and dry. She is not diaphoretic.   Psychiatric: She has a normal mood and affect.      Assessment:  29 y.o.,  female   at 38w6d Gestation   Sterilization request  Idiopathic laryngotracheal stenosis- ENT available for c/section if need intubation. Rec Max size 6 ET tube.   SURPRISE GENDER- no call for monosomy or XXX, R renal agenesis  OBcov: [employer],  Prior c/section x 1,Request Repeat c/section w BTL  NL U/s- 20wk, no R kidney  Severe asthmatic  / on steroids for glucoal  Tdap  and RSV given 3/3/25     Last Estimated fetal weight 2,961 g 35w 6d 38%   GBS - negative      Counseled today on Risks, benefits and alternatives to vaginal delivery and c/section, including bleeding, infection, transfusion, and damage to pelvic organs and she agreed to proceed .      Plan:    Proceed with repeat c/section / salpingectomy 3/31/2025  If unable to get adequate anesthesia with spinal plan ENT available for intubation / small ET tube....Idiopathic laryngotracheal stenosis- ENT available for c/section if need intubation. Rec Max size 6 ET tube.           Víctor Arriaga M.D., FACOG          [1]   Current Outpatient Medications on File Prior to Visit   Medication Sig Dispense Refill    albuterol (PROVENTIL/VENTOLIN HFA) 90 mcg/actuation inhaler Inhale 1-2 puffs into the lungs every 6 (six) hours as needed for Wheezing or Shortness of Breath (chest tightness). Rescue 18 g 6    albuterol (VENTOLIN HFA) 90 mcg/actuation inhaler Inhale 2 puffs into the lungs every 6 (six) hours as needed for Wheezing. Rescue 18 g 0    albuterol sulfate 2.5 mg/0.5 mL Nebu Take 2.5 mg by nebulization every 6 (six) hours as needed (shortness of breath, wheezing, or chest tightness.). Rescue 1 each 0    cetirizine (ZYRTEC) 10 MG tablet Take 1 tablet (10 mg total) by mouth every evening. 30 tablet 0    famotidine (PEPCID) 20 MG tablet Take 1 tablet (20 mg total) by mouth 2 (two) times daily. 180 tablet 0    fluticasone propion-salmeterol 115-21 mcg/dose (ADVAIR HFA) 115-21 mcg/actuation HFAA inhaler Inhale 2 puffs into the lungs 2 (two) times a day. Controller 12 g 0    fluticasone propionate (FLONASE) 50 mcg/actuation nasal spray 2 sprays (100 mcg total) by Each Nostril route once daily. 18.2 mL 0    ondansetron (ZOFRAN-ODT) 4 MG TbDL Take 1 tablet (4 mg total) by mouth every 6 (six) hours as needed (nausea/vomiting). 12 tablet 0    prenatal vit/iron fum/folic ac (PRENATAL 1+1 ORAL) Take 1 tablet by mouth once daily.       pantoprazole (PROTONIX) 40 MG tablet Take 1 tablet (40 mg total) by mouth once daily. (Patient not taking: Reported on 3/26/2025) 30 tablet 0    sertraline (ZOLOFT) 50 MG tablet Take 1 tablet (50 mg total) by mouth once daily. 30 tablet 0     No current facility-administered medications on file prior to visit.   [2]   Social History  Socioeconomic History    Marital status: Single   Tobacco Use    Smoking status: Never     Passive exposure: Never    Smokeless tobacco: Never   Substance and Sexual Activity    Alcohol use: Not Currently    Drug use: No    Sexual activity: Yes     Partners: Male     Birth control/protection: None     Social Drivers of Health     Financial Resource Strain: Low Risk  (1/21/2025)    Overall Financial Resource Strain (CARDIA)     Difficulty of Paying Living Expenses: Not hard at all   Recent Concern: Financial Resource Strain - Medium Risk (1/21/2025)    Overall Financial Resource Strain (CARDIA)     Difficulty of Paying Living Expenses: Somewhat hard   Food Insecurity: No Food Insecurity (1/21/2025)    Hunger Vital Sign     Worried About Running Out of Food in the Last Year: Never true     Ran Out of Food in the Last Year: Never true   Transportation Needs: No Transportation Needs (1/21/2025)    TRANSPORTATION NEEDS     Transportation : No   Physical Activity: Sufficiently Active (1/21/2025)    Exercise Vital Sign     Days of Exercise per Week: 6 days     Minutes of Exercise per Session: 150+ min   Stress: No Stress Concern Present (1/21/2025)    [employer] - Occupational Stress Questionnaire     Feeling of Stress : Not at all   Housing Stability: Unknown (1/21/2025)    Housing Stability Vital Sign     Unable to Pay for Housing in the Last Year: No     Homeless in the Last Year: No

## 2025-03-31 PROBLEM — O34.211 MATERNAL CARE DUE TO LOW TRANSVERSE UTERINE SCAR FROM PREVIOUS CESAREAN DELIVERY: Status: ACTIVE | Noted: 2025-03-31

## 2025-03-31 PROBLEM — Z30.2 ENCOUNTER FOR STERILIZATION: Status: ACTIVE | Noted: 2025-03-31

## 2025-04-07 ENCOUNTER — PATIENT MESSAGE (OUTPATIENT)
Dept: OBSTETRICS AND GYNECOLOGY | Facility: CLINIC | Age: 30
End: 2025-04-07
Payer: COMMERCIAL

## 2025-04-08 ENCOUNTER — PATIENT MESSAGE (OUTPATIENT)
Dept: OTOLARYNGOLOGY | Facility: CLINIC | Age: 30
End: 2025-04-08
Payer: COMMERCIAL

## 2025-04-17 ENCOUNTER — POSTPARTUM VISIT (OUTPATIENT)
Dept: OBSTETRICS AND GYNECOLOGY | Facility: CLINIC | Age: 30
End: 2025-04-17
Payer: COMMERCIAL

## 2025-04-17 ENCOUNTER — PATIENT MESSAGE (OUTPATIENT)
Dept: OBSTETRICS AND GYNECOLOGY | Facility: CLINIC | Age: 30
End: 2025-04-17

## 2025-04-17 VITALS
DIASTOLIC BLOOD PRESSURE: 78 MMHG | SYSTOLIC BLOOD PRESSURE: 114 MMHG | BODY MASS INDEX: 32.37 KG/M2 | WEIGHT: 171.31 LBS

## 2025-04-17 DIAGNOSIS — B35.4 TINEA CORPORIS: Primary | ICD-10-CM

## 2025-04-17 PROCEDURE — 99999 PR PBB SHADOW E&M-EST. PATIENT-LVL III: CPT | Mod: PBBFAC,,, | Performed by: OBSTETRICS & GYNECOLOGY

## 2025-04-17 RX ORDER — NYSTATIN 100000 [USP'U]/G
POWDER TOPICAL
Qty: 60 G | Refills: 1 | Status: SHIPPED | OUTPATIENT
Start: 2025-04-17 | End: 2026-04-17

## 2025-04-17 NOTE — PROGRESS NOTES
History of Present Illness:   Pateint presents today  2 weeks postPartum, status post repeat c/sec w BTL, with complaint of none.    PATH:  4/1/2025    1. RIGHT FALLOPIAN TUBE SEGMENTS, TUBAL LIGATION:- FALLOPIAN TUBE WITH    COMPLETE CROSS-SECTION AND FIMBRIATED END PRESENT     2. LEFT FALLOPIAN TUBE SEGMENTS, TUBAL LIGATION:     - FALLOPIAN TUBE WITH COMPLETE CROSS-SECTION AND FIMBRIATED END PRESENT     Past medical and surgical history reviewed.   I have reviewed the patient's medical history in detail and updated the computerized patient record.    Physical exam:  Vital Signs: as above, reviewed.  Constitutional: She is oriented to person, place, and time. She appears well-developed and well-nourished. No distress.   HENT:   Head: Normocephalic and atraumatic.   Eyes: Conjunctivae and EOM are normal. No scleral icterus.   Neck: Normal range of motion. Neck supple. No tracheal deviation present.   Cardiovascular: Normal rate.    Pulmonary/Chest: Effort normal. No respiratory distress. She exhibits no tenderness.  Breasts: deferred, breast feeding  Abdominal: Soft. She exhibits no distension and no mass. The incision is healing welll. There is no rebound and no guarding. Tinea rash  Genitourinary:    External rectal exam shows no thrombosed external hemorrhoids.    Pelvic exam was performed with patient supine.   No labial fusion.   There is no rash, lesion or injury on the right labia.   There is no rash, lesion or injury on the left labia.   No bleeding and no signs of injury around the vaginal introitus, healed well, urethra is without lesions and well supported. The cervix is visualized with no discharge, lesions or friability.   No vaginal discharge found.    No significant Cystocele, Enterocele or rectocele, and uterus well supported.   Bimanual exam:   The urethra is normal to palpation and there are no palpable vaginal wall masses.   Uterus is not deviated, not enlarged, not fixed, normal shape and not  tender.   Cervix exhibits no motion tenderness.    Right adnexum displays no mass and no tenderness.   Left adnexum displays no mass and no tenderness.  Musculoskeletal: Normal range of motion.   Lymphadenopathy: No inguinal adenopathy present.   Neurological: She is alert and oriented to person, place, and time. Coordination normal.   Skin: Skin is warm and dry. She is not diaphoretic.   Psychiatric: She has a normal mood and affect.    Assessment:  2wk pp, doing well  Tinea corporis    Plan:  Follow up  1 month     Nystatin powder

## 2025-04-21 ENCOUNTER — OFFICE VISIT (OUTPATIENT)
Dept: OTOLARYNGOLOGY | Facility: CLINIC | Age: 30
End: 2025-04-21
Payer: COMMERCIAL

## 2025-04-21 VITALS — SYSTOLIC BLOOD PRESSURE: 136 MMHG | HEART RATE: 75 BPM | DIASTOLIC BLOOD PRESSURE: 87 MMHG

## 2025-04-21 DIAGNOSIS — J39.8 TRACHEAL STENOSIS: ICD-10-CM

## 2025-04-21 DIAGNOSIS — J38.6 SUBGLOTTIC STENOSIS: Primary | ICD-10-CM

## 2025-04-21 PROCEDURE — 3079F DIAST BP 80-89 MM HG: CPT | Mod: CPTII,S$GLB,, | Performed by: OTOLARYNGOLOGY

## 2025-04-21 PROCEDURE — 99999 PR PBB SHADOW E&M-EST. PATIENT-LVL III: CPT | Mod: PBBFAC,,, | Performed by: OTOLARYNGOLOGY

## 2025-04-21 PROCEDURE — 1159F MED LIST DOCD IN RCRD: CPT | Mod: CPTII,S$GLB,, | Performed by: OTOLARYNGOLOGY

## 2025-04-21 PROCEDURE — 1111F DSCHRG MED/CURRENT MED MERGE: CPT | Mod: CPTII,S$GLB,, | Performed by: OTOLARYNGOLOGY

## 2025-04-21 PROCEDURE — 1160F RVW MEDS BY RX/DR IN RCRD: CPT | Mod: CPTII,S$GLB,, | Performed by: OTOLARYNGOLOGY

## 2025-04-21 PROCEDURE — 99213 OFFICE O/P EST LOW 20 MIN: CPT | Mod: 25,S$GLB,, | Performed by: OTOLARYNGOLOGY

## 2025-04-21 PROCEDURE — 3075F SYST BP GE 130 - 139MM HG: CPT | Mod: CPTII,S$GLB,, | Performed by: OTOLARYNGOLOGY

## 2025-04-21 PROCEDURE — 31575 DIAGNOSTIC LARYNGOSCOPY: CPT | Mod: S$GLB,,, | Performed by: OTOLARYNGOLOGY

## 2025-04-21 NOTE — PROGRESS NOTES
[facility]  Department of Otorhinolaryngology and Communication Sciences    Subjective:      Gisell Villafuerte is a 29 y.o. female who presents for follow-up. She has idiopathic laryngotracheal stenosis.    Treatment history:  2025: SML, debridement of stenosis, dilation 14 mm    She is doing very well since her last visit. She had an uneventful , and she and her baby are both doing great. She denies dyspnea or noisy breathing.    The patient's medications, allergies, past medical, surgical, social and family histories were reviewed and updated as appropriate.    A detailed review of systems was obtained with pertinent positives as per the above HPI, and otherwise negative.      Objective:     VS reviewed  Constitutional: comfortable, well dressed  Psychiatric: appropriate affect  Respiratory: comfortably breathing, symmetric chest rise, no stridor  Voice: normal for age and gender  Head: normocephalic  Eyes: conjunctivae and sclerae clear  Indirect laryngoscopy is limited due to gag    Procedure  Flexible Laryngoscopy (83948): Laryngoscopy is indicated for assessment of upper aerodigestive structure and function. This was carried out transnasally with a distal chip videoendoscope. After verbal consent was obtained, the patient was positioned and the nose was topically decongested with 1% phenylephrine and topically anesthetized with 4% lidocaine. The endoscope was passed through the most patent nasal cavity and positioned to image the nasopharynx, larynx, and hypopharynx in detail. The following features were examined: nasopharyngeal, laryngeal, hypopharyngeal masses; velopharyngeal strength, closure, and symmetry of motion; vocal fold range and symmetry of motion; laryngeal mucosal edema, erythema, inflammation, and hydration; salivary pooling; and gross laryngeal sensation. The equipment was removed. The patient tolerated the procedure well without complication. All findings were  normal  - subglottic/tracheal airway widely patent      Assessment:     Gisell Villafuerte is a 29 y.o. 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  She is progressing well following endoscopic treatment 02/06/2025.     Plan:     I demonstrated her examination to her on the video monitor.  Reassurance was provided.    I counseled her that I am moving out of state in a few weeks. She will will follow up with Dr. Prasad in 3-4 months, or sooner if needed.    All questions were answered, and the patient is in agreement with the plan.    Vance Bautista M.D.  [facility]  Department of Otorhinolaryngology and Communication Sciences

## 2025-05-07 ENCOUNTER — PATIENT MESSAGE (OUTPATIENT)
Dept: OBSTETRICS AND GYNECOLOGY | Facility: CLINIC | Age: 30
End: 2025-05-07
Payer: COMMERCIAL

## 2025-05-08 ENCOUNTER — PATIENT MESSAGE (OUTPATIENT)
Dept: FAMILY MEDICINE | Facility: CLINIC | Age: 30
End: 2025-05-08
Payer: COMMERCIAL

## 2025-05-12 ENCOUNTER — OFFICE VISIT (OUTPATIENT)
Dept: OBSTETRICS AND GYNECOLOGY | Facility: CLINIC | Age: 30
End: 2025-05-12
Payer: COMMERCIAL

## 2025-05-12 ENCOUNTER — PATIENT MESSAGE (OUTPATIENT)
Dept: OBSTETRICS AND GYNECOLOGY | Facility: CLINIC | Age: 30
End: 2025-05-12

## 2025-05-12 VITALS
SYSTOLIC BLOOD PRESSURE: 110 MMHG | WEIGHT: 170.19 LBS | DIASTOLIC BLOOD PRESSURE: 72 MMHG | BODY MASS INDEX: 33.24 KG/M2

## 2025-05-12 DIAGNOSIS — Z12.4 SCREENING FOR CERVICAL CANCER: ICD-10-CM

## 2025-05-12 PROCEDURE — 87624 HPV HI-RISK TYP POOLED RSLT: CPT | Performed by: OBSTETRICS & GYNECOLOGY

## 2025-05-12 PROCEDURE — 88175 CYTOPATH C/V AUTO FLUID REDO: CPT | Performed by: OBSTETRICS & GYNECOLOGY

## 2025-05-12 PROCEDURE — 3074F SYST BP LT 130 MM HG: CPT | Mod: CPTII,S$GLB,, | Performed by: OBSTETRICS & GYNECOLOGY

## 2025-05-12 PROCEDURE — 0503F POSTPARTUM CARE VISIT: CPT | Mod: CPTII,S$GLB,, | Performed by: OBSTETRICS & GYNECOLOGY

## 2025-05-12 PROCEDURE — 3078F DIAST BP <80 MM HG: CPT | Mod: CPTII,S$GLB,, | Performed by: OBSTETRICS & GYNECOLOGY

## 2025-05-12 PROCEDURE — 1159F MED LIST DOCD IN RCRD: CPT | Mod: CPTII,S$GLB,, | Performed by: OBSTETRICS & GYNECOLOGY

## 2025-05-12 PROCEDURE — 99999 PR PBB SHADOW E&M-EST. PATIENT-LVL III: CPT | Mod: PBBFAC,,, | Performed by: OBSTETRICS & GYNECOLOGY

## 2025-05-12 RX ORDER — NITROFURANTOIN 25; 75 MG/1; MG/1
100 CAPSULE ORAL 2 TIMES DAILY
Qty: 14 CAPSULE | Refills: 0 | Status: SHIPPED | OUTPATIENT
Start: 2025-05-12 | End: 2025-05-19

## 2025-05-12 RX ORDER — MONTELUKAST SODIUM 10 MG/1
10 TABLET ORAL
COMMUNITY
Start: 2025-01-08

## 2025-05-12 NOTE — PROGRESS NOTES
History of Present Illness:   Pateint presents today  6 weeks postPartum, status post c/sec w BTL, with complaint of none.      Past medical and surgical history reviewed.   I have reviewed the patient's medical history in detail and updated the computerized patient record.    Physical exam:  Vital Signs: as above, reviewed.  Constitutional: She is oriented to person, place, and time. She appears well-developed and well-nourished. No distress.   HENT:   Head: Normocephalic and atraumatic.   Eyes: Conjunctivae and EOM are normal. No scleral icterus.   Neck: Normal range of motion. Neck supple. No tracheal deviation present.   Cardiovascular: Normal rate.    Pulmonary/Chest: Effort normal. No respiratory distress. She exhibits no tenderness.  Breasts: deferred, breast feeding  Abdominal: Soft. She exhibits no distension and no mass. The incision is healed well. There is no rebound and no guarding.   Genitourinary:    External rectal exam shows no thrombosed external hemorrhoids.    Pelvic exam was performed with patient supine.   No labial fusion.   There is no rash, lesion or injury on the right labia.   There is no rash, lesion or injury on the left labia.   No bleeding and no signs of injury around the vaginal introitus, healed well, urethra is without lesions and well supported. The cervix is visualized with no discharge, lesions or friability.   No vaginal discharge found.    No significant Cystocele, Enterocele or rectocele, and uterus well supported.   Bimanual exam:   The urethra is normal to palpation and there are no palpable vaginal wall masses.   Uterus is not deviated, not enlarged, not fixed, normal shape and not tender.   Cervix exhibits no motion tenderness.    Right adnexum displays no mass and no tenderness.   Left adnexum displays no mass and no tenderness.  Musculoskeletal: Normal range of motion.   Lymphadenopathy: No inguinal adenopathy present.   Neurological: She is alert and oriented to  person, place, and time. Coordination normal.   Skin: Skin is warm and dry. She is not diaphoretic.   Psychiatric: She has a normal mood and affect.    Assessment:  Normal postop exam, PAP  S/p c/sec w BTL    Plan:  Follow up  1 year  Resume normal activity, PAP today

## 2025-05-15 ENCOUNTER — PATIENT MESSAGE (OUTPATIENT)
Dept: OBSTETRICS AND GYNECOLOGY | Facility: CLINIC | Age: 30
End: 2025-05-15
Payer: COMMERCIAL

## 2025-05-15 ENCOUNTER — OCCUPATIONAL HEALTH (OUTPATIENT)
Dept: URGENT CARE | Facility: CLINIC | Age: 30
End: 2025-05-15

## 2025-05-15 NOTE — LETTER
May 15, 2025      [facility]  [address] [address] [address]  Phone: [phone]  Fax: [phone]       Patient: Gisell Villafuerte   YOB: 1995  Date of Visit: 05/15/2025    To Whom It May Concern:    Johnnie Villafuerte  was at [facility] on 05/12/25. The patient may return to work/school on 05/12/25  with no restrictions. If you have any questions or concerns, or if I can be of further assistance, please do not hesitate to contact me.    Sincerely,    Víctor Arriaga M.D.

## 2025-05-20 ENCOUNTER — PATIENT MESSAGE (OUTPATIENT)
Dept: OBSTETRICS AND GYNECOLOGY | Facility: CLINIC | Age: 30
End: 2025-05-20
Payer: COMMERCIAL

## 2025-05-28 ENCOUNTER — TELEPHONE (OUTPATIENT)
Dept: FAMILY MEDICINE | Facility: CLINIC | Age: 30
End: 2025-05-28
Payer: COMMERCIAL

## 2025-05-28 ENCOUNTER — PATIENT OUTREACH (OUTPATIENT)
Dept: ADMINISTRATIVE | Facility: HOSPITAL | Age: 30
End: 2025-05-28
Payer: COMMERCIAL

## 2025-05-28 DIAGNOSIS — Z00.00 WELLNESS EXAMINATION: Primary | ICD-10-CM

## 2025-05-29 ENCOUNTER — TELEPHONE (OUTPATIENT)
Dept: FAMILY MEDICINE | Facility: CLINIC | Age: 30
End: 2025-05-29
Payer: COMMERCIAL

## 2025-06-28 ENCOUNTER — PATIENT MESSAGE (OUTPATIENT)
Dept: FAMILY MEDICINE | Facility: CLINIC | Age: 30
End: 2025-06-28
Payer: COMMERCIAL

## 2025-06-30 RX ORDER — PANTOPRAZOLE SODIUM 40 MG/1
40 TABLET, DELAYED RELEASE ORAL DAILY
Qty: 90 TABLET | Refills: 1 | Status: SHIPPED | OUTPATIENT
Start: 2025-06-30 | End: 2025-07-30

## 2025-06-30 NOTE — TELEPHONE ENCOUNTER
No care due was identified.  [facility] Embedded Care Due Messages. Reference number: [id number].   6/30/2025 10:13:56 AM CDT

## 2025-07-07 ENCOUNTER — OFFICE VISIT (OUTPATIENT)
Dept: OTOLARYNGOLOGY | Facility: CLINIC | Age: 30
End: 2025-07-07
Payer: COMMERCIAL

## 2025-07-07 DIAGNOSIS — J38.6 SUBGLOTTIC STENOSIS: Primary | ICD-10-CM

## 2025-07-07 DIAGNOSIS — J39.8 TRACHEAL STENOSIS: ICD-10-CM

## 2025-07-07 PROCEDURE — 99999 PR PBB SHADOW E&M-EST. PATIENT-LVL III: CPT | Mod: PBBFAC,,, | Performed by: OTOLARYNGOLOGY

## 2025-07-07 PROCEDURE — 99213 OFFICE O/P EST LOW 20 MIN: CPT | Mod: 25,S$GLB,, | Performed by: OTOLARYNGOLOGY

## 2025-07-07 PROCEDURE — 1159F MED LIST DOCD IN RCRD: CPT | Mod: CPTII,S$GLB,, | Performed by: OTOLARYNGOLOGY

## 2025-07-07 PROCEDURE — 31575 DIAGNOSTIC LARYNGOSCOPY: CPT | Mod: S$GLB,,, | Performed by: OTOLARYNGOLOGY

## 2025-07-07 NOTE — PROGRESS NOTES
Ear, Nose, & Throat  Otolaryngology - Head & Neck Surgery    Summary of Visit:  Gisell Villafuerte was seen in follow-up for Other Misc (Michele pt.)      Subjective:     Chief Complaint:   Chief Complaint   Patient presents with    Other Misc     Michele pt.       Gisell Villafuerte is a 29 y.o. female who returns to clinic for follow up for laryngotracheal stenosis.  He has been doing well since our last encounter with Dr. Bautista on .  She denies any shortness of breath, stridor, limitation of activity, or other symptoms.  She is quite pleased with her current status.    Past Medical History  Active Ambulatory Problems     Diagnosis Date Noted    Contraception 2013    DUB (dysfunctional uterine bleeding) 10/03/2013    Obesity, Class II, BMI 35-39.9, with comorbidity 2016    Missed menses 2021    Missed      Dyspnea on exertion 2025    27 weeks gestation of pregnancy 2025    Severe persistent asthma with acute exacerbation 2025    Previous  section 2025    Tachycardia 2025    Anxiety 2025    Acquired subglottic stenosis 2025    30 weeks gestation of pregnancy 2025    Acute cystitis 2025    Renal agenesis of fetus affecting antepartum care of mother 2025    Abnormal chromosomal and genetic finding on  screening mother 2025    Maternal care due to low transverse uterine scar from previous  delivery 2025    Encounter for sterilization 2025    Single delivery by - girl 2025     Resolved Ambulatory Problems     Diagnosis Date Noted    DUB (dysfunctional uterine bleeding) 2013    Essential hypertension 2015    Liveborn infant, of vazquez pregnancy, born in hospital by  delivery- Breech 2022    Ezio breech presentation, not applicable or unspecified fetus 2022     Past Medical History:   Diagnosis Date    Anemia     Asthma      Blood transfusion     Hashimoto's disease     HTN (hypertension)        Past Surgical History  She has a past surgical history that includes Dilation and curettage of uterus (N/A, 2021);  section (N/A, 2022); microlaryngoscopy, suspension (N/A, 2025); Tracheostomy (N/A, 2025); Direct laryngobronchoscopy (N/A, 2025); and  section with tubal ligation (N/A, 3/31/2025).    Past Surgical History:   Procedure Laterality Date     SECTION N/A 2022    Procedure:  SECTION;  Surgeon: Víctor Arriaga MD;  Location: [facility] L&D;  Service: OB/GYN;  Laterality: N/A;     SECTION WITH TUBAL LIGATION N/A 3/31/2025    Procedure:  SECTION, WITH TUBAL LIGATION;  Surgeon: Víctor Arriaga MD;  Location: [facility]&D;  Service: OB/GYN;  Laterality: N/A;    DILATION AND CURETTAGE OF UTERUS N/A 2021    Procedure: DILATION AND CURETTAGE, UTERUS;  Surgeon: Víctor Arriaga MD;  Location: [facility] OR;  Service: OB/GYN;  Laterality: N/A;    DIRECT LARYNGOBRONCHOSCOPY N/A 2025    Procedure: LARYNGOSCOPY, DIRECT, WITH BRONCHOSCOPY and dilation;  Surgeon: Vance Bautista MD;  [facility];  Service: ENT;  Laterality: N/A;  Jet Vent, CO2 laser, airway balloons, sickle blade,  [employer] rep confirmed on 2/3 for  @11am by IR.  Conf# [id number].  [employer] notified by TA on  of order change  2nd case    Conf #  [id number]    MICROLARYNGOSCOPY, SUSPENSION N/A 2025    Procedure: MICROLARYNGOSCOPY, SUSPENSION;  Surgeon: Onesimo Seay MD;  Location: [facility] OR 2ND FLR;  Service: ENT;  Laterality: N/A;  Will need CO2 laser, balloon dilators, OB anesthesia    TRACHEOSTOMY N/A 2025    Procedure: CREATION, TRACHEOSTOMY;  Surgeon: Onesimo Seay MD;  Location: [facility] OR [address] [place] [address];  Service: ENT;  Laterality: N/A;        Family History  Her family history includes Diabetes in her father; Hashimoto's thyroiditis in her mother; Thyroid  disease in her mother.    Social History  She reports that she has never smoked. She has never been exposed to tobacco smoke. She has never used smokeless tobacco. She reports that she does not currently use alcohol. She reports that she does not use drugs.    Allergies  She is allergic to no known drug allergies.    Medications  She has a current medication list which includes the following prescription(s): albuterol, albuterol, albuterol sulfate, cetirizine, famotidine, fluticasone propion-salmeterol 115-21 mcg/dose, fluticasone propionate, ibuprofen, montelukast, nystatin, ondansetron, oxycodone-acetaminophen, pantoprazole, prenatal vit/iron fum/folic ac, and sertraline.    ROS:  Pertinent positive and negative review of systems as noted in HPI.     Objective:     There were no vitals taken for this visit.   General Appearance:   Awake, Alert and Oriented. NAD. Appropriate affect and appearance      Neuro:   Spontaneous eye opening, appropriate verbal responses, follows commands  Pupils equal, round & brisk. EOMI, no proptosis,  Face is symmetric, HB I, non-edematous bilaterally       Head and Face:   Skin is intact with no lesions noted.  Parotid and submandibular glands are symmetric and non-tender.      Ears:  Periauricular regions non-erythematous, non-fluctuanct, and non-tender  Pinna normal bilaterally, no skin lesions  EACs patent and without drainage bilaterally   Tympanic membranes are normal in appearance bilaterally.  No middle ear effusion noted bilaterally.    Nose:   External nose is symmetric, no skin lesions  Septum midline, No inferior turbinate hypertrophy, No polyps or rhinorrhea     OC/OP:  Tongue midline on extension, non-edematous, soft  No labial, buccal, oral tongue or floor of mouth lesions  Soft palate symmetric, midline and without lesions or masses, tonsils symmetric  No masses or lesions of the visualized oropharynx     Neck:  Neck is symmetric, non-edematous,  non-erythematous  Trachea is midline and easily palpable,  No palpable adenopathy or masses in levels I-VI  No thyroid nodules or masses, non-tender      Respiratory:  Normal work of breathing, no accessory muscle use, no stridor     Voice:  Normal vocal quality, volume and articulation    Data Review:   LABS    IMAGING    AUDIO    Procedures:   Procedure: Flexible Laryngoscopy    Date: 07/07/2025     Pre procedure Diagnosis:  History of tracheal stenosis    Post procedure Diagnosis: same    Equipment Used: Distal Chip Video Laryngoscope    Anesthesia: Topical 4% Lidocaine and Kelton-synephrine    Nasal Side: right    Procedure:  After verbal consent was obtained, the nose was sprayed with topical anesthetic and decongestant. A flexible laryngoscope was inserted through the nose to the level of the larynx. Findings listed and photodocumentation listed below    Nasopharynx: Non-obstructive, symmetric adenoid tissue. Eustachian tubes orifices patent. Symmetric soft palate elevation  Oropharynx: Symmetric base of tongue, No concerning lesions or mass of valleculae, posterior pharynx, base of tongue or tonsils  Hypopharynx: No concerning lesions/masses of pyriform sinuses. No pooling secretions.   Epiglottis: Crisp  Larynx: True vocal folds mobile bilaterally, no masses or lesions of the endolarynx, no erythema, subglottis adequately visualized. No signs of stenosis or masses.     Outcome: The patient tolerated the procedure well and without complaint.     Assessment:     1. Subglottic stenosis    2. Tracheal stenosis        Plan:     I had a long discussion with the patient regarding her condition and the further workup and management options.  Airway remains widely patent.  She is not having any symptoms at present.  My recommendation would be to follow up on an as-needed basis.  She was advised of warning signs including mild stridor, dyspnea with exertion, exercise limitation, shortness of breath.  He is advised to  contact us should any of these symptoms arise.  No orders of the defined types were placed in this encounter.         Problem List Items Addressed This Visit    None

## 2025-08-15 ENCOUNTER — OFFICE VISIT (OUTPATIENT)
Dept: FAMILY MEDICINE | Facility: CLINIC | Age: 30
End: 2025-08-15
Payer: COMMERCIAL

## 2025-08-15 VITALS
OXYGEN SATURATION: 99 % | BODY MASS INDEX: 37.85 KG/M2 | DIASTOLIC BLOOD PRESSURE: 78 MMHG | TEMPERATURE: 98 F | HEIGHT: 60 IN | RESPIRATION RATE: 16 BRPM | WEIGHT: 192.81 LBS | HEART RATE: 83 BPM | SYSTOLIC BLOOD PRESSURE: 125 MMHG

## 2025-08-15 DIAGNOSIS — J38.6 ACQUIRED SUBGLOTTIC STENOSIS: ICD-10-CM

## 2025-08-15 DIAGNOSIS — F41.9 ANXIETY: Chronic | ICD-10-CM

## 2025-08-15 DIAGNOSIS — J45.51 SEVERE PERSISTENT ASTHMA WITH ACUTE EXACERBATION: ICD-10-CM

## 2025-08-15 DIAGNOSIS — Z00.00 WELLNESS EXAMINATION: Primary | ICD-10-CM

## 2025-08-15 DIAGNOSIS — Z23 IMMUNIZATION DUE: ICD-10-CM

## 2025-08-15 DIAGNOSIS — E66.01 SEVERE OBESITY (BMI 35.0-39.9) WITH COMORBIDITY: ICD-10-CM

## 2025-08-15 PROBLEM — Z3A.30 30 WEEKS GESTATION OF PREGNANCY: Status: RESOLVED | Noted: 2025-01-21 | Resolved: 2025-08-15

## 2025-08-15 PROBLEM — N92.6 MISSED MENSES: Status: RESOLVED | Noted: 2021-06-24 | Resolved: 2025-08-15

## 2025-08-15 PROBLEM — Z30.2 ENCOUNTER FOR STERILIZATION: Status: RESOLVED | Noted: 2025-03-31 | Resolved: 2025-08-15

## 2025-08-15 PROBLEM — O34.211 MATERNAL CARE DUE TO LOW TRANSVERSE UTERINE SCAR FROM PREVIOUS CESAREAN DELIVERY: Status: RESOLVED | Noted: 2025-03-31 | Resolved: 2025-08-15

## 2025-08-15 PROBLEM — O28.5 ABNORMAL CHROMOSOMAL AND GENETIC FINDING ON ANTENATAL SCREENING MOTHER: Status: RESOLVED | Noted: 2025-02-11 | Resolved: 2025-08-15

## 2025-08-15 PROBLEM — N30.00 ACUTE CYSTITIS: Status: RESOLVED | Noted: 2025-01-21 | Resolved: 2025-08-15

## 2025-08-15 PROBLEM — R06.09 DYSPNEA ON EXERTION: Status: RESOLVED | Noted: 2025-01-05 | Resolved: 2025-08-15

## 2025-08-15 PROBLEM — Z98.891 PREVIOUS CESAREAN SECTION: Status: RESOLVED | Noted: 2025-01-05 | Resolved: 2025-08-15

## 2025-08-15 PROBLEM — Z3A.27 27 WEEKS GESTATION OF PREGNANCY: Status: RESOLVED | Noted: 2025-01-05 | Resolved: 2025-08-15

## 2025-08-15 PROBLEM — R00.0 TACHYCARDIA: Status: RESOLVED | Noted: 2025-01-06 | Resolved: 2025-08-15

## 2025-08-15 PROBLEM — O35.EXX0 RENAL AGENESIS OF FETUS AFFECTING ANTEPARTUM CARE OF MOTHER: Status: RESOLVED | Noted: 2025-02-11 | Resolved: 2025-08-15

## 2025-08-15 PROCEDURE — 99999 PR PBB SHADOW E&M-EST. PATIENT-LVL III: CPT | Mod: PBBFAC,,, | Performed by: FAMILY MEDICINE

## (undated) DEVICE — SUT VICRYL 3-0 27 SH

## (undated) DEVICE — TOWEL OR DISP STRL BLUE 4/PK

## (undated) DEVICE — SEE L#120831

## (undated) DEVICE — CATH URETHRAL 16FR RED

## (undated) DEVICE — GOWN SMARTGOWN LVL4 X-LONG XL

## (undated) DEVICE — ELECTRODE BLADE INSULATED 1 IN

## (undated) DEVICE — CORD BIPOLAR 12 FOOT

## (undated) DEVICE — TRAY SKIN SCRUB WET PREMIUM

## (undated) DEVICE — VACURETTE 8MM CURVED

## (undated) DEVICE — SUT PROLENE 2-0 36IN MH BLU

## (undated) DEVICE — DRAPE EENT SPLIT STERILE

## (undated) DEVICE — DRESSING TELFA STRL 4X3 LF

## (undated) DEVICE — GAUZE SPONGE PEANUT STRL

## (undated) DEVICE — ELECTRODE MEGADYNE RETURN DUAL

## (undated) DEVICE — LEGGINGS 48X31 INCH

## (undated) DEVICE — NDL HYPO REG 25G X 1 1/2

## (undated) DEVICE — SEE MEDLINE ITEM 157181

## (undated) DEVICE — CATH AERIS BLLN 55CM 14X40MM

## (undated) DEVICE — BLADE SURG CARBON STEEL SZ11

## (undated) DEVICE — TRAY ENT 4/CS

## (undated) DEVICE — Device

## (undated) DEVICE — GOWN SURGICAL X-LARGE

## (undated) DEVICE — ELECTRODE REM PLYHSV RETURN 9

## (undated) DEVICE — SEE MEDLINE ITEM 157131

## (undated) DEVICE — DEKA CO2 LASER

## (undated) DEVICE — SEE MEDLINE ITEM 157128

## (undated) DEVICE — SUT SILK 2-0 SH 18IN BLACK

## (undated) DEVICE — TRAY MINOR GEN SURG OMC

## (undated) DEVICE — KIT ANTIFOG W/SPONG & FLUID

## (undated) DEVICE — NEPTUNE 4 PORT MANIFOLD

## (undated) DEVICE — HOLDER TRACH TUBE NECKBAND

## (undated) DEVICE — GLOVE SURG BIOGEL LATEX SZ 7.5

## (undated) DEVICE — SEE MEDLINE ITEM 154981